# Patient Record
Sex: MALE | Race: WHITE | Employment: OTHER | URBAN - METROPOLITAN AREA
[De-identification: names, ages, dates, MRNs, and addresses within clinical notes are randomized per-mention and may not be internally consistent; named-entity substitution may affect disease eponyms.]

---

## 2021-01-01 ENCOUNTER — DOCUMENTATION ONLY (OUTPATIENT)
Dept: CARDIOLOGY CLINIC | Age: 71
End: 2021-01-01

## 2021-01-01 ENCOUNTER — TELEPHONE (OUTPATIENT)
Dept: CARDIOLOGY CLINIC | Age: 71
End: 2021-01-01

## 2021-01-01 ENCOUNTER — OFFICE VISIT (OUTPATIENT)
Dept: CARDIOLOGY CLINIC | Age: 71
End: 2021-01-01
Payer: MEDICARE

## 2021-01-01 ENCOUNTER — APPOINTMENT (OUTPATIENT)
Dept: ULTRASOUND IMAGING | Age: 71
DRG: 246 | End: 2021-01-01
Attending: INTERNAL MEDICINE
Payer: MEDICARE

## 2021-01-01 ENCOUNTER — APPOINTMENT (OUTPATIENT)
Dept: NON INVASIVE DIAGNOSTICS | Age: 71
DRG: 246 | End: 2021-01-01
Attending: STUDENT IN AN ORGANIZED HEALTH CARE EDUCATION/TRAINING PROGRAM
Payer: MEDICARE

## 2021-01-01 ENCOUNTER — APPOINTMENT (OUTPATIENT)
Dept: GENERAL RADIOLOGY | Age: 71
DRG: 246 | End: 2021-01-01
Attending: EMERGENCY MEDICINE
Payer: MEDICARE

## 2021-01-01 ENCOUNTER — HOSPITAL ENCOUNTER (OUTPATIENT)
Dept: CT IMAGING | Age: 71
Discharge: HOME | End: 2021-11-30
Payer: MEDICARE

## 2021-01-01 ENCOUNTER — HOSPITAL ENCOUNTER (OUTPATIENT)
Dept: GENERAL RADIOLOGY | Age: 71
Discharge: HOME | End: 2021-11-29
Attending: PSYCHIATRY & NEUROLOGY
Payer: MEDICARE

## 2021-01-01 ENCOUNTER — APPOINTMENT (OUTPATIENT)
Dept: GENERAL RADIOLOGY | Age: 71
DRG: 246 | End: 2021-01-01
Attending: FAMILY MEDICINE
Payer: MEDICARE

## 2021-01-01 ENCOUNTER — APPOINTMENT (OUTPATIENT)
Dept: GENERAL RADIOLOGY | Age: 71
DRG: 246 | End: 2021-01-01
Attending: STUDENT IN AN ORGANIZED HEALTH CARE EDUCATION/TRAINING PROGRAM
Payer: MEDICARE

## 2021-01-01 ENCOUNTER — TELEPHONE (OUTPATIENT)
Dept: CASE MANAGEMENT | Age: 71
End: 2021-01-01

## 2021-01-01 ENCOUNTER — TRANSCRIBE ORDER (OUTPATIENT)
Dept: CARDIAC REHAB | Age: 71
End: 2021-01-01

## 2021-01-01 ENCOUNTER — APPOINTMENT (OUTPATIENT)
Dept: CT IMAGING | Age: 71
DRG: 246 | End: 2021-01-01
Attending: EMERGENCY MEDICINE
Payer: MEDICARE

## 2021-01-01 ENCOUNTER — HOSPITAL ENCOUNTER (INPATIENT)
Age: 71
LOS: 10 days | Discharge: HOME | DRG: 246 | End: 2021-12-19
Attending: EMERGENCY MEDICINE | Admitting: STUDENT IN AN ORGANIZED HEALTH CARE EDUCATION/TRAINING PROGRAM
Payer: MEDICARE

## 2021-01-01 VITALS
HEART RATE: 76 BPM | SYSTOLIC BLOOD PRESSURE: 134 MMHG | BODY MASS INDEX: 23.32 KG/M2 | DIASTOLIC BLOOD PRESSURE: 58 MMHG | WEIGHT: 140 LBS | RESPIRATION RATE: 30 BRPM | HEIGHT: 65 IN

## 2021-01-01 VITALS
OXYGEN SATURATION: 93 % | HEIGHT: 65 IN | HEART RATE: 73 BPM | RESPIRATION RATE: 22 BRPM | TEMPERATURE: 97.4 F | DIASTOLIC BLOOD PRESSURE: 70 MMHG | SYSTOLIC BLOOD PRESSURE: 142 MMHG | WEIGHT: 137.4 LBS | BODY MASS INDEX: 22.89 KG/M2

## 2021-01-01 VITALS
BODY MASS INDEX: 22.48 KG/M2 | SYSTOLIC BLOOD PRESSURE: 141 MMHG | OXYGEN SATURATION: 99 % | HEIGHT: 65 IN | TEMPERATURE: 97.4 F | RESPIRATION RATE: 20 BRPM | HEART RATE: 80 BPM | DIASTOLIC BLOOD PRESSURE: 58 MMHG | WEIGHT: 134.92 LBS

## 2021-01-01 VITALS
BODY MASS INDEX: 23.16 KG/M2 | RESPIRATION RATE: 19 BRPM | DIASTOLIC BLOOD PRESSURE: 70 MMHG | WEIGHT: 139 LBS | OXYGEN SATURATION: 94 % | HEART RATE: 59 BPM | SYSTOLIC BLOOD PRESSURE: 121 MMHG | HEIGHT: 65 IN

## 2021-01-01 VITALS
BODY MASS INDEX: 23.39 KG/M2 | OXYGEN SATURATION: 97 % | DIASTOLIC BLOOD PRESSURE: 54 MMHG | SYSTOLIC BLOOD PRESSURE: 101 MMHG | WEIGHT: 140.4 LBS | HEIGHT: 65 IN | RESPIRATION RATE: 28 BRPM | HEART RATE: 72 BPM

## 2021-01-01 VITALS
SYSTOLIC BLOOD PRESSURE: 114 MMHG | RESPIRATION RATE: 16 BRPM | HEART RATE: 60 BPM | BODY MASS INDEX: 24.07 KG/M2 | OXYGEN SATURATION: 94 % | WEIGHT: 141 LBS | HEIGHT: 64 IN | DIASTOLIC BLOOD PRESSURE: 60 MMHG

## 2021-01-01 DIAGNOSIS — I50.42 CHRONIC COMBINED SYSTOLIC AND DIASTOLIC CONGESTIVE HEART FAILURE (HCC): Primary | ICD-10-CM

## 2021-01-01 DIAGNOSIS — Z95.5 STENTED CORONARY ARTERY: Primary | ICD-10-CM

## 2021-01-01 DIAGNOSIS — I35.1 AORTIC VALVE INSUFFICIENCY, ETIOLOGY OF CARDIAC VALVE DISEASE UNSPECIFIED: ICD-10-CM

## 2021-01-01 DIAGNOSIS — I35.1 AORTIC VALVE INSUFFICIENCY, ETIOLOGY OF CARDIAC VALVE DISEASE UNSPECIFIED: Primary | ICD-10-CM

## 2021-01-01 DIAGNOSIS — I21.A1 TYPE 2 MYOCARDIAL INFARCTION (HCC): ICD-10-CM

## 2021-01-01 DIAGNOSIS — I50.42 CHRONIC COMBINED SYSTOLIC AND DIASTOLIC CONGESTIVE HEART FAILURE (HCC): ICD-10-CM

## 2021-01-01 DIAGNOSIS — N28.9 RENAL INSUFFICIENCY: ICD-10-CM

## 2021-01-01 DIAGNOSIS — I10 ESSENTIAL HYPERTENSION: Primary | ICD-10-CM

## 2021-01-01 DIAGNOSIS — I50.9 ACUTE CONGESTIVE HEART FAILURE, UNSPECIFIED HEART FAILURE TYPE (HCC): Primary | ICD-10-CM

## 2021-01-01 DIAGNOSIS — I25.10 CORONARY ARTERIOSCLEROSIS: ICD-10-CM

## 2021-01-01 DIAGNOSIS — E11.9 TYPE 2 DIABETES MELLITUS WITHOUT COMPLICATION, WITHOUT LONG-TERM CURRENT USE OF INSULIN (HCC): ICD-10-CM

## 2021-01-01 DIAGNOSIS — J44.9 CHRONIC OBSTRUCTIVE PULMONARY DISEASE, UNSPECIFIED COPD TYPE (HCC): ICD-10-CM

## 2021-01-01 DIAGNOSIS — I10 ESSENTIAL HYPERTENSION: ICD-10-CM

## 2021-01-01 DIAGNOSIS — I25.83 CORONARY ARTERY DISEASE DUE TO LIPID RICH PLAQUE: ICD-10-CM

## 2021-01-01 DIAGNOSIS — R91.8 PULMONARY MASS: ICD-10-CM

## 2021-01-01 DIAGNOSIS — R06.02 SHORTNESS OF BREATH: ICD-10-CM

## 2021-01-01 DIAGNOSIS — Z09 HOSPITAL DISCHARGE FOLLOW-UP: ICD-10-CM

## 2021-01-01 DIAGNOSIS — I25.10 CORONARY ARTERY DISEASE DUE TO LIPID RICH PLAQUE: ICD-10-CM

## 2021-01-01 DIAGNOSIS — R06.02 SHORTNESS OF BREATH: Primary | ICD-10-CM

## 2021-01-01 LAB
A FUMIGATUS1 AB SER QL ID: NEGATIVE
A PULLULANS AB SER QL: POSITIVE
ACT BLD: 243 SECS (ref 79–138)
ACT BLD: 249 SECS (ref 79–138)
ALBUMIN SERPL-MCNC: 2.7 G/DL (ref 3.5–5)
ALBUMIN SERPL-MCNC: 2.7 G/DL (ref 3.5–5)
ALBUMIN SERPL-MCNC: 2.8 G/DL (ref 3.5–5)
ALBUMIN SERPL-MCNC: 3.1 G/DL (ref 3.5–5)
ALBUMIN SERPL-MCNC: 3.2 G/DL (ref 3.5–5)
ALBUMIN/GLOB SERPL: 0.6 {RATIO} (ref 1.1–2.2)
ALBUMIN/GLOB SERPL: 0.6 {RATIO} (ref 1.1–2.2)
ALBUMIN/GLOB SERPL: 0.7 {RATIO} (ref 1.1–2.2)
ALBUMIN/GLOB SERPL: 0.8 {RATIO} (ref 1.1–2.2)
ALBUMIN/GLOB SERPL: 0.9 {RATIO} (ref 1.1–2.2)
ALP SERPL-CCNC: 116 U/L (ref 45–117)
ALP SERPL-CCNC: 122 U/L (ref 45–117)
ALP SERPL-CCNC: 146 U/L (ref 45–117)
ALP SERPL-CCNC: 150 U/L (ref 45–117)
ALP SERPL-CCNC: 167 U/L (ref 45–117)
ALT SERPL-CCNC: 43 U/L (ref 12–78)
ALT SERPL-CCNC: 76 U/L (ref 12–78)
ALT SERPL-CCNC: 79 U/L (ref 12–78)
ALT SERPL-CCNC: 86 U/L (ref 12–78)
ALT SERPL-CCNC: 88 U/L (ref 12–78)
ANA SER QL: NEGATIVE
ANA SER QL: NEGATIVE
ANION GAP SERPL CALC-SCNC: 3 MMOL/L (ref 5–15)
ANION GAP SERPL CALC-SCNC: 4 MMOL/L (ref 5–15)
ANION GAP SERPL CALC-SCNC: 5 MMOL/L (ref 5–15)
ANION GAP SERPL CALC-SCNC: 6 MMOL/L (ref 5–15)
ANION GAP SERPL CALC-SCNC: 7 MMOL/L (ref 5–15)
ANION GAP SERPL CALC-SCNC: 7 MMOL/L (ref 5–15)
ANION GAP SERPL CALC-SCNC: 8 MMOL/L (ref 5–15)
ANION GAP SERPL CALC-SCNC: 9 MMOL/L (ref 5–15)
APPEARANCE UR: CLEAR
APTT PPP: 27.1 SEC (ref 22.1–31)
APTT PPP: 28.2 SEC (ref 22.1–31)
APTT PPP: 29.1 SEC (ref 22.1–31)
APTT PPP: 30.6 SEC (ref 22.1–31)
APTT PPP: 51.7 SEC (ref 22.1–31)
APTT PPP: 55.9 SEC (ref 22.1–31)
APTT PPP: 57.5 SEC (ref 22.1–31)
APTT PPP: 69.3 SEC (ref 22.1–31)
APTT PPP: 79.8 SEC (ref 22.1–31)
APTT PPP: >130 SEC (ref 22.1–31)
ARTERIAL PATENCY WRIST A: ABNORMAL
ARTERIAL PATENCY WRIST A: ABNORMAL
AST SERPL-CCNC: 12 U/L (ref 15–37)
AST SERPL-CCNC: 37 U/L (ref 15–37)
AST SERPL-CCNC: 38 U/L (ref 15–37)
AST SERPL-CCNC: 41 U/L (ref 15–37)
AST SERPL-CCNC: 49 U/L (ref 15–37)
ATRIAL RATE: 67 BPM
ATRIAL RATE: 72 BPM
ATRIAL RATE: 76 BPM
ATRIAL RATE: 85 BPM
ATRIAL RATE: 86 BPM
ATRIAL RATE: 97 BPM
AV R PG: 93.89 MMHG
BACTERIA SPEC CULT: ABNORMAL
BACTERIA SPEC CULT: NORMAL
BACTERIA URNS QL MICRO: ABNORMAL /HPF
BASE EXCESS BLD CALC-SCNC: 3.5 MMOL/L
BASE EXCESS BLD CALC-SCNC: 6.3 MMOL/L
BASE EXCESS BLD CALC-SCNC: 7 MMOL/L
BASOPHILS # BLD: 0 K/UL (ref 0–0.1)
BASOPHILS # BLD: 0.1 K/UL (ref 0–0.1)
BASOPHILS # BLD: 0.1 K/UL (ref 0–0.1)
BASOPHILS NFR BLD: 0 % (ref 0–1)
BASOPHILS NFR BLD: 1 % (ref 0–1)
BASOPHILS NFR BLD: 1 % (ref 0–1)
BDY SITE: ABNORMAL
BDY SITE: ABNORMAL
BILIRUB SERPL-MCNC: 0.2 MG/DL (ref 0.2–1)
BILIRUB SERPL-MCNC: 0.3 MG/DL (ref 0.2–1)
BILIRUB SERPL-MCNC: 0.4 MG/DL (ref 0.2–1)
BILIRUB UR QL: NEGATIVE
BNP SERPL-MCNC: 1300 PG/ML
BNP SERPL-MCNC: 2307 PG/ML
BNP SERPL-MCNC: 2469 PG/ML
BNP SERPL-MCNC: 2527 PG/ML
BNP SERPL-MCNC: 2630 PG/ML
BNP SERPL-MCNC: 3351 PG/ML
BNP SERPL-MCNC: 3660 PG/ML
BNP SERPL-MCNC: 4772 PG/ML
BNP SERPL-MCNC: 5365 PG/ML
BNP SERPL-MCNC: 5421 PG/ML
BUN SERPL-MCNC: 53 MG/DL (ref 6–20)
BUN SERPL-MCNC: 54 MG/DL (ref 6–20)
BUN SERPL-MCNC: 54 MG/DL (ref 6–20)
BUN SERPL-MCNC: 56 MG/DL (ref 6–20)
BUN SERPL-MCNC: 67 MG/DL (ref 6–20)
BUN SERPL-MCNC: 72 MG/DL (ref 6–20)
BUN SERPL-MCNC: 77 MG/DL (ref 6–20)
BUN SERPL-MCNC: 81 MG/DL (ref 6–20)
BUN SERPL-MCNC: 83 MG/DL (ref 6–20)
BUN SERPL-MCNC: 84 MG/DL (ref 6–20)
BUN SERPL-MCNC: 84 MG/DL (ref 6–20)
BUN SERPL-MCNC: 85 MG/DL (ref 6–20)
BUN SERPL-MCNC: 86 MG/DL (ref 6–20)
BUN SERPL-MCNC: 88 MG/DL (ref 6–20)
BUN/CREAT SERPL: 21 (ref 12–20)
BUN/CREAT SERPL: 21 (ref 12–20)
BUN/CREAT SERPL: 22 (ref 12–20)
BUN/CREAT SERPL: 23 (ref 12–20)
BUN/CREAT SERPL: 24 (ref 12–20)
BUN/CREAT SERPL: 27 (ref 12–20)
BUN/CREAT SERPL: 27 (ref 12–20)
BUN/CREAT SERPL: 28 (ref 12–20)
BUN/CREAT SERPL: 29 (ref 12–20)
BUN/CREAT SERPL: 29 (ref 12–20)
BUN/CREAT SERPL: 30 (ref 12–20)
BUN/CREAT SERPL: 30 (ref 12–20)
BUN/CREAT SERPL: 33 (ref 12–20)
BUN/CREAT SERPL: 35 (ref 12–20)
C-ANCA TITR SER IF: NORMAL TITER
C-ANCA TITR SER IF: NORMAL TITER
CALCIUM SERPL-MCNC: 8.3 MG/DL (ref 8.5–10.1)
CALCIUM SERPL-MCNC: 8.4 MG/DL (ref 8.5–10.1)
CALCIUM SERPL-MCNC: 8.6 MG/DL (ref 8.5–10.1)
CALCIUM SERPL-MCNC: 8.7 MG/DL (ref 8.5–10.1)
CALCIUM SERPL-MCNC: 8.7 MG/DL (ref 8.5–10.1)
CALCIUM SERPL-MCNC: 8.8 MG/DL (ref 8.5–10.1)
CALCIUM SERPL-MCNC: 9 MG/DL (ref 8.5–10.1)
CALCIUM SERPL-MCNC: 9 MG/DL (ref 8.5–10.1)
CALCIUM SERPL-MCNC: 9.2 MG/DL (ref 8.5–10.1)
CALCIUM SERPL-MCNC: 9.4 MG/DL (ref 8.5–10.1)
CALCIUM SERPL-MCNC: 9.4 MG/DL (ref 8.5–10.1)
CALCIUM SERPL-MCNC: 9.5 MG/DL (ref 8.5–10.1)
CALCULATED P AXIS, ECG09: 25 DEGREES
CALCULATED P AXIS, ECG09: 28 DEGREES
CALCULATED P AXIS, ECG09: 28 DEGREES
CALCULATED P AXIS, ECG09: 31 DEGREES
CALCULATED P AXIS, ECG09: 33 DEGREES
CALCULATED P AXIS, ECG09: 59 DEGREES
CALCULATED R AXIS, ECG10: -22 DEGREES
CALCULATED R AXIS, ECG10: -28 DEGREES
CALCULATED R AXIS, ECG10: -31 DEGREES
CALCULATED R AXIS, ECG10: -33 DEGREES
CALCULATED R AXIS, ECG10: -34 DEGREES
CALCULATED T AXIS, ECG11: 110 DEGREES
CALCULATED T AXIS, ECG11: 122 DEGREES
CALCULATED T AXIS, ECG11: 124 DEGREES
CALCULATED T AXIS, ECG11: 6 DEGREES
CC UR VC: ABNORMAL
CCP IGA+IGG SERPL IA-ACNC: 9 UNITS (ref 0–19)
CHLORIDE SERPL-SCNC: 100 MMOL/L (ref 97–108)
CHLORIDE SERPL-SCNC: 100 MMOL/L (ref 97–108)
CHLORIDE SERPL-SCNC: 101 MMOL/L (ref 97–108)
CHLORIDE SERPL-SCNC: 101 MMOL/L (ref 97–108)
CHLORIDE SERPL-SCNC: 102 MMOL/L (ref 97–108)
CHLORIDE SERPL-SCNC: 103 MMOL/L (ref 97–108)
CHLORIDE SERPL-SCNC: 104 MMOL/L (ref 97–108)
CHLORIDE SERPL-SCNC: 104 MMOL/L (ref 97–108)
CHLORIDE SERPL-SCNC: 95 MMOL/L (ref 97–108)
CHLORIDE SERPL-SCNC: 95 MMOL/L (ref 97–108)
CHLORIDE SERPL-SCNC: 96 MMOL/L (ref 97–108)
CHLORIDE SERPL-SCNC: 98 MMOL/L (ref 97–108)
CHLORIDE SERPL-SCNC: 98 MMOL/L (ref 97–108)
CHLORIDE SERPL-SCNC: 99 MMOL/L (ref 97–108)
CHOLEST SERPL-MCNC: 76 MG/DL
CO2 SERPL-SCNC: 27 MMOL/L (ref 21–32)
CO2 SERPL-SCNC: 28 MMOL/L (ref 21–32)
CO2 SERPL-SCNC: 28 MMOL/L (ref 21–32)
CO2 SERPL-SCNC: 29 MMOL/L (ref 21–32)
CO2 SERPL-SCNC: 30 MMOL/L (ref 21–32)
CO2 SERPL-SCNC: 30 MMOL/L (ref 21–32)
CO2 SERPL-SCNC: 31 MMOL/L (ref 21–32)
CO2 SERPL-SCNC: 31 MMOL/L (ref 21–32)
CO2 SERPL-SCNC: 32 MMOL/L (ref 21–32)
CO2 SERPL-SCNC: 32 MMOL/L (ref 21–32)
CO2 SERPL-SCNC: 33 MMOL/L (ref 21–32)
COLOR UR: ABNORMAL
COMMENT, HOLDF: NORMAL
COVID-19 RAPID TEST, COVR: NOT DETECTED
CREAT SERPL-MCNC: 2.41 MG/DL (ref 0.7–1.3)
CREAT SERPL-MCNC: 2.44 MG/DL (ref 0.7–1.3)
CREAT SERPL-MCNC: 2.46 MG/DL (ref 0.7–1.3)
CREAT SERPL-MCNC: 2.48 MG/DL (ref 0.7–1.3)
CREAT SERPL-MCNC: 2.5 MG/DL (ref 0.7–1.3)
CREAT SERPL-MCNC: 2.57 MG/DL (ref 0.7–1.3)
CREAT SERPL-MCNC: 2.62 MG/DL (ref 0.7–1.3)
CREAT SERPL-MCNC: 2.76 MG/DL (ref 0.7–1.3)
CREAT SERPL-MCNC: 2.83 MG/DL (ref 0.7–1.3)
CREAT SERPL-MCNC: 2.83 MG/DL (ref 0.7–1.3)
CREAT SERPL-MCNC: 2.85 MG/DL (ref 0.7–1.3)
CREAT SERPL-MCNC: 2.89 MG/DL (ref 0.7–1.3)
CREAT SERPL-MCNC: 3.05 MG/DL (ref 0.7–1.3)
CREAT SERPL-MCNC: 3.2 MG/DL (ref 0.7–1.3)
CREAT UR-MCNC: 26.6 MG/DL
CRP SERPL-MCNC: 3.23 MG/DL (ref 0–0.6)
DIAGNOSIS, 93000: NORMAL
DIFFERENTIAL METHOD BLD: ABNORMAL
ECHO AO ROOT DIAM: 3.61 CM
ECHO AR MAX VEL PISA: 483.93 CM/S
ECHO AV AREA PEAK VELOCITY: 1.58 CM2
ECHO AV AREA VTI: 1.83 CM2
ECHO AV AREA/BSA PEAK VELOCITY: 0.9 CM2/M2
ECHO AV AREA/BSA VTI: 1.1 CM2/M2
ECHO AV MEAN GRADIENT: 13.88 MMHG
ECHO AV PEAK GRADIENT: 26.89 MMHG
ECHO AV PEAK VELOCITY: 259.3 CM/S
ECHO AV REGURGITANT PHT: 411.89 MS
ECHO AV VTI: 43.62 CM
ECHO LA AREA 4C: 15.32 CM2
ECHO LA MAJOR AXIS: 4.5 CM
ECHO LA MINOR AXIS: 2.66 CM
ECHO LA VOL 2C: 49.5 ML (ref 18–58)
ECHO LA VOL 4C: 36.97 ML (ref 18–58)
ECHO LA VOL BP: 45.16 ML (ref 18–58)
ECHO LA VOL/BSA BIPLANE: 26.72 ML/M2 (ref 16–28)
ECHO LA VOLUME INDEX A2C: 29.29 ML/M2 (ref 16–28)
ECHO LA VOLUME INDEX A4C: 21.88 ML/M2 (ref 16–28)
ECHO LV E' LATERAL VELOCITY: 4.67 CM/S
ECHO LV E' SEPTAL VELOCITY: 5.74 CM/S
ECHO LV INTERNAL DIMENSION DIASTOLIC: 4.61 CM (ref 4.2–5.9)
ECHO LV INTERNAL DIMENSION SYSTOLIC: 3.59 CM
ECHO LV IVSD: 1.07 CM (ref 0.6–1)
ECHO LV MASS 2D: 187.7 G (ref 88–224)
ECHO LV MASS INDEX 2D: 111.1 G/M2 (ref 49–115)
ECHO LV POSTERIOR WALL DIASTOLIC: 1.18 CM (ref 0.6–1)
ECHO LVOT DIAM: 2.33 CM
ECHO LVOT PEAK GRADIENT: 3.68 MMHG
ECHO LVOT PEAK VELOCITY: 95.86 CM/S
ECHO LVOT SV: 79.9 ML
ECHO LVOT VTI: 18.71 CM
ECHO MV A VELOCITY: 81.1 CM/S
ECHO MV AREA PHT: 3.85 CM2
ECHO MV E DECELERATION TIME (DT): 197.22 MS
ECHO MV E VELOCITY: 60.56 CM/S
ECHO MV E/A RATIO: 0.75
ECHO MV E/E' LATERAL: 12.97
ECHO MV E/E' RATIO (AVERAGED): 11.76
ECHO MV E/E' SEPTAL: 10.55
ECHO MV PRESSURE HALF TIME (PHT): 57.19 MS
ECHO PV MAX VELOCITY: 77.84 CM/S
ECHO PV PEAK INSTANTANEOUS GRADIENT SYSTOLIC: 2.42 MMHG
ECHO RV INTERNAL DIMENSION: 4.73 CM
ECHO RV TAPSE: 2.37 CM (ref 1.5–2)
EOSINOPHIL # BLD: 0 K/UL (ref 0–0.4)
EOSINOPHIL # BLD: 0.1 K/UL (ref 0–0.4)
EOSINOPHIL # BLD: 0.1 K/UL (ref 0–0.4)
EOSINOPHIL # BLD: 0.2 K/UL (ref 0–0.4)
EOSINOPHIL # BLD: 0.3 K/UL (ref 0–0.4)
EOSINOPHIL # BLD: 0.4 K/UL (ref 0–0.4)
EOSINOPHIL # BLD: 0.5 K/UL (ref 0–0.4)
EOSINOPHIL # BLD: 0.7 K/UL (ref 0–0.4)
EOSINOPHIL NFR BLD: 0 % (ref 0–7)
EOSINOPHIL NFR BLD: 1 % (ref 0–7)
EOSINOPHIL NFR BLD: 3 % (ref 0–7)
EOSINOPHIL NFR BLD: 5 % (ref 0–7)
EOSINOPHIL NFR BLD: 6 % (ref 0–7)
EOSINOPHIL NFR BLD: 7 % (ref 0–7)
EPITH CASTS URNS QL MICRO: ABNORMAL /LPF
ERYTHROCYTE [DISTWIDTH] IN BLOOD BY AUTOMATED COUNT: 14.6 % (ref 11.5–14.5)
ERYTHROCYTE [DISTWIDTH] IN BLOOD BY AUTOMATED COUNT: 14.7 % (ref 11.5–14.5)
ERYTHROCYTE [DISTWIDTH] IN BLOOD BY AUTOMATED COUNT: 14.9 % (ref 11.5–14.5)
ERYTHROCYTE [DISTWIDTH] IN BLOOD BY AUTOMATED COUNT: 15 % (ref 11.5–14.5)
ERYTHROCYTE [SEDIMENTATION RATE] IN BLOOD: 63 MM/HR (ref 0–20)
ERYTHROCYTE [SEDIMENTATION RATE] IN BLOOD: 76 MM/HR (ref 0–20)
EST. AVERAGE GLUCOSE BLD GHB EST-MCNC: 220 MG/DL
FERRITIN SERPL-MCNC: 129 NG/ML (ref 26–388)
GAS FLOW.O2 O2 DELIVERY SYS: ABNORMAL L/MIN
GAS FLOW.O2 O2 DELIVERY SYS: ABNORMAL L/MIN
GLOBULIN SER CALC-MCNC: 3.4 G/DL (ref 2–4)
GLOBULIN SER CALC-MCNC: 4.1 G/DL (ref 2–4)
GLOBULIN SER CALC-MCNC: 4.1 G/DL (ref 2–4)
GLOBULIN SER CALC-MCNC: 4.7 G/DL (ref 2–4)
GLOBULIN SER CALC-MCNC: 4.8 G/DL (ref 2–4)
GLUCOSE BLD STRIP.AUTO-MCNC: 102 MG/DL (ref 65–117)
GLUCOSE BLD STRIP.AUTO-MCNC: 102 MG/DL (ref 65–117)
GLUCOSE BLD STRIP.AUTO-MCNC: 105 MG/DL (ref 65–117)
GLUCOSE BLD STRIP.AUTO-MCNC: 108 MG/DL (ref 65–117)
GLUCOSE BLD STRIP.AUTO-MCNC: 109 MG/DL (ref 65–117)
GLUCOSE BLD STRIP.AUTO-MCNC: 110 MG/DL (ref 65–117)
GLUCOSE BLD STRIP.AUTO-MCNC: 111 MG/DL (ref 65–117)
GLUCOSE BLD STRIP.AUTO-MCNC: 114 MG/DL (ref 65–117)
GLUCOSE BLD STRIP.AUTO-MCNC: 118 MG/DL (ref 65–117)
GLUCOSE BLD STRIP.AUTO-MCNC: 129 MG/DL (ref 65–117)
GLUCOSE BLD STRIP.AUTO-MCNC: 132 MG/DL (ref 65–117)
GLUCOSE BLD STRIP.AUTO-MCNC: 135 MG/DL (ref 65–117)
GLUCOSE BLD STRIP.AUTO-MCNC: 136 MG/DL (ref 65–117)
GLUCOSE BLD STRIP.AUTO-MCNC: 140 MG/DL (ref 65–117)
GLUCOSE BLD STRIP.AUTO-MCNC: 140 MG/DL (ref 65–117)
GLUCOSE BLD STRIP.AUTO-MCNC: 143 MG/DL (ref 65–117)
GLUCOSE BLD STRIP.AUTO-MCNC: 165 MG/DL (ref 65–117)
GLUCOSE BLD STRIP.AUTO-MCNC: 173 MG/DL (ref 65–117)
GLUCOSE BLD STRIP.AUTO-MCNC: 182 MG/DL (ref 65–117)
GLUCOSE BLD STRIP.AUTO-MCNC: 189 MG/DL (ref 65–117)
GLUCOSE BLD STRIP.AUTO-MCNC: 190 MG/DL (ref 65–117)
GLUCOSE BLD STRIP.AUTO-MCNC: 191 MG/DL (ref 65–117)
GLUCOSE BLD STRIP.AUTO-MCNC: 194 MG/DL (ref 65–117)
GLUCOSE BLD STRIP.AUTO-MCNC: 196 MG/DL (ref 65–117)
GLUCOSE BLD STRIP.AUTO-MCNC: 196 MG/DL (ref 65–117)
GLUCOSE BLD STRIP.AUTO-MCNC: 205 MG/DL (ref 65–117)
GLUCOSE BLD STRIP.AUTO-MCNC: 206 MG/DL (ref 65–117)
GLUCOSE BLD STRIP.AUTO-MCNC: 210 MG/DL (ref 65–117)
GLUCOSE BLD STRIP.AUTO-MCNC: 219 MG/DL (ref 65–117)
GLUCOSE BLD STRIP.AUTO-MCNC: 219 MG/DL (ref 65–117)
GLUCOSE BLD STRIP.AUTO-MCNC: 226 MG/DL (ref 65–117)
GLUCOSE BLD STRIP.AUTO-MCNC: 227 MG/DL (ref 65–117)
GLUCOSE BLD STRIP.AUTO-MCNC: 234 MG/DL (ref 65–117)
GLUCOSE BLD STRIP.AUTO-MCNC: 256 MG/DL (ref 65–117)
GLUCOSE BLD STRIP.AUTO-MCNC: 274 MG/DL (ref 65–117)
GLUCOSE BLD STRIP.AUTO-MCNC: 275 MG/DL (ref 65–117)
GLUCOSE BLD STRIP.AUTO-MCNC: 294 MG/DL (ref 65–117)
GLUCOSE BLD STRIP.AUTO-MCNC: 311 MG/DL (ref 65–117)
GLUCOSE BLD STRIP.AUTO-MCNC: 33 MG/DL (ref 65–117)
GLUCOSE BLD STRIP.AUTO-MCNC: 35 MG/DL (ref 65–117)
GLUCOSE BLD STRIP.AUTO-MCNC: 363 MG/DL (ref 65–117)
GLUCOSE BLD STRIP.AUTO-MCNC: 37 MG/DL (ref 65–117)
GLUCOSE BLD STRIP.AUTO-MCNC: 406 MG/DL (ref 65–117)
GLUCOSE BLD STRIP.AUTO-MCNC: 409 MG/DL (ref 65–117)
GLUCOSE BLD STRIP.AUTO-MCNC: 41 MG/DL (ref 65–117)
GLUCOSE BLD STRIP.AUTO-MCNC: 411 MG/DL (ref 65–117)
GLUCOSE BLD STRIP.AUTO-MCNC: 424 MG/DL (ref 65–117)
GLUCOSE BLD STRIP.AUTO-MCNC: 49 MG/DL (ref 65–117)
GLUCOSE BLD STRIP.AUTO-MCNC: 50 MG/DL (ref 65–117)
GLUCOSE BLD STRIP.AUTO-MCNC: 52 MG/DL (ref 65–117)
GLUCOSE BLD STRIP.AUTO-MCNC: 53 MG/DL (ref 65–117)
GLUCOSE BLD STRIP.AUTO-MCNC: 53 MG/DL (ref 65–117)
GLUCOSE BLD STRIP.AUTO-MCNC: 55 MG/DL (ref 65–117)
GLUCOSE BLD STRIP.AUTO-MCNC: 57 MG/DL (ref 65–117)
GLUCOSE BLD STRIP.AUTO-MCNC: 61 MG/DL (ref 65–117)
GLUCOSE BLD STRIP.AUTO-MCNC: 62 MG/DL (ref 65–117)
GLUCOSE BLD STRIP.AUTO-MCNC: 63 MG/DL (ref 65–117)
GLUCOSE BLD STRIP.AUTO-MCNC: 64 MG/DL (ref 65–117)
GLUCOSE BLD STRIP.AUTO-MCNC: 65 MG/DL (ref 65–117)
GLUCOSE BLD STRIP.AUTO-MCNC: 76 MG/DL (ref 65–117)
GLUCOSE BLD STRIP.AUTO-MCNC: 84 MG/DL (ref 65–117)
GLUCOSE BLD STRIP.AUTO-MCNC: 87 MG/DL (ref 65–117)
GLUCOSE BLD STRIP.AUTO-MCNC: 88 MG/DL (ref 65–117)
GLUCOSE BLD STRIP.AUTO-MCNC: 89 MG/DL (ref 65–117)
GLUCOSE BLD STRIP.AUTO-MCNC: 89 MG/DL (ref 65–117)
GLUCOSE BLD STRIP.AUTO-MCNC: 90 MG/DL (ref 65–117)
GLUCOSE BLD STRIP.AUTO-MCNC: 91 MG/DL (ref 65–117)
GLUCOSE BLD STRIP.AUTO-MCNC: 93 MG/DL (ref 65–117)
GLUCOSE BLD STRIP.AUTO-MCNC: 97 MG/DL (ref 65–117)
GLUCOSE SERPL-MCNC: 107 MG/DL (ref 65–100)
GLUCOSE SERPL-MCNC: 126 MG/DL (ref 65–100)
GLUCOSE SERPL-MCNC: 136 MG/DL (ref 65–100)
GLUCOSE SERPL-MCNC: 150 MG/DL (ref 65–100)
GLUCOSE SERPL-MCNC: 181 MG/DL (ref 65–100)
GLUCOSE SERPL-MCNC: 195 MG/DL (ref 65–100)
GLUCOSE SERPL-MCNC: 277 MG/DL (ref 65–100)
GLUCOSE SERPL-MCNC: 332 MG/DL (ref 65–100)
GLUCOSE SERPL-MCNC: 334 MG/DL (ref 65–100)
GLUCOSE SERPL-MCNC: 381 MG/DL (ref 65–100)
GLUCOSE SERPL-MCNC: 39 MG/DL (ref 65–100)
GLUCOSE SERPL-MCNC: 71 MG/DL (ref 65–100)
GLUCOSE SERPL-MCNC: 72 MG/DL (ref 65–100)
GLUCOSE SERPL-MCNC: 90 MG/DL (ref 65–100)
GLUCOSE UR STRIP.AUTO-MCNC: NEGATIVE MG/DL
HAV IGM SER QL: NONREACTIVE
HBA1C MFR BLD: 9.3 % (ref 4–5.6)
HBV CORE IGM SER QL: NONREACTIVE
HBV SURFACE AG SER QL: <0.1 INDEX
HBV SURFACE AG SER QL: NEGATIVE
HCO3 BLD-SCNC: 30 MMOL/L (ref 22–26)
HCO3 BLD-SCNC: 31.6 MMOL/L (ref 22–26)
HCO3 BLD-SCNC: 33.1 MMOL/L (ref 22–26)
HCT VFR BLD AUTO: 28.7 % (ref 36.6–50.3)
HCT VFR BLD AUTO: 29.1 % (ref 36.6–50.3)
HCT VFR BLD AUTO: 29.2 % (ref 36.6–50.3)
HCT VFR BLD AUTO: 29.2 % (ref 36.6–50.3)
HCT VFR BLD AUTO: 29.4 % (ref 36.6–50.3)
HCT VFR BLD AUTO: 34.6 % (ref 36.6–50.3)
HCT VFR BLD AUTO: 36 % (ref 36.6–50.3)
HCT VFR BLD AUTO: 40.2 % (ref 36.6–50.3)
HCT VFR BLD AUTO: 40.5 % (ref 36.6–50.3)
HCT VFR BLD AUTO: 41.9 % (ref 36.6–50.3)
HCV AB SERPL QL IA: NONREACTIVE
HDLC SERPL-MCNC: 28 MG/DL
HDLC SERPL: 2.7 {RATIO} (ref 0–5)
HGB BLD-MCNC: 11.1 G/DL (ref 12.1–17)
HGB BLD-MCNC: 11.4 G/DL (ref 12.1–17)
HGB BLD-MCNC: 12.6 G/DL (ref 12.1–17)
HGB BLD-MCNC: 12.8 G/DL (ref 12.1–17)
HGB BLD-MCNC: 13.2 G/DL (ref 12.1–17)
HGB BLD-MCNC: 9.2 G/DL (ref 12.1–17)
HGB BLD-MCNC: 9.3 G/DL (ref 12.1–17)
HGB UR QL STRIP: NEGATIVE
HYALINE CASTS URNS QL MICRO: ABNORMAL /LPF (ref 0–5)
IGE SERPL-ACNC: 264 IU/ML (ref 6–495)
IGE SERPL-ACNC: 268 IU/ML (ref 6–495)
IMM GRANULOCYTES # BLD AUTO: 0.1 K/UL (ref 0–0.04)
IMM GRANULOCYTES # BLD AUTO: 0.2 K/UL (ref 0–0.04)
IMM GRANULOCYTES # BLD AUTO: 0.2 K/UL (ref 0–0.04)
IMM GRANULOCYTES # BLD AUTO: 0.3 K/UL (ref 0–0.04)
IMM GRANULOCYTES # BLD AUTO: 0.3 K/UL (ref 0–0.04)
IMM GRANULOCYTES NFR BLD AUTO: 1 % (ref 0–0.5)
IMM GRANULOCYTES NFR BLD AUTO: 2 % (ref 0–0.5)
INR PPP: 1 (ref 0.9–1.1)
IRON SATN MFR SERPL: 17 % (ref 20–50)
IRON SERPL-MCNC: 45 UG/DL (ref 35–150)
KETONES UR QL STRIP.AUTO: NEGATIVE MG/DL
L PNEUMO1 AG UR QL IA: NEGATIVE
LACEYELLA SACCHARI AB SER QL: NEGATIVE
LACTATE BLD-SCNC: 0.58 MMOL/L (ref 0.4–2)
LACTATE SERPL-SCNC: 1 MMOL/L (ref 0.4–2)
LACTATE SERPL-SCNC: 1.2 MMOL/L (ref 0.4–2)
LACTATE SERPL-SCNC: 1.5 MMOL/L (ref 0.4–2)
LACTATE SERPL-SCNC: 1.7 MMOL/L (ref 0.4–2)
LDLC SERPL CALC-MCNC: 25.4 MG/DL (ref 0–100)
LEUKOCYTE ESTERASE UR QL STRIP.AUTO: ABNORMAL
LYMPHOCYTES # BLD: 0.2 K/UL (ref 0.8–3.5)
LYMPHOCYTES # BLD: 0.3 K/UL (ref 0.8–3.5)
LYMPHOCYTES # BLD: 0.4 K/UL (ref 0.8–3.5)
LYMPHOCYTES # BLD: 0.5 K/UL (ref 0.8–3.5)
LYMPHOCYTES # BLD: 0.7 K/UL (ref 0.8–3.5)
LYMPHOCYTES # BLD: 0.8 K/UL (ref 0.8–3.5)
LYMPHOCYTES # BLD: 1 K/UL (ref 0.8–3.5)
LYMPHOCYTES # BLD: 1 K/UL (ref 0.8–3.5)
LYMPHOCYTES NFR BLD: 1 % (ref 12–49)
LYMPHOCYTES NFR BLD: 10 % (ref 12–49)
LYMPHOCYTES NFR BLD: 10 % (ref 12–49)
LYMPHOCYTES NFR BLD: 2 % (ref 12–49)
LYMPHOCYTES NFR BLD: 4 % (ref 12–49)
LYMPHOCYTES NFR BLD: 5 % (ref 12–49)
LYMPHOCYTES NFR BLD: 8 % (ref 12–49)
LYMPHOCYTES NFR BLD: 8 % (ref 12–49)
M PNEUMO IGG SER IA-ACNC: 322 U/ML (ref 0–99)
M PNEUMO IGM SER IA-ACNC: <770 U/ML (ref 0–769)
MAGNESIUM SERPL-MCNC: 2.3 MG/DL (ref 1.6–2.4)
MAGNESIUM SERPL-MCNC: 2.4 MG/DL (ref 1.6–2.4)
MAGNESIUM SERPL-MCNC: 2.4 MG/DL (ref 1.6–2.4)
MAGNESIUM SERPL-MCNC: 2.5 MG/DL (ref 1.6–2.4)
MAGNESIUM SERPL-MCNC: 2.6 MG/DL (ref 1.6–2.4)
MAGNESIUM SERPL-MCNC: 2.8 MG/DL (ref 1.6–2.4)
MAGNESIUM SERPL-MCNC: 2.8 MG/DL (ref 1.6–2.4)
MAGNESIUM SERPL-MCNC: 3 MG/DL (ref 1.6–2.4)
MCH RBC QN AUTO: 29.3 PG (ref 26–34)
MCH RBC QN AUTO: 29.4 PG (ref 26–34)
MCH RBC QN AUTO: 29.5 PG (ref 26–34)
MCH RBC QN AUTO: 29.6 PG (ref 26–34)
MCH RBC QN AUTO: 29.6 PG (ref 26–34)
MCH RBC QN AUTO: 29.7 PG (ref 26–34)
MCH RBC QN AUTO: 29.8 PG (ref 26–34)
MCH RBC QN AUTO: 29.8 PG (ref 26–34)
MCH RBC QN AUTO: 30 PG (ref 26–34)
MCH RBC QN AUTO: 30 PG (ref 26–34)
MCHC RBC AUTO-ENTMCNC: 31.3 G/DL (ref 30–36.5)
MCHC RBC AUTO-ENTMCNC: 31.5 G/DL (ref 30–36.5)
MCHC RBC AUTO-ENTMCNC: 31.6 G/DL (ref 30–36.5)
MCHC RBC AUTO-ENTMCNC: 31.7 G/DL (ref 30–36.5)
MCHC RBC AUTO-ENTMCNC: 31.8 G/DL (ref 30–36.5)
MCHC RBC AUTO-ENTMCNC: 31.8 G/DL (ref 30–36.5)
MCHC RBC AUTO-ENTMCNC: 32.1 G/DL (ref 30–36.5)
MCHC RBC AUTO-ENTMCNC: 32.4 G/DL (ref 30–36.5)
MCV RBC AUTO: 92.4 FL (ref 80–99)
MCV RBC AUTO: 92.6 FL (ref 80–99)
MCV RBC AUTO: 92.7 FL (ref 80–99)
MCV RBC AUTO: 93 FL (ref 80–99)
MCV RBC AUTO: 93.3 FL (ref 80–99)
MCV RBC AUTO: 93.5 FL (ref 80–99)
MCV RBC AUTO: 94.2 FL (ref 80–99)
MCV RBC AUTO: 94.2 FL (ref 80–99)
MCV RBC AUTO: 94.4 FL (ref 80–99)
MCV RBC AUTO: 94.4 FL (ref 80–99)
MONOCYTES # BLD: 0.1 K/UL (ref 0–1)
MONOCYTES # BLD: 0.3 K/UL (ref 0–1)
MONOCYTES # BLD: 0.8 K/UL (ref 0–1)
MONOCYTES # BLD: 0.9 K/UL (ref 0–1)
MONOCYTES # BLD: 1.1 K/UL (ref 0–1)
MONOCYTES # BLD: 1.4 K/UL (ref 0–1)
MONOCYTES NFR BLD: 1 % (ref 5–13)
MONOCYTES NFR BLD: 10 % (ref 5–13)
MONOCYTES NFR BLD: 10 % (ref 5–13)
MONOCYTES NFR BLD: 11 % (ref 5–13)
MONOCYTES NFR BLD: 14 % (ref 5–13)
MONOCYTES NFR BLD: 2 % (ref 5–13)
MONOCYTES NFR BLD: 5 % (ref 5–13)
MONOCYTES NFR BLD: 9 % (ref 5–13)
MYELOPEROXIDASE AB SER IA-ACNC: <9 U/ML (ref 0–9)
MYELOPEROXIDASE AB SER IA-ACNC: <9 U/ML (ref 0–9)
NEUTS SEG # BLD: 11.6 K/UL (ref 1.8–8)
NEUTS SEG # BLD: 13.5 K/UL (ref 1.8–8)
NEUTS SEG # BLD: 6.6 K/UL (ref 1.8–8)
NEUTS SEG # BLD: 7.1 K/UL (ref 1.8–8)
NEUTS SEG # BLD: 7.2 K/UL (ref 1.8–8)
NEUTS SEG # BLD: 7.6 K/UL (ref 1.8–8)
NEUTS SEG # BLD: 7.8 K/UL (ref 1.8–8)
NEUTS SEG # BLD: 8.5 K/UL (ref 1.8–8)
NEUTS SEG NFR BLD: 72 % (ref 32–75)
NEUTS SEG NFR BLD: 73 % (ref 32–75)
NEUTS SEG NFR BLD: 74 % (ref 32–75)
NEUTS SEG NFR BLD: 77 % (ref 32–75)
NEUTS SEG NFR BLD: 79 % (ref 32–75)
NEUTS SEG NFR BLD: 91 % (ref 32–75)
NEUTS SEG NFR BLD: 93 % (ref 32–75)
NEUTS SEG NFR BLD: 93 % (ref 32–75)
NITRITE UR QL STRIP.AUTO: NEGATIVE
NRBC # BLD: 0 K/UL (ref 0–0.01)
NRBC BLD-RTO: 0 PER 100 WBC
O2/TOTAL GAS SETTING VFR VENT: 5 %
O2/TOTAL GAS SETTING VFR VENT: 5 %
P-ANCA ATYPICAL TITR SER IF: NORMAL TITER
P-ANCA ATYPICAL TITR SER IF: NORMAL TITER
P-ANCA TITR SER IF: NORMAL TITER
P-ANCA TITR SER IF: NORMAL TITER
P-R INTERVAL, ECG05: 150 MS
P-R INTERVAL, ECG05: 152 MS
P-R INTERVAL, ECG05: 162 MS
P-R INTERVAL, ECG05: 162 MS
P-R INTERVAL, ECG05: 168 MS
P-R INTERVAL, ECG05: 186 MS
PCO2 BLD: 47 MMHG (ref 35–45)
PCO2 BLD: 51.3 MMHG (ref 35–45)
PCO2 BLD: 53.9 MMHG (ref 35–45)
PH BLD: 7.38 [PH] (ref 7.35–7.45)
PH BLD: 7.4 [PH] (ref 7.35–7.45)
PH BLD: 7.44 [PH] (ref 7.35–7.45)
PH UR STRIP: 6.5 [PH] (ref 5–8)
PHOSPHATE SERPL-MCNC: 2.5 MG/DL (ref 2.6–4.7)
PHOSPHATE SERPL-MCNC: 2.8 MG/DL (ref 2.6–4.7)
PHOSPHATE SERPL-MCNC: 4.1 MG/DL (ref 2.6–4.7)
PHOSPHATE SERPL-MCNC: 4.1 MG/DL (ref 2.6–4.7)
PHOSPHATE SERPL-MCNC: 4.7 MG/DL (ref 2.6–4.7)
PIGEON SERUM AB QL ID: NEGATIVE
PLATELET # BLD AUTO: 119 K/UL (ref 150–400)
PLATELET # BLD AUTO: 131 K/UL (ref 150–400)
PLATELET # BLD AUTO: 134 K/UL (ref 150–400)
PLATELET # BLD AUTO: 137 K/UL (ref 150–400)
PLATELET # BLD AUTO: 141 K/UL (ref 150–400)
PLATELET # BLD AUTO: 176 K/UL (ref 150–400)
PLATELET # BLD AUTO: 230 K/UL (ref 150–400)
PLATELET # BLD AUTO: 243 K/UL (ref 150–400)
PLATELET # BLD AUTO: 249 K/UL (ref 150–400)
PLATELET # BLD AUTO: 250 K/UL (ref 150–400)
PLATELET COMMENTS,PCOM: ABNORMAL
PLATELET COMMENTS,PCOM: ABNORMAL
PMV BLD AUTO: 10.3 FL (ref 8.9–12.9)
PMV BLD AUTO: 10.6 FL (ref 8.9–12.9)
PMV BLD AUTO: 10.6 FL (ref 8.9–12.9)
PMV BLD AUTO: 10.8 FL (ref 8.9–12.9)
PMV BLD AUTO: 10.9 FL (ref 8.9–12.9)
PMV BLD AUTO: 11 FL (ref 8.9–12.9)
PMV BLD AUTO: 11.1 FL (ref 8.9–12.9)
PMV BLD AUTO: 11.4 FL (ref 8.9–12.9)
PMV BLD AUTO: 11.7 FL (ref 8.9–12.9)
PMV BLD AUTO: 11.8 FL (ref 8.9–12.9)
PO2 BLD: 150 MMHG (ref 80–100)
PO2 BLD: 45 MMHG (ref 80–100)
PO2 BLD: 64 MMHG (ref 80–100)
POTASSIUM SERPL-SCNC: 3.8 MMOL/L (ref 3.5–5.1)
POTASSIUM SERPL-SCNC: 3.9 MMOL/L (ref 3.5–5.1)
POTASSIUM SERPL-SCNC: 4.1 MMOL/L (ref 3.5–5.1)
POTASSIUM SERPL-SCNC: 4.2 MMOL/L (ref 3.5–5.1)
POTASSIUM SERPL-SCNC: 4.3 MMOL/L (ref 3.5–5.1)
POTASSIUM SERPL-SCNC: 4.4 MMOL/L (ref 3.5–5.1)
POTASSIUM SERPL-SCNC: 4.7 MMOL/L (ref 3.5–5.1)
POTASSIUM SERPL-SCNC: 4.7 MMOL/L (ref 3.5–5.1)
POTASSIUM SERPL-SCNC: 4.8 MMOL/L (ref 3.5–5.1)
PROCALCITONIN SERPL-MCNC: 0.06 NG/ML
PROCALCITONIN SERPL-MCNC: 20.2 NG/ML
PROCALCITONIN SERPL-MCNC: 30.26 NG/ML
PROCALCITONIN SERPL-MCNC: 6.84 NG/ML
PROT SERPL-MCNC: 6.6 G/DL (ref 6.4–8.2)
PROT SERPL-MCNC: 6.8 G/DL (ref 6.4–8.2)
PROT SERPL-MCNC: 7.2 G/DL (ref 6.4–8.2)
PROT SERPL-MCNC: 7.5 G/DL (ref 6.4–8.2)
PROT SERPL-MCNC: 7.5 G/DL (ref 6.4–8.2)
PROT UR STRIP-MCNC: 30 MG/DL
PROT UR-MCNC: 75 MG/DL (ref 0–11.9)
PROT/CREAT UR-RTO: 2.8
PROTEINASE3 AB SER IA-ACNC: <3.5 U/ML (ref 0–3.5)
PROTEINASE3 AB SER IA-ACNC: <3.5 U/ML (ref 0–3.5)
PROTHROMBIN TIME: 10.8 SEC (ref 9–11.1)
Q-T INTERVAL, ECG07: 372 MS
Q-T INTERVAL, ECG07: 380 MS
Q-T INTERVAL, ECG07: 384 MS
Q-T INTERVAL, ECG07: 402 MS
Q-T INTERVAL, ECG07: 408 MS
Q-T INTERVAL, ECG07: 426 MS
QRS DURATION, ECG06: 110 MS
QRS DURATION, ECG06: 112 MS
QRS DURATION, ECG06: 116 MS
QRS DURATION, ECG06: 120 MS
QRS DURATION, ECG06: 94 MS
QRS DURATION, ECG06: 96 MS
QTC CALCULATION (BEZET), ECG08: 416 MS
QTC CALCULATION (BEZET), ECG08: 431 MS
QTC CALCULATION (BEZET), ECG08: 459 MS
QTC CALCULATION (BEZET), ECG08: 472 MS
QTC CALCULATION (BEZET), ECG08: 478 MS
QTC CALCULATION (BEZET), ECG08: 479 MS
RBC # BLD AUTO: 3.09 M/UL (ref 4.1–5.7)
RBC # BLD AUTO: 3.1 M/UL (ref 4.1–5.7)
RBC # BLD AUTO: 3.14 M/UL (ref 4.1–5.7)
RBC # BLD AUTO: 3.16 M/UL (ref 4.1–5.7)
RBC # BLD AUTO: 3.17 M/UL (ref 4.1–5.7)
RBC # BLD AUTO: 3.7 M/UL (ref 4.1–5.7)
RBC # BLD AUTO: 3.86 M/UL (ref 4.1–5.7)
RBC # BLD AUTO: 4.26 M/UL (ref 4.1–5.7)
RBC # BLD AUTO: 4.3 M/UL (ref 4.1–5.7)
RBC # BLD AUTO: 4.44 M/UL (ref 4.1–5.7)
RBC #/AREA URNS HPF: ABNORMAL /HPF (ref 0–5)
RBC MORPH BLD: ABNORMAL
RHEUMATOID FACT SERPL-ACNC: 19 IU/ML
S RECTIVIRGULA AB SER QL ID: NEGATIVE
SAMPLES BEING HELD,HOLD: NORMAL
SAO2 % BLD: 78.5 % (ref 92–97)
SAO2 % BLD: 92.4 % (ref 92–97)
SAO2 % BLD: 99.2 % (ref 92–97)
SARS-COV-2, COV2: NORMAL
SARS-COV-2, XPLCVT: NOT DETECTED
SERVICE CMNT-IMP: ABNORMAL
SERVICE CMNT-IMP: NORMAL
SODIUM SERPL-SCNC: 130 MMOL/L (ref 136–145)
SODIUM SERPL-SCNC: 132 MMOL/L (ref 136–145)
SODIUM SERPL-SCNC: 133 MMOL/L (ref 136–145)
SODIUM SERPL-SCNC: 135 MMOL/L (ref 136–145)
SODIUM SERPL-SCNC: 136 MMOL/L (ref 136–145)
SODIUM SERPL-SCNC: 137 MMOL/L (ref 136–145)
SODIUM SERPL-SCNC: 137 MMOL/L (ref 136–145)
SODIUM SERPL-SCNC: 139 MMOL/L (ref 136–145)
SODIUM SERPL-SCNC: 140 MMOL/L (ref 136–145)
SOURCE, COVRS: NORMAL
SOURCE, COVRS: NORMAL
SP GR UR REFRACTOMETRY: 1.01 (ref 1–1.03)
SP1: NORMAL
SP2: NORMAL
SP3: NORMAL
SPECIMEN SOURCE: NORMAL
SPECIMEN TYPE: ABNORMAL
T VULGARIS AB SER QL ID: NEGATIVE
THERAPEUTIC RANGE,PTTT: ABNORMAL SECS (ref 58–77)
THERAPEUTIC RANGE,PTTT: NORMAL SECS (ref 58–77)
TIBC SERPL-MCNC: 272 UG/DL (ref 250–450)
TRIGL SERPL-MCNC: 113 MG/DL (ref ?–150)
TROPONIN-HIGH SENSITIVITY: 15 NG/L (ref 0–76)
TROPONIN-HIGH SENSITIVITY: 16 NG/L (ref 0–76)
TROPONIN-HIGH SENSITIVITY: 16 NG/L (ref 0–76)
TROPONIN-HIGH SENSITIVITY: 17 NG/L (ref 0–76)
TROPONIN-HIGH SENSITIVITY: 250 NG/L (ref 0–76)
TROPONIN-HIGH SENSITIVITY: 490 NG/L (ref 0–76)
TROPONIN-HIGH SENSITIVITY: 527 NG/L (ref 0–76)
TROPONIN-HIGH SENSITIVITY: 88 NG/L (ref 0–76)
TROPONIN-HIGH SENSITIVITY: 90 NG/L (ref 0–76)
TSH SERPL DL<=0.05 MIU/L-ACNC: 0.41 UIU/ML (ref 0.36–3.74)
UA: UC IF INDICATED,UAUC: ABNORMAL
URATE SERPL-MCNC: 9.4 MG/DL (ref 3.5–7.2)
UROBILINOGEN UR QL STRIP.AUTO: 0.2 EU/DL (ref 0.2–1)
VENTRICULAR RATE, ECG03: 67 BPM
VENTRICULAR RATE, ECG03: 72 BPM
VENTRICULAR RATE, ECG03: 76 BPM
VENTRICULAR RATE, ECG03: 85 BPM
VENTRICULAR RATE, ECG03: 86 BPM
VENTRICULAR RATE, ECG03: 97 BPM
VLDLC SERPL CALC-MCNC: 22.6 MG/DL
WBC # BLD AUTO: 10.6 K/UL (ref 4.1–11.1)
WBC # BLD AUTO: 10.6 K/UL (ref 4.1–11.1)
WBC # BLD AUTO: 11.4 K/UL (ref 4.1–11.1)
WBC # BLD AUTO: 12.6 K/UL (ref 4.1–11.1)
WBC # BLD AUTO: 15 K/UL (ref 4.1–11.1)
WBC # BLD AUTO: 8.4 K/UL (ref 4.1–11.1)
WBC # BLD AUTO: 9.2 K/UL (ref 4.1–11.1)
WBC # BLD AUTO: 9.6 K/UL (ref 4.1–11.1)
WBC # BLD AUTO: 9.8 K/UL (ref 4.1–11.1)
WBC # BLD AUTO: 9.9 K/UL (ref 4.1–11.1)
WBC URNS QL MICRO: ABNORMAL /HPF (ref 0–4)

## 2021-01-01 PROCEDURE — 74011250636 HC RX REV CODE- 250/636: Performed by: STUDENT IN AN ORGANIZED HEALTH CARE EDUCATION/TRAINING PROGRAM

## 2021-01-01 PROCEDURE — 74011000250 HC RX REV CODE- 250: Performed by: STUDENT IN AN ORGANIZED HEALTH CARE EDUCATION/TRAINING PROGRAM

## 2021-01-01 PROCEDURE — 65660000001 HC RM ICU INTERMED STEPDOWN

## 2021-01-01 PROCEDURE — 74011250637 HC RX REV CODE- 250/637: Performed by: SPECIALIST

## 2021-01-01 PROCEDURE — 94640 AIRWAY INHALATION TREATMENT: CPT

## 2021-01-01 PROCEDURE — 2022F DILAT RTA XM EVC RTNOPTHY: CPT | Performed by: NURSE PRACTITIONER

## 2021-01-01 PROCEDURE — 97535 SELF CARE MNGMENT TRAINING: CPT

## 2021-01-01 PROCEDURE — 77030013744: Performed by: INTERNAL MEDICINE

## 2021-01-01 PROCEDURE — 85730 THROMBOPLASTIN TIME PARTIAL: CPT

## 2021-01-01 PROCEDURE — C1887 CATHETER, GUIDING: HCPCS | Performed by: INTERNAL MEDICINE

## 2021-01-01 PROCEDURE — 74011000250 HC RX REV CODE- 250: Performed by: FAMILY MEDICINE

## 2021-01-01 PROCEDURE — G8420 CALC BMI NORM PARAMETERS: HCPCS | Performed by: NURSE PRACTITIONER

## 2021-01-01 PROCEDURE — 83735 ASSAY OF MAGNESIUM: CPT

## 2021-01-01 PROCEDURE — 74011250637 HC RX REV CODE- 250/637: Performed by: STUDENT IN AN ORGANIZED HEALTH CARE EDUCATION/TRAINING PROGRAM

## 2021-01-01 PROCEDURE — 027034Z DILATION OF CORONARY ARTERY, ONE ARTERY WITH DRUG-ELUTING INTRALUMINAL DEVICE, PERCUTANEOUS APPROACH: ICD-10-PCS | Performed by: INTERNAL MEDICINE

## 2021-01-01 PROCEDURE — 85025 COMPLETE CBC W/AUTO DIFF WBC: CPT

## 2021-01-01 PROCEDURE — 83880 ASSAY OF NATRIURETIC PEPTIDE: CPT

## 2021-01-01 PROCEDURE — 92928 PRQ TCAT PLMT NTRAC ST 1 LES: CPT | Performed by: INTERNAL MEDICINE

## 2021-01-01 PROCEDURE — 74011250637 HC RX REV CODE- 250/637: Performed by: FAMILY MEDICINE

## 2021-01-01 PROCEDURE — 86140 C-REACTIVE PROTEIN: CPT

## 2021-01-01 PROCEDURE — 74011000250 HC RX REV CODE- 250: Performed by: INTERNAL MEDICINE

## 2021-01-01 PROCEDURE — 82962 GLUCOSE BLOOD TEST: CPT

## 2021-01-01 PROCEDURE — G8754 DIAS BP LESS 90: HCPCS | Performed by: NURSE PRACTITIONER

## 2021-01-01 PROCEDURE — 77030040934 HC CATH DIAG DXTERITY MEDT -A: Performed by: INTERNAL MEDICINE

## 2021-01-01 PROCEDURE — 93460 R&L HRT ART/VENTRICLE ANGIO: CPT | Performed by: INTERNAL MEDICINE

## 2021-01-01 PROCEDURE — 74011250636 HC RX REV CODE- 250/636: Performed by: INTERNAL MEDICINE

## 2021-01-01 PROCEDURE — 3017F COLORECTAL CA SCREEN DOC REV: CPT | Performed by: NURSE PRACTITIONER

## 2021-01-01 PROCEDURE — 77010033678 HC OXYGEN DAILY

## 2021-01-01 PROCEDURE — 74011636637 HC RX REV CODE- 636/637: Performed by: FAMILY MEDICINE

## 2021-01-01 PROCEDURE — 82728 ASSAY OF FERRITIN: CPT

## 2021-01-01 PROCEDURE — 83520 IMMUNOASSAY QUANT NOS NONAB: CPT

## 2021-01-01 PROCEDURE — 74011250636 HC RX REV CODE- 250/636: Performed by: FAMILY MEDICINE

## 2021-01-01 PROCEDURE — 99214 OFFICE O/P EST MOD 30 MIN: CPT | Performed by: NURSE PRACTITIONER

## 2021-01-01 PROCEDURE — G8432 DEP SCR NOT DOC, RNG: HCPCS | Performed by: NURSE PRACTITIONER

## 2021-01-01 PROCEDURE — 84443 ASSAY THYROID STIM HORMONE: CPT

## 2021-01-01 PROCEDURE — 74011250636 HC RX REV CODE- 250/636: Performed by: PHYSICIAN ASSISTANT

## 2021-01-01 PROCEDURE — 87040 BLOOD CULTURE FOR BACTERIA: CPT

## 2021-01-01 PROCEDURE — 74011250637 HC RX REV CODE- 250/637: Performed by: INTERNAL MEDICINE

## 2021-01-01 PROCEDURE — 74011000258 HC RX REV CODE- 258: Performed by: PHYSICIAN ASSISTANT

## 2021-01-01 PROCEDURE — 77030013406 HC CATH CTRL EDWD -B: Performed by: INTERNAL MEDICINE

## 2021-01-01 PROCEDURE — 74011000636 HC RX REV CODE- 636: Performed by: INTERNAL MEDICINE

## 2021-01-01 PROCEDURE — G8752 SYS BP LESS 140: HCPCS | Performed by: NURSE PRACTITIONER

## 2021-01-01 PROCEDURE — 3046F HEMOGLOBIN A1C LEVEL >9.0%: CPT | Performed by: NURSE PRACTITIONER

## 2021-01-01 PROCEDURE — 99152 MOD SED SAME PHYS/QHP 5/>YRS: CPT | Performed by: INTERNAL MEDICINE

## 2021-01-01 PROCEDURE — 84156 ASSAY OF PROTEIN URINE: CPT

## 2021-01-01 PROCEDURE — 84484 ASSAY OF TROPONIN QUANT: CPT

## 2021-01-01 PROCEDURE — C1769 GUIDE WIRE: HCPCS | Performed by: INTERNAL MEDICINE

## 2021-01-01 PROCEDURE — 94664 DEMO&/EVAL PT USE INHALER: CPT

## 2021-01-01 PROCEDURE — 84145 PROCALCITONIN (PCT): CPT

## 2021-01-01 PROCEDURE — C1725 CATH, TRANSLUMIN NON-LASER: HCPCS | Performed by: INTERNAL MEDICINE

## 2021-01-01 PROCEDURE — 93005 ELECTROCARDIOGRAM TRACING: CPT

## 2021-01-01 PROCEDURE — 99232 SBSQ HOSP IP/OBS MODERATE 35: CPT | Performed by: SPECIALIST

## 2021-01-01 PROCEDURE — 92978 ENDOLUMINL IVUS OCT C 1ST: CPT | Performed by: INTERNAL MEDICINE

## 2021-01-01 PROCEDURE — 97116 GAIT TRAINING THERAPY: CPT

## 2021-01-01 PROCEDURE — 99153 MOD SED SAME PHYS/QHP EA: CPT | Performed by: INTERNAL MEDICINE

## 2021-01-01 PROCEDURE — 99285 EMERGENCY DEPT VISIT HI MDM: CPT

## 2021-01-01 PROCEDURE — 86602 ANTINOMYCES ANTIBODY: CPT

## 2021-01-01 PROCEDURE — 77030013797 HC KT TRNSDUC PRSSR EDWD -A: Performed by: INTERNAL MEDICINE

## 2021-01-01 PROCEDURE — G8427 DOCREV CUR MEDS BY ELIG CLIN: HCPCS | Performed by: NURSE PRACTITIONER

## 2021-01-01 PROCEDURE — 36415 COLL VENOUS BLD VENIPUNCTURE: CPT

## 2021-01-01 PROCEDURE — 80048 BASIC METABOLIC PNL TOTAL CA: CPT

## 2021-01-01 PROCEDURE — 74011250636 HC RX REV CODE- 250/636: Performed by: NURSE PRACTITIONER

## 2021-01-01 PROCEDURE — U0005 INFEC AGEN DETEC AMPLI PROBE: HCPCS

## 2021-01-01 PROCEDURE — 77030013715 HC INFL SYS MRTM -B: Performed by: INTERNAL MEDICINE

## 2021-01-01 PROCEDURE — 71046 X-RAY EXAM CHEST 2 VIEWS: CPT

## 2021-01-01 PROCEDURE — P9047 ALBUMIN (HUMAN), 25%, 50ML: HCPCS | Performed by: NURSE PRACTITIONER

## 2021-01-01 PROCEDURE — 97530 THERAPEUTIC ACTIVITIES: CPT

## 2021-01-01 PROCEDURE — 74011250637 HC RX REV CODE- 250/637: Performed by: NURSE PRACTITIONER

## 2021-01-01 PROCEDURE — 80074 ACUTE HEPATITIS PANEL: CPT

## 2021-01-01 PROCEDURE — 36600 WITHDRAWAL OF ARTERIAL BLOOD: CPT

## 2021-01-01 PROCEDURE — 99233 SBSQ HOSP IP/OBS HIGH 50: CPT | Performed by: SPECIALIST

## 2021-01-01 PROCEDURE — 84132 ASSAY OF SERUM POTASSIUM: CPT

## 2021-01-01 PROCEDURE — 99203 OFFICE O/P NEW LOW 30 MIN: CPT | Performed by: INTERNAL MEDICINE

## 2021-01-01 PROCEDURE — 71045 X-RAY EXAM CHEST 1 VIEW: CPT

## 2021-01-01 PROCEDURE — C1874 STENT, COATED/COV W/DEL SYS: HCPCS | Performed by: INTERNAL MEDICINE

## 2021-01-01 PROCEDURE — 71250 CT THORAX DX C-: CPT

## 2021-01-01 PROCEDURE — 74011636637 HC RX REV CODE- 636/637: Performed by: INTERNAL MEDICINE

## 2021-01-01 PROCEDURE — 84100 ASSAY OF PHOSPHORUS: CPT

## 2021-01-01 PROCEDURE — 74011250636 HC RX REV CODE- 250/636: Performed by: SPECIALIST

## 2021-01-01 PROCEDURE — 86200 CCP ANTIBODY: CPT

## 2021-01-01 PROCEDURE — 80053 COMPREHEN METABOLIC PANEL: CPT

## 2021-01-01 PROCEDURE — 76937 US GUIDE VASCULAR ACCESS: CPT | Performed by: INTERNAL MEDICINE

## 2021-01-01 PROCEDURE — APPSS45 APP SPLIT SHARED TIME 31-45 MINUTES: Performed by: NURSE PRACTITIONER

## 2021-01-01 PROCEDURE — 86431 RHEUMATOID FACTOR QUANT: CPT

## 2021-01-01 PROCEDURE — 99223 1ST HOSP IP/OBS HIGH 75: CPT | Performed by: SPECIALIST

## 2021-01-01 PROCEDURE — 74011000250 HC RX REV CODE- 250: Performed by: NURSE PRACTITIONER

## 2021-01-01 PROCEDURE — 82164 ANGIOTENSIN I ENZYME TEST: CPT

## 2021-01-01 PROCEDURE — 65660000000 HC RM CCU STEPDOWN

## 2021-01-01 PROCEDURE — 74011000250 HC RX REV CODE- 250: Performed by: SPECIALIST

## 2021-01-01 PROCEDURE — C1894 INTRO/SHEATH, NON-LASER: HCPCS | Performed by: INTERNAL MEDICINE

## 2021-01-01 PROCEDURE — 85027 COMPLETE CBC AUTOMATED: CPT

## 2021-01-01 PROCEDURE — G8536 NO DOC ELDER MAL SCRN: HCPCS | Performed by: NURSE PRACTITIONER

## 2021-01-01 PROCEDURE — 1101F PT FALLS ASSESS-DOCD LE1/YR: CPT | Performed by: NURSE PRACTITIONER

## 2021-01-01 PROCEDURE — 83605 ASSAY OF LACTIC ACID: CPT

## 2021-01-01 PROCEDURE — 83036 HEMOGLOBIN GLYCOSYLATED A1C: CPT

## 2021-01-01 PROCEDURE — 84550 ASSAY OF BLOOD/URIC ACID: CPT

## 2021-01-01 PROCEDURE — 87086 URINE CULTURE/COLONY COUNT: CPT

## 2021-01-01 PROCEDURE — 93306 TTE W/DOPPLER COMPLETE: CPT | Performed by: SPECIALIST

## 2021-01-01 PROCEDURE — 74011636637 HC RX REV CODE- 636/637: Performed by: STUDENT IN AN ORGANIZED HEALTH CARE EDUCATION/TRAINING PROGRAM

## 2021-01-01 PROCEDURE — APPSS30 APP SPLIT SHARED TIME 16-30 MINUTES: Performed by: NURSE PRACTITIONER

## 2021-01-01 PROCEDURE — 77030010221 HC SPLNT WR POS TELE -B: Performed by: INTERNAL MEDICINE

## 2021-01-01 PROCEDURE — 97161 PT EVAL LOW COMPLEX 20 MIN: CPT

## 2021-01-01 PROCEDURE — 82785 ASSAY OF IGE: CPT

## 2021-01-01 PROCEDURE — 99232 SBSQ HOSP IP/OBS MODERATE 35: CPT | Performed by: INTERNAL MEDICINE

## 2021-01-01 PROCEDURE — 82803 BLOOD GASES ANY COMBINATION: CPT

## 2021-01-01 PROCEDURE — 93306 TTE W/DOPPLER COMPLETE: CPT

## 2021-01-01 PROCEDURE — 93000 ELECTROCARDIOGRAM COMPLETE: CPT | Performed by: NURSE PRACTITIONER

## 2021-01-01 PROCEDURE — 86738 MYCOPLASMA ANTIBODY: CPT

## 2021-01-01 PROCEDURE — 86038 ANTINUCLEAR ANTIBODIES: CPT

## 2021-01-01 PROCEDURE — 81001 URINALYSIS AUTO W/SCOPE: CPT

## 2021-01-01 PROCEDURE — P9047 ALBUMIN (HUMAN), 25%, 50ML: HCPCS | Performed by: INTERNAL MEDICINE

## 2021-01-01 PROCEDURE — 74011000258 HC RX REV CODE- 258: Performed by: NURSE PRACTITIONER

## 2021-01-01 PROCEDURE — 85652 RBC SED RATE AUTOMATED: CPT

## 2021-01-01 PROCEDURE — 87449 NOS EACH ORGANISM AG IA: CPT

## 2021-01-01 PROCEDURE — 74011636637 HC RX REV CODE- 636/637: Performed by: NURSE PRACTITIONER

## 2021-01-01 PROCEDURE — 87077 CULTURE AEROBIC IDENTIFY: CPT

## 2021-01-01 PROCEDURE — B2111ZZ FLUOROSCOPY OF MULTIPLE CORONARY ARTERIES USING LOW OSMOLAR CONTRAST: ICD-10-PCS | Performed by: INTERNAL MEDICINE

## 2021-01-01 PROCEDURE — 87635 SARS-COV-2 COVID-19 AMP PRB: CPT

## 2021-01-01 PROCEDURE — 76770 US EXAM ABDO BACK WALL COMP: CPT

## 2021-01-01 PROCEDURE — 82784 ASSAY IGA/IGD/IGG/IGM EACH: CPT

## 2021-01-01 PROCEDURE — 87186 SC STD MICRODIL/AGAR DIL: CPT

## 2021-01-01 PROCEDURE — 83540 ASSAY OF IRON: CPT

## 2021-01-01 PROCEDURE — 2709999900 HC NON-CHARGEABLE SUPPLY: Performed by: INTERNAL MEDICINE

## 2021-01-01 PROCEDURE — 1111F DSCHRG MED/CURRENT MED MERGE: CPT | Performed by: NURSE PRACTITIONER

## 2021-01-01 PROCEDURE — C1753 CATH, INTRAVAS ULTRASOUND: HCPCS | Performed by: INTERNAL MEDICINE

## 2021-01-01 PROCEDURE — 85610 PROTHROMBIN TIME: CPT

## 2021-01-01 PROCEDURE — 96374 THER/PROPH/DIAG INJ IV PUSH: CPT

## 2021-01-01 PROCEDURE — 74011000250 HC RX REV CODE- 250: Performed by: EMERGENCY MEDICINE

## 2021-01-01 PROCEDURE — C1751 CATH, INF, PER/CENT/MIDLINE: HCPCS | Performed by: INTERNAL MEDICINE

## 2021-01-01 PROCEDURE — 97165 OT EVAL LOW COMPLEX 30 MIN: CPT

## 2021-01-01 PROCEDURE — 80061 LIPID PANEL: CPT

## 2021-01-01 PROCEDURE — 4A023N8 MEASUREMENT OF CARDIAC SAMPLING AND PRESSURE, BILATERAL, PERCUTANEOUS APPROACH: ICD-10-PCS | Performed by: INTERNAL MEDICINE

## 2021-01-01 PROCEDURE — 85347 COAGULATION TIME ACTIVATED: CPT

## 2021-01-01 PROCEDURE — 77030019569 HC BND COMPR RAD TERU -B: Performed by: INTERNAL MEDICINE

## 2021-01-01 DEVICE — XIENCE SIERRA™ EVEROLIMUS ELUTING CORONARY STENT SYSTEM 3.25 MM X 33 MM / RAPID-EXCHANGE
Type: IMPLANTABLE DEVICE | Status: FUNCTIONAL
Brand: XIENCE SIERRA™

## 2021-01-01 RX ORDER — INSULIN GLARGINE 100 [IU]/ML
15 INJECTION, SOLUTION SUBCUTANEOUS
Status: DISCONTINUED | OUTPATIENT
Start: 2021-01-01 | End: 2021-01-01

## 2021-01-01 RX ORDER — GUAIFENESIN 600 MG/1
600 TABLET, EXTENDED RELEASE ORAL EVERY 12 HOURS
Status: DISCONTINUED | OUTPATIENT
Start: 2021-01-01 | End: 2021-01-01 | Stop reason: HOSPADM

## 2021-01-01 RX ORDER — MIDAZOLAM HYDROCHLORIDE 1 MG/ML
INJECTION, SOLUTION INTRAMUSCULAR; INTRAVENOUS AS NEEDED
Status: DISCONTINUED | OUTPATIENT
Start: 2021-01-01 | End: 2021-01-01 | Stop reason: HOSPADM

## 2021-01-01 RX ORDER — HYDRALAZINE HYDROCHLORIDE 50 MG/1
25 TABLET, FILM COATED ORAL EVERY 8 HOURS
Status: DISCONTINUED | OUTPATIENT
Start: 2021-01-01 | End: 2021-01-01

## 2021-01-01 RX ORDER — DEXTROSE 50 % IN WATER (D50W) INTRAVENOUS SYRINGE
12.5-25 AS NEEDED
Status: DISCONTINUED | OUTPATIENT
Start: 2021-01-01 | End: 2021-01-01 | Stop reason: HOSPADM

## 2021-01-01 RX ORDER — FUROSEMIDE 40 MG/1
40 TABLET ORAL 2 TIMES DAILY
Status: DISCONTINUED | OUTPATIENT
Start: 2021-01-01 | End: 2021-01-01

## 2021-01-01 RX ORDER — BUDESONIDE AND FORMOTEROL FUMARATE DIHYDRATE 80; 4.5 UG/1; UG/1
AEROSOL RESPIRATORY (INHALATION)
Status: ON HOLD | COMMUNITY
Start: 2021-01-01 | End: 2022-01-01

## 2021-01-01 RX ORDER — ASPIRIN 325 MG
325 TABLET ORAL ONCE
Status: COMPLETED | OUTPATIENT
Start: 2021-01-01 | End: 2021-01-01

## 2021-01-01 RX ORDER — CLOPIDOGREL BISULFATE 75 MG/1
TABLET ORAL
Qty: 90 TABLET | Refills: 3 | Status: ON HOLD | OUTPATIENT
Start: 2021-01-01 | End: 2022-01-01

## 2021-01-01 RX ORDER — HYDRALAZINE HYDROCHLORIDE 50 MG/1
50 TABLET, FILM COATED ORAL EVERY 8 HOURS
Status: DISCONTINUED | OUTPATIENT
Start: 2021-01-01 | End: 2021-01-01

## 2021-01-01 RX ORDER — IPRATROPIUM BROMIDE AND ALBUTEROL SULFATE 2.5; .5 MG/3ML; MG/3ML
3 SOLUTION RESPIRATORY (INHALATION)
Status: COMPLETED | OUTPATIENT
Start: 2021-01-01 | End: 2021-01-01

## 2021-01-01 RX ORDER — HYDRALAZINE HYDROCHLORIDE 50 MG/1
50 TABLET, FILM COATED ORAL EVERY 8 HOURS
Qty: 270 TABLET | Refills: 1 | Status: SHIPPED | OUTPATIENT
Start: 2021-01-01 | End: 2022-01-01

## 2021-01-01 RX ORDER — LIDOCAINE HYDROCHLORIDE 10 MG/ML
INJECTION INFILTRATION; PERINEURAL AS NEEDED
Status: DISCONTINUED | OUTPATIENT
Start: 2021-01-01 | End: 2021-01-01 | Stop reason: HOSPADM

## 2021-01-01 RX ORDER — CARVEDILOL 12.5 MG/1
12.5 TABLET ORAL EVERY 12 HOURS
Status: DISCONTINUED | OUTPATIENT
Start: 2021-01-01 | End: 2021-01-01

## 2021-01-01 RX ORDER — MORPHINE SULFATE 10 MG/ML
2 INJECTION, SOLUTION INTRAMUSCULAR; INTRAVENOUS ONCE
Status: COMPLETED | OUTPATIENT
Start: 2021-01-01 | End: 2021-01-01

## 2021-01-01 RX ORDER — FUROSEMIDE 40 MG/1
1 TABLET ORAL 2 TIMES DAILY
COMMUNITY
Start: 2021-01-01 | End: 2021-01-01

## 2021-01-01 RX ORDER — SODIUM CHLORIDE 0.9 % (FLUSH) 0.9 %
5-10 SYRINGE (ML) INJECTION AS NEEDED
Status: DISCONTINUED | OUTPATIENT
Start: 2021-01-01 | End: 2021-01-01 | Stop reason: HOSPADM

## 2021-01-01 RX ORDER — CLONIDINE HYDROCHLORIDE 0.1 MG/1
TABLET ORAL
Qty: 270 TABLET | Refills: 1 | Status: SHIPPED | OUTPATIENT
Start: 2021-01-01 | End: 2021-01-01 | Stop reason: ALTCHOICE

## 2021-01-01 RX ORDER — MAG HYDROX/ALUMINUM HYD/SIMETH 200-200-20
30 SUSPENSION, ORAL (FINAL DOSE FORM) ORAL
Status: DISCONTINUED | OUTPATIENT
Start: 2021-01-01 | End: 2021-01-01 | Stop reason: HOSPADM

## 2021-01-01 RX ORDER — INSULIN GLARGINE 100 [IU]/ML
15 INJECTION, SOLUTION SUBCUTANEOUS
Status: DISCONTINUED | OUTPATIENT
Start: 2021-01-01 | End: 2021-01-01 | Stop reason: HOSPADM

## 2021-01-01 RX ORDER — ALBUMIN HUMAN 250 G/1000ML
25 SOLUTION INTRAVENOUS ONCE
Status: COMPLETED | OUTPATIENT
Start: 2021-01-01 | End: 2021-01-01

## 2021-01-01 RX ORDER — CARVEDILOL 6.25 MG/1
6.25 TABLET ORAL EVERY 12 HOURS
Qty: 180 TABLET | Refills: 1 | Status: SHIPPED | OUTPATIENT
Start: 2021-01-01

## 2021-01-01 RX ORDER — DEXAMETHASONE 4 MG/1
6 TABLET ORAL DAILY
Status: DISCONTINUED | OUTPATIENT
Start: 2021-01-01 | End: 2021-01-01

## 2021-01-01 RX ORDER — ROSUVASTATIN CALCIUM 20 MG/1
TABLET, COATED ORAL
Qty: 90 TABLET | Refills: 3 | Status: SHIPPED | OUTPATIENT
Start: 2021-01-01 | End: 2022-01-01

## 2021-01-01 RX ORDER — BUMETANIDE 1 MG/1
1 TABLET ORAL 2 TIMES DAILY
Qty: 60 TABLET | Refills: 1 | Status: SHIPPED | OUTPATIENT
Start: 2021-01-01 | End: 2021-01-01

## 2021-01-01 RX ORDER — HEPARIN SODIUM 1000 [USP'U]/ML
INJECTION, SOLUTION INTRAVENOUS; SUBCUTANEOUS AS NEEDED
Status: DISCONTINUED | OUTPATIENT
Start: 2021-01-01 | End: 2021-01-01 | Stop reason: HOSPADM

## 2021-01-01 RX ORDER — CEPHALEXIN 250 MG/1
250 CAPSULE ORAL EVERY 8 HOURS
Status: DISCONTINUED | OUTPATIENT
Start: 2021-01-01 | End: 2021-01-01 | Stop reason: HOSPADM

## 2021-01-01 RX ORDER — CEPHALEXIN 500 MG/1
500 CAPSULE ORAL EVERY 8 HOURS
Status: DISCONTINUED | OUTPATIENT
Start: 2021-01-01 | End: 2021-01-01 | Stop reason: DRUGHIGH

## 2021-01-01 RX ORDER — ACETAMINOPHEN 325 MG/1
650 TABLET ORAL EVERY 6 HOURS
Status: DISCONTINUED | OUTPATIENT
Start: 2021-01-01 | End: 2021-01-01 | Stop reason: HOSPADM

## 2021-01-01 RX ORDER — HYDRALAZINE HYDROCHLORIDE 25 MG/1
1 TABLET, FILM COATED ORAL EVERY 8 HOURS
COMMUNITY
Start: 2021-01-01 | End: 2021-01-01 | Stop reason: SDUPTHER

## 2021-01-01 RX ORDER — CARVEDILOL 6.25 MG/1
6.25 TABLET ORAL EVERY 12 HOURS
Status: DISCONTINUED | OUTPATIENT
Start: 2021-01-01 | End: 2021-01-01 | Stop reason: HOSPADM

## 2021-01-01 RX ORDER — PEN NEEDLE, DIABETIC 32GX 5/32"
NEEDLE, DISPOSABLE MISCELLANEOUS
Status: ON HOLD | COMMUNITY
Start: 2021-01-01 | End: 2022-01-01

## 2021-01-01 RX ORDER — ALBUTEROL SULFATE 90 UG/1
2 AEROSOL, METERED RESPIRATORY (INHALATION)
COMMUNITY
Start: 2021-01-22

## 2021-01-01 RX ORDER — AMLODIPINE BESYLATE 5 MG/1
2.5 TABLET ORAL DAILY
Status: DISCONTINUED | OUTPATIENT
Start: 2021-01-01 | End: 2021-01-01

## 2021-01-01 RX ORDER — IPRATROPIUM BROMIDE AND ALBUTEROL SULFATE 2.5; .5 MG/3ML; MG/3ML
3 SOLUTION RESPIRATORY (INHALATION)
Status: DISCONTINUED | OUTPATIENT
Start: 2021-01-01 | End: 2021-01-01

## 2021-01-01 RX ORDER — ONDANSETRON 2 MG/ML
4 INJECTION INTRAMUSCULAR; INTRAVENOUS
Status: DISCONTINUED | OUTPATIENT
Start: 2021-01-01 | End: 2021-01-01 | Stop reason: HOSPADM

## 2021-01-01 RX ORDER — ISOSORBIDE MONONITRATE 60 MG/1
60 TABLET, EXTENDED RELEASE ORAL DAILY
Qty: 30 TABLET | Refills: 0 | Status: SHIPPED | OUTPATIENT
Start: 2021-01-01 | End: 2022-01-01

## 2021-01-01 RX ORDER — CLONIDINE HYDROCHLORIDE 0.1 MG/1
TABLET ORAL
Qty: 270 TABLET | Refills: 1 | Status: SHIPPED | OUTPATIENT
Start: 2021-01-01 | End: 2021-01-01

## 2021-01-01 RX ORDER — ISOSORBIDE MONONITRATE 30 MG/1
1 TABLET, EXTENDED RELEASE ORAL DAILY
COMMUNITY
Start: 2021-01-01 | End: 2021-01-01

## 2021-01-01 RX ORDER — BENZONATATE 100 MG/1
100 CAPSULE ORAL
Status: DISCONTINUED | OUTPATIENT
Start: 2021-01-01 | End: 2021-01-01 | Stop reason: HOSPADM

## 2021-01-01 RX ORDER — NITROGLYCERIN 0.4 MG/1
0.4 TABLET SUBLINGUAL
Status: DISCONTINUED | OUTPATIENT
Start: 2021-01-01 | End: 2021-01-01 | Stop reason: HOSPADM

## 2021-01-01 RX ORDER — ISOSORBIDE MONONITRATE 60 MG/1
60 TABLET, EXTENDED RELEASE ORAL DAILY
Status: DISCONTINUED | OUTPATIENT
Start: 2021-01-01 | End: 2021-01-01

## 2021-01-01 RX ORDER — ISOSORBIDE MONONITRATE 30 MG/1
30 TABLET, EXTENDED RELEASE ORAL DAILY
Qty: 90 TABLET | Refills: 1 | Status: ON HOLD | OUTPATIENT
Start: 2021-01-01 | End: 2021-01-01 | Stop reason: SDUPTHER

## 2021-01-01 RX ORDER — HEPARIN SODIUM 5000 [USP'U]/ML
5000 INJECTION, SOLUTION INTRAVENOUS; SUBCUTANEOUS EVERY 12 HOURS
Status: DISCONTINUED | OUTPATIENT
Start: 2021-01-01 | End: 2021-01-01 | Stop reason: SDUPTHER

## 2021-01-01 RX ORDER — AMLODIPINE BESYLATE 2.5 MG/1
2.5 TABLET ORAL DAILY
Qty: 90 TABLET | Refills: 3 | Status: SHIPPED | OUTPATIENT
Start: 2021-01-01 | End: 2021-01-01 | Stop reason: ALTCHOICE

## 2021-01-01 RX ORDER — FUROSEMIDE 10 MG/ML
40 INJECTION INTRAMUSCULAR; INTRAVENOUS ONCE
Status: DISCONTINUED | OUTPATIENT
Start: 2021-01-01 | End: 2021-01-01

## 2021-01-01 RX ORDER — LEVOTHYROXINE SODIUM 100 UG/1
100 TABLET ORAL
Status: DISCONTINUED | OUTPATIENT
Start: 2021-01-01 | End: 2021-01-01 | Stop reason: HOSPADM

## 2021-01-01 RX ORDER — FENTANYL CITRATE 50 UG/ML
INJECTION, SOLUTION INTRAMUSCULAR; INTRAVENOUS AS NEEDED
Status: DISCONTINUED | OUTPATIENT
Start: 2021-01-01 | End: 2021-01-01 | Stop reason: HOSPADM

## 2021-01-01 RX ORDER — BUMETANIDE 1 MG/1
TABLET ORAL
Qty: 180 TABLET | Refills: 0 | Status: ON HOLD | OUTPATIENT
Start: 2021-01-01 | End: 2022-01-01

## 2021-01-01 RX ORDER — LANOLIN ALCOHOL/MO/W.PET/CERES
200 CREAM (GRAM) TOPICAL EVERY OTHER DAY
Status: DISCONTINUED | OUTPATIENT
Start: 2021-01-01 | End: 2021-01-01 | Stop reason: HOSPADM

## 2021-01-01 RX ORDER — DIPHENHYDRAMINE HYDROCHLORIDE 50 MG/ML
25 INJECTION, SOLUTION INTRAMUSCULAR; INTRAVENOUS ONCE
Status: COMPLETED | OUTPATIENT
Start: 2021-01-01 | End: 2021-01-01

## 2021-01-01 RX ORDER — INSULIN GLARGINE 100 [IU]/ML
25 INJECTION, SOLUTION SUBCUTANEOUS
Status: DISCONTINUED | OUTPATIENT
Start: 2021-01-01 | End: 2021-01-01

## 2021-01-01 RX ORDER — ASPIRIN 81 MG/1
81 TABLET ORAL
Status: DISCONTINUED | OUTPATIENT
Start: 2021-01-01 | End: 2021-01-01

## 2021-01-01 RX ORDER — LISINOPRIL 20 MG/1
TABLET ORAL
Qty: 180 TABLET | Refills: 3 | Status: SHIPPED | OUTPATIENT
Start: 2021-01-01 | End: 2021-01-01 | Stop reason: ALTCHOICE

## 2021-01-01 RX ORDER — CARVEDILOL 6.25 MG/1
1 TABLET ORAL EVERY 12 HOURS
COMMUNITY
Start: 2021-01-01 | End: 2021-01-01 | Stop reason: SDUPTHER

## 2021-01-01 RX ORDER — SODIUM CHLORIDE 9 MG/ML
INJECTION, SOLUTION INTRAVENOUS
Status: COMPLETED | OUTPATIENT
Start: 2021-01-01 | End: 2021-01-01

## 2021-01-01 RX ORDER — ISOSORBIDE MONONITRATE 30 MG/1
30 TABLET, EXTENDED RELEASE ORAL DAILY
COMMUNITY
Start: 2021-01-01 | End: 2021-01-01 | Stop reason: SDUPTHER

## 2021-01-01 RX ORDER — DEXTROSE MONOHYDRATE 100 MG/ML
25 INJECTION, SOLUTION INTRAVENOUS CONTINUOUS
Status: DISCONTINUED | OUTPATIENT
Start: 2021-01-01 | End: 2021-01-01

## 2021-01-01 RX ORDER — ANASTROZOLE 1 MG/1
0.5 TABLET ORAL
COMMUNITY

## 2021-01-01 RX ORDER — HYDROCORTISONE SODIUM SUCCINATE 100 MG/2ML
100 INJECTION, POWDER, FOR SOLUTION INTRAMUSCULAR; INTRAVENOUS ONCE
Status: COMPLETED | OUTPATIENT
Start: 2021-01-01 | End: 2021-01-01

## 2021-01-01 RX ORDER — AMLODIPINE BESYLATE 2.5 MG/1
2.5 TABLET ORAL DAILY
Qty: 90 TABLET | Refills: 1 | Status: SHIPPED | OUTPATIENT
Start: 2021-01-01 | End: 2021-01-01

## 2021-01-01 RX ORDER — BUMETANIDE 0.25 MG/ML
1.5 INJECTION INTRAMUSCULAR; INTRAVENOUS ONCE
Status: COMPLETED | OUTPATIENT
Start: 2021-01-01 | End: 2021-01-01

## 2021-01-01 RX ORDER — ONDANSETRON 2 MG/ML
4 INJECTION INTRAMUSCULAR; INTRAVENOUS
Status: DISCONTINUED | OUTPATIENT
Start: 2021-01-01 | End: 2021-01-01

## 2021-01-01 RX ORDER — ACETAMINOPHEN 325 MG/1
650 TABLET ORAL
Status: DISCONTINUED | OUTPATIENT
Start: 2021-01-01 | End: 2021-01-01

## 2021-01-01 RX ORDER — CLOPIDOGREL 300 MG/1
300 TABLET, FILM COATED ORAL ONCE
Status: COMPLETED | OUTPATIENT
Start: 2021-01-01 | End: 2021-01-01

## 2021-01-01 RX ORDER — ACETAMINOPHEN 325 MG/1
650 TABLET ORAL
Status: DISCONTINUED | OUTPATIENT
Start: 2021-01-01 | End: 2021-01-01 | Stop reason: HOSPADM

## 2021-01-01 RX ORDER — INSULIN LISPRO 100 [IU]/ML
INJECTION, SOLUTION INTRAVENOUS; SUBCUTANEOUS
Status: DISCONTINUED | OUTPATIENT
Start: 2021-01-01 | End: 2021-01-01 | Stop reason: HOSPADM

## 2021-01-01 RX ORDER — DEXTROSE 20 G/100ML
INJECTION, SOLUTION INTRAVENOUS CONTINUOUS
Status: DISCONTINUED | OUTPATIENT
Start: 2021-01-01 | End: 2021-01-01

## 2021-01-01 RX ORDER — NITROGLYCERIN 20 MG/100ML
0-200 INJECTION INTRAVENOUS
Status: DISCONTINUED | OUTPATIENT
Start: 2021-01-01 | End: 2021-01-01

## 2021-01-01 RX ORDER — LISINOPRIL 5 MG/1
2.5 TABLET ORAL DAILY
Status: DISCONTINUED | OUTPATIENT
Start: 2021-01-01 | End: 2021-01-01

## 2021-01-01 RX ORDER — BUMETANIDE 1 MG/1
1 TABLET ORAL 2 TIMES DAILY
Status: DISCONTINUED | OUTPATIENT
Start: 2021-01-01 | End: 2021-01-01

## 2021-01-01 RX ORDER — HYDRALAZINE HYDROCHLORIDE 50 MG/1
50 TABLET, FILM COATED ORAL 3 TIMES DAILY
Status: DISCONTINUED | OUTPATIENT
Start: 2021-01-01 | End: 2021-01-01 | Stop reason: HOSPADM

## 2021-01-01 RX ORDER — ATENOLOL 50 MG/1
TABLET ORAL
Qty: 180 TABLET | Refills: 3 | Status: SHIPPED | OUTPATIENT
Start: 2021-01-01 | End: 2021-01-01 | Stop reason: ALTCHOICE

## 2021-01-01 RX ORDER — CLOPIDOGREL BISULFATE 75 MG/1
75 TABLET ORAL DAILY
Status: DISCONTINUED | OUTPATIENT
Start: 2021-01-01 | End: 2021-01-01 | Stop reason: HOSPADM

## 2021-01-01 RX ORDER — MAGNESIUM SULFATE 100 %
4 CRYSTALS MISCELLANEOUS AS NEEDED
Status: DISCONTINUED | OUTPATIENT
Start: 2021-01-01 | End: 2021-01-01 | Stop reason: HOSPADM

## 2021-01-01 RX ORDER — BUMETANIDE 0.25 MG/ML
1 INJECTION INTRAMUSCULAR; INTRAVENOUS
Status: COMPLETED | OUTPATIENT
Start: 2021-01-01 | End: 2021-01-01

## 2021-01-01 RX ORDER — BUMETANIDE 0.25 MG/ML
1 INJECTION INTRAMUSCULAR; INTRAVENOUS 2 TIMES DAILY
Status: DISCONTINUED | OUTPATIENT
Start: 2021-01-01 | End: 2021-01-01

## 2021-01-01 RX ORDER — INSULIN LISPRO 100 [IU]/ML
5 INJECTION, SOLUTION INTRAVENOUS; SUBCUTANEOUS ONCE
Status: COMPLETED | OUTPATIENT
Start: 2021-01-01 | End: 2021-01-01

## 2021-01-01 RX ORDER — ALBUMIN HUMAN 250 G/1000ML
12.5 SOLUTION INTRAVENOUS 2 TIMES DAILY
Status: DISCONTINUED | OUTPATIENT
Start: 2021-01-01 | End: 2021-01-01

## 2021-01-01 RX ORDER — FUROSEMIDE 10 MG/ML
60 INJECTION INTRAMUSCULAR; INTRAVENOUS ONCE
Status: COMPLETED | OUTPATIENT
Start: 2021-01-01 | End: 2021-01-01

## 2021-01-01 RX ORDER — NITROGLYCERIN 0.4 MG/1
0.4 TABLET SUBLINGUAL
Qty: 25 TABLET | Refills: 1 | Status: SHIPPED | OUTPATIENT
Start: 2021-01-01

## 2021-01-01 RX ORDER — ASPIRIN 81 MG/1
81 TABLET ORAL
Status: DISCONTINUED | OUTPATIENT
Start: 2021-01-01 | End: 2021-01-01 | Stop reason: HOSPADM

## 2021-01-01 RX ORDER — ISOSORBIDE MONONITRATE 60 MG/1
60 TABLET, EXTENDED RELEASE ORAL 2 TIMES DAILY
Status: DISCONTINUED | OUTPATIENT
Start: 2021-01-01 | End: 2021-01-01 | Stop reason: HOSPADM

## 2021-01-01 RX ORDER — ROSUVASTATIN CALCIUM 10 MG/1
20 TABLET, COATED ORAL
Status: DISCONTINUED | OUTPATIENT
Start: 2021-01-01 | End: 2021-01-01 | Stop reason: HOSPADM

## 2021-01-01 RX ORDER — ISOSORBIDE MONONITRATE 30 MG/1
30 TABLET, EXTENDED RELEASE ORAL 2 TIMES DAILY
Status: DISCONTINUED | OUTPATIENT
Start: 2021-01-01 | End: 2021-01-01

## 2021-01-01 RX ORDER — TALC
250 POWDER (GRAM) TOPICAL
COMMUNITY
Start: 2021-01-01

## 2021-01-01 RX ORDER — CARVEDILOL 6.25 MG/1
6.25 TABLET ORAL EVERY 12 HOURS
Status: DISCONTINUED | OUTPATIENT
Start: 2021-01-01 | End: 2021-01-01

## 2021-01-01 RX ORDER — SODIUM CHLORIDE 9 MG/ML
75 INJECTION, SOLUTION INTRAVENOUS CONTINUOUS
Status: DISCONTINUED | OUTPATIENT
Start: 2021-01-01 | End: 2021-01-01

## 2021-01-01 RX ORDER — LINEZOLID 2 MG/ML
600 INJECTION, SOLUTION INTRAVENOUS EVERY 12 HOURS
Status: DISCONTINUED | OUTPATIENT
Start: 2021-01-01 | End: 2021-01-01

## 2021-01-01 RX ORDER — ASPIRIN 325 MG
325 TABLET ORAL DAILY
Status: DISCONTINUED | OUTPATIENT
Start: 2021-01-01 | End: 2021-01-01

## 2021-01-01 RX ORDER — ISOSORBIDE MONONITRATE 30 MG/1
30 TABLET, EXTENDED RELEASE ORAL DAILY
Status: DISCONTINUED | OUTPATIENT
Start: 2021-01-01 | End: 2021-01-01

## 2021-01-01 RX ORDER — HEPARIN SODIUM 200 [USP'U]/100ML
INJECTION, SOLUTION INTRAVENOUS
Status: COMPLETED | OUTPATIENT
Start: 2021-01-01 | End: 2021-01-01

## 2021-01-01 RX ORDER — ALBUMIN HUMAN 250 G/1000ML
12.5 SOLUTION INTRAVENOUS 2 TIMES DAILY
Status: COMPLETED | OUTPATIENT
Start: 2021-01-01 | End: 2021-01-01

## 2021-01-01 RX ORDER — IPRATROPIUM BROMIDE AND ALBUTEROL SULFATE 2.5; .5 MG/3ML; MG/3ML
3 SOLUTION RESPIRATORY (INHALATION)
Status: ACTIVE | OUTPATIENT
Start: 2021-01-01 | End: 2021-01-01

## 2021-01-01 RX ORDER — ALBUMIN HUMAN 250 G/1000ML
12.5 SOLUTION INTRAVENOUS ONCE
Status: DISCONTINUED | OUTPATIENT
Start: 2021-01-01 | End: 2021-01-01

## 2021-01-01 RX ORDER — CEFDINIR 300 MG/1
300 CAPSULE ORAL DAILY
Status: DISCONTINUED | OUTPATIENT
Start: 2021-01-01 | End: 2021-01-01 | Stop reason: CLARIF

## 2021-01-01 RX ORDER — DEXTROSE MONOHYDRATE 50 MG/ML
75 INJECTION, SOLUTION INTRAVENOUS CONTINUOUS
Status: DISCONTINUED | OUTPATIENT
Start: 2021-01-01 | End: 2021-01-01

## 2021-01-01 RX ORDER — HEPARIN SODIUM 10000 [USP'U]/100ML
12-25 INJECTION, SOLUTION INTRAVENOUS
Status: DISCONTINUED | OUTPATIENT
Start: 2021-01-01 | End: 2021-01-01

## 2021-01-01 RX ORDER — HYDRALAZINE HYDROCHLORIDE 25 MG/1
25 TABLET, FILM COATED ORAL EVERY 8 HOURS
Qty: 270 TABLET | Refills: 1 | Status: ON HOLD | OUTPATIENT
Start: 2021-01-01 | End: 2021-01-01 | Stop reason: SDUPTHER

## 2021-01-01 RX ADMIN — ACETAMINOPHEN 650 MG: 325 TABLET ORAL at 06:12

## 2021-01-01 RX ADMIN — Medication 3 UNITS: at 19:02

## 2021-01-01 RX ADMIN — CLOPIDOGREL BISULFATE 75 MG: 75 TABLET ORAL at 09:34

## 2021-01-01 RX ADMIN — Medication 3 UNITS: at 12:51

## 2021-01-01 RX ADMIN — ASPIRIN 81 MG: 81 TABLET, COATED ORAL at 01:01

## 2021-01-01 RX ADMIN — INSULIN GLARGINE 15 UNITS: 100 INJECTION, SOLUTION SUBCUTANEOUS at 18:11

## 2021-01-01 RX ADMIN — NITROGLYCERIN 0.4 MG: 0.4 TABLET, ORALLY DISINTEGRATING SUBLINGUAL at 07:45

## 2021-01-01 RX ADMIN — CLOPIDOGREL BISULFATE 75 MG: 75 TABLET ORAL at 09:09

## 2021-01-01 RX ADMIN — ARFORMOTEROL TARTRATE: 15 SOLUTION RESPIRATORY (INHALATION) at 07:38

## 2021-01-01 RX ADMIN — GUAIFENESIN 600 MG: 600 TABLET, EXTENDED RELEASE ORAL at 08:12

## 2021-01-01 RX ADMIN — ISOSORBIDE MONONITRATE 60 MG: 60 TABLET, EXTENDED RELEASE ORAL at 14:16

## 2021-01-01 RX ADMIN — INSULIN GLARGINE 15 UNITS: 100 INJECTION, SOLUTION SUBCUTANEOUS at 17:48

## 2021-01-01 RX ADMIN — IRON SUCROSE 200 MG: 20 INJECTION, SOLUTION INTRAVENOUS at 13:31

## 2021-01-01 RX ADMIN — BUMETANIDE 1 MG: 1 TABLET ORAL at 10:26

## 2021-01-01 RX ADMIN — HYDRALAZINE HYDROCHLORIDE 75 MG: 50 TABLET, FILM COATED ORAL at 17:53

## 2021-01-01 RX ADMIN — ACETAMINOPHEN 650 MG: 325 TABLET ORAL at 00:49

## 2021-01-01 RX ADMIN — DOXYCYCLINE 100 MG: 100 INJECTION, POWDER, LYOPHILIZED, FOR SOLUTION INTRAVENOUS at 18:58

## 2021-01-01 RX ADMIN — NITROGLYCERIN 40 MCG/MIN: 20 INJECTION INTRAVENOUS at 10:49

## 2021-01-01 RX ADMIN — CLOPIDOGREL BISULFATE 75 MG: 75 TABLET ORAL at 08:58

## 2021-01-01 RX ADMIN — ISOSORBIDE MONONITRATE 60 MG: 60 TABLET, EXTENDED RELEASE ORAL at 08:12

## 2021-01-01 RX ADMIN — CEFEPIME 2 G: 2 INJECTION, POWDER, FOR SOLUTION INTRAVENOUS at 06:33

## 2021-01-01 RX ADMIN — HEPARIN SODIUM 5000 UNITS: 5000 INJECTION INTRAVENOUS; SUBCUTANEOUS at 19:13

## 2021-01-01 RX ADMIN — ACETAMINOPHEN 650 MG: 325 TABLET ORAL at 12:34

## 2021-01-01 RX ADMIN — ROSUVASTATIN 20 MG: 10 TABLET, FILM COATED ORAL at 21:15

## 2021-01-01 RX ADMIN — CARVEDILOL 12.5 MG: 12.5 TABLET, FILM COATED ORAL at 08:40

## 2021-01-01 RX ADMIN — ARFORMOTEROL TARTRATE: 15 SOLUTION RESPIRATORY (INHALATION) at 07:16

## 2021-01-01 RX ADMIN — NITROGLYCERIN 0.4 MG: 0.4 TABLET, ORALLY DISINTEGRATING SUBLINGUAL at 18:45

## 2021-01-01 RX ADMIN — ONDANSETRON 4 MG: 2 INJECTION INTRAMUSCULAR; INTRAVENOUS at 03:07

## 2021-01-01 RX ADMIN — CARVEDILOL 12.5 MG: 12.5 TABLET, FILM COATED ORAL at 08:15

## 2021-01-01 RX ADMIN — Medication 10 ML: at 21:22

## 2021-01-01 RX ADMIN — Medication 3 UNITS: at 07:07

## 2021-01-01 RX ADMIN — GUAIFENESIN 600 MG: 600 TABLET, EXTENDED RELEASE ORAL at 21:21

## 2021-01-01 RX ADMIN — ACETAMINOPHEN 650 MG: 325 TABLET ORAL at 21:11

## 2021-01-01 RX ADMIN — Medication 10 ML: at 21:20

## 2021-01-01 RX ADMIN — HYDRALAZINE HYDROCHLORIDE 50 MG: 50 TABLET, FILM COATED ORAL at 15:13

## 2021-01-01 RX ADMIN — NITROGLYCERIN 0.4 MG: 0.4 TABLET, ORALLY DISINTEGRATING SUBLINGUAL at 06:18

## 2021-01-01 RX ADMIN — DOXYCYCLINE 100 MG: 100 INJECTION, POWDER, LYOPHILIZED, FOR SOLUTION INTRAVENOUS at 19:48

## 2021-01-01 RX ADMIN — CEFDINIR 300 MG: 300 CAPSULE ORAL at 09:09

## 2021-01-01 RX ADMIN — LEVOTHYROXINE SODIUM 100 MCG: 0.1 TABLET ORAL at 08:42

## 2021-01-01 RX ADMIN — BUMETANIDE 1 MG: 1 TABLET ORAL at 17:53

## 2021-01-01 RX ADMIN — IPRATROPIUM BROMIDE AND ALBUTEROL SULFATE 3 ML: .5; 3 SOLUTION RESPIRATORY (INHALATION) at 07:20

## 2021-01-01 RX ADMIN — BUMETANIDE 1 MG: 1 TABLET ORAL at 09:34

## 2021-01-01 RX ADMIN — LINEZOLID 600 MG: 600 INJECTION, SOLUTION INTRAVENOUS at 06:33

## 2021-01-01 RX ADMIN — Medication 200 MG: at 09:10

## 2021-01-01 RX ADMIN — BUMETANIDE 1 MG: 0.25 INJECTION INTRAMUSCULAR; INTRAVENOUS at 23:35

## 2021-01-01 RX ADMIN — ACETAMINOPHEN 650 MG: 325 TABLET ORAL at 05:50

## 2021-01-01 RX ADMIN — Medication 3 UNITS: at 13:56

## 2021-01-01 RX ADMIN — CARVEDILOL 6.25 MG: 3.12 TABLET, FILM COATED ORAL at 21:11

## 2021-01-01 RX ADMIN — INSULIN GLARGINE 25 UNITS: 100 INJECTION, SOLUTION SUBCUTANEOUS at 21:21

## 2021-01-01 RX ADMIN — Medication 9 UNITS: at 07:31

## 2021-01-01 RX ADMIN — HYDRALAZINE HYDROCHLORIDE 75 MG: 50 TABLET, FILM COATED ORAL at 21:15

## 2021-01-01 RX ADMIN — IRON SUCROSE 200 MG: 20 INJECTION, SOLUTION INTRAVENOUS at 14:10

## 2021-01-01 RX ADMIN — CARVEDILOL 12.5 MG: 12.5 TABLET, FILM COATED ORAL at 21:21

## 2021-01-01 RX ADMIN — Medication 2 UNITS: at 16:30

## 2021-01-01 RX ADMIN — CLOPIDOGREL BISULFATE 75 MG: 75 TABLET ORAL at 08:12

## 2021-01-01 RX ADMIN — GUAIFENESIN 600 MG: 600 TABLET, EXTENDED RELEASE ORAL at 21:34

## 2021-01-01 RX ADMIN — ALBUMIN (HUMAN) 12.5 G: 0.25 INJECTION, SOLUTION INTRAVENOUS at 09:06

## 2021-01-01 RX ADMIN — ASPIRIN 81 MG: 81 TABLET, COATED ORAL at 21:15

## 2021-01-01 RX ADMIN — LEVOTHYROXINE SODIUM 100 MCG: 0.1 TABLET ORAL at 06:12

## 2021-01-01 RX ADMIN — DOXYCYCLINE 100 MG: 100 INJECTION, POWDER, LYOPHILIZED, FOR SOLUTION INTRAVENOUS at 07:07

## 2021-01-01 RX ADMIN — Medication 200 MG: at 09:48

## 2021-01-01 RX ADMIN — ONDANSETRON 4 MG: 2 INJECTION INTRAMUSCULAR; INTRAVENOUS at 18:23

## 2021-01-01 RX ADMIN — HYDRALAZINE HYDROCHLORIDE 75 MG: 50 TABLET, FILM COATED ORAL at 22:08

## 2021-01-01 RX ADMIN — ASPIRIN 81 MG: 81 TABLET, COATED ORAL at 22:31

## 2021-01-01 RX ADMIN — NITROGLYCERIN 0.4 MG: 0.4 TABLET, ORALLY DISINTEGRATING SUBLINGUAL at 02:00

## 2021-01-01 RX ADMIN — ISOSORBIDE MONONITRATE 60 MG: 60 TABLET, EXTENDED RELEASE ORAL at 08:40

## 2021-01-01 RX ADMIN — MORPHINE SULFATE 2 MG: 10 INJECTION INTRAVENOUS at 11:55

## 2021-01-01 RX ADMIN — ISOSORBIDE MONONITRATE 60 MG: 60 TABLET, EXTENDED RELEASE ORAL at 09:09

## 2021-01-01 RX ADMIN — ACETAMINOPHEN 650 MG: 325 TABLET ORAL at 06:32

## 2021-01-01 RX ADMIN — GUAIFENESIN 600 MG: 600 TABLET, EXTENDED RELEASE ORAL at 10:25

## 2021-01-01 RX ADMIN — GUAIFENESIN 600 MG: 600 TABLET, EXTENDED RELEASE ORAL at 09:46

## 2021-01-01 RX ADMIN — Medication 10 ML: at 12:38

## 2021-01-01 RX ADMIN — CARVEDILOL 12.5 MG: 12.5 TABLET, FILM COATED ORAL at 10:26

## 2021-01-01 RX ADMIN — ARFORMOTEROL TARTRATE: 15 SOLUTION RESPIRATORY (INHALATION) at 19:47

## 2021-01-01 RX ADMIN — Medication 3 UNITS: at 22:32

## 2021-01-01 RX ADMIN — IPRATROPIUM BROMIDE AND ALBUTEROL SULFATE 3 ML: .5; 3 SOLUTION RESPIRATORY (INHALATION) at 19:47

## 2021-01-01 RX ADMIN — DEXTROSE MONOHYDRATE 25 G: 25 INJECTION, SOLUTION INTRAVENOUS at 03:49

## 2021-01-01 RX ADMIN — DOXYCYCLINE 100 MG: 100 INJECTION, POWDER, LYOPHILIZED, FOR SOLUTION INTRAVENOUS at 19:00

## 2021-01-01 RX ADMIN — CARVEDILOL 6.25 MG: 3.12 TABLET, FILM COATED ORAL at 22:08

## 2021-01-01 RX ADMIN — LEVOTHYROXINE SODIUM 100 MCG: 0.1 TABLET ORAL at 07:07

## 2021-01-01 RX ADMIN — Medication 2 UNITS: at 13:14

## 2021-01-01 RX ADMIN — CLOPIDOGREL BISULFATE 300 MG: 300 TABLET, FILM COATED ORAL at 13:11

## 2021-01-01 RX ADMIN — HYDRALAZINE HYDROCHLORIDE 75 MG: 50 TABLET, FILM COATED ORAL at 10:25

## 2021-01-01 RX ADMIN — NITROGLYCERIN 0.4 MG: 0.4 TABLET, ORALLY DISINTEGRATING SUBLINGUAL at 16:44

## 2021-01-01 RX ADMIN — ACETAMINOPHEN 650 MG: 325 TABLET ORAL at 18:11

## 2021-01-01 RX ADMIN — ONDANSETRON 4 MG: 2 INJECTION INTRAMUSCULAR; INTRAVENOUS at 15:14

## 2021-01-01 RX ADMIN — NITROGLYCERIN 0.4 MG: 0.4 TABLET, ORALLY DISINTEGRATING SUBLINGUAL at 10:26

## 2021-01-01 RX ADMIN — NITROGLYCERIN 0.4 MG: 0.4 TABLET, ORALLY DISINTEGRATING SUBLINGUAL at 18:53

## 2021-01-01 RX ADMIN — CEPHALEXIN 250 MG: 250 CAPSULE ORAL at 15:02

## 2021-01-01 RX ADMIN — ARFORMOTEROL TARTRATE: 15 SOLUTION RESPIRATORY (INHALATION) at 07:44

## 2021-01-01 RX ADMIN — LINEZOLID 600 MG: 600 INJECTION, SOLUTION INTRAVENOUS at 06:58

## 2021-01-01 RX ADMIN — IPRATROPIUM BROMIDE AND ALBUTEROL SULFATE 3 ML: .5; 3 SOLUTION RESPIRATORY (INHALATION) at 01:43

## 2021-01-01 RX ADMIN — ACETAMINOPHEN 650 MG: 325 TABLET ORAL at 00:35

## 2021-01-01 RX ADMIN — ONDANSETRON 4 MG: 2 INJECTION INTRAMUSCULAR; INTRAVENOUS at 21:32

## 2021-01-01 RX ADMIN — NITROGLYCERIN 0.4 MG: 0.4 TABLET, ORALLY DISINTEGRATING SUBLINGUAL at 19:12

## 2021-01-01 RX ADMIN — NITROGLYCERIN 0.4 MG: 0.4 TABLET, ORALLY DISINTEGRATING SUBLINGUAL at 20:01

## 2021-01-01 RX ADMIN — CLOPIDOGREL BISULFATE 75 MG: 75 TABLET ORAL at 10:26

## 2021-01-01 RX ADMIN — ROSUVASTATIN 20 MG: 10 TABLET, FILM COATED ORAL at 21:33

## 2021-01-01 RX ADMIN — ASPIRIN 81 MG: 81 TABLET, COATED ORAL at 21:21

## 2021-01-01 RX ADMIN — GUAIFENESIN 600 MG: 600 TABLET, EXTENDED RELEASE ORAL at 09:04

## 2021-01-01 RX ADMIN — HYDRALAZINE HYDROCHLORIDE 75 MG: 50 TABLET, FILM COATED ORAL at 13:00

## 2021-01-01 RX ADMIN — ASPIRIN 81 MG: 81 TABLET, COATED ORAL at 21:14

## 2021-01-01 RX ADMIN — ASPIRIN 81 MG: 81 TABLET, COATED ORAL at 21:32

## 2021-01-01 RX ADMIN — HYDRALAZINE HYDROCHLORIDE 75 MG: 50 TABLET, FILM COATED ORAL at 17:48

## 2021-01-01 RX ADMIN — CARVEDILOL 12.5 MG: 12.5 TABLET, FILM COATED ORAL at 22:31

## 2021-01-01 RX ADMIN — BUMETANIDE 1 MG: 1 TABLET ORAL at 17:48

## 2021-01-01 RX ADMIN — IPRATROPIUM BROMIDE AND ALBUTEROL SULFATE 3 ML: .5; 3 SOLUTION RESPIRATORY (INHALATION) at 21:11

## 2021-01-01 RX ADMIN — GUAIFENESIN 600 MG: 600 TABLET, EXTENDED RELEASE ORAL at 21:14

## 2021-01-01 RX ADMIN — LINEZOLID 600 MG: 600 INJECTION, SOLUTION INTRAVENOUS at 18:54

## 2021-01-01 RX ADMIN — HYDRALAZINE HYDROCHLORIDE 75 MG: 50 TABLET, FILM COATED ORAL at 09:05

## 2021-01-01 RX ADMIN — CLOPIDOGREL BISULFATE 300 MG: 300 TABLET, FILM COATED ORAL at 20:45

## 2021-01-01 RX ADMIN — DOXYCYCLINE 100 MG: 100 INJECTION, POWDER, LYOPHILIZED, FOR SOLUTION INTRAVENOUS at 07:11

## 2021-01-01 RX ADMIN — DEXTROSE MONOHYDRATE 12.5 G: 25 INJECTION, SOLUTION INTRAVENOUS at 19:17

## 2021-01-01 RX ADMIN — Medication 10 ML: at 10:20

## 2021-01-01 RX ADMIN — GLUCAGON HYDROCHLORIDE 2 MG: 1 INJECTION, POWDER, FOR SOLUTION INTRAMUSCULAR; INTRAVENOUS; SUBCUTANEOUS at 05:23

## 2021-01-01 RX ADMIN — CARVEDILOL 12.5 MG: 12.5 TABLET, FILM COATED ORAL at 09:45

## 2021-01-01 RX ADMIN — GUAIFENESIN 600 MG: 600 TABLET, EXTENDED RELEASE ORAL at 21:15

## 2021-01-01 RX ADMIN — ROSUVASTATIN 20 MG: 10 TABLET, FILM COATED ORAL at 21:21

## 2021-01-01 RX ADMIN — GUAIFENESIN 600 MG: 600 TABLET, EXTENDED RELEASE ORAL at 22:32

## 2021-01-01 RX ADMIN — CARVEDILOL 12.5 MG: 12.5 TABLET, FILM COATED ORAL at 21:15

## 2021-01-01 RX ADMIN — GUAIFENESIN 600 MG: 600 TABLET, EXTENDED RELEASE ORAL at 21:11

## 2021-01-01 RX ADMIN — ACETAMINOPHEN 650 MG: 325 TABLET ORAL at 06:18

## 2021-01-01 RX ADMIN — NITROGLYCERIN 0.4 MG: 0.4 TABLET, ORALLY DISINTEGRATING SUBLINGUAL at 10:18

## 2021-01-01 RX ADMIN — CEPHALEXIN 250 MG: 250 CAPSULE ORAL at 06:30

## 2021-01-01 RX ADMIN — HYDRALAZINE HYDROCHLORIDE 75 MG: 50 TABLET, FILM COATED ORAL at 22:32

## 2021-01-01 RX ADMIN — Medication 2 UNITS: at 18:09

## 2021-01-01 RX ADMIN — Medication 2 UNITS: at 23:06

## 2021-01-01 RX ADMIN — ONDANSETRON 4 MG: 2 INJECTION INTRAMUSCULAR; INTRAVENOUS at 19:11

## 2021-01-01 RX ADMIN — CLOPIDOGREL BISULFATE 75 MG: 75 TABLET ORAL at 09:04

## 2021-01-01 RX ADMIN — NITROGLYCERIN 0.4 MG: 0.4 TABLET, ORALLY DISINTEGRATING SUBLINGUAL at 02:25

## 2021-01-01 RX ADMIN — NITROGLYCERIN 0.4 MG: 0.4 TABLET, ORALLY DISINTEGRATING SUBLINGUAL at 11:19

## 2021-01-01 RX ADMIN — HYDRALAZINE HYDROCHLORIDE 25 MG: 50 TABLET, FILM COATED ORAL at 07:00

## 2021-01-01 RX ADMIN — ISOSORBIDE MONONITRATE 60 MG: 60 TABLET, EXTENDED RELEASE ORAL at 17:48

## 2021-01-01 RX ADMIN — HYDRALAZINE HYDROCHLORIDE 75 MG: 50 TABLET, FILM COATED ORAL at 12:36

## 2021-01-01 RX ADMIN — ARFORMOTEROL TARTRATE: 15 SOLUTION RESPIRATORY (INHALATION) at 08:46

## 2021-01-01 RX ADMIN — CEPHALEXIN 250 MG: 250 CAPSULE ORAL at 18:03

## 2021-01-01 RX ADMIN — HYDRALAZINE HYDROCHLORIDE 75 MG: 50 TABLET, FILM COATED ORAL at 08:12

## 2021-01-01 RX ADMIN — IPRATROPIUM BROMIDE AND ALBUTEROL SULFATE 3 ML: .5; 3 SOLUTION RESPIRATORY (INHALATION) at 19:22

## 2021-01-01 RX ADMIN — Medication 10 ML: at 13:15

## 2021-01-01 RX ADMIN — BUMETANIDE 1 MG: 0.25 INJECTION INTRAMUSCULAR; INTRAVENOUS at 09:04

## 2021-01-01 RX ADMIN — ISOSORBIDE MONONITRATE 60 MG: 60 TABLET, EXTENDED RELEASE ORAL at 10:25

## 2021-01-01 RX ADMIN — ISOSORBIDE MONONITRATE 60 MG: 60 TABLET, EXTENDED RELEASE ORAL at 18:42

## 2021-01-01 RX ADMIN — HYDRALAZINE HYDROCHLORIDE 50 MG: 50 TABLET, FILM COATED ORAL at 07:16

## 2021-01-01 RX ADMIN — GUAIFENESIN 600 MG: 600 TABLET, EXTENDED RELEASE ORAL at 08:44

## 2021-01-01 RX ADMIN — CEFEPIME 2 G: 2 INJECTION, POWDER, FOR SOLUTION INTRAVENOUS at 07:01

## 2021-01-01 RX ADMIN — CLOPIDOGREL BISULFATE 75 MG: 75 TABLET ORAL at 09:05

## 2021-01-01 RX ADMIN — ROSUVASTATIN 20 MG: 10 TABLET, FILM COATED ORAL at 22:08

## 2021-01-01 RX ADMIN — HEPARIN SODIUM 9 UNITS/KG/HR: 10000 INJECTION, SOLUTION INTRAVENOUS at 05:46

## 2021-01-01 RX ADMIN — ACETAMINOPHEN 650 MG: 325 TABLET ORAL at 19:30

## 2021-01-01 RX ADMIN — DOXYCYCLINE 100 MG: 100 INJECTION, POWDER, LYOPHILIZED, FOR SOLUTION INTRAVENOUS at 19:47

## 2021-01-01 RX ADMIN — DEXTROSE MONOHYDRATE 75 ML/HR: 50 INJECTION, SOLUTION INTRAVENOUS at 03:58

## 2021-01-01 RX ADMIN — Medication 10 ML: at 22:32

## 2021-01-01 RX ADMIN — HYDRALAZINE HYDROCHLORIDE 50 MG: 50 TABLET, FILM COATED ORAL at 07:43

## 2021-01-01 RX ADMIN — Medication 7 UNITS: at 18:11

## 2021-01-01 RX ADMIN — ALBUMIN (HUMAN) 25 G: 0.25 INJECTION, SOLUTION INTRAVENOUS at 00:40

## 2021-01-01 RX ADMIN — NITROGLYCERIN 0.4 MG: 0.4 TABLET, ORALLY DISINTEGRATING SUBLINGUAL at 04:35

## 2021-01-01 RX ADMIN — Medication 5 UNITS: at 22:08

## 2021-01-01 RX ADMIN — ASPIRIN 325 MG ORAL TABLET 325 MG: 325 PILL ORAL at 20:45

## 2021-01-01 RX ADMIN — METHYLPREDNISOLONE SODIUM SUCCINATE 125 MG: 125 INJECTION, POWDER, FOR SOLUTION INTRAMUSCULAR; INTRAVENOUS at 07:00

## 2021-01-01 RX ADMIN — ISOSORBIDE MONONITRATE 60 MG: 60 TABLET, EXTENDED RELEASE ORAL at 08:58

## 2021-01-01 RX ADMIN — ROSUVASTATIN 20 MG: 10 TABLET, FILM COATED ORAL at 23:36

## 2021-01-01 RX ADMIN — INSULIN GLARGINE 15 UNITS: 100 INJECTION, SOLUTION SUBCUTANEOUS at 17:54

## 2021-01-01 RX ADMIN — CARVEDILOL 12.5 MG: 12.5 TABLET, FILM COATED ORAL at 21:33

## 2021-01-01 RX ADMIN — HYDRALAZINE HYDROCHLORIDE 50 MG: 50 TABLET, FILM COATED ORAL at 21:21

## 2021-01-01 RX ADMIN — ARFORMOTEROL TARTRATE: 15 SOLUTION RESPIRATORY (INHALATION) at 19:20

## 2021-01-01 RX ADMIN — CARVEDILOL 12.5 MG: 12.5 TABLET, FILM COATED ORAL at 09:09

## 2021-01-01 RX ADMIN — NITROGLYCERIN 0.4 MG: 0.4 TABLET, ORALLY DISINTEGRATING SUBLINGUAL at 07:30

## 2021-01-01 RX ADMIN — IPRATROPIUM BROMIDE AND ALBUTEROL SULFATE 3 ML: .5; 3 SOLUTION RESPIRATORY (INHALATION) at 07:12

## 2021-01-01 RX ADMIN — CEFDINIR 300 MG: 300 CAPSULE ORAL at 13:00

## 2021-01-01 RX ADMIN — LEVOTHYROXINE SODIUM 100 MCG: 0.1 TABLET ORAL at 07:11

## 2021-01-01 RX ADMIN — HYDRALAZINE HYDROCHLORIDE 50 MG: 50 TABLET, FILM COATED ORAL at 17:49

## 2021-01-01 RX ADMIN — CEFDINIR 300 MG: 300 CAPSULE ORAL at 08:15

## 2021-01-01 RX ADMIN — CLOPIDOGREL BISULFATE 75 MG: 75 TABLET ORAL at 08:40

## 2021-01-01 RX ADMIN — BUMETANIDE 1.5 MG: 0.25 INJECTION INTRAMUSCULAR; INTRAVENOUS at 18:57

## 2021-01-01 RX ADMIN — HYDRALAZINE HYDROCHLORIDE 75 MG: 50 TABLET, FILM COATED ORAL at 09:09

## 2021-01-01 RX ADMIN — LEVOTHYROXINE SODIUM 100 MCG: 0.1 TABLET ORAL at 06:32

## 2021-01-01 RX ADMIN — DOXYCYCLINE 100 MG: 100 INJECTION, POWDER, LYOPHILIZED, FOR SOLUTION INTRAVENOUS at 19:12

## 2021-01-01 RX ADMIN — LEVOTHYROXINE SODIUM 100 MCG: 0.1 TABLET ORAL at 06:30

## 2021-01-01 RX ADMIN — HYDROCORTISONE SODIUM SUCCINATE 100 MG: 100 INJECTION, POWDER, FOR SOLUTION INTRAMUSCULAR; INTRAVENOUS at 10:07

## 2021-01-01 RX ADMIN — CEPHALEXIN 250 MG: 250 CAPSULE ORAL at 21:21

## 2021-01-01 RX ADMIN — Medication 200 MG: at 09:34

## 2021-01-01 RX ADMIN — IPRATROPIUM BROMIDE AND ALBUTEROL SULFATE 3 ML: .5; 3 SOLUTION RESPIRATORY (INHALATION) at 07:44

## 2021-01-01 RX ADMIN — LEVOTHYROXINE SODIUM 100 MCG: 0.1 TABLET ORAL at 07:01

## 2021-01-01 RX ADMIN — HEPARIN SODIUM 12 UNITS/KG/HR: 10000 INJECTION, SOLUTION INTRAVENOUS at 10:02

## 2021-01-01 RX ADMIN — IPRATROPIUM BROMIDE AND ALBUTEROL SULFATE 3 ML: .5; 3 SOLUTION RESPIRATORY (INHALATION) at 07:38

## 2021-01-01 RX ADMIN — ACETAMINOPHEN 650 MG: 325 TABLET ORAL at 07:19

## 2021-01-01 RX ADMIN — Medication 2 UNITS: at 12:38

## 2021-01-01 RX ADMIN — GUAIFENESIN 600 MG: 600 TABLET, EXTENDED RELEASE ORAL at 22:08

## 2021-01-01 RX ADMIN — DEXTROSE: 20 INJECTION, SOLUTION INTRAVENOUS at 22:00

## 2021-01-01 RX ADMIN — ROSUVASTATIN 20 MG: 10 TABLET, FILM COATED ORAL at 21:14

## 2021-01-01 RX ADMIN — HEPARIN SODIUM 5000 UNITS: 5000 INJECTION INTRAVENOUS; SUBCUTANEOUS at 05:27

## 2021-01-01 RX ADMIN — DEXTROSE MONOHYDRATE 25 ML/HR: 10 INJECTION, SOLUTION INTRAVENOUS at 09:02

## 2021-01-01 RX ADMIN — CARVEDILOL 12.5 MG: 12.5 TABLET, FILM COATED ORAL at 08:58

## 2021-01-01 RX ADMIN — CARVEDILOL 6.25 MG: 3.12 TABLET, FILM COATED ORAL at 09:05

## 2021-01-01 RX ADMIN — HYDRALAZINE HYDROCHLORIDE 50 MG: 50 TABLET, FILM COATED ORAL at 15:20

## 2021-01-01 RX ADMIN — ROSUVASTATIN 20 MG: 10 TABLET, FILM COATED ORAL at 22:32

## 2021-01-01 RX ADMIN — Medication 5 UNITS: at 07:02

## 2021-01-01 RX ADMIN — ACETAMINOPHEN 650 MG: 325 TABLET ORAL at 07:11

## 2021-01-01 RX ADMIN — Medication 2 UNITS: at 17:49

## 2021-01-01 RX ADMIN — Medication 5 UNITS: at 13:45

## 2021-01-01 RX ADMIN — HYDRALAZINE HYDROCHLORIDE 75 MG: 50 TABLET, FILM COATED ORAL at 12:38

## 2021-01-01 RX ADMIN — LINEZOLID 600 MG: 600 INJECTION, SOLUTION INTRAVENOUS at 18:22

## 2021-01-01 RX ADMIN — ISOSORBIDE MONONITRATE 60 MG: 60 TABLET, EXTENDED RELEASE ORAL at 09:45

## 2021-01-01 RX ADMIN — ALUMINUM HYDROXIDE, MAGNESIUM HYDROXIDE, AND SIMETHICONE 30 ML: 200; 200; 20 SUSPENSION ORAL at 15:45

## 2021-01-01 RX ADMIN — ISOSORBIDE MONONITRATE 60 MG: 60 TABLET, EXTENDED RELEASE ORAL at 08:15

## 2021-01-01 RX ADMIN — BUMETANIDE 1 MG: 1 TABLET ORAL at 08:12

## 2021-01-01 RX ADMIN — HYDRALAZINE HYDROCHLORIDE 50 MG: 50 TABLET, FILM COATED ORAL at 18:02

## 2021-01-01 RX ADMIN — NITROGLYCERIN 0.4 MG: 0.4 TABLET, ORALLY DISINTEGRATING SUBLINGUAL at 00:55

## 2021-01-01 RX ADMIN — NITROGLYCERIN 0.4 MG: 0.4 TABLET, ORALLY DISINTEGRATING SUBLINGUAL at 08:41

## 2021-01-01 RX ADMIN — Medication 10 ML: at 06:58

## 2021-01-01 RX ADMIN — LINEZOLID 600 MG: 600 INJECTION, SOLUTION INTRAVENOUS at 07:27

## 2021-01-01 RX ADMIN — ALUMINUM HYDROXIDE, MAGNESIUM HYDROXIDE, AND SIMETHICONE 40 ML: 200; 200; 20 SUSPENSION ORAL at 11:38

## 2021-01-01 RX ADMIN — ISOSORBIDE MONONITRATE 60 MG: 60 TABLET, EXTENDED RELEASE ORAL at 18:02

## 2021-01-01 RX ADMIN — CARVEDILOL 12.5 MG: 12.5 TABLET, FILM COATED ORAL at 13:33

## 2021-01-01 RX ADMIN — BUMETANIDE 1 MG: 1 TABLET ORAL at 09:09

## 2021-01-01 RX ADMIN — Medication 200 MG: at 09:05

## 2021-01-01 RX ADMIN — ISOSORBIDE MONONITRATE 30 MG: 30 TABLET, EXTENDED RELEASE ORAL at 09:34

## 2021-01-01 RX ADMIN — DIPHENHYDRAMINE HYDROCHLORIDE 25 MG: 50 INJECTION, SOLUTION INTRAMUSCULAR; INTRAVENOUS at 10:07

## 2021-01-01 RX ADMIN — BUMETANIDE 1 MG: 1 TABLET ORAL at 18:41

## 2021-01-01 RX ADMIN — CEPHALEXIN 250 MG: 250 CAPSULE ORAL at 13:55

## 2021-01-01 RX ADMIN — NITROGLYCERIN 0.4 MG: 0.4 TABLET, ORALLY DISINTEGRATING SUBLINGUAL at 02:10

## 2021-01-01 RX ADMIN — HEPARIN SODIUM 5000 UNITS: 5000 INJECTION INTRAVENOUS; SUBCUTANEOUS at 19:48

## 2021-01-01 RX ADMIN — ROSUVASTATIN 20 MG: 10 TABLET, FILM COATED ORAL at 22:31

## 2021-01-01 RX ADMIN — CARVEDILOL 12.5 MG: 12.5 TABLET, FILM COATED ORAL at 22:32

## 2021-01-01 RX ADMIN — CARVEDILOL 6.25 MG: 3.12 TABLET, FILM COATED ORAL at 09:43

## 2021-01-01 RX ADMIN — HYDRALAZINE HYDROCHLORIDE 75 MG: 50 TABLET, FILM COATED ORAL at 09:04

## 2021-01-01 RX ADMIN — FUROSEMIDE 60 MG: 40 INJECTION, SOLUTION INTRAMUSCULAR; INTRAVENOUS at 01:11

## 2021-01-01 RX ADMIN — ARFORMOTEROL TARTRATE: 15 SOLUTION RESPIRATORY (INHALATION) at 07:20

## 2021-01-01 RX ADMIN — Medication 16 G: at 23:28

## 2021-01-01 RX ADMIN — LEVOTHYROXINE SODIUM 100 MCG: 0.1 TABLET ORAL at 06:48

## 2021-01-01 RX ADMIN — DOXYCYCLINE 100 MG: 100 INJECTION, POWDER, LYOPHILIZED, FOR SOLUTION INTRAVENOUS at 06:18

## 2021-01-01 RX ADMIN — ISOSORBIDE MONONITRATE 60 MG: 60 TABLET, EXTENDED RELEASE ORAL at 19:08

## 2021-01-01 RX ADMIN — CLOPIDOGREL BISULFATE 75 MG: 75 TABLET ORAL at 08:15

## 2021-01-01 RX ADMIN — GUAIFENESIN 600 MG: 600 TABLET, EXTENDED RELEASE ORAL at 08:58

## 2021-01-01 RX ADMIN — GUAIFENESIN 600 MG: 600 TABLET, EXTENDED RELEASE ORAL at 09:34

## 2021-01-01 RX ADMIN — ONDANSETRON 4 MG: 2 INJECTION INTRAMUSCULAR; INTRAVENOUS at 13:20

## 2021-01-01 RX ADMIN — CARVEDILOL 6.25 MG: 3.12 TABLET, FILM COATED ORAL at 09:04

## 2021-01-01 RX ADMIN — LINEZOLID 600 MG: 600 INJECTION, SOLUTION INTRAVENOUS at 07:19

## 2021-01-01 RX ADMIN — ASPIRIN 81 MG: 81 TABLET, COATED ORAL at 21:11

## 2021-01-01 RX ADMIN — HYDRALAZINE HYDROCHLORIDE 75 MG: 50 TABLET, FILM COATED ORAL at 12:34

## 2021-01-01 RX ADMIN — ACETAMINOPHEN 650 MG: 325 TABLET ORAL at 23:00

## 2021-01-01 RX ADMIN — LEVOTHYROXINE SODIUM 100 MCG: 0.1 TABLET ORAL at 07:19

## 2021-01-01 RX ADMIN — Medication 10 ML: at 06:32

## 2021-01-01 RX ADMIN — CARVEDILOL 12.5 MG: 12.5 TABLET, FILM COATED ORAL at 08:12

## 2021-01-01 RX ADMIN — DEXTROSE MONOHYDRATE 12.5 G: 25 INJECTION, SOLUTION INTRAVENOUS at 21:15

## 2021-01-01 RX ADMIN — GUAIFENESIN 600 MG: 600 TABLET, EXTENDED RELEASE ORAL at 09:09

## 2021-01-01 RX ADMIN — LEVOTHYROXINE SODIUM 100 MCG: 0.1 TABLET ORAL at 07:00

## 2021-01-01 RX ADMIN — CEFEPIME 2 G: 2 INJECTION, POWDER, FOR SOLUTION INTRAVENOUS at 06:59

## 2021-01-01 RX ADMIN — SODIUM CHLORIDE 75 ML/HR: 9 INJECTION, SOLUTION INTRAVENOUS at 12:36

## 2021-01-01 RX ADMIN — DEXTROSE MONOHYDRATE 25 G: 25 INJECTION, SOLUTION INTRAVENOUS at 15:57

## 2021-01-01 RX ADMIN — ISOSORBIDE MONONITRATE 60 MG: 60 TABLET, EXTENDED RELEASE ORAL at 17:49

## 2021-01-01 RX ADMIN — ISOSORBIDE MONONITRATE 60 MG: 60 TABLET, EXTENDED RELEASE ORAL at 18:16

## 2021-01-01 RX ADMIN — NITROGLYCERIN 0.4 MG: 0.4 TABLET, ORALLY DISINTEGRATING SUBLINGUAL at 13:38

## 2021-01-01 RX ADMIN — LINEZOLID 600 MG: 600 INJECTION, SOLUTION INTRAVENOUS at 22:14

## 2021-01-01 RX ADMIN — NITROGLYCERIN 0.4 MG: 0.4 TABLET, ORALLY DISINTEGRATING SUBLINGUAL at 21:29

## 2021-01-01 RX ADMIN — LEVOTHYROXINE SODIUM 100 MCG: 0.1 TABLET ORAL at 06:59

## 2021-01-01 RX ADMIN — CLOPIDOGREL BISULFATE 75 MG: 75 TABLET ORAL at 08:44

## 2021-01-01 RX ADMIN — HYDRALAZINE HYDROCHLORIDE 75 MG: 50 TABLET, FILM COATED ORAL at 18:11

## 2021-01-01 RX ADMIN — IPRATROPIUM BROMIDE AND ALBUTEROL SULFATE 3 ML: .5; 3 SOLUTION RESPIRATORY (INHALATION) at 08:46

## 2021-01-01 RX ADMIN — BUMETANIDE 1 MG: 0.25 INJECTION INTRAMUSCULAR; INTRAVENOUS at 09:05

## 2021-01-01 RX ADMIN — BUMETANIDE 1 MG: 0.25 INJECTION INTRAMUSCULAR; INTRAVENOUS at 18:12

## 2021-01-01 RX ADMIN — NITROGLYCERIN 10 MCG/MIN: 20 INJECTION INTRAVENOUS at 13:27

## 2021-01-01 RX ADMIN — BUMETANIDE 1 MG: 0.25 INJECTION INTRAMUSCULAR; INTRAVENOUS at 19:12

## 2021-01-01 RX ADMIN — HEPARIN SODIUM 5000 UNITS: 5000 INJECTION INTRAVENOUS; SUBCUTANEOUS at 06:19

## 2021-01-01 RX ADMIN — ARFORMOTEROL TARTRATE: 15 SOLUTION RESPIRATORY (INHALATION) at 21:11

## 2021-01-01 RX ADMIN — SODIUM CHLORIDE 2 G: 9 INJECTION INTRAMUSCULAR; INTRAVENOUS; SUBCUTANEOUS at 07:19

## 2021-01-01 RX ADMIN — HYDRALAZINE HYDROCHLORIDE 50 MG: 50 TABLET, FILM COATED ORAL at 06:39

## 2021-01-01 RX ADMIN — ACETAMINOPHEN 650 MG: 325 TABLET ORAL at 00:46

## 2021-01-01 RX ADMIN — BUMETANIDE 1 MG: 1 TABLET ORAL at 09:46

## 2021-01-01 RX ADMIN — HYDRALAZINE HYDROCHLORIDE 50 MG: 50 TABLET, FILM COATED ORAL at 08:40

## 2021-01-01 RX ADMIN — INSULIN GLARGINE 15 UNITS: 100 INJECTION, SOLUTION SUBCUTANEOUS at 19:12

## 2021-01-01 RX ADMIN — DOXYCYCLINE 100 MG: 100 INJECTION, POWDER, LYOPHILIZED, FOR SOLUTION INTRAVENOUS at 06:19

## 2021-01-01 RX ADMIN — CLOPIDOGREL BISULFATE 75 MG: 75 TABLET ORAL at 09:46

## 2021-01-01 RX ADMIN — GUAIFENESIN 600 MG: 600 TABLET, EXTENDED RELEASE ORAL at 09:06

## 2021-01-01 RX ADMIN — ALBUMIN (HUMAN) 12.5 G: 0.25 INJECTION, SOLUTION INTRAVENOUS at 18:10

## 2021-01-01 RX ADMIN — NITROGLYCERIN 0.4 MG: 0.4 TABLET, ORALLY DISINTEGRATING SUBLINGUAL at 04:40

## 2021-01-28 ENCOUNTER — OFFICE VISIT (OUTPATIENT)
Dept: CARDIOLOGY CLINIC | Age: 71
End: 2021-01-28
Payer: MEDICARE

## 2021-01-28 VITALS
OXYGEN SATURATION: 95 % | WEIGHT: 146 LBS | BODY MASS INDEX: 24.32 KG/M2 | SYSTOLIC BLOOD PRESSURE: 128 MMHG | RESPIRATION RATE: 18 BRPM | HEART RATE: 56 BPM | DIASTOLIC BLOOD PRESSURE: 60 MMHG | HEIGHT: 65 IN | TEMPERATURE: 96 F

## 2021-01-28 DIAGNOSIS — N28.9 RENAL INSUFFICIENCY: ICD-10-CM

## 2021-01-28 DIAGNOSIS — R06.09 DOE (DYSPNEA ON EXERTION): Primary | ICD-10-CM

## 2021-01-28 DIAGNOSIS — I25.10 CORONARY ARTERIOSCLEROSIS: ICD-10-CM

## 2021-01-28 PROBLEM — N18.4 STAGE 4 CHRONIC KIDNEY DISEASE (HCC): Status: RESOLVED | Noted: 2019-01-15 | Resolved: 2021-01-28

## 2021-01-28 PROBLEM — N18.4 STAGE 4 CHRONIC KIDNEY DISEASE (HCC): Status: ACTIVE | Noted: 2019-01-15

## 2021-01-28 PROCEDURE — 99215 OFFICE O/P EST HI 40 MIN: CPT | Performed by: NURSE PRACTITIONER

## 2021-01-28 PROCEDURE — G8420 CALC BMI NORM PARAMETERS: HCPCS | Performed by: NURSE PRACTITIONER

## 2021-01-28 PROCEDURE — G8432 DEP SCR NOT DOC, RNG: HCPCS | Performed by: NURSE PRACTITIONER

## 2021-01-28 PROCEDURE — G8754 DIAS BP LESS 90: HCPCS | Performed by: NURSE PRACTITIONER

## 2021-01-28 PROCEDURE — G8536 NO DOC ELDER MAL SCRN: HCPCS | Performed by: NURSE PRACTITIONER

## 2021-01-28 PROCEDURE — 1101F PT FALLS ASSESS-DOCD LE1/YR: CPT | Performed by: NURSE PRACTITIONER

## 2021-01-28 PROCEDURE — G8752 SYS BP LESS 140: HCPCS | Performed by: NURSE PRACTITIONER

## 2021-01-28 PROCEDURE — G8427 DOCREV CUR MEDS BY ELIG CLIN: HCPCS | Performed by: NURSE PRACTITIONER

## 2021-01-28 PROCEDURE — 3017F COLORECTAL CA SCREEN DOC REV: CPT | Performed by: NURSE PRACTITIONER

## 2021-01-28 RX ORDER — GLIMEPIRIDE 4 MG/1
4 TABLET ORAL
COMMUNITY
End: 2021-01-01

## 2021-01-28 RX ORDER — INSULIN GLARGINE 100 [IU]/ML
INJECTION, SOLUTION SUBCUTANEOUS
COMMUNITY
End: 2021-01-01

## 2021-01-28 RX ORDER — HYDRALAZINE HYDROCHLORIDE 25 MG/1
TABLET, FILM COATED ORAL
COMMUNITY
Start: 2020-07-20 | End: 2021-01-01 | Stop reason: SINTOL

## 2021-01-28 RX ORDER — ASPIRIN 81 MG/1
81 TABLET ORAL DAILY
COMMUNITY

## 2021-01-28 RX ORDER — CLONIDINE HYDROCHLORIDE 0.1 MG/1
TABLET ORAL 3 TIMES DAILY
COMMUNITY
Start: 2020-07-20 | End: 2021-01-01

## 2021-01-28 RX ORDER — CLOPIDOGREL BISULFATE 75 MG/1
75 TABLET ORAL DAILY
Qty: 90 TAB | Refills: 3 | Status: SHIPPED | OUTPATIENT
Start: 2021-01-28 | End: 2021-01-01

## 2021-01-28 RX ORDER — TESTOSTERONE 20.25 MG/1.25G
20.25 GEL TOPICAL DAILY
COMMUNITY

## 2021-01-28 RX ORDER — LEVOTHYROXINE SODIUM 100 UG/1
100 TABLET ORAL DAILY
COMMUNITY

## 2021-01-28 RX ORDER — ANASTROZOLE 1 MG/1
1 TABLET ORAL 3 TIMES DAILY
COMMUNITY
End: 2021-01-01

## 2021-01-28 RX ORDER — FUROSEMIDE 40 MG/1
40 TABLET ORAL 2 TIMES DAILY
COMMUNITY
End: 2021-01-01 | Stop reason: ALTCHOICE

## 2021-01-28 RX ORDER — NITROGLYCERIN 0.4 MG/1
TABLET SUBLINGUAL
COMMUNITY
End: 2021-01-01 | Stop reason: SDUPTHER

## 2021-01-28 RX ORDER — PRAVASTATIN SODIUM 40 MG/1
TABLET ORAL
COMMUNITY
End: 2021-02-01 | Stop reason: ALTCHOICE

## 2021-01-28 RX ORDER — LISINOPRIL 20 MG/1
TABLET ORAL
COMMUNITY
Start: 2020-07-20 | End: 2021-01-01 | Stop reason: SDUPTHER

## 2021-01-28 RX ORDER — CLOPIDOGREL BISULFATE 75 MG/1
TABLET ORAL
COMMUNITY
Start: 2020-07-20 | End: 2021-01-28 | Stop reason: SDUPTHER

## 2021-01-28 RX ORDER — ATENOLOL 50 MG/1
TABLET ORAL
COMMUNITY
Start: 2020-07-20 | End: 2021-01-01 | Stop reason: SDUPTHER

## 2021-01-28 NOTE — LETTER
1/28/2021    Patient: Jarad Mcfarland   YOB: 1950   Date of Visit: 1/28/2021     Neela Rodgers NP  [address] [facility] [address]  Via Fax: [phone]    Dear Neela Rodgers NP,      Thank you for referring Mr. Jarad Mcfarland to [facility] for evaluation. My notes for this consultation are attached.    If you have questions, please do not hesitate to call me. I look forward to following your patient along with you.      Sincerely,    Aime Manrique NP

## 2021-01-28 NOTE — PROGRESS NOTES
[facility] @ [facility]    Corrine Meehan DNP, ANP-BC  Subjective/HPI: New patient consultation    Carin Duncan is a 79 y.o. male who has previously been seen in our practice in 2005 by Dr. Alondra Crane and myself for a history of atherosclerotic heart disease PTCA stenting December 2005. He had moved to [place] where he had had numerous cardiac catheterizations and stents placed. In reviewing his stent cards he has a Cypher stent in 2006 and a proximal circumflex, 2018 2 Sudarshan stents in the LAD, 2019 and Woronoco stent in the LAD and circumflex he recently had a cardiac catheterization January 2020 received an Woronoco stent but unknown vessel. He states he has had at least 2 myocardial infarctions in [place]. He was followed by nephrology for CKD stage III-4 predominantly in CKD stage III. He has moved back to [place]. Was recently seen by primary care lab work was performed and he is awaiting results. Reviewed some medical records he had from [place], he has intolerance to Brilinta and Effient stating it was causing him lightheadedness dizziness and syncope. He was intolerant to Ranexa, isosorbide mononitrate, carvedilol, amlodipine due to fatigue lightheadedness dizziness. At present time he denies any chest discomfort, has some intermittent dyspnea on exertion he has quit smoking in 2000. He also has aortic regurgitation unknown degree, patient reports last echocardiogram at least 2 years ago.     PCP Provider  Bernarda Vega NP  Past Medical History:   Diagnosis Date    Aortic regurgitation     CAD (coronary artery disease)     Taxus stent 12/2005, 12/06 Cypher Prox circ, 6/2018 Woronoco LADx2,  9/19 Woronoco LAD & LCX, 1/20 Woronoco     Chronic obstructive pulmonary disease ([address]     Congestive heart failure ([address]     Diabetes ([address]     Essential hypertension     Hyperlipidemia     MI (myocardial infarction) ([address]     Murmur     Renal insufficiency     Thyroid disease Past Surgical History:   Procedure Laterality Date    HX CORONARY STENT PLACEMENT      HX CYST REMOVAL      IR ASP BLADDER SUPRA CATH       Allergies   Allergen Reactions    Bee Sting [Sting, Bee] Anaphylaxis    Iodinated Contrast Media Rash    Brilinta [Ticagrelor] Other (comments)    Effient [Prasugrel] Other (comments)    Imdur [Isosorbide Mononitrate] Other (comments)    Penicillins Rash and Nausea Only    Ranexa [Ranolazine] Other (comments)    Sulfa (Sulfonamide Antibiotics) Rash      No family history on file. Current Outpatient Medications   Medication Sig    anastrozole (ARIMIDEX) 1 mg tablet anastrozole 1 mg tablet   Take 0.5 tablets every other day by oral route.  aspirin delayed-release 81 mg tablet Aspir-81 mg tablet,delayed release   Take 1 tablet every day by oral route.  atenoloL (TENORMIN) 50 mg tablet atenolol 50 mg tablet   Take 1 tablet twice a day by oral route.  cloNIDine HCL (CATAPRES) 0.1 mg tablet clonidine HCl 0.1 mg tablet   Take 1 tablet twice a day by oral route.  furosemide (LASIX) 20 mg tablet furosemide 20 mg tablet   Take 2 tablets twice a day by oral route.  glimepiride (AMARYL) 4 mg tablet glimepiride 4 mg tablet   Take 1 tablet twice a day by oral route.  hydrALAZINE (APRESOLINE) 25 mg tablet hydralazine 25 mg tablet   Take 1 tablet 3 times a day by oral route.  insulin glargine (Lantus U-100 Insulin) 100 unit/mL injection Lantus U-100 Insulin 100 unit/mL subcutaneous solution   Inject by subcutaneous route as directed.  levothyroxine (SYNTHROID) 100 mcg tablet levothyroxine 100 mcg tablet   Take 1 tablet every day by oral route.  lisinopriL (PRINIVIL, ZESTRIL) 20 mg tablet lisinopril 20 mg tablet   Take 1 tablet twice a day by oral route.  nitroglycerin (Nitrostat) 0.4 mg SL tablet Nitrostat 0.4 mg sublingual tablet   Place 1 tablet by sublingual route as needed.     pravastatin (PRAVACHOL) 40 mg tablet pravastatin 40 mg tablet   Take 1 tablet every day by oral route in the evening.  testosterone (ANDROGEL) 1.62 % (20.25 mg/1.25 gram) glpk testosterone 1.62 % (20.25 mg/1.25 gram) transdermal gel packet   Apply 1 packet every day by transdermal route.  clopidogreL (PLAVIX) 75 mg tab Take 1 Tab by mouth daily. Long term 1st generation stents     No current facility-administered medications for this visit.        Vitals:    21 1336   BP: 128/60   Pulse: (!) 56   Resp: 18   Temp: (!) 96 °F (35.6 °C)   TempSrc: Temporal   SpO2: 95%   Weight: 146 lb (66.2 kg)   Height: 5' 4.5\" (1.638 m)     Social History     Socioeconomic History    Marital status:      Spouse name: Not on file    Number of children: Not on file    Years of education: Not on file    Highest education level: Not on file   Occupational History    Not on file   Social Needs    Financial resource strain: Not on file    Food insecurity     Worry: Not on file     Inability: Not on file    Transportation needs     Medical: Not on file     Non-medical: Not on file   Tobacco Use    Smoking status: Former Smoker     Packs/day: 2.00     Years: 30.00     Pack years: 60.00     Quit date: 1991     Years since quittin.0    Smokeless tobacco: Never Used   Substance and Sexual Activity    Alcohol use: Yes     Comment: rarely    Drug use: Never    Sexual activity: Yes     Partners: Female   Lifestyle    Physical activity     Days per week: Not on file     Minutes per session: Not on file    Stress: Not on file   Relationships    Social connections     Talks on phone: Not on file     Gets together: Not on file     Attends Latter day service: Not on file     Active member of club or organization: Not on file     Attends meetings of clubs or organizations: Not on file     Relationship status: Not on file    Intimate partner violence     Fear of current or ex partner: Not on file     Emotionally abused: Not on file     Physically abused: Not on file     Forced sexual activity: Not on file   Other Topics Concern    Not on file   Social History Narrative    Not on file       I have reviewed the nurses notes, vitals, problem list, allergy list, medical history, family, social history and medications. Review of Symptoms:  11 systems reviewed, negative other than as stated in the HPI      Physical Exam:      General: Well developed, in no acute distress, cooperative and alert  HEENT: No carotid bruits, no JVD, trach is midline. Neck Supple, PERRL, EOM intact. Heart:  Normal S1/S2 negative S3 or S4. Regular, 2/6 diastolic murmur, gallop or rub. Respiratory: Clear bilaterally x 4, no wheezing or rales  Abdomen:   Soft, non-tender, no masses, bowel sounds are active. Extremities:  No edema, normal cap refill, no cyanosis, atraumatic. Neuro: A&Ox3, speech clear, gait stable. Skin: Skin color is normal. No rashes or lesions. Non diaphoretic  Vascular: 2+ pulses symmetric in all extremities    Cardiographics    ECG: Manual EKG to be scanned into chart on today's date of service. Sinus bradycardia rate 49 bpm, poor anteroseptal R wave progression. Cardiology Labs:  No results found for: CHOL, CHOLX, CHLST, CHOLV, 941246, HDL, HDLP, LDL, LDLC, DLDLP, TGLX, TRIGL, TRIGP, CHHD, CHHDX    No results found for: NA, K, CL, CO2, AGAP, GLU, BUN, CREA, BUCR, GFRAA, GFRNA, CA, TBIL, TBILI, AP, TP, ALB, GLOB, AGRAT, ALT        Assessment:     Assessment:     Diagnoses and all orders for this visit:    1. WISEMAN (dyspnea on exertion)  -     AMB POC EKG ROUTINE W/ 12 LEADS, INTER & REP  -     ECHO ADULT COMPLETE; Future    2. Coronary arteriosclerosis    3. Renal insufficiency    Other orders  -     clopidogreL (PLAVIX) 75 mg tab; Take 1 Tab by mouth daily. Long term 1st generation stents        ICD-10-CM ICD-9-CM    1. WISEMAN (dyspnea on exertion)  R06.00 786.09 AMB POC EKG ROUTINE W/ 12 LEADS, INTER & REP      ECHO ADULT COMPLETE   2.  Coronary arteriosclerosis  I25.10 414.00 3. Renal insufficiency  N28.9 593.9      Orders Placed This Encounter    AMB POC EKG ROUTINE W/ 12 LEADS, INTER & REP     Order Specific Question:   Reason for Exam:     Answer:   CAD    anastrozole (ARIMIDEX) 1 mg tablet     Sig: anastrozole 1 mg tablet   Take 0.5 tablets every other day by oral route.  aspirin delayed-release 81 mg tablet     Sig: Aspir-81 mg tablet,delayed release   Take 1 tablet every day by oral route.  atenoloL (TENORMIN) 50 mg tablet     Sig: atenolol 50 mg tablet   Take 1 tablet twice a day by oral route.  cloNIDine HCL (CATAPRES) 0.1 mg tablet     Sig: clonidine HCl 0.1 mg tablet   Take 1 tablet twice a day by oral route.  DISCONTD: clopidogreL (PLAVIX) 75 mg tab     Sig: clopidogrel 75 mg tablet   Take 1 tablet every day by oral route.  furosemide (LASIX) 20 mg tablet     Sig: furosemide 20 mg tablet   Take 2 tablets twice a day by oral route.  glimepiride (AMARYL) 4 mg tablet     Sig: glimepiride 4 mg tablet   Take 1 tablet twice a day by oral route.  hydrALAZINE (APRESOLINE) 25 mg tablet     Sig: hydralazine 25 mg tablet   Take 1 tablet 3 times a day by oral route.  insulin glargine (Lantus U-100 Insulin) 100 unit/mL injection     Sig: Lantus U-100 Insulin 100 unit/mL subcutaneous solution   Inject by subcutaneous route as directed.  levothyroxine (SYNTHROID) 100 mcg tablet     Sig: levothyroxine 100 mcg tablet   Take 1 tablet every day by oral route.  lisinopriL (PRINIVIL, ZESTRIL) 20 mg tablet     Sig: lisinopril 20 mg tablet   Take 1 tablet twice a day by oral route.  nitroglycerin (Nitrostat) 0.4 mg SL tablet     Sig: Nitrostat 0.4 mg sublingual tablet   Place 1 tablet by sublingual route as needed.  pravastatin (PRAVACHOL) 40 mg tablet     Sig: pravastatin 40 mg tablet   Take 1 tablet every day by oral route in the evening.     testosterone (ANDROGEL) 1.62 % (20.25 mg/1.25 gram) glpk     Sig: testosterone 1.62 % (20.25 mg/1.25 gram) transdermal gel packet   Apply 1 packet every day by transdermal route.  clopidogreL (PLAVIX) 75 mg tab     Sig: Take 1 Tab by mouth daily. Long term 1st generation stents     Dispense:  90 Tab     Refill:  3        Plan:     1. Atherosclerotic heart disease: History of multiple PTCA stents in LAD circumflex most recent in 2020. At least a total of 7 predominantly in the LAD. Requesting records from [place]. Has maintained dual antiplatelet therapy he has first generation Taxus and Cypher stents recommend long-term Plavix therapy provided no future contraindications. Continue atenolol and statin therapy  2. Hypertension: Controlled 128/60 continue lisinopril, hydralazine, atenolol and clonidine. 3.  CKD stage III: We will need to establish with nephrology recommending Dr. Manuel [place] will provide consult information  4. Hyperlipidemia: On pravastatin 40 mg will request records from Dr. Emir Borden goal LDL less than 70.  5.  Aortic regurgitation: 2/6 diastolic murmur on exam echocardiogram ordered, essentially asymptomatic. Mild dyspnea on exertion multifactorial given history of COPD. 6.  Type 2 diabetes: On Lantus intermittently checking Accu-Cheks, lab work pending from primary care. 51-year-old male with extensive history of multivessel PTCA stenting CAD with myocardial infarction, hypertension hyperlipidemia type 2 diabetes aortic regurgitation. Presenting clinically stable will evaluate with 2D echocardiogram, refer to nephrology given history of CKD previously followed by nephrology in [place], follow-up in 6 months unless markedly abnormal echo. Judit Finley NP      Please note that this dictation was completed with [employer], the [employer] voice recognition software. Quite often unanticipated grammatical, syntax, homophones, and other interpretive errors are inadvertently transcribed by the computer software. Please disregard these errors.   Please excuse any errors that have escaped final proofreading. Thank you.

## 2021-02-01 ENCOUNTER — ANCILLARY PROCEDURE (OUTPATIENT)
Dept: CARDIOLOGY CLINIC | Age: 71
End: 2021-02-01
Payer: MEDICARE

## 2021-02-01 VITALS
BODY MASS INDEX: 24.92 KG/M2 | DIASTOLIC BLOOD PRESSURE: 60 MMHG | WEIGHT: 146 LBS | HEIGHT: 64 IN | SYSTOLIC BLOOD PRESSURE: 128 MMHG

## 2021-02-01 DIAGNOSIS — R06.09 DOE (DYSPNEA ON EXERTION): ICD-10-CM

## 2021-02-01 LAB
AV R PG: 78.12 MMHG
ECHO AO ASC DIAM: 3.06 CM
ECHO AO ROOT DIAM: 3.05 CM
ECHO AR MAX VEL PISA: 441.94 CM/S
ECHO AV AREA PEAK VELOCITY: 1.42 CM2
ECHO AV AREA VTI: 1.29 CM2
ECHO AV AREA/BSA PEAK VELOCITY: 0.8 CM2/M2
ECHO AV AREA/BSA VTI: 0.8 CM2/M2
ECHO AV MEAN GRADIENT: 12.76 MMHG
ECHO AV PEAK GRADIENT: 27.73 MMHG
ECHO AV PEAK VELOCITY: 263.29 CM/S
ECHO AV REGURGITANT PHT: 689.27 MS
ECHO AV VTI: 64.07 CM
ECHO LA AREA 4C: 15.91 CM2
ECHO LA MAJOR AXIS: 4.46 CM
ECHO LA MINOR AXIS: 2.61 CM
ECHO LA VOL 2C: 68.67 ML (ref 18–58)
ECHO LA VOL 4C: 43.98 ML (ref 18–58)
ECHO LA VOL BP: 64.59 ML (ref 18–58)
ECHO LA VOL/BSA BIPLANE: 37.75 ML/M2 (ref 16–28)
ECHO LA VOLUME INDEX A2C: 40.13 ML/M2 (ref 16–28)
ECHO LA VOLUME INDEX A4C: 25.7 ML/M2 (ref 16–28)
ECHO LV E' LATERAL VELOCITY: 6.31 CM/S
ECHO LV E' SEPTAL VELOCITY: 4.06 CM/S
ECHO LV INTERNAL DIMENSION DIASTOLIC: 5.38 CM (ref 4.2–5.9)
ECHO LV INTERNAL DIMENSION SYSTOLIC: 4.29 CM
ECHO LV IVSD: 1.07 CM (ref 0.6–1)
ECHO LV MASS 2D: 227.7 G (ref 88–224)
ECHO LV MASS INDEX 2D: 133.1 G/M2 (ref 49–115)
ECHO LV POSTERIOR WALL DIASTOLIC: 1.09 CM (ref 0.6–1)
ECHO LVOT DIAM: 1.97 CM
ECHO LVOT PEAK GRADIENT: 5.97 MMHG
ECHO LVOT PEAK VELOCITY: 122.13 CM/S
ECHO LVOT SV: 82.8 ML
ECHO LVOT VTI: 27.24 CM
ECHO MV A VELOCITY: 70.87 CM/S
ECHO MV E DECELERATION TIME (DT): 390.02 MS
ECHO MV E VELOCITY: 49.57 CM/S
ECHO MV E/A RATIO: 0.7
ECHO MV E/E' LATERAL: 7.86
ECHO MV E/E' RATIO (AVERAGED): 10.03
ECHO MV E/E' SEPTAL: 12.21
ECHO MV EROA PISA: 0.11 CM2
ECHO MV REGURGITANT RADIUS PISA: 0.57 CM
ECHO MV REGURGITANT VOLUME: 30.62 ML
ECHO MV REGURGITANT VTIA: 278.73 CM
ECHO RA AREA 4C: 11.06 CM2
ECHO RV TAPSE: 3.06 CM (ref 1.5–2)
LA VOL DISK BP: 60.35 ML (ref 18–58)
LVOT MG: 2.67 MMHG
MR PISA PV: 692.25 CM/S

## 2021-02-01 PROCEDURE — 93306 TTE W/DOPPLER COMPLETE: CPT | Performed by: INTERNAL MEDICINE

## 2021-02-01 RX ORDER — ROSUVASTATIN CALCIUM 20 MG/1
20 TABLET, COATED ORAL
Qty: 90 TAB | Refills: 3 | Status: SHIPPED | OUTPATIENT
Start: 2021-02-01 | End: 2021-01-01

## 2021-02-01 NOTE — PROGRESS NOTES
Placed call to pt. Two pt identifiers confirmed. Pt informed per NP Collene Laughter of his heart in the normal range but on the low side of normal as expected with having previous heart attacks. His aortic valve has only mild stenosis and leakage. Otherwise stable looking ultrasound of the heart follow-up as planned in 6 months. \" Pt verbalized understanding of information discussed w/ no further questions at this time.

## 2021-02-01 NOTE — PROGRESS NOTES
Q: Please call patient strength of his heart in the normal range but on the low side of normal as expected with having previous heart attacks. His aortic valve has only mild stenosis and leakage.   Otherwise stable looking ultrasound of the heart follow-up as planned in 6 months

## 2021-05-04 NOTE — PROGRESS NOTES
Patient: Berenice Schumacher date of birth 1950  Provider Marie Torres, [address], ANP-[facility]  Date of note 5/4/2021    Received lab work from primary care office Dr. Heather Tariq. Patient has hyponatremia sodium level 120, magnesium level 2.4, creatinine 2.9 GFR 21, potassium 3.7 glucose 122. Patient had been advised to hold furosemide but was awaiting input from cardiology. I spoke with Mr. Anitra Leon this afternoon at 1620 hrs. advising him to hold furosemide 40 mg twice a day for 5 days. He will need repeat basic metabolic panel in 5 days and reassess sodium level and diuretic usage as he is known to have heart failure with preserved ejection fraction. He is pending evaluation with nephrology and endocrinology. In discussing symptoms of hyponatremia with patient he denies blurred vision unless his sugar is low, denies headache nausea or vomiting. He denies muscle cramps. He is alert and orientated x3 so no mental confusion. Discussed with patient if any symptoms of hyponatremia develop he will need to proceed to the emergency room for repeat labs and possible repletion of sodium. I have advised him to increase some sodium intake in his diet but not in excess. He plans to call primary care for appointment to repeat lab work.

## 2021-05-12 NOTE — PROGRESS NOTES
[facility]  [facility]  5/12/2021    Patient had been on a furosemide hold initially for 5 days due to profound hyponatremia. Within 48 hours of the hold he had started developing dyspnea and lower extremity edema. Previously on 40 mg twice a day with a sodium level dropping to 120. He had resumed taking furosemide 40 mg daily with improvement of his edema and respiratory state. Had chest x-ray performed by primary care 5/10/2021 reporting cardiomegaly COPD possible CHF but no pleural effusion. He had temporarily been put back on 40 mg twice a day. His weight was 146 pounds at my last visit, his weight at primary care 2 days ago 144 pounds. Over the phone clinically sounds compensated. Of concern is a rebound hyponatremia event with the higher dose of furosemide. Will have patient maintain 20 mg furosemide twice a day, take his weight daily. If his weight exceeds 145 pounds he is to take an additional 20 mg of furosemide for that day until his weight is below 145 pounds. He will need repeat blood work in 2 weeks to reassess his BMP. He has a nephrology consult with Dr. Socorro Garcia pending in mid June.     Hilda Harden DNP, ANP-BC     GARCIA FP: Please schedule patient for BMP the last week of May and fax results to [phone]  Thanks,

## 2021-06-25 NOTE — TELEPHONE ENCOUNTER
Received refill request from pharmacy for atenolol.     PCP: Yuni Villanueva NP    Last appt: Visit date not found  Future Appointments   Date Time Provider Johnny Roman   7/29/2021  1:20 PM Elodia Mosqueda, [facility]       Requested Prescriptions      No prescriptions requested or ordered in this encounter       Prior labs and Blood pressures:  BP Readings from Last 3 Encounters:   02/01/21 128/60   01/28/21 128/60     No results found for: NA, K, CL, CO2, AGAP, GLU, BUN, CREA, BUCR, GFRAA, GFRNA, CA, GFRAA  No results found for: HBA1C, KVK8MDSU, DOS4NGPK  No results found for: CHOL, CHOLPOCT, CHOLX, CHLST, CHOLV, HDL, HDLPOC, HDLP, LDL, LDLCPOC, LDLC, DLDLP, VLDLC, VLDL, TGLX, TRIGL, TRIGP, TGLPOCT, CHHD, CHHDX  No results found for: VITD3, XQVID2, XQVID3, XQVID, VD3RIA    No results found for: TSH, TSH2, TSH3, TSHP, TSHEXT

## 2021-07-27 NOTE — PROGRESS NOTES
[facility] @ [facility]    Abhijeet Doherty DNP, ANP-BC  Subjective/HPI:     Chevy Carlson is a 79 y.o. male the history of multivessel PTCA stenting predominantly in the LAD most recent stent 2020, hypertension, hyperlipidemia, CKD stage III, mild aortic stenosis, diastolic dysfunction presents for routine follow-up. He reports feeling in his usual state of health however we had discussed in detail that since he had been placed on hydralazine from his cardiologist in [place] last year he felt for approximately 10 minutes each time in the morning and evening after taking hydralazine he was developing an overall chest fullness and pressure radiating the entire anterior chest wall. He denies orthopnea or paroxysmal nocturnal dyspnea. Remains clinically euvolemic on 40 mg of furosemide twice a day, he did not tolerate reduction to 60 mg daily as he had developed significant lower extremity edema and dyspnea. He has since established with nephrologist Dr. Tonya Marinelli. Lab work from primary care dated 5/10/2021. .5, creatinine 2.5, BUN 47. LDL 55, HDL 28 hemoglobin A1c 10%  ECHO 2/2021  Interpretation Summary    · LV: Estimated LVEF is 50 - 55%. Visually measured ejection fraction. Mildly dilated left ventricle. Upper normal wall thickness. Globally reduced systolic function. Low normal systolic function. Mild (grade 1) left ventricular diastolic dysfunction. · LA: Mildly dilated left atrium. Left Atrium volume index is 37.77 mL/m2. · AV: Aortic valve leaflet calcification present. Mild aortic valve stenosis is present. Mild aortic valve regurgitation is present. · MV: Mild mitral valve regurgitation is present. Echo Findings    Left Ventricle Mildly dilated left ventricle. Upper normal wall thickness. The estimated EF is 50 - 55%. Visually measured ejection fraction. Globally reduced systolic function. Low normal systolic function.  There is mild (grade 1) left ventricular diastolic dysfunction. Left Atrium Mildly dilated left atrium. Left Atrium volume index is 37.77 mL/m2. Right Ventricle Normal cavity size, wall thickness and global systolic function. Right Atrium Normal cavity size. Aortic Valve Trileaflet valve structure. Mild aortic valve sclerosis. There is leaflet calcification. There is mild aortic stenosis. Mild aortic valve regurgitation. Mitral Valve Normal valve structure and no stenosis. Mild regurgitation. Tricuspid Valve Normal valve structure and no stenosis. Trace regurgitation. Pulmonic Valve Normal valve structure, no stenosis and no regurgitation. Aorta Normal aortic root, ascending aortic, and aortic arch. Pulmonary Artery Pulmonary hypertension not suggested by Doppler findings. IVC/Hepatic Veins Normal structure. Normal central venous pressure (3 mmHg); IVC diameter is less than 21 mm and collapses more than 50% with respiration. Pericardium Normal pericardium and no evidence of pericardial effusion. 1/28/21 Visit  1. Atherosclerotic heart disease: History of multiple PTCA stents in LAD circumflex most recent in 2020. At least a total of 7 predominantly in the LAD. Requesting records from [place]. Has maintained dual antiplatelet therapy he has first generation Taxus and Cypher stents recommend long-term Plavix therapy provided no future contraindications. Continue atenolol and statin therapy  2. Hypertension: Controlled 128/60 continue lisinopril, hydralazine, atenolol and clonidine. 3.  CKD stage III: We will need to establish with nephrology recommending Dr. Atilio Garcia will provide consult information  4. Hyperlipidemia: On pravastatin 40 mg will request records from Dr. Maame Robin goal LDL less than 70.  5.  Aortic regurgitation: 2/6 diastolic murmur on exam echocardiogram ordered, essentially asymptomatic. Mild dyspnea on exertion multifactorial given history of COPD.   6.  Type 2 diabetes: On Lantus intermittently checking Accu-Cheks, lab work pending from primary care.     70-year-old male with extensive history of multivessel PTCA stenting CAD with myocardial infarction, hypertension hyperlipidemia type 2 diabetes aortic regurgitation. Presenting clinically stable will evaluate with 2D echocardiogram, refer to nephrology given history of CKD previously followed by nephrology in [place], follow-up in 6 months unless markedly abnormal echo  PCP Provider  Matt Olivo NP  Past Medical History:   Diagnosis Date    Aortic regurgitation     CAD (coronary artery disease)     Taxus stent 12/2005, 12/06 Cypher Prox circ, 6/2018 [facility] LADx2,  9/19 [facility] LAD & LCX, 1/20 [facility]     Chronic obstructive pulmonary disease [address]     Congestive heart failure ([address]     Diabetes ([address]     Essential hypertension     Hyperlipidemia     MI (myocardial infarction) ([address]     Murmur     Renal insufficiency     Thyroid disease       Past Surgical History:   Procedure Laterality Date    HX CORONARY STENT PLACEMENT      HX CYST REMOVAL      IR ASP BLADDER SUPRA CATH       Allergies   Allergen Reactions    Bee Sting [Sting, Bee] Anaphylaxis    Iodinated Contrast Media Rash    Brilinta [Ticagrelor] Other (comments)    Effient [Prasugrel] Other (comments)    Imdur [Isosorbide Mononitrate] Other (comments)    Penicillins Rash and Nausea Only    Ranexa [Ranolazine] Other (comments)    Sulfa (Sulfonamide Antibiotics) Rash      No family history on file. Current Outpatient Medications   Medication Sig    atenoloL (TENORMIN) 50 mg tablet Take 1 tablet twice a day by oral route.  lisinopriL (PRINIVIL, ZESTRIL) 20 mg tablet Take 1 tablet twice a day by oral route.  rosuvastatin (CRESTOR) 20 mg tablet Take 1 Tab by mouth nightly.  anastrozole (ARIMIDEX) 1 mg tablet anastrozole 1 mg tablet   Take 0.5 tablets every other day by oral route.     aspirin delayed-release 81 mg tablet Aspir-81 mg tablet,delayed release   Take 1 tablet every day by oral route.  cloNIDine HCL (CATAPRES) 0.1 mg tablet clonidine HCl 0.1 mg tablet   Take 1 tablet twice a day by oral route.  furosemide (LASIX) 20 mg tablet furosemide 20 mg tablet   Take 2 tablets twice a day by oral route.  glimepiride (AMARYL) 4 mg tablet glimepiride 4 mg tablet   Take 1 tablet twice a day by oral route.  hydrALAZINE (APRESOLINE) 25 mg tablet hydralazine 25 mg tablet   Take 1 tablet 3 times a day by oral route.  insulin glargine (Lantus U-100 Insulin) 100 unit/mL injection Lantus U-100 Insulin 100 unit/mL subcutaneous solution   Inject by subcutaneous route as directed.  levothyroxine (SYNTHROID) 100 mcg tablet levothyroxine 100 mcg tablet   Take 1 tablet every day by oral route.  nitroglycerin (Nitrostat) 0.4 mg SL tablet Nitrostat 0.4 mg sublingual tablet   Place 1 tablet by sublingual route as needed.  testosterone (ANDROGEL) 1.62 % (20.25 mg/1.25 gram) glpk testosterone 1.62 % (20.25 mg/1.25 gram) transdermal gel packet   Apply 1 packet every day by transdermal route.  clopidogreL (PLAVIX) 75 mg tab Take 1 Tab by mouth daily. Long term 1st generation stents     No current facility-administered medications for this visit. There were no vitals filed for this visit.   Social History     Socioeconomic History    Marital status:      Spouse name: Not on file    Number of children: Not on file    Years of education: Not on file    Highest education level: Not on file   Occupational History    Not on file   Tobacco Use    Smoking status: Former Smoker     Packs/day: 2.00     Years: 30.00     Pack years: 60.00     Quit date: 1991     Years since quittin.5    Smokeless tobacco: Never Used   Vaping Use    Vaping Use: Never used   Substance and Sexual Activity    Alcohol use: Yes     Comment: rarely    Drug use: Never    Sexual activity: Yes     Partners: Female   Other Topics Concern    Not on file   Social History Narrative    Not on file     Social Determinants of Health     Financial Resource Strain:     Difficulty of Paying Living Expenses:    Food Insecurity:     Worried About Running Out of Food in the Last Year:     [address] in the Last Year:    Transportation Needs:     Lack of Transportation (Medical):  Lack of Transportation (Non-Medical):    Physical Activity:     Days of Exercise per Week:     Minutes of Exercise per Session:    Stress:     Feeling of Stress :    Social Connections:     Frequency of Communication with Friends and Family:     Frequency of Social Gatherings with Friends and Family:     Attends [employer] Services:     Active Member of Clubs or Organizations:     Attends Club or Organization Meetings:     Marital Status:    Intimate Partner Violence:     Fear of Current or Ex-Partner:     Emotionally Abused:     Physically Abused:     Sexually Abused:        I have reviewed the nurses notes, vitals, problem list, allergy list, medical history, family, social history and medications. Review of Symptoms:  11 systems reviewed, negative other than as stated in the HPI      Physical Exam:      General: Well developed, in no acute distress, cooperative and alert  HEENT: No carotid bruits, no JVD, trach is midline. Neck Supple, PERRL, EOM intact. Heart:  Normal S1/S2 negative S3 or S4. Regular, no murmur, gallop or rub. Respiratory: Clear bilaterally x 4, no wheezing or rales  Abdomen:   Soft, non-tender, no masses, bowel sounds are active. Extremities: Trace bilateral ankle edema, normal cap refill, no cyanosis, atraumatic. Neuro: A&Ox3, speech clear, gait stable. Skin: Skin color is normal. No rashes or lesions. Non diaphoretic  Vascular: 2+ pulses symmetric in all extremities    Cardiographics    ECG: Sinus rhythm LVH.         Cardiology Labs:  No results found for: CHOL, CHOLX, CHLST, [address], [address], HDL, HDLP, LDL, LDLC, DLDLP, TGLX, TRIGL, TRIGP, CHHD, CHHDX    No results found for: NA, K, CL, CO2, AGAP, GLU, BUN, CREA, BUCR, GFRAA, GFRNA, CA, TBIL, TBILI, AP, TP, ALB, GLOB, AGRAT, ALT        Assessment:     Assessment:     Diagnoses and all orders for this visit:    1. Aortic valve insufficiency, etiology of cardiac valve disease unspecified    2. Chronic obstructive pulmonary disease, unspecified COPD type ([facility]    3. Type 2 diabetes mellitus without complication, without long-term current use of insulin ([facility]    4. Coronary arteriosclerosis    5. Essential hypertension        ICD-10-CM ICD-9-CM    1. Aortic valve insufficiency, etiology of cardiac valve disease unspecified  I35.1 424.1    2. Chronic obstructive pulmonary disease, unspecified COPD type ([facility]  J44.9 496    3. Type 2 diabetes mellitus without complication, without long-term current use of insulin ([facility])  E11.9 250.00    4. Coronary arteriosclerosis  I25.10 414.00    5. Essential hypertension  I10 401.9      No orders of the defined types were placed in this encounter. Plan:     1. Atherosclerotic heart disease: History of multiple PTCA stents in LAD circumflex most recent in 2020. At least a total of 7 predominantly in the LAD. Has maintained dual antiplatelet therapy he has first generation Taxus and Cypher stents recommend long-term Plavix (intolerant to Brilinta and Effient) therapy provided no future contraindications. Continue atenolol and statin therapy  2. Daily chest discomfort post medications, specific hydralazine: Unable to take long-acting nitrates due to side effects, will discontinue hydralazine as potential for anginal side effects in the absence of using long-acting nitrates currently. Will place patient on amlodipine 2.5 mg uptitrate accordingly for hypertension management. 2.  Hypertension: Well-controlled continue current medications with the exception of hydralazine change as stated above  3. CKD stage III: Has established with Dr. Jorge Forrester  4.   Hyperlipidemia: On pravastatin 40 mg, as per primary care goal LDL 70 or less  5. Aortic stenosis / regurgitation: Mild on ECHO 2/2021   6. Type 2 diabetes: On Lantus intermittently checking Accu-Cheks, labs followed by primary care  7. Diastolic dysfunction: Requires furosemide 40 mg twice a day to remain euvolemic previous reduction triggered significant lower extremity edema and dyspnea. (Of note BNP can be falsely elevated in the setting of CKD, will manage patient's diastolic dysfunction merrily on symptom and weight). 80-year-old male history of atherosclerotic heart disease, hypertension, CKD stage III mild AS and type II diabetic. On discussion today reporting daily chest discomfort after taking medications in particular hydralazine since 2020 prescribed from previous cardiologist in [place]. Concern with inability to use long-acting nitrates along with hydralazine in the setting of CAD suspecting anginal side effect of hydralazine will discontinue and use amlodipine 2.5 mg daily for hypertension management can uptitrate amlodipine and/or clonidine for hypertension management. Follow-up in 1 month for symptom reassessment, discuss with patient/wife to call if not tolerating medication changes.     Heaven Marcial NP      Please note that this dictation was completed with [employer], the computer voice recognition software. Quite often unanticipated grammatical, syntax, homophones, and other interpretive errors are inadvertently transcribed by the computer software. Please disregard these errors. Please excuse any errors that have escaped final proofreading. Thank you.

## 2021-07-29 NOTE — PROGRESS NOTES
1. Have you been to the ER, urgent care clinic since your last visit? Hospitalized since your last visit? No    2. Have you seen or consulted any other health care providers outside of the [address] [place] [address] since your last visit? Include any pap smears or colon screening.  No    Chief Complaint   Patient presents with    Heart Problem     6mo f/u; Pt c/o diff breathing w/COPD and humidity, when taking BP meds notices at occ CP across chest to arms, Bilat feet/ankle swelling

## 2021-08-31 NOTE — PROGRESS NOTES
[facility] @ [address] [place] , [place]  Subjective/HPI:     Nery Saunders is a 70 y.o. male history of multivessel PTCA stenting predominantly in the LAD most recent stent 2020, hypertension, hyperlipidemia, CKD stage III, mild aortic stenosis, diastolic dysfunction  is here for routine f/u. At the last visit he had reported daily chest discomfort after taking his medications daily, I had discontinued hydralazine since he is unable to take long-acting nitrates as suspecting hydralazine was triggering coronary artery spasms. He reports his chest discomfort has resolved. It is on rare occasion that after taking his p.m. medications any lays down he feels some tightness across his chest but resolves when he sits up. There is no associated nausea vomiting or diaphoresis. Lab work 8/18/2021 from primary care: , , LDL 56, HDL 27, triglycerides 117, TSH 0.18, normal hepatic panel creatinine 3.01 GFR 21 potassium 4.2 magnesium level 2.4 normal H&H normal platelet. 7/29/2021 Visit  1.  Atherosclerotic heart disease: History of multiple PTCA stents in LAD circumflex most recent in 2020.  At least a total of 7 predominantly in the LAD.    Has maintained dual antiplatelet therapy he has first generation Taxus and Cypher stents recommend long-term Plavix (intolerant to Brilinta and Effient) therapy provided no future contraindications.  Continue atenolol and statin therapy  2. Daily chest discomfort post medications, specific hydralazine: Unable to take long-acting nitrates due to side effects, will discontinue hydralazine as potential for anginal side effects in the absence of using long-acting nitrates currently. Will place patient on amlodipine 2.5 mg uptitrate accordingly for hypertension management.     2.  Hypertension: Well-controlled continue current medications with the exception of hydralazine change as stated above  3.  CKD stage III: Has established with Dr. Zachariah Hernández: On pravastatin 40 mg, as per primary care goal LDL 70 or less  5.  Aortic stenosis / regurgitation: Mild on ECHO 2/2021   6.  Type 2 diabetes: On Lantus intermittently checking Accu-Cheks, labs followed by primary care  7. Diastolic dysfunction: Requires furosemide 40 mg twice a day to remain euvolemic previous reduction triggered significant lower extremity edema and dyspnea. (Of note BNP can be falsely elevated in the setting of CKD, will manage patient's diastolic dysfunction merrily on symptom and weight).     79-year-old male history of atherosclerotic heart disease, hypertension, CKD stage III mild AS and type II diabetic. On discussion today reporting daily chest discomfort after taking medications in particular hydralazine since 2020 prescribed from previous cardiologist in [place]. Concern with inability to use long-acting nitrates along with hydralazine in the setting of CAD suspecting anginal side effect of hydralazine will discontinue and use amlodipine 2.5 mg daily for hypertension management can uptitrate amlodipine and/or clonidine for hypertension management.     Follow-up in 1 month for symptom reassessment, discuss with patient/wife to call if not tolerating medication changes. ECHO 2/2021  Interpretation Summary     · LV: Estimated LVEF is 50 - 55%. Visually measured ejection fraction. Mildly dilated left ventricle. Upper normal wall thickness. Globally reduced systolic function. Low normal systolic function. Mild (grade 1) left ventricular diastolic dysfunction. · LA: Mildly dilated left atrium. Left Atrium volume index is 37.77 mL/m2. · AV: Aortic valve leaflet calcification present. Mild aortic valve stenosis is present. Mild aortic valve regurgitation is present. · MV: Mild mitral valve regurgitation is present.      Echo Findings     Left Ventricle Mildly dilated left ventricle. Upper normal wall thickness.  The estimated EF is 50 - 55%. Visually measured ejection fraction. Globally reduced systolic function. Low normal systolic function. There is mild (grade 1) left ventricular diastolic dysfunction. Left Atrium Mildly dilated left atrium. Left Atrium volume index is 37.77 mL/m2. Right Ventricle Normal cavity size, wall thickness and global systolic function. Right Atrium Normal cavity size. Aortic Valve Trileaflet valve structure. Mild aortic valve sclerosis. There is leaflet calcification. There is mild aortic stenosis. Mild aortic valve regurgitation. Mitral Valve Normal valve structure and no stenosis. Mild regurgitation. Tricuspid Valve Normal valve structure and no stenosis. Trace regurgitation. Pulmonic Valve Normal valve structure, no stenosis and no regurgitation. Aorta Normal aortic root, ascending aortic, and aortic arch. Pulmonary Artery Pulmonary hypertension not suggested by Doppler findings. IVC/Hepatic Veins Normal structure. Normal central venous pressure (3 mmHg); IVC diameter is less than 21 mm and collapses more than 50% with respiration.    Pericardium Normal pericardium and no evidence of pericardial effusion.        PCP Provider  Yasir Boyd NP  Past Medical History:   Diagnosis Date    Aortic regurgitation     CAD (coronary artery disease)     Taxus stent 12/2005, 12/06 Cypher Prox circ, 6/2018 Sudarshan LADx2,  9/19 Duncan LAD & LCX, 1/20 Duncan     Chronic obstructive pulmonary disease (HCC)     Congestive heart failure ([address]     Diabetes ([address]     Essential hypertension     Hyperlipidemia     MI (myocardial infarction) ([address]     Murmur     Renal insufficiency     Thyroid disease       Past Surgical History:   Procedure Laterality Date    HX CORONARY STENT PLACEMENT      HX CYST REMOVAL      IR ASP BLADDER SUPRA CATH       Allergies   Allergen Reactions    Bee Sting [Sting, Bee] Anaphylaxis    Iodinated Contrast Media Rash    Brilinta [Ticagrelor] Other (comments)    Effient [Prasugrel] Other (comments)    Imdur [Isosorbide Mononitrate] Other (comments)    Penicillins Rash and Nausea Only    Ranexa [Ranolazine] Other (comments)    Sulfa (Sulfonamide Antibiotics) Rash      No family history on file. Current Outpatient Medications   Medication Sig    cloNIDine HCL (CATAPRES) 0.1 mg tablet TAKE 1 TABLET BY MOUTH THREE TIMES DAILY    albuterol (ProAir HFA) 90 mcg/actuation inhaler inhale 2 puff by inhalation route  every 4 hours as needed    BD Lali 2nd Gen Pen Needle 32 gauge x 5/32\" ndle USE WITH LANTUS    amLODIPine (NORVASC) 2.5 mg tablet Take 1 Tablet by mouth daily. D/C hydralazine    atenoloL (TENORMIN) 50 mg tablet Take 1 tablet twice a day by oral route.  lisinopriL (PRINIVIL, ZESTRIL) 20 mg tablet Take 1 tablet twice a day by oral route.  rosuvastatin (CRESTOR) 20 mg tablet Take 1 Tab by mouth nightly.  anastrozole (ARIMIDEX) 1 mg tablet anastrozole 1 mg tablet   Take 0.5 tablets every other day by oral route.  aspirin delayed-release 81 mg tablet Aspir-81 mg tablet,delayed release   Take 1 tablet every day by oral route.  furosemide (LASIX) 20 mg tablet furosemide 20 mg tablet   Take 2 tablets twice a day by oral route.  glimepiride (AMARYL) 4 mg tablet glimepiride 4 mg tablet   Take 1 tablet twice a day by oral route.  insulin glargine (Lantus U-100 Insulin) 100 unit/mL injection 18U nightly    levothyroxine (SYNTHROID) 100 mcg tablet levothyroxine 100 mcg tablet   Take 1 tablet every day by oral route.  nitroglycerin (Nitrostat) 0.4 mg SL tablet Nitrostat 0.4 mg sublingual tablet   Place 1 tablet by sublingual route as needed.  testosterone (ANDROGEL) 1.62 % (20.25 mg/1.25 gram) glpk testosterone 1.62 % (20.25 mg/1.25 gram) transdermal gel packet   Apply 1 packet every day by transdermal route.  clopidogreL (PLAVIX) 75 mg tab Take 1 Tab by mouth daily.  Long term 1st generation stents     No current facility-administered medications for this visit. There were no vitals filed for this visit. Social History     Socioeconomic History    Marital status:      Spouse name: Not on file    Number of children: Not on file    Years of education: Not on file    Highest education level: Not on file   Occupational History    Not on file   Tobacco Use    Smoking status: Former Smoker     Packs/day: 2.00     Years: 30.00     Pack years: 60.00     Quit date: 1991     Years since quittin.6    Smokeless tobacco: Never Used   Vaping Use    Vaping Use: Never used   Substance and Sexual Activity    Alcohol use: Yes     Comment: rarely    Drug use: Never    Sexual activity: Yes     Partners: Female   Other Topics Concern    Not on file   Social History Narrative    Not on file     Social Determinants of Health     Financial Resource Strain:     Difficulty of Paying Living Expenses:    Food Insecurity:     Worried About Running Out of Food in the Last Year:     [address] in the Last Year:    Transportation Needs:     Lack of Transportation (Medical):  Lack of Transportation (Non-Medical):    Physical Activity:     Days of Exercise per Week:     Minutes of Exercise per Session:    Stress:     Feeling of Stress :    Social Connections:     Frequency of Communication with Friends and Family:     Frequency of Social Gatherings with Friends and Family:     Attends [employer] Services:     Active Member of Clubs or Organizations:     Attends Club or Organization Meetings:     Marital Status:    Intimate Partner Violence:     Fear of Current or Ex-Partner:     Emotionally Abused:     Physically Abused:     Sexually Abused:        I have reviewed the nurses notes, vitals, problem list, allergy list, medical history, family, social history and medications.     Review of Symptoms  11 systems reviewed, negative other than as stated in the HPI      Physical Exam:      General: Well developed, in no acute distress, cooperative and alert  HEENT: No carotid bruits, no JVD, trach is midline. Neck Supple, PERRL, EOM intact. Heart:  Normal S1/S2 negative S3 or S4. Regular, no murmur, gallop or rub. Respiratory: Clear bilaterally x 4, no wheezing or rales  Abdomen:   Soft, non-tender, no masses, bowel sounds are active. Extremities: Trace ankle edema, bilateral varicosities, normal cap refill, no cyanosis, atraumatic. Neuro: A&Ox3, speech clear, gait stable. Skin: Skin color is normal. No rashes or lesions. Non diaphoretic  Vascular: 2+ pulses symmetric in all extremities    Cardiographics    ECG:         Cardiology Labs:  No results found for: CHOL, CHOLX, CHLST, CHOLV, 492991, HDL, HDLP, LDL, LDLC, DLDLP, TGLX, TRIGL, TRIGP, CHHD, CHHDX    No results found for: NA, K, CL, CO2, AGAP, GLU, BUN, CREA, BUCR, GFRAA, GFRNA, CA, TBIL, TBILI, AP, TP, ALB, GLOB, AGRAT, ALT        Assessment:     Assessment:     Diagnoses and all orders for this visit:    1. Essential hypertension    2. Type 2 diabetes mellitus without complication, without long-term current use of insulin ([facility]    3. Aortic valve insufficiency, etiology of cardiac valve disease unspecified    4. Coronary arteriosclerosis    5. Chronic obstructive pulmonary disease, unspecified COPD type ([facility]        ICD-10-CM ICD-9-CM    1. Essential hypertension  I10 401.9    2. Type 2 diabetes mellitus without complication, without long-term current use of insulin (HCC)  E11.9 250.00    3. Aortic valve insufficiency, etiology of cardiac valve disease unspecified  I35.1 424.1    4. Coronary arteriosclerosis  I25.10 414.00    5. Chronic obstructive pulmonary disease, unspecified COPD type ([facility]  J44.9 496      No orders of the defined types were placed in this encounter.        Plan:       1.  Atherosclerotic heart disease: History of multiple PTCA stents in LAD circumflex most recent in 2020.  At least a total of 7 predominantly in the LAD.    Has maintained dual antiplatelet therapy he has first generation Taxus and Cypher stents recommend long-term Plavix (intolerant to Brilinta and Effient) therapy provided no future contraindications.  Continue atenolol and statin therapy  2. Daily chest discomfort post medications, specific hydralazine: His chest discomfort has resolved with discontinuation of hydralazine and is tolerating amlodipine without side effects. Suspect coronary artery spasms were triggered by hydralazine in the absence and the ability to use nitrates. 2.  Hypertension:  Normotensive 121/70  3.  CKD stage III: Has established with Dr. Isaac Méndez: On pravastatin 40 mg,  DL 56 at target  5.  Aortic stenosis / regurgitation: Mild on ECHO 2/2021   6.  Type 2 diabetes: On Lantus intermittently checking Accu-Cheks, labs followed by primary care  7. Diastolic dysfunction: Requires furosemide 40 mg twice a day to remain euvolemic previous reduction triggered significant lower extremity edema and dyspnea. (Of note BNP can be falsely elevated in the setting of CKD, will manage patient's diastolic dysfunction merrily on symptom and weight). Presents clinically stable and euvolemic on exam today. Advised for trivial amount of pedal edema to use compression stockings more likely venous insufficiency/varicosities is the culprit. Follow-up in 6 months  Cas Perez NP      Please note that this dictation was completed with [employer], the computer voice recognition software. Quite often unanticipated grammatical, syntax, homophones, and other interpretive errors are inadvertently transcribed by the computer software. Please disregard these errors. Please excuse any errors that have escaped final proofreading. Thank you.

## 2021-09-01 NOTE — PROGRESS NOTES
1. Have you been to the ER, urgent care clinic since your last visit? Hospitalized since your last visit? No    2. Have you seen or consulted any other health care providers outside of the [address] [place] [address] since your last visit? Include any pap smears or colon screening. Family Practice Dr. Timothy Tovar [facility], Dr. Adrian Salcedo. Chief Complaint   Patient presents with    Follow-up     1 month appt. Pt denies cardiac symptoms.  Shortness of Breath     pt states he has COPD.  Leg Swelling     bilateral, leg swelling.

## 2021-11-14 PROBLEM — I50.42 CHRONIC COMBINED SYSTOLIC AND DIASTOLIC CONGESTIVE HEART FAILURE (HCC): Status: ACTIVE | Noted: 2021-01-01

## 2021-11-15 NOTE — LETTER
11/15/2021    Patient: Dasia Martino   YOB: 1950   Date of Visit: 11/15/2021     Venice Hackett NP  [place] [address]  Via Fax: [phone]    Dear [place] Hackett NP,      Thank you for referring Mr. Dasia Martino to [address] [place] [address] for evaluation. My notes for this consultation are attached. If you have questions, please do not hesitate to call me. I look forward to following your patient along with you.       Sincerely,    Chema Conner NP

## 2021-11-15 NOTE — PROGRESS NOTES
Chief Complaint   Patient presents with   [facility] ED/ Admission on 11/04/21 d/t SOB, discharged on 11/10/21    Shortness of Breath     portable O2 at 2LPM via [place].     Ankle swelling    Chest Pain

## 2021-11-23 NOTE — TELEPHONE ENCOUNTER
Maria L with [facility] called to check the status of new patient referral. [facility] was informed that records are still with nursing and will call her back

## 2021-11-26 NOTE — PROGRESS NOTES
[facility] @ [place] [place] , [place]  Subjective/HPI:     Johnathan Nevarez is a 70 y.o. male history of multivessel PTCA stenting x 9  predominantly in the LAD most recent stent 2020, hypertension, hyperlipidemia, CKD stage III, mild aortic stenosis, diastolic dysfunction, HFmrEF, is here for 2-week follow-up. Despite increasing diuretics, he continues to have dyspnea, weight has been about the same, home scale is a little bit lower than our scale here. Has lower extremity edema. Has required some increased oxygen use, desaturates with simple activities. Since last visit he did see pulmonary Associates Dr. Zaida Lei, awaiting PFTs. 11/15/21  1.  Atherosclerotic heart disease: History of multiple PTCA stents in LAD circumflex most recent in 2020. Type II non-STEMI [facility] 11/2021 in setting of decompensated combined systolic and diastolic heart failure EF 45-50%. On previous visits patient reporting angina after taking hydralazine in the absence of nitrates which triggered significant headaches. At [facility] restarted on hydralazine with Imdur and patient reports he is tolerating without headaches and denies chest pain at this time. Presently on triple antianginal therapy beta-blocker/nitrates/calcium channel blocker. 2.  Heart failure moderately reduced ejection fraction: EF 45% on echo [facility] 11/2021 (55% 22021): Switched to carvedilol during admission, resumed on diuretics as initially had BIPIN with aggressive diuresis during inpatient, remainder of nephrotoxic medications held. Avoid ACE/ARB/Arni at this time. He is up 5 pounds and developing lower extremity edema and has crackles in the left lower lobe, will change from Lasix 40 mg twice a day to Bumex 1 mg twice a day. Repeat CMP in 1 week. 2.  Hypertension:   Reducing amlodipine back to 2.5 mg ([facility] had increased dose to 5 mg), he is off clonidine.   Blood pressure is soft 101/54  3.  CKD stage III/: Has established with Dr. Brayden Nair, holding nephrotoxic medications for heart failure with the exception of diuretics, has upcoming appointment in January. 4.  Hyperlipidemia: Previously on pravastatin, was switched to Crestor at time [place] [facility] will continue  5.  Aortic stenosis / regurgitation: Mild/Moderate 11/2021 [place] [facility]   6.  Type 2 diabetes: On Lantus intermittently checking Accu-Cheks, labs followed by primary care  7.  Diastolic dysfunction: Recent admission 11/2021 for decompensated CHF, 5 pound weight gain with increasing edema as reported above changing Lasix to Bumex. 8.  COPD: Followed by Dr Zully Freeman.     68-year-old male with history of atherosclerotic heart disease, hypertension, CKD, aortic stenosis, diabetes, diastolic dysfunction recent [place] [facility] [place] onset heart failure moderately reduced ejection fraction 10%, diastolic dysfunction, fluid overload. Multiple med changes as reported above presently chest pain-free, changing diuretics. Has appointment with pulmonary tomorrow. Check CMP, BNP, CBC and magnesium in 1 week. Referring patient to advanced heart failure. Will need right heart cath, will consider left heart cath dependent on renal function and if any recurrence of chest pain. Follow-up in 2 weeks.   PCP Provider  Sarah Dewitt NP  Past Medical History:   Diagnosis Date    Aortic regurgitation     CAD (coronary artery disease)     Taxus stent 12/2005, 12/06 [place] Prox circ, 6/2018 [place] LADx2,  9/19 [place] L[place] & LCX, 1/20 [place]     Chronic combined systolic and diastolic congestive heart failure ([address] 11/14/2021    Chronic obstructive pulmonary disease ([address]     Congestive heart failure ([address]     Diabetes ([address]     Essential hypertension     Hyperlipidemia     MI (myocardial infarction) ([address]     Murmur     Renal insufficiency     Thyroid disease       Past Surgical History:   Procedure Laterality Date    HX CORONARY STENT PLACEMENT      HX CYST REMOVAL      IR ASP BLADDER SUPRA CATH       Allergies   Allergen Reactions    Bee Sting [Sting, Bee] Anaphylaxis    Iodinated Contrast Media Rash    Brilinta [Ticagrelor] Other (comments)    Effient [Prasugrel] Other (comments)    Penicillins Rash and Nausea Only    Ranexa [Ranolazine] Other (comments)    Sulfa (Sulfonamide Antibiotics) Rash      No family history on file. Current Outpatient Medications   Medication Sig    bumetanide (BUMEX) 1 mg tablet TAKE 1 TABLET BY MOUTH TWICE DAILY. REPLACES. LASIX    isosorbide mononitrate ER (IMDUR) 30 mg tablet Take 1 Tablet by mouth daily.  magnesium oxide 250 mg magnesium tablet Take 250 mg by mouth. Every other day    prasterone, dhea, 50 mg tab 50 mg = 1 tab each dose, PO, daily, # 30 tab, 0 Refills    carvediloL (COREG) 6.25 mg tablet Take 1 Tablet by mouth every twelve (12) hours.  isosorbide mononitrate ER (IMDUR) 30 mg tablet Take 1 Tablet by mouth daily.  hydrALAZINE (APRESOLINE) 25 mg tablet Take 1 Tablet by mouth every eight (8) hours.  amLODIPine (NORVASC) 2.5 mg tablet Take 1 Tablet by mouth daily.  rosuvastatin (CRESTOR) 20 mg tablet TAKE 1 TABLET BY MOUTH EVERY NIGHT    clopidogreL (PLAVIX) 75 mg tab TAKE 1 TABLET BY MOUTH EVERY DAY    Symbicort 80-4.5 mcg/actuation HFAA INHALE 2 PUFFS BY MOUTH TWICE DAILY IN THE MORNING AND IN THE EVENING    nitroglycerin (Nitrostat) 0.4 mg SL tablet 1 Tablet by SubLINGual route every five (5) minutes as needed for Chest Pain. Up to 3 doses.  albuterol (ProAir HFA) 90 mcg/actuation inhaler inhale 2 puff by inhalation route  every 4 hours as needed    BD Lali 2nd Gen Pen Needle 32 gauge x 5/32\" ndle USE WITH LANTUS    anastrozole (ARIMIDEX) 1 mg tablet Take 1 mg by mouth three (3) times daily. 0.5 tab TID    aspirin delayed-release 81 mg tablet Aspir-81 mg tablet,delayed release   Take 1 tablet every day by oral route.  glimepiride (AMARYL) 4 mg tablet Take 4 mg by mouth daily.  Every two days    insulin glargine (Lantus U-100 Insulin) 100 unit/mL injection 18U nightly    levothyroxine (SYNTHROID) 100 mcg tablet Take 100 mcg by mouth daily. Morning    testosterone (ANDROGEL) 1.62 % (20.25 mg/1.25 gram) glpk testosterone 1.62 % (20.25 mg/1.25 gram) transdermal gel packet   Apply 1 packet every day by transdermal route. No current facility-administered medications for this visit. There were no vitals filed for this visit. Social History     Socioeconomic History    Marital status:      Spouse name: Not on file    Number of children: Not on file    Years of education: Not on file    Highest education level: Not on file   Occupational History    Not on file   Tobacco Use    Smoking status: Former Smoker     Packs/day: 2.00     Years: 30.00     Pack years: 60.00     Quit date: 1991     Years since quittin.8    Smokeless tobacco: Never Used   Vaping Use    Vaping Use: Never used   Substance and Sexual Activity    Alcohol use: Yes     Comment: rarely    Drug use: Never    Sexual activity: Yes     Partners: Female   Other Topics Concern    Not on file   Social History Narrative    Not on file     Social Determinants of Health     Financial Resource Strain:     Difficulty of Paying Living Expenses: Not on file   Food Insecurity:     Worried About Running Out of Food in the Last Year: Not on file    Linda of Food in the Last Year: Not on file   Transportation Needs:     Lack of Transportation (Medical): Not on file    Lack of Transportation (Non-Medical):  Not on file   Physical Activity:     Days of Exercise per Week: Not on file    Minutes of Exercise per Session: Not on file   Stress:     Feeling of Stress : Not on file   Social Connections:     Frequency of Communication with Friends and Family: Not on file    Frequency of Social Gatherings with Friends and Family: Not on file    Attends Scientologist Services: Not on file    Active Member of Clubs or Organizations: Not on file    Attends Club or Organization Meetings: Not on file    Marital Status: Not on file   Intimate Partner Violence:     Fear of Current or Ex-Partner: Not on file    Emotionally Abused: Not on file    Physically Abused: Not on file    Sexually Abused: Not on file   Housing Stability:     Unable to Pay for Housing in the Last Year: Not on file    Number of Kyra in the Last Year: Not on file    Unstable Housing in the Last Year: Not on file       I have reviewed the nurses notes, vitals, problem list, allergy list, medical history, family, social history and medications. Review of Symptoms  11 systems reviewed, negative other than as stated in the HPI      Physical Exam:      General: Tired appearing, in mild distress distress previous level of work for breathing, cooperative and alert  HEENT: No carotid bruits, no JVD, trach is midline. Neck Supple, PERRL, EOM intact. Heart:  Normal S1/S2 negative S3 or S4. Regular, no murmur, gallop or rub. Respiratory: Diffusely diminished in all fields with prolonged exhalation, scant Rales bilateral bases. Abdomen:   Soft, non-tender, no masses, bowel sounds are active. Extremities: 2+ pitting ankle edema, normal cap refill, no cyanosis, atraumatic. Neuro: A&Ox3, speech clear,   Skin: Skin color is pale. No rashes or lesions. Non diaphoretic  Vascular: 2+ pulses symmetric in all extremities    Cardiographics    ECG:         Cardiology Labs:  No results found for: CHOL, CHOLX, CHLST, CHOLV, 494335, HDL, HDLP, LDL, LDLC, DLDLP, TGLX, TRIGL, TRIGP, CHHD, CHHDX    No results found for: NA, K, CL, CO2, AGAP, GLU, BUN, CREA, BUCR, GFRAA, GFRNA, CA, TBIL, TBILI, AP, TP, ALB, GLOB, AGRAT, ALT        Assessment:     Assessment:     Diagnoses and all orders for this visit:    1. Chronic combined systolic and diastolic congestive heart failure ([address]    2. Aortic valve insufficiency, etiology of cardiac valve disease unspecified    3.  Coronary arteriosclerosis    4. Essential hypertension    5. Type 2 diabetes mellitus without complication, without long-term current use of insulin ([facility]    6. Chronic obstructive pulmonary disease, unspecified COPD type ([facility]    7. Renal insufficiency        ICD-10-CM ICD-9-CM    1. Chronic combined systolic and diastolic congestive heart failure (HCC)  I50.42 428.42      428.0    2. Aortic valve insufficiency, etiology of cardiac valve disease unspecified  I35.1 424.1    3. Coronary arteriosclerosis  I25.10 414.00    4. Essential hypertension  I10 401.9    5. Type 2 diabetes mellitus without complication, without long-term current use of insulin ([facility])  E11.9 250.00    6. Chronic obstructive pulmonary disease, unspecified COPD type ([facility]  J44.9 496    7. Renal insufficiency  N28.9 593.9      No orders of the defined types were placed in this encounter. Plan:     1.  Atherosclerotic heart disease: History of multiple PTCA stents in LAD circumflex most recent in 2020. Type II non-STEMI [facility] 11/2021 in setting of decompensated combined systolic and diastolic heart failure EF 45-50%. On previous visits patient reporting angina after taking hydralazine in the absence of nitrates which triggered significant headaches. At [facility] restarted on hydralazine with Imdur and patient reports he is tolerating without headaches and denies chest pain at this time. Presently on triple antianginal therapy beta-blocker/nitrates/calcium channel blocker. 2.  Heart failure moderately reduced ejection fraction: EF 45% on echo [facility] 11/2021 (55% 2/2021): Switched to carvedilol during admission, resumed on diuretics as initially had BIPIN with aggressive diuresis during inpatient, remainder of nephrotoxic medications held. Avoid ACE/ARB/Arni at this time. Currently on 1 mg twice a day of Bumex, home weight 136-138 pound range. Has 2+ pitting edema, lab work from primary care dated 11/22/2021 BNP 94.9, creatinine 2.83.   Will temporarily increase Bumex to 2 mg in the morning 1 mg in the afternoon and send for chest x-ray. 2.  Hypertension:    Normotensive 134/58  3.  CKD stage III/: Has established with Dr. Brain Woodard, holding nephrotoxic medications for heart failure with the exception of diuretics. 4.  Hyperlipidemia: On Crestor   5.  Aortic stenosis / regurgitation: Mild/Moderate 11/2021 ECHO [facility]   6.  Type 2 diabetes: On Lantus intermittently checking Accu-Cheks, labs followed by primary care  7.  Diastolic dysfunction:  Continues to have +2 ankle edema, temporarily uptitrate diuretics for the next 3 days. 8.  COPD: Followed by Dr Abdirahman Collado. Pending PFTs     Chest x-ray today PA lateral, temporarily uptitrate Bumex 2 mg in the morning 1 mg in the afternoon patient with the understanding of potential for worsening CKD in order to remain euvolemic. COPD also significant contributor awaiting PFTs, desaturates very easily with minimal activity. Scheduling referral to advanced heart failure. If no improvement with increased diuresis and or oxygen desaturations will then require readmission. Follow-up in 2 weeks      Cesia Dougherty NP  Addendum 11/30/2021 1144 hrs.:  Chest x-ray:    AP and lateral views of the chest were obtained. These films were obtained  during a less than optimal degree of inspiration. There is abnormal prominence  of the pulmonary interstitial markings (left greater than right). A somewhat  focal, ill-defined soft tissue density is noted in the medial aspect of the left  lung base. While this may represent an area of developing infiltration, other  etiologies must be considered. A follow-up more inspiratory examination of the  chest is recommended. If there is persistence of this density, a CT examination  of the chest may render additional information.     IMPRESSION  Presence of abnormal density involving both lungs (left greater than  right). Follow-up examinations are recommended.     Discussed findings with patient, states with increased dose of Bumex today he is breathing better but is not as active as he was yesterday, afebrile. Offered patient admission to the hospital versus outpatient treatment with CT scan without contrast.  At this time we will move forward with outpatient CT scan, advised if any desaturations progressive dyspnea or worsening edema would then admit. Advised to continue 2 mg of Bumex in the morning 1 mg of Bumex in the afternoon on Saturday returned to 1 mg twice a day plan to reassess labs next week and review CT scan results. Please note that this dictation was completed with [employer], the [employer] voice recognition software. Quite often unanticipated grammatical, syntax, homophones, and other interpretive errors are inadvertently transcribed by the computer software. Please disregard these errors. Please excuse any errors that have escaped final proofreading. Thank you.

## 2021-11-29 NOTE — LETTER
11/29/2021    Patient: Alix Heredia   YOB: 1950   Date of Visit: 11/29/2021     Abdulkadir Nesbitt NP  [place] [address]  Via Fax: [phone]    Dear Abdulkadir Nesbitt NP,      Thank you for referring Mr. Alix Heredia to [address] [place] [address] for evaluation. My notes for this consultation are attached. If you have questions, please do not hesitate to call me. I look forward to following your patient along with you.       Sincerely,    Jimbo Oreilly NP

## 2021-11-29 NOTE — PROGRESS NOTES
1. Have you been to the ER, urgent care clinic since your last visit? Hospitalized since your last visit? No    2. Have you seen or consulted any other health care providers outside of the [facility] since your last visit? Include any pap smears or colon screening. PCP, Almaz Quinonez, NP at [facility] on 11/16/21. Chief Complaint   Patient presents with    Follow-up     2 week follow up visit.  Shortness of Breath     with portable @ 3 LPM via NC.      Leg Swelling     bilateral    Ankle swelling     bilateral

## 2021-11-30 NOTE — PROGRESS NOTES
Please call patient / wife, advise I am off today and will be back on Thursday. CT scan showing significant inflammatory process in the lungs, possible early pneumonia. He will need additional pulmonary testing. Please fax this CXR and CT report to [facility] Dr Jayden Samuels. Dr Jayden Samuels will need to address these findings, his shortness of breath and desaturations are mainly pulmonary related and not just CHF. Continue the increased dose of Bumex for the next few days to pull off the edema in his legs. If he is not feeling well or unable to get a hold of Pulmonary for outpatient then I want him to go to [facility] ER for admission which is where his Pulmonologist is out of. I discussed previously [facility] for Advanced Heart Failure but his lungs are the main culprit of his difficulty breathing.

## 2021-12-01 NOTE — TELEPHONE ENCOUNTER
I called and spoke with the patient's wife, Juan Saleem, on HIPAA  Patient's two identifiers verified. I discussed Dr. Noe Anaya message below with Juan Saleem. She verbalized understanding. I called Dr. Anthony Lomeli office at [facility] to inform that I will be faxing a patient's CT and chest x-ray result that requires immediate attention, I spoke with Evlira Jaramillo. Fax confirmation received.    ----- Message from Kristi Rizvi NP sent at 11/30/2021  5:50 PM EST -----  \"Please call patient / wife, advise I am off today and will be back on Thursday. CT scan showing significant inflammatory process in the lungs, possible early pneumonia. He will need additional pulmonary testing. Please fax this CXR and CT report to [facility] Dr Zaida Lei. Dr Zaida Lei will need to address these findings, his shortness of breath and desaturations are mainly pulmonary related and not just CHF. Continue the increased dose of Bumex for the next few days to pull off the edema in his legs. If he is not feeling well or unable to get a hold of Pulmonary for outpatient then I want him to go to [id number] ER for admission which is where his Pulmonologist is out of. I discussed previously [facility] for Advanced Heart Failure but his lungs are the main culprit of his difficulty breathing.  \"

## 2021-12-03 NOTE — TELEPHONE ENCOUNTER
Patient's wife, Mary Briones, is calling in regards to the test results that need to be faxed over to Dr. Zaida Lei. Dr. Anthony Lomeli office is saying they never received these results. Please callback and advise.           Callback Number: [phone]          ThanksSkylar Lomeli Fax Number: [phone]        Thanks,  Geovani Colbert

## 2021-12-03 NOTE — TELEPHONE ENCOUNTER
I called and spoke with the patient's wife, Chrissy Byrne. Patient's two identifiers verified. She stated that she called [facility] and she was told that they did not receive the Chest CT and X-ray results faxed by me. These results were faxed on 12/01/21, with confirmation received. I called [facility], spoke with Danny Du from the call center, I requested to speak with Dr. Edwin Marroquin nurse directly. But she's unable to transfer me because the clinic is on going. However, she will send a message to the nurse, Sangeeta regarding my concern and will have her call me back. I called back Roby for this update.

## 2021-12-07 NOTE — TELEPHONE ENCOUNTER
I called [facility] spoke with Vance Valero. She confirmed that they received the Chest CT and X-ray results that I faxed on 12/01/21. Also, they have access to [facility], they can pull out the results via [facility] too. I informed Vance Valero that the results require Dr. Suleiman Reich immediate attention. She will send a message to his Sangeeta EDMOND to check on this. Patient has PFT scheduled with them on 12/28/21 and ff up on 12/29/21. I advised to contact patient directly if Dr. Zully Freeman wants patient to be re-scheduled sooner. I called and spoke with patient's wife, Kira Glover and updated her reagrding this. No other concerns at this time.

## 2021-12-07 NOTE — TELEPHONE ENCOUNTER
Franklindiana Brambiland, patient's wife called to notify our office that she received a call from [address] [facility], patient is rescheduled to see Dr. Verenice Mullins on 12/10/21 at 9.15 AM.     She will call pharmacy and discuss about refills remaining.

## 2021-12-07 NOTE — TELEPHONE ENCOUNTER
Patient'd wife, Makenzie Church called requesting for refill for Bumex, Imdur, Carvedilol. Two identifiers verified. Wife Makenzie Church on HIPAA. I informed her that Dr. Raydell Goldberg electronically sent prescriptions in November 2021 to preferred pharmacy, with refill. She will contact pharmacy. Will call me back if with refill concerns. I also notified her that I have not heard back from [address] [facility] yet, I will ff up with them, and I suggested contacting the pulmonology office directly herself too.

## 2021-12-08 NOTE — Clinical Note
Pt is drowsy and unable to swallow plavix while in cath lab at this time. Pt to receive plavix in recovery.  Marine Rhodes RN aware

## 2021-12-08 NOTE — ED TRIAGE NOTES
Sent from [facility] by MD Jerry Hearn for possible pneumonia. C/o SOB and lethargy. Symptoms ongoing since 11/4 when patient was admitted to [address] [place] [address]. On O2 since then.

## 2021-12-08 NOTE — Clinical Note
Right internal jugular vein. Accessed successfully. Radial access needle used. Using ultrasound guidance.  Number of attempts =  1.

## 2021-12-08 NOTE — PROGRESS NOTES
[facility] in [place], [facility] Note    Patient name: Gerber Chan  Patient : 1950  Patient MRN: [id number]  Date of service: 21    Primary care physician: Cary Devries NP  Primary general cardiologist:  Tami Nam    Primary F cardiologist: Ang Way MD    CHIEF COMPLAINT:  Chronic systolic heart failure    PLAN OF CARE:  · 71 y/o with 2 months h/o of worsened shortness of breath just recently discharged from [facility] on 1L NC O2 after treatment of presumed acute diastolic heart failure; [facility] with [facility]; now on 3 liters of oxygen, in wheelchair; desaturates to 70s after walking 3 steps. · Chest CT done last week shows diffuse infiltrates likely atypical and possibly viral pneumonia; no covid test found in records. · Patient was referred to ER for admission for pneumonia with severe hypoxia    IMPRESSION:  Hypoxia on 3LNC  Shortness of breath at rest  Multiple pulmonary infiltrates  Normal heart function, LVEF 50-55%  Coronary artery disease  · S/p multivessel PTCA stents x 9  · Most recent stent LAD 2020  Cardiac risk factors   HTN   HL   Former smoker  CKD, stage 2    CARDIAC IMAGING:  Echo (21)  · LV: Estimated LVEF is 50 - 55%. Visually measured ejection fraction. Mildly dilated left ventricle. Upper normal wall thickness. Globally reduced systolic function. Low normal systolic function. Mild (grade 1) left ventricular diastolic dysfunction. · LA: Mildly dilated left atrium. Left Atrium volume index is 37.77 mL/m2. · AV: Aortic valve leaflet calcification present. Mild aortic valve stenosis is present. Mild aortic valve regurgitation is present. · MV: Mild mitral valve regurgitation is present.   · IVSd 1.07cm  EKG (11/15/21) NSR 68bpm,     HEMODYNAMICS:  RHC not done  CPEST not done  6MW not done    OTHER IMAGING:  CXR (21)  Presence of abnormal density involving both lungs (left greater than  right). Follow-up examinations are recommended. CT chest (11/30/21)  Widespread patchy opacities, reticulations, and architectural distortion with diffuse varicoid bronchiectasis and bronchial wall thickening. Additionally, few enlarged mediastinal and hilar lymph nodes are noted. Constellation of findings may reflect end-stage sarcoidosis or other interstitial lung disease, but a component of superimposed acute infectious or inflammatory process versus neoplasm cannot be excluded entirely based on imaging alone. Consider PET/CT for further evaluation. HISTORY OF PRESENT ILLNESS:  I had the pleasure of seeing Soniya Mcclain in [facility] at [address] [facility] in [address] [facility]. Briefly, Soniya Mcclain is a 70 y.o. male with h/o HTN, HL, former smoker, coronary artery disease s/p multivessel PTCA stents x 9, most recent stent LAD 2020 recently discharged from [facility] on 1 liters of O2 for treatment of shortness of breath, presumably diastolic HF. Patient presents for evaluation of shortness of breath. Chest CT last week showed diffuse pulmonary infiltrates. Hypoxia worsened now requiring 3 liters of O2 and desaturating to 70% walking 3 steps in clinic. Arrived in wheelchair. INTERVAL HISTORY:  Today, patient presents for routine clinic visit accompanied by his wife. Patient is doing very poorly. Arrived in wheelchair. Dyspnea at rest.      REVIEW OF SYSTEMS:  General: Denies fever, night sweats. Respiratory: Denies cough, wheezing, sputum production, hemoptysis. PHYSICAL EXAM:  Visit Vitals  BP (!) 142/70 (BP 1 Location: Right arm, BP Patient Position: Sitting, BP Cuff Size: Adult)   Pulse 73   Temp 97.4 °F (36.3 °C) (Axillary)   Resp 22   Ht 5' 5\" (1.651 m)   Wt 137 lb 6.4 oz (62.3 kg)   SpO2 93%   BMI 22.86 kg/m²     General: Patient is cachectic, in a wheelchair  HEENT: Normocephalic and atraumatic. No scleral icterus.  Pupils are equal, round and reactive to light and accomodation. No conjunctival injection. Oropharynx is clear. Neck: Supple. No evidence of thyroid enlargements or lymphadenopathy. JVD: Negative  Lungs: Breath sounds are equal and clear bilaterally. No wheezes, rhonchi, or rales. Heart: Regular rate and rhythm with normal S1 and S2. No murmurs, gallops or rubs. Abdomen: Soft, no mass or tenderness. No organomegaly or hernia. Bowel sounds present. Genitourinary and rectal: deferred  Extremities: No cyanosis, clubbing, or edema. Neurologic: No focal sensory or motor deficits are noted. Grossly intact. Psychiatric: Awake, alert an doriented x 3. Appropriate mood and affect. Skin: Warm, dry and well perfused. No lesions, nodules or rashes are noted. PAST MEDICAL HISTORY:  Past Medical History:   Diagnosis Date    Aortic regurgitation     CAD (coronary artery disease)     Taxus stent 2005,  Cypher Prox circ, 2018 [place] LADx2,   [place] LAD [place] LCX,  [place]     Chronic combined systolic and diastolic congestive heart failure ([address] 2021    Chronic obstructive pulmonary disease (HCC)     Congestive heart failure (HCC)     Diabetes ([address]     Essential hypertension     Hyperlipidemia     MI (myocardial infarction) ([address]     Murmur     Renal insufficiency     Thyroid disease        PAST SURGICAL HISTORY:  Past Surgical History:   Procedure Laterality Date    HX CORONARY STENT PLACEMENT      HX CYST REMOVAL      IR ASP BLADDER SUPRA CATH         FAMILY HISTORY:  No family history on file.     SOCIAL HISTORY:  Social History     Socioeconomic History    Marital status:    Tobacco Use    Smoking status: Former Smoker     Packs/day: 2.00     Years: 30.00     Pack years: 60.00     Quit date: 1991     Years since quittin.8    Smokeless tobacco: Never Used   Vaping Use    Vaping Use: Never used   Substance and Sexual Activity    Alcohol use: Yes     Comment: rarely    Drug use: Never    Sexual activity: Yes     Partners: Female       LABORATORY RESULTS:  No flowsheet data found. ALLERGY:  Allergies   Allergen Reactions    Bee Sting [Sting, Bee] Anaphylaxis    Iodinated Contrast Media Rash    Brilinta [Ticagrelor] Other (comments)    Effient [Prasugrel] Other (comments)    Penicillins Rash and Nausea Only    Ranexa [Ranolazine] Other (comments)    Sulfa (Sulfonamide Antibiotics) Rash        CURRENT MEDICATIONS:    Current Outpatient Medications:     bumetanide (BUMEX) 1 mg tablet, TAKE 1 TABLET BY MOUTH TWICE DAILY. REPLACES. LASIX, Disp: 180 Tablet, Rfl: 0    magnesium oxide 250 mg magnesium tablet, Take 250 mg by mouth. Every other day, Disp: , Rfl:     prasterone, dhea, 50 mg tab, 50 mg = 1 tab each dose, PO, daily, # 30 tab, 0 Refills, Disp: , Rfl:     carvediloL (COREG) 6.25 mg tablet, Take 1 Tablet by mouth every twelve (12) hours. , Disp: 180 Tablet, Rfl: 1    isosorbide mononitrate ER (IMDUR) 30 mg tablet, Take 1 Tablet by mouth daily. , Disp: 90 Tablet, Rfl: 1    hydrALAZINE (APRESOLINE) 25 mg tablet, Take 1 Tablet by mouth every eight (8) hours. , Disp: 270 Tablet, Rfl: 1    amLODIPine (NORVASC) 2.5 mg tablet, Take 1 Tablet by mouth daily. , Disp: 90 Tablet, Rfl: 1    rosuvastatin (CRESTOR) 20 mg tablet, TAKE 1 TABLET BY MOUTH EVERY NIGHT, Disp: 90 Tablet, Rfl: 3    clopidogreL (PLAVIX) 75 mg tab, TAKE 1 TABLET BY MOUTH EVERY DAY, Disp: 90 Tablet, Rfl: 3    Symbicort 80-4.5 mcg/actuation HFAA, INHALE 2 PUFFS BY MOUTH TWICE DAILY IN THE MORNING AND IN THE EVENING, Disp: , Rfl:     nitroglycerin (Nitrostat) 0.4 mg SL tablet, 1 Tablet by SubLINGual route every five (5) minutes as needed for Chest Pain. Up to 3 doses. , Disp: 25 Tablet, Rfl: 1    albuterol (ProAir HFA) 90 mcg/actuation inhaler, Take 2 Puffs by inhalation.  4-6 hrs as needed, Disp: , Rfl:     BD Lali 2nd Gen Pen Needle 32 gauge x 5/32\" ndle, USE WITH LANTUS, Disp: , Rfl:     aspirin delayed-release 81 mg tablet, Take 81 mg by mouth daily. At night, Disp: , Rfl:     glimepiride (AMARYL) 4 mg tablet, Take 4 mg by mouth. Every two days, Disp: , Rfl:     insulin glargine (Lantus U-100 Insulin) 100 unit/mL injection, 18 U nightly after dinner, Disp: , Rfl:     levothyroxine (SYNTHROID) 100 mcg tablet, Take 100 mcg by mouth daily. Morning, Disp: , Rfl:     testosterone (ANDROGEL) 1.62 % (20.25 mg/1.25 gram) glpk, 20.25 mg daily. 2 to 3 pumps alternating in the morning., Disp: , Rfl:     Thank you for your referral and allowing me to participate in this patient's care.     Akbar Quintanilla MD PhD  [address] [place] [address], [address]  Phone: [phone]  Fax: [phone]    PATIENT CARE TEAM:  Patient Care Team:  Dean Lopez NP as PCP - General (Nurse Practitioner)     Total visit time: 40 minutes (> 50% spent face-to-face counseling)

## 2021-12-09 PROBLEM — I50.9 CHF (CONGESTIVE HEART FAILURE) (HCC): Status: ACTIVE | Noted: 2021-01-01

## 2021-12-09 NOTE — PROGRESS NOTES
ADVANCED HEART FAILURE NOTE    Patient admitted last night from [facility] for hypoxia and new pulmonary infiltrates. Patient chart reviewed remotely. Afebrile, /150s/60s, HR 60-90s  Cr 2.5, pro-NT-BNP 1630, trop neg    Plan:  1. Discontinue norvasc as it can cause leg edema  2. Discontinue lisinopril due to acute on chronic renal failure  3. Increase hydralazine to 50mg PO q6h; and hydralazine 5mg IV prn SBP > 140mmHg  4. Nephrology consultation re: diuresis; CO2 33   5. Pulmonary consult, per primary team discretion  6. Patient will need RHC once able to lay flat  7.  Plan to postpone plans for LHC until euvolemic and nephrology clears     Arnaud Perry MD  [facility] Cardiology Alert-The patient is alert, awake and responds to voice. The patient is oriented to time, place, and person. The triage nurse is able to obtain subjective information.

## 2021-12-09 NOTE — ED NOTES
Verbal shift change report given to [facility], RN  (oncoming nurse) by Adwoa Maya RN (offgoing nurse). Report included the following information SBAR, ED Summary, Intake/Output, MAR and Recent Results.

## 2021-12-09 NOTE — CONSULTS
Assessment:  CKD stage 4: Serum Cr 2.4mg/dl. Baseline serum Cr 2.5 to 3mg/dl-> 2 to DM/HTN. Followed by my partner Dr. Vicki Gomez. Decompensated CHF: EF 45-50%    Chest pain: hx of 9 stents    DM2: Uncontrolled->Hyperglycemia    HTN: fluctuating control. Hx of urethral obstruction: s/p suprapubic catheter    Plan/Recommendations:  Change Bumex to IV 1mg BID  Send off urine protein/cr ratio  Echo  [facility] planned this admission-> discussed risks of MEL with patient/daughter  Control Bld sugars  Strict I/Os, daily weights  Avoid nephrotoxins  AM labs      Discussed with patient    Thanks for the consultation. Renal service will follow patient with you. Please contact me with any questions or concerns. Initial Consult note         Patient name: Fco Arndt  MR no: [id number]  Date of admission: 12/8/2021  Date of consultation: 12/9/2021  Requested by: Aline Marrero NP  Reason for consult: CKD stage 4    Patient seen and examined. History obtained from patient and chart review. Relevant labs, data and notes reviewed. HPI: Fco Arndt is a 70 y.o. male with PMH significant for CKD stage 4, HFrEF, HTN, DM2, CAD s/p stents advised to come in for admission by [facility] after evaluation yesterday in the office. Patient was admitted at [facility] last month for decompensated CHF. Since being discharge patient/wife noted worsening edema/SOB/orthopnea.      PMH:  Past Medical History:   Diagnosis Date    Aortic regurgitation     CAD (coronary artery disease)     Taxus stent 12/2005, 12/06 Cypher Prox circ, 6/2018 [place] LADx2,  9/19 [place] LAD & LCX, 1/20 [place]     Chronic combined systolic and diastolic congestive heart failure ([address] 11/14/2021    Chronic obstructive pulmonary disease (HCC)     Congestive heart failure (HCC)     Diabetes ([address]     Essential hypertension     Hyperlipidemia     MI (myocardial infarction) ([address]     Murmur     Renal insufficiency     Thyroid disease PSH:  Past Surgical History:   Procedure Laterality Date    HX CORONARY STENT PLACEMENT      HX CYST REMOVAL      IR ASP BLADDER SUPRA CATH         Social history:   Social History     Tobacco Use    Smoking status: Former Smoker     Packs/day: 2.00     Years: 30.00     Pack years: 60.00     Quit date: 1991     Years since quittin.8    Smokeless tobacco: Never Used   Vaping Use    Vaping Use: Never used   Substance Use Topics    Alcohol use: Yes     Comment: rarely    Drug use: Never       Family history:  No history of CKD or ESRD in the family.      Allergies   Allergen Reactions    Bee Sting [Sting, Bee] Anaphylaxis    Iodinated Contrast Media Rash    Brilinta [Ticagrelor] Other (comments)    Effient [Prasugrel] Other (comments)    Penicillins Rash and Nausea Only    Ranexa [Ranolazine] Other (comments)    Sulfa (Sulfonamide Antibiotics) Rash       Current Facility-Administered Medications   Medication Dose Route Frequency Last Admin    heparin (porcine) injection 5,000 Units  5,000 Units SubCUTAneous Q12H 5,000 Units at 21 0527    guaiFENesin ER (MUCINEX) tablet 600 mg  600 mg Oral Q12H 600 mg at 21 0934    benzonatate (TESSALON) capsule 100 mg  100 mg Oral TID PRN      acetaminophen (TYLENOL) tablet 650 mg  650 mg Oral Q6H PRN      aspirin delayed-release tablet 81 mg  81 mg Oral QHS      insulin glargine (LANTUS) injection 15 Units  15 Units SubCUTAneous PCD      levothyroxine (SYNTHROID) tablet 100 mcg  100 mcg Oral  mcg at 21 0700    albuterol-ipratropium (DUO-NEB) 2.5 MG-0.5 MG/3 ML  3 mL Nebulization Q4H PRN      arformoterol 15 mcg/budesonide 0.5 mg neb solution   Nebulization BID RT Given at 21 0716    nitroglycerin (NITROSTAT) tablet 0.4 mg  0.4 mg SubLINGual Q5MIN PRN 0.4 mg at 21 1644    clopidogreL (PLAVIX) tablet 75 mg  75 mg Oral DAILY 75 mg at 21 0934    rosuvastatin (CRESTOR) tablet 20 mg  20 mg Oral QHS      magnesium oxide (MAG-OX) tablet 200 mg  200 mg Oral EVERY OTHER  mg at 12/09/21 0934    carvediloL (COREG) tablet 6.25 mg  6.25 mg Oral Q12H 6.25 mg at 12/09/21 0571    isosorbide mononitrate ER (IMDUR) tablet 30 mg  30 mg Oral DAILY 30 mg at 12/09/21 0934    bumetanide (BUMEX) tablet 1 mg  1 mg Oral BID 1 mg at 12/09/21 0934    hydrALAZINE (APRESOLINE) tablet 50 mg  50 mg Oral Q8H 50 mg at 12/09/21 1520    doxycycline (VIBRAMYCIN) 100 mg in 0.9% sodium chloride (MBP/ADV) 100 mL MBP  100 mg IntraVENous Q12H      sodium chloride (NS) flush 5-10 mL  5-10 mL IntraVENous PRN       Current Outpatient Medications   Medication Sig Dispense    anastrozole (ARIMIDEX) 1 mg tablet Take 0.5 mg by mouth every Monday, Wednesday, Friday.  bumetanide (BUMEX) 1 mg tablet TAKE 1 TABLET BY MOUTH TWICE DAILY. REPLACES. LASIX 180 Tablet    magnesium oxide 250 mg magnesium tablet Take 250 mg by mouth. Every other day - OTC     prasterone, dhea, 50 mg tab 50 mg = 1 tab each dose, PO, daily, # 30 tab, 0 Refills     carvediloL (COREG) 6.25 mg tablet Take 1 Tablet by mouth every twelve (12) hours. 180 Tablet    isosorbide mononitrate ER (IMDUR) 30 mg tablet Take 1 Tablet by mouth daily. 90 Tablet    hydrALAZINE (APRESOLINE) 25 mg tablet Take 1 Tablet by mouth every eight (8) hours. 270 Tablet    amLODIPine (NORVASC) 2.5 mg tablet Take 1 Tablet by mouth daily. 90 Tablet    rosuvastatin (CRESTOR) 20 mg tablet TAKE 1 TABLET BY MOUTH EVERY NIGHT 90 Tablet    clopidogreL (PLAVIX) 75 mg tab TAKE 1 TABLET BY MOUTH EVERY DAY 90 Tablet    Symbicort 80-4.5 mcg/actuation HFAA INHALE 2 PUFFS BY MOUTH TWICE DAILY IN THE MORNING AND IN THE EVENING     nitroglycerin (Nitrostat) 0.4 mg SL tablet 1 Tablet by SubLINGual route every five (5) minutes as needed for Chest Pain. Up to 3 doses. 25 Tablet    albuterol (ProAir HFA) 90 mcg/actuation inhaler Take 2 Puffs by inhalation.  4-6 hrs as needed     BD Lali 2nd Gen Pen Needle 32 gauge x 5/32\" ndle USE WITH LANTUS     aspirin delayed-release 81 mg tablet Take 81 mg by mouth daily. At night     glimepiride (AMARYL) 4 mg tablet Take 4 mg by mouth. Every two days     insulin glargine (Lantus U-100 Insulin) 100 unit/mL injection 18 U nightly after dinner     levothyroxine (SYNTHROID) 100 mcg tablet Take 100 mcg by mouth daily. Morning     testosterone (ANDROGEL) 1.62 % (20.25 mg/1.25 gram) glpk 20.25 mg daily. 2 to 3 pumps alternating in the morning. ROS (besides HPI):    General: No fever. + weight changes  ENT: No hearing loss or visual changes  Cardiovascular: + Chest pain. +Edema. +Orthopnea  Pulmonary: + SOB  GI: No abdominal pain. No Nausea/Vomiting/Diarrhea. No blood in stool  : No blood in urine. No foamy or cloudy urine  Musculoskeletal: No joint swelling or redness. No morning stiffness  Endocrine: no cold or heat intolerance  Psych: denies anxiety or depression  Neuro: No light headedness or dizziness    Objective   Visit Vitals  /75   Pulse 80   Temp 98.1 °F (36.7 °C)   Resp 18   Ht 5' 5\" (1.651 m)   Wt 62.3 kg (137 lb 5.6 oz)   SpO2 99%   BMI 22.86 kg/m²       Physical Exam:    Gen: NAD    HEENT: AT/NC, EOMI, moist mucous membrane, no scleral icterus    Neck: no JVD, no cervical lymphadenopathy, no carotid bruit    Lungs/Chest wall: Breath sounds diminished with bibasilar crackles     Cardiovascular: Normal S1/S2, normal rate, regular rhythm. Abdomen: soft, NT, ND, BS+, no HSM    Ext: no clubbing or cyanosis. +peripheral edema    Skin: warm and dry. No rashes    : Suprapubic catheter/wallace    CNS: alert awake. Answers appropriately.      Labs/Data:    Lab Results   Component Value Date/Time    Sodium 135 (L) 12/09/2021 10:28 AM    Potassium 4.8 12/09/2021 10:28 AM    Chloride 98 12/09/2021 10:28 AM    CO2 31 12/09/2021 10:28 AM    Anion gap 6 12/09/2021 10:28 AM    Glucose 381 (H) 12/09/2021 10:28 AM    BUN 56 (H) 12/09/2021 10:28 AM    Creatinine 2.44 (H) 12/09/2021 10:28 AM    BUN/Creatinine ratio 23 (H) 12/09/2021 10:28 AM    GFR est AA 32 (L) 12/09/2021 10:28 AM    GFR est non-AA 26 (L) 12/09/2021 10:28 AM    Calcium 9.2 12/09/2021 10:28 AM       Lab Results   Component Value Date/Time    WBC 9.2 12/09/2021 10:28 AM    HGB 12.6 12/09/2021 10:28 AM    HCT 40.2 12/09/2021 10:28 AM    PLATELET 324 07/26/9938 10:28 AM    MCV 94.4 12/09/2021 10:28 AM       Urine analysis: 12/9/21 reviewed      Renal US: ordered    No components found for: SPEP, UPEP  No results found for: PUQ, PROTU2, PROTU1, BJP1, CPE1, IMEL1, MET2  No results found for: MCACR, MCA1, MCA2, MCA3, MCAU, MCAU2, MCALPOCT      Intake/Output Summary (Last 24 hours) at 12/9/2021 1715  Last data filed at 12/9/2021 0359  Gross per 24 hour   Intake    Output 900 ml   Net -900 ml       Wt Readings from Last 3 Encounters:   12/08/21 62.3 kg (137 lb 5.6 oz)   12/08/21 62.3 kg (137 lb 6.4 oz)   11/29/21 63.5 kg (140 lb)       Signed by:  Rhys Goodwin MD  Nephrology and Hypertension  [facility]

## 2021-12-09 NOTE — PROGRESS NOTES
[address] [facility] Adult  Hospitalist Group                                                                                          Hospitalist Progress Note  Jennie Rodriguez MD  Answering service: [phone] from in house phone        Date of Service:  2021  NAME:  Aislinn Aguilera  :  1950  MRN:  [id number]      Admission Summary:     Patient with recent admission at [facility] and discharged with diagnosis of acute diastolic heart failure. Patient went home on 1 L of oxygen. Patient presented to heart failure clinic  and found to be hypoxic with increased work of breathing and subsequently sent to the emergency room for further evaluation. CT of the chest shows bilateral pulmonary infiltrates which was unchanged from his recent chest CT . Patient has seen pulmonology once for further evaluation as outpatient and further ongoing investigation for that. Interval history / Subjective:       Patient reports of chest pain this a.m. a rapid response was called. Chest pain was relieved after nitroglycerin.      Assessment & Plan:     Acute on chronic hypoxic respiratory failure  -Acute on chronic hypoxic respiratory failure likely combination of CHF and pulmonary pathology  -Patient on 1 L of oxygen at home but found more hypoxic yesterday  -Pulmonology evaluating the patient  -CT chest shows bilateral infiltrates, negative for Covid  -Screening for connective tissue disease, hypersensitivity panel  -Starting antibiotic with doxycycline for atypical pneumonia    Acute diastolic congestive heart failure  -Discontinue Norvasc due to leg edema, discontinue lisinopril due to BIPIN  -Previous echo in February shows EF of 50 to 55%, repeat echo this admission pending  -On hydralazine, and on diuresis with Bumex  -Advanced heart failure team following  -Plan for right heart cath once patient is more euvolemic  -Plan for left heart cath until euvolemic and nephrology clears    Acute chest pain  -Likely atypical per cardiology  -Low level of troponin and not consistent with ACS  -Patient with history of coronary artery disease and multiple stents  -Continue Plavix, nitrates, beta-blocker and statin    Hypertension  -Continue hydralazine, nitrates    Diabetes  -Continue Lantus along with insulin sliding scale coverage    Dyslipidemia  -Continue statin    Hypothyroidism  -Continue Synthroid    Code status: Full  DVT prophylaxis: SCDs    Care Plan discussed with: Patient/Family  Anticipated Disposition: Home w/Family  Anticipated Discharge: Greater than 48 hours     Hospital Problems  Date Reviewed: 12/9/2021          Codes Class Noted POA    CHF (congestive heart failure) (HCC) ICD-10-CM: I50.9  ICD-9-CM: 428.0  12/9/2021 Unknown        Renal insufficiency ICD-10-CM: N28.9  ICD-9-CM: 593.9  Unknown Yes        Chronic obstructive pulmonary disease ([facility] ICD-10-CM: J44.9  ICD-9-CM: 901  Unknown Yes        Diabetes ([facility] [address] ICD-10-CM: E11.9  ICD-9-CM: 250.00  Unknown Yes        Essential hypertension ICD-10-CM: I10  ICD-9-CM: 401.9  Unknown Yes        Coronary arteriosclerosis ICD-10-CM: I25.10  ICD-9-CM: 414.00  7/3/2018 Yes                Review of Systems:   A comprehensive review of systems was negative except for that written in the HPI. Vital Signs:    Last 24hrs VS reviewed since prior progress note. Most recent are:  Visit Vitals  /75   Pulse 80   Temp 98.1 °F (36.7 °C)   Resp 18   Ht 5' 5\" (1.651 m)   Wt 62.3 kg (137 lb 5.6 oz)   SpO2 99%   BMI 22.86 kg/m²         Intake/Output Summary (Last 24 hours) at 12/9/2021 1840  Last data filed at 12/9/2021 0359  Gross per 24 hour   Intake    Output 900 ml   Net -900 ml        Physical Examination:     I had a face to face encounter with this patient and independently examined them on 12/9/2021 as outlined below:          Constitutional:  No acute distress, cooperative, pleasant    ENT:  Oral mucosa moist, oropharynx benign.     Resp:  CTA bilaterally. No wheezing/rhonchi/rales. No accessory muscle use   CV:  Regular rhythm, normal rate, no murmurs, gallops, rubs    GI:  Soft, non distended, non tender. normoactive bowel sounds, no hepatosplenomegaly     Musculoskeletal:  No edema, warm, 2+ pulses throughout    Neurologic:  Moves all extremities. AAOx3, CN II-XII reviewed            Data Review:    Review and/or order of clinical lab test      Labs:     Recent Labs     12/09/21  1028 12/08/21  2324   WBC 9.2 11.4*   HGB 12.6 13.2   HCT 40.2 41.9    249     Recent Labs     12/09/21  1028 12/08/21  2324 12/08/21  1522   * 140 139  136   K 4.8 4.7 4.8  4.7   CL 98 101 102  104   CO2 31 33* 32  29   BUN 56* 54* 54*  53*   CREA 2.44* 2.50* 2.57*  2.48*   * 150* 126*  136*   CA 9.2 9.4 9.4  9.5   MG  --  2.4  --    PHOS  --  4.1  --      Recent Labs     12/08/21 2324 12/08/21  1522   ALT 88* 86*  79*   * 150*  146*   TBILI 0.3 0.2  0.2   TP 7.2 7.5  7.5   ALB 3.1* 2.8*  2.7*   GLOB 4.1* 4.7*  4.8*     No results for input(s): INR, PTP, APTT, INREXT in the last 72 hours. Recent Labs     12/09/21  1026   TIBC 272   PSAT 17*   FERR 129      No results found for: FOL, RBCF   No results for input(s): PH, PCO2, PO2 in the last 72 hours. No results for input(s): CPK, CKNDX, TROIQ in the last 72 hours.     No lab exists for component: CPKMB  No results found for: CHOL, CHOLX, CHLST, CHOLV, HDL, HDLP, LDL, LDLC, DLDLP, TGLX, TRIGL, TRIGP, CHHD, CHHDX  No results found for: [facility]  Lab Results   Component Value Date/Time    Color YELLOW/STRAW 12/09/2021 05:26 AM    Appearance CLEAR 12/09/2021 05:26 AM    Specific gravity 1.008 12/09/2021 05:26 AM    pH (UA) 6.5 12/09/2021 05:26 AM    Protein 30 (A) 12/09/2021 05:26 AM    Glucose Negative 12/09/2021 05:26 AM    Ketone Negative 12/09/2021 05:26 AM    Bilirubin Negative 12/09/2021 05:26 AM    Urobilinogen 0.2 12/09/2021 05:26 AM    Nitrites Negative 12/09/2021 05:26 AM Leukocyte Esterase SMALL (A) 12/09/2021 05:26 AM    Epithelial cells FEW 12/09/2021 05:26 AM    Bacteria 4+ (A) 12/09/2021 05:26 AM    WBC 20-50 12/09/2021 05:26 AM    RBC 0-5 12/09/2021 05:26 AM         Medications Reviewed:     Current Facility-Administered Medications   Medication Dose Route Frequency    heparin (porcine) injection 5,000 Units  5,000 Units SubCUTAneous Q12H    guaiFENesin ER (MUCINEX) tablet 600 mg  600 mg Oral Q12H    benzonatate (TESSALON) capsule 100 mg  100 mg Oral TID PRN    acetaminophen (TYLENOL) tablet 650 mg  650 mg Oral Q6H PRN    aspirin delayed-release tablet 81 mg  81 mg Oral QHS    insulin glargine (LANTUS) injection 15 Units  15 Units SubCUTAneous PCD    levothyroxine (SYNTHROID) tablet 100 mcg  100 mcg Oral ACB    albuterol-ipratropium (DUO-NEB) 2.5 MG-0.5 MG/3 ML  3 mL Nebulization Q4H PRN    arformoterol 15 mcg/budesonide 0.5 mg neb solution   Nebulization BID RT    nitroglycerin (NITROSTAT) tablet 0.4 mg  0.4 mg SubLINGual Q5MIN PRN    clopidogreL (PLAVIX) tablet 75 mg  75 mg Oral DAILY    rosuvastatin (CRESTOR) tablet 20 mg  20 mg Oral QHS    magnesium oxide (MAG-OX) tablet 200 mg  200 mg Oral EVERY OTHER DAY    carvediloL (COREG) tablet 6.25 mg  6.25 mg Oral Q12H    isosorbide mononitrate ER (IMDUR) tablet 30 mg  30 mg Oral DAILY    hydrALAZINE (APRESOLINE) tablet 50 mg  50 mg Oral Q8H    doxycycline (VIBRAMYCIN) 100 mg in 0.9% sodium chloride (MBP/ADV) 100 mL MBP  100 mg IntraVENous Q12H    bumetanide (BUMEX) injection 1 mg  1 mg IntraVENous BID    sodium chloride (NS) flush 5-10 mL  5-10 mL IntraVENous PRN     Current Outpatient Medications   Medication Sig    anastrozole (ARIMIDEX) 1 mg tablet Take 0.5 mg by mouth every Monday, Wednesday, Friday.  bumetanide (BUMEX) 1 mg tablet TAKE 1 TABLET BY MOUTH TWICE DAILY. REPLACES. LASIX    magnesium oxide 250 mg magnesium tablet Take 250 mg by mouth.  Every other day - OTC    prasterone, dhea, 50 mg tab 50 mg = 1 tab each dose, PO, daily, # 30 tab, 0 Refills    carvediloL (COREG) 6.25 mg tablet Take 1 Tablet by mouth every twelve (12) hours.  isosorbide mononitrate ER (IMDUR) 30 mg tablet Take 1 Tablet by mouth daily.  hydrALAZINE (APRESOLINE) 25 mg tablet Take 1 Tablet by mouth every eight (8) hours.  amLODIPine (NORVASC) 2.5 mg tablet Take 1 Tablet by mouth daily.  rosuvastatin (CRESTOR) 20 mg tablet TAKE 1 TABLET BY MOUTH EVERY NIGHT    clopidogreL (PLAVIX) 75 mg tab TAKE 1 TABLET BY MOUTH EVERY DAY    Symbicort 80-4.5 mcg/actuation HFAA INHALE 2 PUFFS BY MOUTH TWICE DAILY IN THE MORNING AND IN THE EVENING    nitroglycerin (Nitrostat) 0.4 mg SL tablet 1 Tablet by SubLINGual route every five (5) minutes as needed for Chest Pain. Up to 3 doses.  albuterol (ProAir HFA) 90 mcg/actuation inhaler Take 2 Puffs by inhalation. 4-6 hrs as needed    BD Lali 2nd Gen Pen Needle 32 gauge x 5/32\" ndle USE WITH LANTUS    aspirin delayed-release 81 mg tablet Take 81 mg by mouth daily. At night    glimepiride (AMARYL) 4 mg tablet Take 4 mg by mouth. Every two days    insulin glargine (Lantus U-100 Insulin) 100 unit/mL injection 18 U nightly after dinner    levothyroxine (SYNTHROID) 100 mcg tablet Take 100 mcg by mouth daily. Morning    testosterone (ANDROGEL) 1.62 % (20.25 mg/1.25 gram) glpk 20.25 mg daily.  2 to 3 pumps alternating in the morning.     ______________________________________________________________________  EXPECTED LENGTH OF STAY: - - -  ACTUAL LENGTH OF STAY:          0                 Cody Castellano MD

## 2021-12-09 NOTE — ED PROVIDER NOTES
Please note that this dictation was completed with [employer], the computer voice recognition software.  Quite often unanticipated grammatical, syntax, homophones, and other interpretive errors are inadvertently transcribed by the computer software.  Please disregard these errors.  Please excuse any errors that have escaped final proofreading. 24-year-old male past medical history markable for aortic regurgitation, coronary artery disease with stents, congestive heart failure, diabetes, hypertension, hyperlipidemia, previous MI, murmur, renal insufficiency, and thyroid disease presents the ER after being sent from the advanced heart failure clinic for admission for \"increased shortness of breath increased oxygen needs hypoxia with exertion since being discharged from [facility] per chart review. Patient states \" been getting more more swollen peripheral edema and more dyspnea with exertion since I left [facility] on 10 November. \"  Patient adds \"is admitted [facility] for the same thing congestive heart exacerbation. Patient states he has a chronic suprapubic Ruelas which has been functioning normally has been tolerating p.o. normally. He has had normal bowel habits. Patient states he just feels like he is becoming more more winded though he is able to sleep normally on his 3 pillows without an increased oxygen demand. \"I just noticed my legs keep getting bigger and bigger. \"  Discussed results with patient thus far he agrees with administration of Bumex here evaluation for admission. \"That is what the doctor sent me here for. \"    pt denies HA, vison changes, diff swallowing, CP, Abd pain, F/Ch, N/V, D/Cons or other current systemic complaints    Social/ PSH reviewed in EMR    EMR Chart Reviewed           Past Medical History:   Diagnosis Date    Aortic regurgitation     CAD (coronary artery disease)     Taxus stent 12/2005, 12/06 [place] Prox circ, 6/2018 [place] LADx2,  9/19 Sudarshan LAD & LCX, 1/20 Sudarshan     Chronic combined systolic and diastolic congestive heart failure ([facility] 2021    Chronic obstructive pulmonary disease (HCC)     Congestive heart failure (HCC)     Diabetes ([facility]     Essential hypertension     Hyperlipidemia     MI (myocardial infarction) ([facility]     Murmur     Renal insufficiency     Thyroid disease        Past Surgical History:   Procedure Laterality Date    HX CORONARY STENT PLACEMENT      HX CYST REMOVAL      IR ASP BLADDER SUPRA CATH           History reviewed. No pertinent family history. Social History     Socioeconomic History    Marital status:      Spouse name: Not on file    Number of children: Not on file    Years of education: Not on file    Highest education level: Not on file   Occupational History    Not on file   Tobacco Use    Smoking status: Former Smoker     Packs/day: 2.00     Years: 30.00     Pack years: 60.00     Quit date: 1991     Years since quittin.8    Smokeless tobacco: Never Used   Vaping Use    Vaping Use: Never used   Substance and Sexual Activity    Alcohol use: Yes     Comment: rarely    Drug use: Never    Sexual activity: Yes     Partners: Female   Other Topics Concern    Not on file   Social History Narrative    Not on file     Social Determinants of Health     Financial Resource Strain:     Difficulty of Paying Living Expenses: Not on file   Food Insecurity:     Worried About Running Out of Food in the Last Year: Not on file    Linda of Food in the Last Year: Not on file   Transportation Needs:     Lack of Transportation (Medical): Not on file    Lack of Transportation (Non-Medical):  Not on file   Physical Activity:     Days of Exercise per Week: Not on file    Minutes of Exercise per Session: Not on file   Stress:     Feeling of Stress : Not on file   Social Connections:     Frequency of Communication with Friends and Family: Not on file    Frequency of Social Gatherings with Friends and Family: Not on file    Attends Yarsanism Services: Not on file    Active Member of Clubs or Organizations: Not on file    Attends Club or Organization Meetings: Not on file    Marital Status: Not on file   Intimate Partner Violence:     Fear of Current or Ex-Partner: Not on file    Emotionally Abused: Not on file    Physically Abused: Not on file    Sexually Abused: Not on file   Housing Stability:     Unable to Pay for Housing in the Last Year: Not on file    Number of [place] in the Last Year: Not on file    Unstable Housing in the Last Year: Not on file         ALLERGIES: Bee sting [sting, bee]; Iodinated contrast media; Brilinta [ticagrelor]; Effient [prasugrel]; Penicillins; Ranexa [ranolazine]; and Sulfa (sulfonamide antibiotics)    Review of Systems   Constitutional: Positive for chills. Negative for appetite change and fever. HENT: Negative for drooling, trouble swallowing and voice change. Eyes: Negative for visual disturbance. Respiratory: Positive for chest tightness and shortness of breath. Negative for wheezing and stridor. Cardiovascular: Positive for leg swelling. Negative for chest pain and palpitations. Gastrointestinal: Negative for abdominal pain, diarrhea, nausea and vomiting. Genitourinary: Negative for dysuria. Musculoskeletal: Negative for back pain. Skin: Negative for rash. Neurological: Negative for facial asymmetry and speech difficulty. Psychiatric/Behavioral: Negative for confusion. All other systems reviewed and are negative. Vitals:    12/08/21 1446 12/08/21 2044 12/08/21 2051   BP: 129/81 (!) 151/75    Pulse: 74 74    Resp: 26     Temp: 98 °F (36.7 °C) 97.7 °F (36.5 °C)    SpO2: 95% 97% 97%   Weight: 62.3 kg (137 lb 5.6 oz)     Height: 5' 5\" (1.651 m)              Physical Exam  Vitals and nursing note reviewed. Constitutional:       General: He is not in acute distress. Appearance: Normal appearance. He is well-developed.  He is not ill-appearing, toxic-appearing or diaphoretic. Comments: NAD, AxOx4, speaking in complete sentences    On NC, increased to  2.5 LPM;    HENT:      Head: Normocephalic and atraumatic. Right Ear: External ear normal.      Left Ear: External ear normal.      Mouth/Throat:      Pharynx: No oropharyngeal exudate. Eyes:      General:         Right eye: No discharge. Left eye: No discharge. Conjunctiva/sclera: Conjunctivae normal.      Pupils: Pupils are equal, round, and reactive to light. Cardiovascular:      Rate and Rhythm: Normal rate and regular rhythm. Pulses: Normal pulses. Heart sounds: Normal heart sounds. No murmur heard. No friction rub. No gallop. Pulmonary:      Effort: Pulmonary effort is normal. No respiratory distress. Breath sounds: Normal breath sounds. No stridor. No wheezing, rhonchi or rales. Chest:      Chest wall: No tenderness. Abdominal:      General: Bowel sounds are normal. There is no distension. Palpations: Abdomen is soft. There is no mass. Tenderness: There is no abdominal tenderness. There is no guarding or rebound. Hernia: No hernia is present. Comments: Noted suprapubic wallace/ abd nttp       Genitourinary:     Comments: Pt denies urinary/ Testicular/ scrotal or penile  complaints  Musculoskeletal:         General: No swelling, tenderness, deformity or signs of injury. Normal range of motion. Cervical back: Normal range of motion and neck supple. No tenderness. Right lower leg: Edema present. Left lower leg: Edema present. Lymphadenopathy:      Cervical: No cervical adenopathy. Skin:     General: Skin is warm and dry. Capillary Refill: Capillary refill takes less than 2 seconds. Findings: No bruising, erythema or rash. Neurological:      General: No focal deficit present. Mental Status: He is alert and oriented to person, place, and time. Cranial Nerves: No cranial nerve deficit. Sensory: No sensory deficit. Motor: No weakness. Coordination: Coordination normal.      Gait: Gait normal.          MDM  Number of Diagnoses or Management Options  Risk of Complications, Morbidity, and/or Mortality  Presenting problems: high  Diagnostic procedures: moderate  Management options: moderate  General comments: Total critical care time spent exclusive of procedures:  45 min    Patient Progress  Patient progress: improved         Procedures      Chief Complaint   Patient presents with    Referral / Consult       9:19 PM  The patients presenting problems have been discussed, and they are in agreement with the care plan formulated and outlined with them. I have encouraged them to ask questions as they arise throughout their visit. MEDICATIONS GIVEN:  Medications   sodium chloride (NS) flush 5-10 mL (has no administration in time range)       LABS REVIEWED:  Labs Reviewed   METABOLIC PANEL, COMPREHENSIVE - Abnormal; Notable for the following components:       Result Value    Anion gap 3 (*)     Glucose 136 (*)     BUN 53 (*)     Creatinine 2.48 (*)     BUN/Creatinine ratio 21 (*)     GFR est AA 31 (*)     GFR est non-AA 26 (*)     ALT (SGPT) 79 (*)     Alk. phosphatase 146 (*)     Albumin 2.7 (*)     Globulin 4.8 (*)     A-G Ratio 0.6 (*)     All other components within normal limits   CBC WITH AUTOMATED DIFF - Abnormal; Notable for the following components:    RDW 14.6 (*)     LYMPHOCYTES 8 (*)     IMMATURE GRANULOCYTES 1 (*)     ABS. MONOCYTES 1.1 (*)     ABS. EOSINOPHILS 0.7 (*)     ABS. IMM.  GRANS. 0.1 (*)     All other components within normal limits   NT-PRO BNP - Abnormal; Notable for the following components:    NT pro-BNP 2,469 (*)     All other components within normal limits   CULTURE, BLOOD   CULTURE, BLOOD   COVID-19 RAPID TEST   SAMPLES BEING HELD   TROPONIN-HIGH SENSITIVITY   URINALYSIS W/ REFLEX CULTURE   METABOLIC PANEL, COMPREHENSIVE   NT-PRO BNP   PROCALCITONIN   SARS-COV-2       RADIOLOGY RESULTS:  The following have been ordered and reviewed:  _____________________________________________________________________  _____________________________________________________________________    EKG interpretation:   Rhythm: normal sinus rhythm in a LBBB Pattern; and regular . Rate (approx.): 72; Axis: normal; P wave: normal; QRS interval: normal ; ST/T wave: normal; Negative acute significant segmental elevations/ compared to study dated 11/15/2021      PROCEDURES:        CONSULTATIONS:       PROGRESS NOTES:      DIAGNOSIS:    1. Acute congestive heart failure, unspecified heart failure type ([address]        PLAN:  1-acute chf exacerbation - bumex; admit  2 ? PNA - CT/  cxr neg;       ED COURSE: The patients hospital course has been uncomplicated. Perfect Serve Consult for Admission  11:06 PM    ED Room Number: ER25/25  Patient Name and age:  Jhon Wilhelm 70 y.o.  male  Working Diagnosis:   1.  Acute congestive heart failure, unspecified heart failure type ([address]        COVID-19 Suspicion:  no  Sepsis present:  no  Reassessment needed: yes  Code Status:  Full Code  Readmission: yes  Isolation Requirements:  no  Recommended Level of Care:  telemetry  Department:[facility] Adult ED - 21   Other:  Sent by Dr Charles Gonzales, Heart Failure clinic;

## 2021-12-09 NOTE — CONSULTS
[facility]      Cardiology Care Note                                    Initial visit      Patient Name: Fay Ward - KMA:[birth year] - [facility]:[id number]  Primary Cardiologist: Marquis Huizar NP, Dr. Houston Ken Cardiologist: Dav Choi MD  Reason for Consult: chest pain     Subjective 70 y.o. male who presented from [facility] office with chief c/o progressive worsening dyspnea and hypoxia. Cardiology consulted for new onset upper chest discomfort, worse with inspiration. Currently reports chest pain resolved. Still with SOB although on rounds this afternoon with Dr. Anaya Wilson pt feels it is a little better albeit he is mildly tachypneic. Assessment/Plan/Discussion:Cardiology Attending:     Patient seen on the day of progress note and examined  reviewed  with Advance Practice Provider (CARLOS ALBERTO, NP,PA)     S: 49-year-old gentleman with 9 stents in [place] now admitted with respiratory failure and hypoxia with marked dyspnea. He is reported chest pain which is not typical of his previous angina. This discomfort appears to be pleuritic. O: Diffuse severe crackles and abnormal breath sounds throughout  No pitting edema  L: Hemoglobin 12.6 UA with 20-50 white cells  Sodium 130 5K4. 8 creatinine 2.44 lactic acid 1.2   proBNP 2600  A/P:  Chest pain appears to be atypical- troponins are low -this is not an NSTEMI. Chronic CAD with multiple stents    Acute on chronic respiratory failure with markedly abnormal CT scan suspect pulmonary source questionable ILD pulmonary edema airspace disease    Mild reduction in left ventricular systolic function EF 45 to 50% by echo [facility] eleven 521 can continue Coreg hydralazine Imdur as tolerated no ACE due to renal dysfunction  Has CKD 3-4.     Echo pending  Suspect HCVD and LVH  EKG with LVH and STs of chronic strain    Caution with diuretic use given poor baseline renal dysfunction and the lack of overall volume overload on physical exam  Jose Meyer MD        Assessment and Plan     1. Chest pain   -Atypical   -no NSTEMI. Troponins low 15-16.   -EKG: NSR, incomplete LBBB, LVH, NSST abnormality lateral leads   -Pain is different from his anginal pain, with pleuritic component   -Do not suspect acute ACS as culprit. -LHC is planned per AHF once pulmonary status improves    2. Acute on chronic hypoxic respiratory failure   -suspect due to diffuse bilateral infiltrates (underlying lung condition I.e. ? ILD vs ?pulmonary edema vs infection (but no fever or WBC),      3. Chronic CHF (HFmrEF)    -EF 45-50% per echo at [facility] 11/5/21 (was 50-55% in 2/1/21). -pro BNp 2630 but in setting of abnormal renal function.    -Will defer management to AHF. Coreg, hydralaine, imdur.    -no ace-I/ARB due to renal dysfunction. -on Bumex 1 mg po BID     4. History of CAD   -s/p 9 stents. Last to LAD in 2020 [place], [place])   -continue ASA, BB, statin, plavix   -Echo 11/5/21: EF 45-50%, mid to distal inferolateral hypokinesis, mild-mod AI    5. Elevated creatinine   -? BIPIN on CKD. Uncertain baseline   -Follows with Dr. Allen Dasilva. Per F recommendations, will consult for assistance with optimization of renal function especially if [facility] requested this hospitalization. Pt admitted for evaluation, management of increased SOB. Has new diffuse interstitial infiltrates present on CT scan since 11/30/21. Unclear if this represents ILD? Possible edema although creatinine elevated and BLE edema has improved per pt report. Will defer to pulmonary, AHF team for management. In regard to his chest pain, do not suspect ACS, he has ruled out for NSTEMI and pain is different than his angina he has had in the past. Perhaps LHC as per AHF recommendations once pulmonary status and renal status optimized. will ask renal to see as per Dr. Jerry Hearn recommendations.       ____________________________________________________________      HPI:  Mr. Dixon Stephen is a 70 y.o. male with PMh of CAD s/p 9 stents (last in 2020 LAD in [place], [place]), HTN, HLD, CKD stage II, mild AS, HFmrEF. He was recently discharged from [address] [place] [address] last month after presentation for  2 week history of SOB. He was thought to be due to HFmrEF and was diuresed with some improvement. Reportedly also had worsened renal function after aggressive diuresis. At [address] [place] [address] He was also noted to have NSTEMI determined to be type II with troponin peak of 2.45. He went home with home O2 and multiple med changes. He also saw Juju Rothman NP on 11/14/21 post hospitalization and his diuretic was changed from lasix to Bumex as he had increased BLE edema and crackles. He also had CT in 11/30/2020 concerning for inflammatory process and was referred to Dr. Yue Lara but workup is still pending. Chantale Swenson He now presents from [facility] office yesterday with chierf c/o worsening SOB with conversational dyspnea as well as hypoxia on 3 L NC. Today, cardiology has been consulted as pt developed new onset upper chest pain which is somewhat worse with inspiration. Pain is nonradiating and he has no associated dizziness/lightheadedness or nausea. He says that the pain improved with nitroglycerine. His HS troponin is not elevated at 15-16-16 since admission. He reports that this pain was different than the pain \"anginal\" pain that he has had in the past.  Reports he experienced the same pain at [address] [place] [address] hospitalization. States pain comes and goes, mild currently. He reports worsened BLE edema. Reports progressively worsened SOB over past 2 months,  [facility] is following and feels pt will need RHC once able to lay flat. LHC recommended by [facility] once nephrolgy clears and euvolemic. Cardiac testing history:  TTE at [facility]: 11/5/21:   LV ejection fraction = 45-50%. Mid to distal inferolateral hypokinesis. Right atrium not well visualized. Right atrium is small. Thickened, calcified aortic valve with mild to moderate stenosis.    There is mild to moderate aortic regurgitation. CARDIAC IMAGING:  Echo (2/1/21)  · LV: Estimated LVEF is 50 - 55%. Visually measured ejection fraction. Mildly dilated left ventricle. Upper normal wall thickness. Globally reduced systolic function. Low normal systolic function. Mild (grade 1) left ventricular diastolic dysfunction. · LA: Mildly dilated left atrium. Left Atrium volume index is 37.77 mL/m2. · AV: Aortic valve leaflet calcification present. Mild aortic valve stenosis is present. Mild aortic valve regurgitation is present. · MV: Mild mitral valve regurgitation is present. · IVSd 1.07cm  EKG (11/15/21) NSR 68bpm,          Patient Active Problem List   Diagnosis Code    Coronary arteriosclerosis I25.10    Renal insufficiency N28.9    Chronic obstructive pulmonary disease ([address] J44.9    Diabetes ([address] E11.9    Essential hypertension I10    Aortic regurgitation I35.1    MI (myocardial infarction) ([address] I21.9    Chronic combined systolic and diastolic congestive heart failure (HCC) I50.42    CHF (congestive heart failure) ([address] I50.9     No specialty comments available.       Review of Systems:    [] Patient unable to provide secondary to condition    CONSTITUTIONAL: fatigue     ENT/MOUTH: negative     EYES: negative    CARDIOVASCULAR: chest Pain  , SOB, WISEMAN, orthopnea, edema      RESPIRATORY: cough  , SOB      GASTROINTESTINAL: negative     GENITOURINARY: has urostomy due to neurogenic bladder     MUSCULOSKELETAL: negative      SKIN: negative    NEUROLOGICAL: negative     PSYCHOLOGICAL: negative      HEME/LYMPH: negative    ENDOCRINE: negative        Past Medical History:   Diagnosis Date    Aortic regurgitation     CAD (coronary artery disease)     Taxus stent 12/2005, 12/06 Cypher Prox circ, 6/2018 Sudarshan LADx2,  9/19 Sudarshan LAD & LCX, 1/20 Sudarshan     Chronic combined systolic and diastolic congestive heart failure ([address] 11/14/2021    Chronic obstructive pulmonary disease (HCC)     Congestive heart failure [facility]     Diabetes ([facility]     Essential hypertension     Hyperlipidemia     MI (myocardial infarction) ([facility]     Murmur     Renal insufficiency     Thyroid disease      Past Surgical History:   Procedure Laterality Date    HX CORONARY STENT PLACEMENT      HX CYST REMOVAL      IR ASP BLADDER SUPRA CATH       Current Facility-Administered Medications   Medication Dose Route Frequency    heparin (porcine) injection 5,000 Units  5,000 Units SubCUTAneous Q12H    guaiFENesin ER (MUCINEX) tablet 600 mg  600 mg Oral Q12H    benzonatate (TESSALON) capsule 100 mg  100 mg Oral TID PRN    acetaminophen (TYLENOL) tablet 650 mg  650 mg Oral Q6H PRN    aspirin delayed-release tablet 81 mg  81 mg Oral QHS    insulin glargine (LANTUS) injection 15 Units  15 Units SubCUTAneous PCD    levothyroxine (SYNTHROID) tablet 100 mcg  100 mcg Oral ACB    . PHARMACY TO SUBSTITUTE PER PROTOCOL (Reordered from: testosterone (ANDROGEL) 1.62 % (20.25 mg/1.25 gram) [facility])    Per Protocol    albuterol-ipratropium (DUO-NEB) 2.5 MG-0.5 MG/3 ML  3 mL Nebulization Q4H PRN    arformoterol 15 mcg/budesonide 0.5 mg neb solution   Nebulization BID RT    nitroglycerin (NITROSTAT) tablet 0.4 mg  0.4 mg SubLINGual Q5MIN PRN    clopidogreL (PLAVIX) tablet 75 mg  75 mg Oral DAILY    rosuvastatin (CRESTOR) tablet 20 mg  20 mg Oral QHS    magnesium oxide (MAG-OX) tablet 200 mg  200 mg Oral EVERY OTHER DAY    . PHARMACY TO SUBSTITUTE PER PROTOCOL (Reordered from: prasterone, dhea, 50 mg tab)    Per Protocol    carvediloL (COREG) tablet 6.25 mg  6.25 mg Oral Q12H    isosorbide mononitrate ER (IMDUR) tablet 30 mg  30 mg Oral DAILY    bumetanide (BUMEX) tablet 1 mg  1 mg Oral BID    hydrALAZINE (APRESOLINE) tablet 50 mg  50 mg Oral Q8H    sodium chloride (NS) flush 5-10 mL  5-10 mL IntraVENous PRN     Current Outpatient Medications   Medication Sig    furosemide (LASIX) 40 mg tablet Take 1 Tablet by mouth two (2) times a day.     bumetanide (BUMEX) 1 mg tablet TAKE 1 TABLET BY MOUTH TWICE DAILY. REPLACES. LASIX    magnesium oxide 250 mg magnesium tablet Take 250 mg by mouth. Every other day - OTC    prasterone, dhea, 50 mg tab 50 mg = 1 tab each dose, PO, daily, # 30 tab, 0 Refills    carvediloL (COREG) 6.25 mg tablet Take 1 Tablet by mouth every twelve (12) hours.  isosorbide mononitrate ER (IMDUR) 30 mg tablet Take 1 Tablet by mouth daily.  hydrALAZINE (APRESOLINE) 25 mg tablet Take 1 Tablet by mouth every eight (8) hours.  amLODIPine (NORVASC) 2.5 mg tablet Take 1 Tablet by mouth daily.  rosuvastatin (CRESTOR) 20 mg tablet TAKE 1 TABLET BY MOUTH EVERY NIGHT    clopidogreL (PLAVIX) 75 mg tab TAKE 1 TABLET BY MOUTH EVERY DAY    Symbicort 80-4.5 mcg/actuation HFAA INHALE 2 PUFFS BY MOUTH TWICE DAILY IN THE MORNING AND IN THE EVENING    nitroglycerin (Nitrostat) 0.4 mg SL tablet 1 Tablet by SubLINGual route every five (5) minutes as needed for Chest Pain. Up to 3 doses.  albuterol (ProAir HFA) 90 mcg/actuation inhaler Take 2 Puffs by inhalation. 4-6 hrs as needed    BD Lali 2nd Gen Pen Needle 32 gauge x 5/32\" ndle USE WITH LANTUS    aspirin delayed-release 81 mg tablet Take 81 mg by mouth daily. At night    glimepiride (AMARYL) 4 mg tablet Take 4 mg by mouth. Every two days    insulin glargine (Lantus U-100 Insulin) 100 unit/mL injection 18 U nightly after dinner    levothyroxine (SYNTHROID) 100 mcg tablet Take 100 mcg by mouth daily. Morning    testosterone (ANDROGEL) 1.62 % (20.25 mg/1.25 gram) glpk 20.25 mg daily. 2 to 3 pumps alternating in the morning. Allergies   Allergen Reactions    Bee Sting [Sting, Bee] Anaphylaxis    Iodinated Contrast Media Rash    Brilinta [Ticagrelor] Other (comments)    Effient [Prasugrel] Other (comments)    Penicillins Rash and Nausea Only    Ranexa [Ranolazine] Other (comments)    Sulfa (Sulfonamide Antibiotics) Rash        FmHx: family history is not on file.    Social Hx : reports that he quit smoking about 30 years ago. He has a 60.00 pack-year smoking history. He has never used smokeless tobacco. He reports current alcohol use. He reports that he does not use drugs. Objective:    Physical Exam    Vitals:   Vitals:    12/09/21 0527 12/09/21 0705 12/09/21 0716 12/09/21 0821   BP: (!) 162/64 (!) 154/69  (!) 154/70   Pulse: 91 67     Resp: 21 17     Temp:  98.1 °F (36.7 °C)     SpO2:  98% 97%    Weight:       Height:           General:    Alert, cooperative, no distress, appears stated age.+conversational dyspnea   Neck:   Supple,    Back:     Symmetric,     Lungs:     Crackles bilaterally throughout lung fields,  to auscultation bilaterally. tachypneic   Heart[de-identified]    Regular rate and rhythm, S1, S2 normal, no murmur, click, rub or gallop. Abdomen:     Soft, non-tender. Bowel sounds normal.    Extremities:   Extremities normal, atraumatic, no cyanosis, trace edema. Vascular:   Pulses - 2+   Skin:   Skin color normal. No rashes or lesions on visible areas   Neurologic:   CN II-XII grossly intact.         Telemetry: normal sinus rhythm    ECG:   EKG Results     Procedure 720 Value Units Date/Time    EKG, 12 LEAD, INITIAL [[id number]] Collected: 12/09/21 1012    Order Status: Completed Updated: 12/09/21 1017     Ventricular Rate 85 BPM      Atrial Rate 85 BPM      P-R Interval 186 ms      QRS Duration 110 ms      Q-T Interval 402 ms      QTC Calculation (Bezet) 478 ms      Calculated P Axis 59 degrees      Calculated R Axis -34 degrees      Calculated T Axis 122 degrees      Diagnosis --     Normal sinus rhythm  Left axis deviation  Incomplete left bundle branch block  Left ventricular hypertrophy with repolarization abnormality ( R in aVL ,   [place] product , [employer] )  Abnormal ECG  When compared with ECG of 08-DEC-2021 15:19,  MANUAL COMPARISON REQUIRED, DATA IS UNCONFIRMED      EKG, 12 LEAD, INITIAL [[id number]]     Order Status: Sent     EKG, 12 LEAD, INITIAL [[id number]] Collected: 12/08/21 1519    Order Status: Completed Updated: 12/08/21 1526     Ventricular Rate 72 BPM      Atrial Rate 72 BPM      P-R Interval 150 ms      QRS Duration 94 ms      Q-T Interval 380 ms      QTC Calculation (Bezet) 416 ms      Calculated P Axis 33 degrees      Calculated T Axis 6 degrees      Diagnosis --     Normal sinus rhythm  Left ventricular hypertrophy with repolarization abnormality ( R in aVL ,   [place] product )  Abnormal ECG  No previous ECGs available            Data Review:     Radiology:   XR Results (most recent):  Results from Hospital Encounter encounter on 12/08/21    XR CHEST PA LAT    Narrative  INDICATION: PNA    EXAM: CXR 2 Views. COMPARISON: 11/30/2021. FINDINGS:  Frontal and lateral views of the chest show no change of low lung volumes and  severe bilateral pulmonary disease. Heart size is normal. There is no overt pulmonary edema. There is no  pneumothorax, midline shift or pleural effusion. Impression  Stable chronic lung disease. No results for input(s): CPK, TROIQ in the last 72 hours. No lab exists for component: CKQMB, CPKMB, BMPP  Recent Labs     12/09/21  1028 12/08/21  2324   * 140   K 4.8 4.7   CL 98 101   CO2 31 33*   BUN 56* 54*   CREA 2.44* 2.50*   * 150*   PHOS  --  4.1   CA 9.2 9.4     Recent Labs     12/09/21  1028 12/08/21  2324   WBC 9.2 11.4*   HGB 12.6 13.2   HCT 40.2 41.9    249     Recent Labs     12/08/21  2324 12/08/21  1522   * 150*  146*     No results for input(s): CHOL, LDLC in the last 72 hours. No lab exists for component: TGL, HDLC,  HBA1C  Recent Labs     12/09/21  1026   TSH 0.41       Jennifer South NP    [facility] [address] [place] [address], [address] [place] [address] [address]  Jefferson Hernandez  [phone]      CC:Clark Rodgers NP

## 2021-12-09 NOTE — PROGRESS NOTES
Admission Medication Reconciliation:    Information obtained from:  med list and with patient and wife  RxQuery data available¹:  YES    Comments/Recommendations: Updated PTA meds/reviewed patient's allergies. 1)  patient and wife well acquainted with medications and provided updated list    2)  Medication changes (since last review): Added  - anastrazole 0.5 mg M/W/F    Removed  - furosemide    3)  patient recently taken off of lisinopril and put on amlodipine outpatient. ¹RxQuery pharmacy benefit data reflects medications filled and processed through the patient's insurance, however   this data does NOT capture whether the medication was picked up or is currently being taken by the patient. Allergies:  Bee sting [sting, bee]; Iodinated contrast media; Brilinta [ticagrelor]; Effient [prasugrel]; Penicillins; Ranexa [ranolazine]; and Sulfa (sulfonamide antibiotics)    Significant PMH/Disease States:   Past Medical History:   Diagnosis Date    Aortic regurgitation     CAD (coronary artery disease)     Taxus stent 12/2005, 12/06 Cypher Prox circ, 6/2018 Sudarshan LADx2,  9/19 Sudarshan LAD & LCX, 1/20 Sudarshan     Chronic combined systolic and diastolic congestive heart failure ([address] 11/14/2021    Chronic obstructive pulmonary disease ([address]     Congestive heart failure ([address]     Diabetes ([address]     Essential hypertension     Hyperlipidemia     MI (myocardial infarction) ([address]     Murmur     Renal insufficiency     Thyroid disease      Chief Complaint for this Admission:    Chief Complaint   Patient presents with    Referral / Consult     Prior to Admission Medications:   Prior to Admission Medications   Prescriptions Last Dose Informant Taking?    BD Lali 2nd Gen Pen Needle 32 gauge x 5/32\" ndle   No   Sig: USE WITH LANTUS   Symbicort 80-4.5 mcg/actuation HFAA   No   Sig: INHALE 2 PUFFS BY MOUTH TWICE DAILY IN THE MORNING AND IN THE EVENING   albuterol (ProAir HFA) 90 mcg/actuation inhaler   No   Sig: Take 2 Puffs by inhalation. 4-6 hrs as needed   amLODIPine (NORVASC) 2.5 mg tablet   No   Sig: Take 1 Tablet by mouth daily. anastrozole (ARIMIDEX) 1 mg tablet   Yes   Sig: Take 0.5 mg by mouth every Monday, Wednesday, Friday. aspirin delayed-release 81 mg tablet   No   Sig: Take 81 mg by mouth daily. At night   bumetanide (BUMEX) 1 mg tablet   No   Sig: TAKE 1 TABLET BY MOUTH TWICE DAILY. REPLACES. LASIX   carvediloL (COREG) 6.25 mg tablet   No   Sig: Take 1 Tablet by mouth every twelve (12) hours. clopidogreL (PLAVIX) 75 mg tab   No   Sig: TAKE 1 TABLET BY MOUTH EVERY DAY   glimepiride (AMARYL) 4 mg tablet   No   Sig: Take 4 mg by mouth. Every two days   hydrALAZINE (APRESOLINE) 25 mg tablet   No   Sig: Take 1 Tablet by mouth every eight (8) hours. insulin glargine (Lantus U-100 Insulin) 100 unit/mL injection   No   Si U nightly after dinner   isosorbide mononitrate ER (IMDUR) 30 mg tablet   No   Sig: Take 1 Tablet by mouth daily. levothyroxine (SYNTHROID) 100 mcg tablet   No   Sig: Take 100 mcg by mouth daily. Morning   magnesium oxide 250 mg magnesium tablet   No   Sig: Take 250 mg by mouth. Every other day - OTC   nitroglycerin (Nitrostat) 0.4 mg SL tablet   No   Si Tablet by SubLINGual route every five (5) minutes as needed for Chest Pain. Up to 3 doses. prasterone, dhea, 50 mg tab   No   Si mg = 1 tab each dose, PO, daily, # 30 tab, 0 Refills   rosuvastatin (CRESTOR) 20 mg tablet   No   Sig: TAKE 1 TABLET BY MOUTH EVERY NIGHT   testosterone (ANDROGEL) 1.62 % (20.25 mg/1.25 gram) glpk   No   Si.25 mg daily. 2 to 3 pumps alternating in the morning. Facility-Administered Medications: None     Please contact the main inpatient pharmacy with any questions or concerns at [phone] and we will direct you to the clinical pharmacist covering this patient's care while in-house.    Sylvain Roche, BRENDAD

## 2021-12-09 NOTE — H&P
PLEASE NOTE: I HAVE GENERATED THIS NOTE WITH THE ASSISTANCE OF VOICE-RECOGNITION TECHNOLOGY. PLEASE EXCUSE ANY SPELLING, GRAMMATICAL, AND SYNTAX ERRORS YOU MAY FIND. IF YOU NEED CLARIFICATION ON ANYTHING, PLEASE FEEL FREE TO REACH OUT TO ME.  THANK YOU              [facility] Adult  Hospitalist Group  History and Physical - Dr. Shane Tai    Primary Care Provider: Yisel Santiago NP  Date of Service:  12/9/2021    Chief Complaint: SOB    Subjective:     70 y.o. male presents with a few days' duration of gradual onset, gradually worsening, severe, constant, shortness of breath that is associated with bilateral lower extremity pitting edema. Patient denies exacerbating features  and denies remitting features. It appears that patient is having a mild COPD exacerbation. Of note, patient is negative for Covid    Review of Systems:  Patient denies any chest pain, fevers, chills, nausea, vomiting, diarrhea  12 point ROS obtained and otherwise negative, except as per HPI and above. Past Medical History:   Diagnosis Date    Aortic regurgitation     CAD (coronary artery disease)     Taxus stent 12/2005, 12/06 Cypher Prox circ, 6/2018 Sudarshan LADx2,  9/19 Quentin LAD & LCX, 1/20 Sudarshan     Chronic combined systolic and diastolic congestive heart failure ([address] 11/14/2021    Chronic obstructive pulmonary disease (HCC)     Congestive heart failure (HCC)     Diabetes ([address]     Essential hypertension     Hyperlipidemia     MI (myocardial infarction) ([address]     Murmur     Renal insufficiency     Thyroid disease       Past Surgical History:   Procedure Laterality Date    HX CORONARY STENT PLACEMENT      HX CYST REMOVAL      IR ASP BLADDER SUPRA CATH       Prior to Admission medications    Medication Sig Start Date End Date Taking? Authorizing Provider   furosemide (LASIX) 40 mg tablet Take 1 Tablet by mouth two (2) times a day.  11/9/21 11/9/22 Yes Provider, Historical   bumetanide (BUMEX) 1 mg tablet TAKE 1 TABLET BY MOUTH TWICE DAILY. REPLACES. LASIX 11/16/21   Taya Osullivan NP   magnesium oxide 250 mg magnesium tablet Take 250 mg by mouth. Every other day - OTC 11/4/21   Provider, Historical   prasterone, dhea, 50 mg tab 50 mg = 1 tab each dose, PO, daily, # 30 tab, 0 Refills 11/4/21   Provider, Historical   carvediloL (COREG) 6.25 mg tablet Take 1 Tablet by mouth every twelve (12) hours. 11/15/21   Taya Osullivan NP   isosorbide mononitrate ER (IMDUR) 30 mg tablet Take 1 Tablet by mouth daily. 11/15/21   Taya Osullivan NP   hydrALAZINE (APRESOLINE) 25 mg tablet Take 1 Tablet by mouth every eight (8) hours. 11/15/21   Taya Osullivan NP   amLODIPine (NORVASC) 2.5 mg tablet Take 1 Tablet by mouth daily. 11/15/21   Taya Osullivan NP   rosuvastatin (CRESTOR) 20 mg tablet TAKE 1 TABLET BY MOUTH EVERY NIGHT 11/4/21   Taya Osullivan NP   clopidogreL (PLAVIX) 75 mg tab TAKE 1 TABLET BY MOUTH EVERY DAY 10/26/21   Taya Osullivan NP   Symbicort 80-4.5 mcg/actuation HFAA INHALE 2 PUFFS BY MOUTH TWICE DAILY IN THE MORNING AND IN THE EVENING 8/18/21   Provider, Historical   nitroglycerin (Nitrostat) 0.4 mg SL tablet 1 Tablet by SubLINGual route every five (5) minutes as needed for Chest Pain. Up to 3 doses. 9/1/21   Taya Osullivan NP   albuterol (ProAir HFA) 90 mcg/actuation inhaler Take 2 Puffs by inhalation. 4-6 hrs as needed 1/22/21   Provider, Historical   BD Lali 2nd Gen Pen Needle 32 gauge x 5/32\" ndle USE WITH LANTUS 7/13/21   Provider, Historical   aspirin delayed-release 81 mg tablet Take 81 mg by mouth daily. At night    Provider, Historical   glimepiride (AMARYL) 4 mg tablet Take 4 mg by mouth. Every two days    Provider, Historical   insulin glargine (Lantus U-100 Insulin) 100 unit/mL injection 18 U nightly after dinner    Provider, Historical   levothyroxine (SYNTHROID) 100 mcg tablet Take 100 mcg by mouth daily.  Morning    Provider, Historical   testosterone (ANDROGEL) 1.62 % (20.25 mg/1.25 gram) glpk 20.25 mg daily. 2 to 3 pumps alternating in the morning. Provider, Historical     Allergies   Allergen Reactions    Bee Sting [Sting, Bee] Anaphylaxis    Iodinated Contrast Media Rash    Brilinta [Ticagrelor] Other (comments)    Effient [Prasugrel] Other (comments)    Penicillins Rash and Nausea Only    Ranexa [Ranolazine] Other (comments)    Sulfa (Sulfonamide Antibiotics) Rash      History reviewed. No pertinent family history. SOCIAL HISTORY:    Patient ambulates independently. Smoking history: Former  Alcohol history: Yes    Objective:     Physical Exam:     VS as below    Const'l:          Normal body habitus, a&o, no acute distress  Head/Neck:       neck supple, no jvd, trachea midline, carotid midline, no cervical/head mass  Eyes:     gion, nonicteric sclera, eom intact  ENT:      auditory acuity grossly intact, no nasal deformity  Cardio:           Regular rate regular rhythm, no murmurs/rubs/gallops, no carotid bruit, normal s1, s2  Pulm:     no accessory muscle use, poor air movement bilaterally, wheezes tab  Abd:       Soft, nontender, nondistended normal bowel sounds x 4 quadrants, no palpable masses  Derm:     no rashes, no ulcers, no lesions  Extr:      Bilateral 2+ pitting edema, no cyanosis, no calf tenderness, no varicosities  Neuro:    cn II-XII grossly intact, muscle strength intact, sensation intact  Psych:   mood intact, judgement intact    Data Review: All diagnostic labs and studies have been reviewed. CXR: Stable chronic lung disease      Assessment:     Active Problems:    Coronary arteriosclerosis (7/3/2018)      Renal insufficiency ()      Chronic obstructive pulmonary disease (HCC) ()      Diabetes (HCC) ()      Essential hypertension ()      CHF (congestive heart failure) ([facility] (12/9/2021)        Plan:     I believe the patient is having more of a COPD exacerbation - will give duonebs, steroids. Patietn is wheezing, and has lower extremity edema, but does not have rales. For pt's DM, HTN, CHF, COPD - will continue home meds.     Of note, patient is negative for Covid    FUNCTIONAL STATUS PRIOR TO HOSPITALIZATION Ambulates Independently (including history of recent falls)      Signed By: Giovanna Rose DO     December 9, 2021

## 2021-12-09 NOTE — NURSE NAVIGATOR
Chart reviewed by Heart Failure Nurse Navigator. Heart Failure database completed. EF:  Prior echo 2/2021 ef 50/55; repeat echo pending    ACEi/ARB/ARNi: discontinued due to chronic renal failure    BB: coreg 6.25 mg twice daily    Aldosterone Antagonist: not currently indicated    Obstructive Sleep Apnea Screening: Screening priority 1   STOP-BANG score:   Referred to Sleep Medicine:     CRT not currently indicated    NYHA Functional Class documentation requested. Heart Failure Teach Back in Patient Education. Heart Failure Avoiding Triggers on Discharge Instructions. Cardiologist: followed by Dr. Alicia Andrew [facility])      Post discharge follow up phone call to be made within 48-72 hours of discharge.

## 2021-12-09 NOTE — CONSULTS
Pulmonary, Critical Care, and Sleep Medicine~Consult Note    Name: Michael Carter MRN: [id number]   : 1950 Hospital: [facility]   Date: 2021 2:58 PM Admission: 2021     Impression Plan   1. Acute on chronic hypoxic resp failure (O2 only recently started at [address] [place] [address])  2. Abnormal CT scan: Diffuse patchy irregular consolidative opacities and reticular changes and peribronchial thickening Left > right. Notable enlarged mediastinal/hilar adenopathy. Fairly extensive bronchiectasis. left upper lobe with pneumatocele. Query long standing burnt out sarcoid? ?? Offers no signs of new medications, familial CTD, pet birds, farm life. Query as well lymphogenic carcinomatosis? Possible. A coexisting PNA is additionally a possibility as WBC was elevated on admission   3. Suspected COPD, yet to be define via PFTs. Followed by Dr Libia Narayan  4. CAD, s/p 9 stents; intermittent CP improved by nitro  5. decom HFrEF; EF 45-55%, mild MVR/mild AVR at [facility]  6. BIPIN on CKD   7. DM II 1. Has received diuretics   2. Start doxy now  3. Screen CTD, hypersensitivity panel  4. Atypical pnas  5. Negative covid  6. O2 titration above 90%  7. Low threshold to start steroids  8. dvt proph  9. On brovana/pulmicort   10. ECHO pending; noted consideration of RHC on this admission  11. Will discussed with attending   12. Dicussed with wife at bedside      Daily Progression:    Consult Note requested by hospitalist.    Patient was recently seen by outpatient advance Heart team, Dr Prosper Akbar. At that visit patient was told to present to the ER because that he was clinically worsening. He is followed by Dr Libia Narayan and was last seen on 21. At that visit he started symbicort and ordered PFTs that have yet to be completed. He was hospitalized at [address] [place] [address] from -11/10 following presenting to the stand alone ER in [place] for dyspnea. He was treated there for pulmonary edema.      He has a remote hx of aggressive tobacco use stopping in 2000. No formal dx of COPD    On O2 since last admission. No prior dyspnea since last visit. Chest pains improved with nitro    Last ECHO showed EF 40-45%  followled by Dr Carolyn Colbert for CKD    Hx of 9 stens placement. Recent change of duiertics from lasix to bumex. 12/8 CT scan fairly comparably to the 11/30 CT scan  LUNGS: Diffuse patchy irregular consolidative opacities and reticulations with  architectural distortion, left slightly more pronounced than right. 4.9 cm left  upper lobe with pneumatocele    I have reviewed the labs and previous days notes. Pertinent items are noted in HPI. Past Medical History:   Diagnosis Date    Aortic regurgitation     CAD (coronary artery disease)     Taxus stent 12/2005, 12/06 Cypher Prox circ, 6/2018 Sudarshan LADx2,  9/19 Sudarshan LAD & LCX, 1/20 Sudarshan     Chronic combined systolic and diastolic congestive heart failure ([address] 11/14/2021    Chronic obstructive pulmonary disease (HCC)     Congestive heart failure (HCC)     Diabetes ([address]     Essential hypertension     Hyperlipidemia     MI (myocardial infarction) ([address]     Murmur     Renal insufficiency     Thyroid disease       Past Surgical History:   Procedure Laterality Date    HX CORONARY STENT PLACEMENT      HX CYST REMOVAL      IR ASP BLADDER SUPRA CATH        Prior to Admission medications    Medication Sig Start Date End Date Taking? Authorizing Provider   furosemide (LASIX) 40 mg tablet Take 1 Tablet by mouth two (2) times a day. 11/9/21 11/9/22 Yes Provider, Historical   bumetanide (BUMEX) 1 mg tablet TAKE 1 TABLET BY MOUTH TWICE DAILY. REPLACES. LASIX 11/16/21   Alanis Robertson NP   magnesium oxide 250 mg magnesium tablet Take 250 mg by mouth.  Every other day - OTC 11/4/21   Provider, Historical   prasterone, dhea, 50 mg tab 50 mg = 1 tab each dose, PO, daily, # 30 tab, 0 Refills 11/4/21   Provider, Historical   carvediloL (COREG) 6.25 mg tablet Take 1 Tablet by mouth every twelve (12) hours. 11/15/21   Jose Davis NP   isosorbide mononitrate ER (IMDUR) 30 mg tablet Take 1 Tablet by mouth daily. 11/15/21   Jose Davis NP   hydrALAZINE (APRESOLINE) 25 mg tablet Take 1 Tablet by mouth every eight (8) hours. 11/15/21   Jose Davis NP   amLODIPine (NORVASC) 2.5 mg tablet Take 1 Tablet by mouth daily. 11/15/21   Jose Davis NP   rosuvastatin (CRESTOR) 20 mg tablet TAKE 1 TABLET BY MOUTH EVERY NIGHT 11/4/21   Jose Davis NP   clopidogreL (PLAVIX) 75 mg tab TAKE 1 TABLET BY MOUTH EVERY DAY 10/26/21   Jose Davis NP   Symbicort 80-4.5 mcg/actuation HFAA INHALE 2 PUFFS BY MOUTH TWICE DAILY IN THE MORNING AND IN THE EVENING 8/18/21   Provider, Historical   nitroglycerin (Nitrostat) 0.4 mg SL tablet 1 Tablet by SubLINGual route every five (5) minutes as needed for Chest Pain. Up to 3 doses. 9/1/21   Jose Davis NP   albuterol (ProAir HFA) 90 mcg/actuation inhaler Take 2 Puffs by inhalation. 4-6 hrs as needed 1/22/21   Provider, Historical   BD Lali 2nd Gen Pen Needle 32 gauge x 5/32\" ndle USE WITH LANTUS 7/13/21   Provider, Historical   aspirin delayed-release 81 mg tablet Take 81 mg by mouth daily. At night    Provider, Historical   glimepiride (AMARYL) 4 mg tablet Take 4 mg by mouth. Every two days    Provider, Historical   insulin glargine (Lantus U-100 Insulin) 100 unit/mL injection 18 U nightly after dinner    Provider, Historical   levothyroxine (SYNTHROID) 100 mcg tablet Take 100 mcg by mouth daily. Morning    Provider, Historical   testosterone (ANDROGEL) 1.62 % (20.25 mg/1.25 gram) glpk 20.25 mg daily. 2 to 3 pumps alternating in the morning.     Provider, Historical     Allergies   Allergen Reactions    Bee Sting [Sting, Bee] Anaphylaxis    Iodinated Contrast Media Rash    Brilinta [Ticagrelor] Other (comments)    Effient [Prasugrel] Other (comments)    Penicillins Rash and Nausea Only    Ranexa [Ranolazine] Other (comments)    Sulfa (Sulfonamide Antibiotics) Rash Social History     Tobacco Use    Smoking status: Former Smoker     Packs/day: 2.00     Years: 30.00     Pack years: 60.00     Quit date: 1991     Years since quittin.8    Smokeless tobacco: Never Used   Substance Use Topics    Alcohol use: Yes     Comment: rarely      History reviewed. No pertinent family history. OBJECTIVE:     Vital Signs:       Visit Vitals  /75   Pulse 80   Temp 98.1 °F (36.7 °C)   Resp 18   Ht 5' 5\" (1.651 m)   Wt 62.3 kg (137 lb 5.6 oz)   SpO2 99%   BMI 22.86 kg/m²      Temp (24hrs), Av.9 °F (36.6 °C), Min:97.7 °F (36.5 °C), Max:98.1 °F (36.7 °C)     Intake/Output:     Last shift: No intake/output data recorded.     Last 3 shifts:  1901 -  0700  In: -   Out: 900 [Urine:900]          Intake/Output Summary (Last 24 hours) at 2021 1458  Last data filed at 2021 0359  Gross per 24 hour   Intake    Output 900 ml   Net -900 ml       Physical Exam:                                        Exam Findings Other   General: No resp distress noted, appears stated age    [de-identified]:  No ulcers, JVD not elevated, no cervical LAD    Chest: No pectus deformity, normal chest rise b/l    HEART:  RRR, no murmurs/rubs/gallops    Lungs:  Mild wheeze, diminished BS at bases    ABD: Soft/NT, non rigid mildly distended    EXT: No cyanosis/clubbing/edema, normal peripheral pulses    Skin: No rashes or ulcers, no mottling    Neuro: A/O x 3        Medications:  Current Facility-Administered Medications   Medication Dose Route Frequency    heparin (porcine) injection 5,000 Units  5,000 Units SubCUTAneous Q12H    guaiFENesin ER (MUCINEX) tablet 600 mg  600 mg Oral Q12H    benzonatate (TESSALON) capsule 100 mg  100 mg Oral TID PRN    acetaminophen (TYLENOL) tablet 650 mg  650 mg Oral Q6H PRN    aspirin delayed-release tablet 81 mg  81 mg Oral QHS    insulin glargine (LANTUS) injection 15 Units  15 Units SubCUTAneous PCD    levothyroxine (SYNTHROID) tablet 100 mcg  100 mcg Oral ACB    albuterol-ipratropium (DUO-NEB) 2.5 MG-0.5 MG/3 ML  3 mL Nebulization Q4H PRN    arformoterol 15 mcg/budesonide 0.5 mg neb solution   Nebulization BID RT    nitroglycerin (NITROSTAT) tablet 0.4 mg  0.4 mg SubLINGual Q5MIN PRN    clopidogreL (PLAVIX) tablet 75 mg  75 mg Oral DAILY    rosuvastatin (CRESTOR) tablet 20 mg  20 mg Oral QHS    magnesium oxide (MAG-OX) tablet 200 mg  200 mg Oral EVERY OTHER DAY    carvediloL (COREG) tablet 6.25 mg  6.25 mg Oral Q12H    isosorbide mononitrate ER (IMDUR) tablet 30 mg  30 mg Oral DAILY    bumetanide (BUMEX) tablet 1 mg  1 mg Oral BID    hydrALAZINE (APRESOLINE) tablet 50 mg  50 mg Oral Q8H    sodium chloride (NS) flush 5-10 mL  5-10 mL IntraVENous PRN     Current Outpatient Medications   Medication Sig    furosemide (LASIX) 40 mg tablet Take 1 Tablet by mouth two (2) times a day.  bumetanide (BUMEX) 1 mg tablet TAKE 1 TABLET BY MOUTH TWICE DAILY. REPLACES. LASIX    magnesium oxide 250 mg magnesium tablet Take 250 mg by mouth. Every other day - OTC    prasterone, dhea, 50 mg tab 50 mg = 1 tab each dose, PO, daily, # 30 tab, 0 Refills    carvediloL (COREG) 6.25 mg tablet Take 1 Tablet by mouth every twelve (12) hours.  isosorbide mononitrate ER (IMDUR) 30 mg tablet Take 1 Tablet by mouth daily.  hydrALAZINE (APRESOLINE) 25 mg tablet Take 1 Tablet by mouth every eight (8) hours.  amLODIPine (NORVASC) 2.5 mg tablet Take 1 Tablet by mouth daily.  rosuvastatin (CRESTOR) 20 mg tablet TAKE 1 TABLET BY MOUTH EVERY NIGHT    clopidogreL (PLAVIX) 75 mg tab TAKE 1 TABLET BY MOUTH EVERY DAY    Symbicort 80-4.5 mcg/actuation HFAA INHALE 2 PUFFS BY MOUTH TWICE DAILY IN THE MORNING AND IN THE EVENING    nitroglycerin (Nitrostat) 0.4 mg SL tablet 1 Tablet by SubLINGual route every five (5) minutes as needed for Chest Pain. Up to 3 doses.  albuterol (ProAir HFA) 90 mcg/actuation inhaler Take 2 Puffs by inhalation.  4-6 hrs as needed    BD Lali 2nd Gen Pen Needle 32 gauge x 5/32\" ndle USE WITH LANTUS    aspirin delayed-release 81 mg tablet Take 81 mg by mouth daily. At night    glimepiride (AMARYL) 4 mg tablet Take 4 mg by mouth. Every two days    insulin glargine (Lantus U-100 Insulin) 100 unit/mL injection 18 U nightly after dinner    levothyroxine (SYNTHROID) 100 mcg tablet Take 100 mcg by mouth daily. Morning    testosterone (ANDROGEL) 1.62 % (20.25 mg/1.25 gram) glpk 20.25 mg daily. 2 to 3 pumps alternating in the morning.        Labs:  ABG Recent Labs     12/08/21  2326   PHI 7.38   PCO2I 51.3*   PO2I 45*   HCO3I 30.0*   SO2I 78.5*        CBC Recent Labs     12/09/21  1028 12/08/21  2324 12/08/21  1522   WBC 9.2 11.4* 10.6   HGB 12.6 13.2 12.8   HCT 40.2 41.9 40.5    249 250   MCV 94.4 94.4 94.2   MCH 29.6 29.7 35.8        Metabolic  Panel Recent Labs     12/09/21  1028 12/08/21  2324 12/08/21  1522   * 140 139  136   K 4.8 4.7 4.8  4.7   CL 98 101 102  104   CO2 31 33* 32  29   * 150* 126*  136*   BUN 56* 54* 54*  53*   CREA 2.44* 2.50* 2.57*  2.48*   CA 9.2 9.4 9.4  9.5   MG  --  2.4  --    PHOS  --  4.1  --    ALB  --  3.1* 2.8*  2.7*   ALT  --  88* 86*  79*        Pertinent Labs                Ernesto Mcginnis PA-C  12/9/2021

## 2021-12-10 NOTE — PROGRESS NOTES
12/10/2021 -   TRANSITIONS OF CARE PLAN:   1. RUR: 14%; LOW  2. DESTINATION: Likely Own Home  3. TRANSPORT: Spouse  4. NEEDS FOR DISCHARGE: possible DME - spouse is requesting eval for transport wheelchair  5. ANTICIPATED FOLLOW UPS: PCP, Cardio, AHF, Pulmonary  6. ONGOING INPATIENT NEEDS: O2 Support with weaning as possible, ABX, Diuresing, Right and Left Heart Cath Plans Pending, Echo, Monitoring of Labs, PT/OT evals    Anticipated Discharge is: Greater Than 48 Hours    Reason for Admission:  CHF                   RUR Score:     14%; LOW                Plan for utilizing home health:      TBD    PCP: First and Last name:  Kaveh Fry NP     Name of Practice: [address] [place] [address]   Are you a current patient: Yes/No: Yes   Approximate date of last visit: 2 weeks   Can you participate in a virtual visit with your PCP: Yes                    Current Advanced Directive/Advance Care Plan: Full Code    Healthcare Decision Maker:   Click here to complete [address] including selection of the Healthcare Decision Maker Relationship (ie \"Primary\")           PRIMARY: Minerva Wan, Spouse: [phone]                  Transition of Care Plan:   CM notes that patient is currently on Droplet Plus and Airborne precautions. CM contacted patient's spouse (Sneha Capps: [phone]). Per spouse, patient lives with spouse in a 2 story home, first floor living, no exterior steps, 13 interior steps. At baseline, patient is independent in ADLs, to include driving. Home DME includes: cane, rollator, shower seat, Home O2 at 1L provided by [employer], glucometer, bp cuff, pulse ox, scale. Patient has no hx of HH and no hx of Rehab. [facility] preference [facility]. Patient does not have an AMD but patient is legally . Spouse identified that they moved to [place] from [facility] about 1 year ago to be closer to family. Additional support includes 2 sister in laws and a niece.   Patient also has 2 adult sons: 1 in [place] and 1 in [place]. Preferred disposition is for discharge to own home with transport via spouse. Spouse inquired about DME of a transport wheelchair. CM discussed working with PT and OT for DME recommendations. Spouse expressed understanding. CM requested for PT and OT evals from Attending. Attending is in agreement; consults placed. Medicare pt has received, reviewed, and signed IM letter informing them of their right to appeal the discharge. Signed copy has been placed on pt bedside chart. Care Management Interventions  PCP Verified by CM: Yes (last seen 2 weeks ago)  Palliative Care Criteria Met (RRAT>21 & CHF Dx)?: No  Mode of Transport at Discharge:  Other (see comment) (spouse)  Transition of Care Consult (CM Consult): Discharge Planning  MyChart Signup: Yes  Discharge Durable Medical Equipment: No (cane, rollator, shower seat, Home O2 at 1L provided by [employer], glucometer, bp cuff, pulse ox, scale)  Health Maintenance Reviewed: Yes (cm spoke with patient's spouse by phone)  Physical Therapy Consult: Yes  Occupational Therapy Consult: Yes  Speech Therapy Consult: No  Support Systems: Spouse/Significant Other, Child(jorge), Other Family Member(s)  Confirm Follow Up Transport: Family (independent in [place] [address], to include some driving)  [address] Provided?: No  Discharge Location  Discharge Placement: Unable to determine at this time (lives with spouse in a 2 story home, first floor living, no exterior steps, 13 interior steps)  CRM: Daron Russ, MPH, [address] [place] [address]; Z: [phone]

## 2021-12-10 NOTE — ED NOTES
Pt aware of need for sputum sample for lab testing and educated on how to provide sample. Pt states unable to provide at this time. Will continue to encourage pt to provide sample.

## 2021-12-10 NOTE — PROGRESS NOTES
Pt complaints of chest pain today. 2 doses of nitro given.  Notified Dr. Alhaji Ray and paged Dr. Hood Mart.

## 2021-12-10 NOTE — PROGRESS NOTES
Pulmonary, Critical Care, and Sleep Medicine~Consult Note    Name: Fay Ward MRN: [id number]   : 1950 Hospital: [facility]   Date: 12/10/2021 2:58 PM Admission: 2021     Impression Plan   1. Acute on chronic hypoxic resp failure (O2 only recently started at [facility])  2. Abnormal CT scan: Diffuse patchy irregular consolidative opacities and reticular changes and peribronchial thickening Left > right. Notable enlarged mediastinal/hilar adenopathy. Fairly extensive bronchiectasis. left upper lobe with pneumatocele. Query long standing burnt out sarcoid? ?? Offers no signs of new medications, familial CTD, pet birds, farm life. Query as well lymphogenic carcinomatosis? Possible. A coexisting PNA is additionally a possibility as WBC was elevated on admission ? atylical infection   3. Suspected COPD, yet to be define via PFTs. Followed by Dr Melissa Weaver  4. CAD, s/p 9 stents; intermittent CP improved by nitro  5. decom HFrEF; EF 45-55%, mild MVR/mild AVR at [facility]  6. BIPIN on CKD   7. DM II 1. Has received diuretics   2. Start doxy now  3. Screen CTD, hypersensitivity panel- pending  4. Sputum culture for bacteria and atypical mycobacteria  5. Comparison with chest films from [facility] will be beneficial in determining temporal course (he has relocated from [place] a year ago)  6. Atypical pnas  7. Negative covid  8. O2 titration above 90%  9. Low threshold to start steroids  10. dvt proph  11. On brovana/pulmicort   12. ECHO ; noted consideration of RHC on this admission  13. Dicussed with wife at bedside      Daily Progression:  12/10  Seen and examined earlier today. His most distressing symptom has been's midsternal chest pain and radiation into the arms. He denies severe neck or back pain. He denies ability to bring up sputum.   He has relocated from [place] approximately a year ago and was hospitalized at [facility] in early November, comparison of imaging from November admission may help with temporal course of events. Left heart catheterization has been deferred in view of renal insufficiency. Chest pain is midsternal and nonpleuritic and does not appear to be pulmonary in origin. CT scan of the chest shows patchy infiltrates with nonspecific fibrosis and bronchiectasis-atypical mycobacterial infection is also in the differential diagnosis. He does have a follow-up appointment with Dr. Prabha Rhoades on the 28th for PFTs    12/9  Consult Note requested by hospitalist.    Patient was recently seen by outpatient [facility], Dr Mayela Harper. At that visit patient was told to present to the ER because that he was clinically worsening. He is followed by Dr Prabha Rhoades and was last seen on 11/16/21. At that visit he started symbicort and ordered PFTs that have yet to be completed. He was hospitalized at [facility] from 11/4-11/10 following presenting to the stand alone ER in [place] for dyspnea. He was treated there for pulmonary edema. He has a remote hx of aggressive tobacco use stopping in 2000. No formal dx of COPD    On O2 since last admission. No prior dyspnea since last visit. Chest pains improved with nitro    Last ECHO showed EF 40-45%  followled by Dr Sherly Quinn for CKD    Hx of 9 stens placement. Recent change of duiertics from lasix to bumex. 12/8 CT scan fairly comparably to the 11/30 CT scan  LUNGS: Diffuse patchy irregular consolidative opacities and reticulations with  architectural distortion, left slightly more pronounced than right. 4.9 cm left  upper lobe with pneumatocele    I have reviewed the labs and previous days notes. Pertinent items are noted in HPI.   Past Medical History:   Diagnosis Date    Aortic regurgitation     CAD (coronary artery disease)     Taxus stent 12/2005, 12/06 Cypher Prox circ, 6/2018 Sudarshan LADx2,  9/19 Sudarshan LAD & LCX, 1/20 [place]     Chronic combined systolic and diastolic congestive heart failure ([address] 11/14/2021    [facility]     Cong[facility] failure [facility]     [facility]     Essential hypertension     Hyperlipidemia     MI [facility]myocardial infarction) [facility]     Murmur     Renal insufficiency     Thyroid disease       Past Surgical History:   Procedure Laterality Date    HX CORONARY STENT PLACEMENT      HX CYST REMOVAL      IR ASP BLADDER SUPRA CATH        Prior to Admission medications    Medication Sig Start Date End Date Taking? Authorizing Provider   anastrozole (ARIMIDEX) 1 mg tablet Take 0.5 mg by mouth every Monday, Wednesday, Friday. Yes Provider, Historical   bumetanide (BUMEX) 1 mg tablet TAKE 1 TABLET BY MOUTH TWICE DAILY. REPLACES. LASIX 11/16/21   Loren Arriaza NP   magnesium oxide 250 mg magnesium tablet Take 250 mg by mouth. Every other day - OTC 11/4/21   Provider, Historical   prasterone, dhea, 50 mg tab 50 mg = 1 tab each dose, PO, daily, # 30 tab, 0 Refills 11/4/21   Provider, Historical   carvediloL (COREG) 6.25 mg tablet Take 1 Tablet by mouth every twelve (12) hours. 11/15/21   Loren Arriaza NP   isosorbide mononitrate ER (IMDUR) 30 mg tablet Take 1 Tablet by mouth daily. 11/15/21   Loren Arriaza NP   hydrALAZINE (APRESOLINE) 25 mg tablet Take 1 Tablet by mouth every eight (8) hours. 11/15/21   Loren Arriaza NP   amLODIPine (NORVASC) 2.5 mg tablet Take 1 Tablet by mouth daily. 11/15/21   Loren Arriaza NP   rosuvastatin (CRESTOR) 20 mg tablet TAKE 1 TABLET BY MOUTH EVERY NIGHT 11/4/21   Loren Arriaza NP   clopidogreL (PLAVIX) 75 mg tab TAKE 1 TABLET BY MOUTH EVERY DAY 10/26/21   Loren Arriaza NP   Symbicort 80-4.5 mcg/actuation HFAA INHALE 2 PUFFS BY MOUTH TWICE DAILY IN THE MORNING AND IN THE EVENING 8/18/21   Provider, Historical   nitroglycerin (Nitrostat) 0.4 mg SL tablet 1 Tablet by SubLINGual route every five (5) minutes as needed for Chest Pain. Up to 3 doses. 9/1/21   Loren Arriaza NP   albuterol (ProAir HFA) 90 mcg/actuation inhaler Take 2 Puffs by inhalation.  4-6 hrs as needed 1/22/21 Provider, Historical   BD Lali 2nd Gen Pen Needle 32 gauge x \" ndle USE WITH LANTUS 21   Provider, Historical   aspirin delayed-release 81 mg tablet Take 81 mg by mouth daily. At night    Provider, Historical   glimepiride (AMARYL) 4 mg tablet Take 4 mg by mouth. Every two days    Provider, Historical   insulin glargine (Lantus U-100 Insulin) 100 unit/mL injection 18 U nightly after dinner    Provider, Historical   levothyroxine (SYNTHROID) 100 mcg tablet Take 100 mcg by mouth daily. Morning    Provider, Historical   testosterone (ANDROGEL) 1.62 % (20.25 mg/1.25 gram) glpk 20.25 mg daily. 2 to 3 pumps alternating in the morning. Provider, Historical     Allergies   Allergen Reactions    Bee Sting [Sting, Bee] Anaphylaxis    Iodinated Contrast Media Rash    Brilinta [Ticagrelor] Other (comments)    Effient [Prasugrel] Other (comments)    Penicillins Rash and Nausea Only    Ranexa [Ranolazine] Other (comments)    Sulfa (Sulfonamide Antibiotics) Rash      Social History     Tobacco Use    Smoking status: Former Smoker     Packs/day: 2.00     Years: 30.00     Pack years: 60.00     Quit date: 1991     Years since quittin.8    Smokeless tobacco: Never Used   Substance Use Topics    Alcohol use: Yes     Comment: rarely      History reviewed. No pertinent family history.   OBJECTIVE:     Vital Signs:       Visit Vitals  BP (!) 130/54   Pulse 71   Temp 98 °F (36.7 °C)   Resp 15   Ht 5' 5\" (1.651 m)   Wt 62.6 kg (138 lb)   SpO2 99%   BMI 22.96 kg/m²      Temp (24hrs), Av.8 °F (36.6 °C), Min:97.5 °F (36.4 °C), Max:98 °F (36.7 °C)     Intake/Output:     Last shift: 12/10 0701 - 12/10 1900  In: -   Out: 300 [Urine:300]    Last 3 shifts: 1901 - 12/10 0700  In: 100 [I.V.:100]  Out: 1100 [Urine:1100]          Intake/Output Summary (Last 24 hours) at 12/10/2021 1742  Last data filed at 12/10/2021 1056  Gross per 24 hour   Intake 100 ml   Output 500 ml   Net -400 ml       Physical Exam: Exam Findings Other   General: No resp distress noted, appears stated age    [de-identified]:  No ulcers, JVD not elevated, no cervical LAD    Chest: No pectus deformity, normal chest rise b/l    HEART:  RRR, no murmurs/rubs/gallops    Lungs:  Mild wheeze, diminished BS at bases    ABD: Soft/NT, non rigid mildly distended    EXT: No cyanosis/clubbing/edema, normal peripheral pulses    Skin: No rashes or ulcers, no mottling    Neuro: A/O x 3        Medications:  Current Facility-Administered Medications   Medication Dose Route Frequency    hydrALAZINE (APRESOLINE) tablet 75 mg  75 mg Oral QID    insulin lispro (HUMALOG) injection   SubCUTAneous AC&HS    glucose chewable tablet 16 g  4 Tablet Oral PRN    dextrose (D50W) injection syrg 12.5-25 g  12.5-25 g IntraVENous PRN    glucagon (GLUCAGEN) injection 1 mg  1 mg IntraMUSCular PRN    mylanta/viscous lidocaine (GI COCKTAIL)  40 mL Oral Q6H PRN    nitroglycerin (Tridil) 200 mcg/ml infusion  0-200 mcg/min IntraVENous TITRATE    albumin human 25% (BUMINATE) solution 12.5 g  12.5 g IntraVENous BID    iron sucrose (VENOFER) 200 mg in 0.9% sodium chloride 100 mL IVPB  200 mg IntraVENous Q24H    heparin (porcine) injection 5,000 Units  5,000 Units SubCUTAneous Q12H    guaiFENesin ER (MUCINEX) tablet 600 mg  600 mg Oral Q12H    benzonatate (TESSALON) capsule 100 mg  100 mg Oral TID PRN    aspirin delayed-release tablet 81 mg  81 mg Oral QHS    insulin glargine (LANTUS) injection 15 Units  15 Units SubCUTAneous PCD    levothyroxine (SYNTHROID) tablet 100 mcg  100 mcg Oral ACB    arformoterol 15 mcg/budesonide 0.5 mg neb solution   Nebulization BID RT    nitroglycerin (NITROSTAT) tablet 0.4 mg  0.4 mg SubLINGual Q5MIN PRN    clopidogreL (PLAVIX) tablet 75 mg  75 mg Oral DAILY    rosuvastatin (CRESTOR) tablet 20 mg  20 mg Oral QHS    magnesium oxide (MAG-OX) tablet 200 mg  200 mg Oral EVERY OTHER DAY    carvediloL (COREG) tablet 6.25 mg 6.25 mg Oral Q12H    doxycycline (VIBRAMYCIN) 100 mg in 0.9% sodium chloride (MBP/ADV) 100 mL MBP  100 mg IntraVENous Q12H    bumetanide (BUMEX) injection 1 mg  1 mg IntraVENous BID    acetaminophen (TYLENOL) tablet 650 mg  650 mg Oral Q4H PRN    acetaminophen (TYLENOL) tablet 650 mg  650 mg Oral Q6H    albuterol-ipratropium (DUO-NEB) 2.5 MG-0.5 MG/3 ML  3 mL Nebulization Q6H RT    sodium chloride (NS) flush 5-10 mL  5-10 mL IntraVENous PRN     Current Outpatient Medications   Medication Sig    anastrozole (ARIMIDEX) 1 mg tablet Take 0.5 mg by mouth every Monday, Wednesday, Friday.  bumetanide (BUMEX) 1 mg tablet TAKE 1 TABLET BY MOUTH TWICE DAILY. REPLACES. LASIX    magnesium oxide 250 mg magnesium tablet Take 250 mg by mouth. Every other day - OTC    prasterone, dhea, 50 mg tab 50 mg = 1 tab each dose, PO, daily, # 30 tab, 0 Refills    carvediloL (COREG) 6.25 mg tablet Take 1 Tablet by mouth every twelve (12) hours.  isosorbide mononitrate ER (IMDUR) 30 mg tablet Take 1 Tablet by mouth daily.  hydrALAZINE (APRESOLINE) 25 mg tablet Take 1 Tablet by mouth every eight (8) hours.  amLODIPine (NORVASC) 2.5 mg tablet Take 1 Tablet by mouth daily.  rosuvastatin (CRESTOR) 20 mg tablet TAKE 1 TABLET BY MOUTH EVERY NIGHT    clopidogreL (PLAVIX) 75 mg tab TAKE 1 TABLET BY MOUTH EVERY DAY    Symbicort 80-4.5 mcg/actuation HFAA INHALE 2 PUFFS BY MOUTH TWICE DAILY IN THE MORNING AND IN THE EVENING    nitroglycerin (Nitrostat) 0.4 mg SL tablet 1 Tablet by SubLINGual route every five (5) minutes as needed for Chest Pain. Up to 3 doses.  albuterol (ProAir HFA) 90 mcg/actuation inhaler Take 2 Puffs by inhalation. 4-6 hrs as needed    BD Lali 2nd Gen Pen Needle 32 gauge x 5/32\" ndle USE WITH LANTUS    aspirin delayed-release 81 mg tablet Take 81 mg by mouth daily. At night    glimepiride (AMARYL) 4 mg tablet Take 4 mg by mouth.  Every two days    insulin glargine (Lantus U-100 Insulin) 100 unit/mL injection 18 U nightly after dinner    levothyroxine (SYNTHROID) 100 mcg tablet Take 100 mcg by mouth daily. Morning    testosterone (ANDROGEL) 1.62 % (20.25 mg/1.25 gram) glpk 20.25 mg daily. 2 to 3 pumps alternating in the morning. Labs:  ABG Recent Labs     12/08/21 2326   PHI 7.38   PCO2I 51.3*   PO2I 45*   HCO3I 30.0*   SO2I 78.5*        CBC Recent Labs     12/09/21  1028 12/08/21  2324 12/08/21  1522   WBC 9.2 11.4* 10.6   HGB 12.6 13.2 12.8   HCT 40.2 41.9 40.5    249 250   MCV 94.4 94.4 94.2   MCH 29.6 29.7 24.1        Metabolic  Panel Recent Labs     12/10/21  1459 12/10/21  0306 12/09/21  1028 12/08/21  2324 12/08/21  1522 12/08/21  1522   NA  --  136 135* 140   < > 139  136   K  --  4.8 4.8 4.7   < > 4.8  4.7   CL  --  100 98 101   < > 102  104   CO2  --  31 31 33*   < > 32  29   GLU  --  277* 381* 150*   < > 126*  136*   BUN  --  67* 56* 54*   < > 54*  53*   CREA  --  2.76* 2.44* 2.50*   < > 2.57*  2.48*   CA  --  9.0 9.2 9.4   < > 9.4  9.5   MG  --   --   --  2.4  --   --    PHOS  --   --   --  4.1  --   --    ALB  --   --   --  3.1*  --  2.8*  2.7*   ALT  --   --   --  88*  --  86*  79*   INR 1.0  --   --   --   --   --     < > = values in this interval not displayed.         Pertinent Labs                Pablo Marcano MD  12/10/2021

## 2021-12-10 NOTE — PROGRESS NOTES
12/10/2021   Todd Lopes MD  [facility]    . Camiloflavia Cailin Pagentrishabh Boothe is a 70 y.o. male   Looks tired and short of breath  Had chest pain in mid sternum radiated down both arms, worse with breathing, got NTG SL x 2 with some relief, he says it does not feel like his usual angina pain  EKG done with LVH with non-specific ST-T segment/T wave findings   Hgb 12.6  Na 136  Cr 2.76  Troponin 16, & 16 yesterday  Discussion/Plans/Recs    1. Chest pain  He says atypical for his CAD and his troponin yesterday were normal   Did get better with NTG SL, EKG no acute findings  Will check serial troponin and start NTG drip  Cath with this severe CKD would put a high risk for permanent renal failure    2. Resp distress  Acute on chronic respiratory failure with markedly abnormal CT scan suspect pulmonary source questionable ILD pulmonary edema airspace disease  Cant finish full sentences  Does not whole body volume overloaded, skin looks dry, some tenting  Continue ABX and use of diuretics  CT Chest per Pulmonary with patch consolidation and reticular changes with adenopathy     3. CAD chronic   9 stents in [place] per pt  Taxus stent 12/2005, 12/06 Cypher Prox circ, 6/2018 [place] LADx2,  9/19 [place] LAD & LCX, 1/20 [place]   Last to LAD in 2020 Jaclyn Delcid [place])              -continue ASA, BB, statin, plavix              -Echo 11/5/21: EF 45-50%, mid to distal inferolateral hypokinesis, mild-mod AI    4. CHF systolic/diastolic/HCVD/LVH with strain  Has CMY with EF mild reduced at [facility] echo here, lungs are wet, body is not  Limited options for adjusting OMT given lower bp   No ACE due to CKD 4  Was on Coreg hydralazine Imdur   Dr Tonia Blackburn seeing  Lab Results   Component Value Date/Time    NT pro-BNP 2,469 (H) 12/08/2021 03:22 PM    NT pro-BNP 2,630 (H) 12/08/2021 03:22 PM        5.  CKD 4  continue diuretics, baseline Cr per Dr Gao/Pepe is 3    following     Cardiac Studies/Hx:  No specialty comments available. Past Medical History:   Diagnosis Date    Aortic regurgitation     CAD (coronary artery disease)     Taxus stent 12/2005, 12/06 Cypher Prox circ, 6/2018 [place] LADx2,  9/19 [place] LAD & LCX, 1/20 Sudarshan     Chronic combined systolic and diastolic congestive heart failure ([facility] [address] 11/14/2021    Chronic obstructive pulmonary disease (HCC)     Congestive heart failure (HCC)     Diabetes ([facility] [address]     Essential hypertension     Hyperlipidemia     MI (myocardial infarction) ([facility] [address]     Murmur     Renal insufficiency     Thyroid disease       ROS-pertinents  negative except as above  The pertinent portions of the medical history,physician and nursing notes, meds,vitals , labs and Ins/Outs,are reviewed in the electronic record. Results for orders placed or performed during the hospital encounter of 12/08/21   EKG, 12 LEAD, INITIAL   Result Value Ref Range    Ventricular Rate 97 BPM    Atrial Rate 97 BPM    P-R Interval 152 ms    QRS Duration 96 ms    Q-T Interval 372 ms    QTC Calculation (Bezet) 472 ms    Calculated P Axis 31 degrees    Calculated R Axis -22 degrees    Calculated T Axis 110 degrees    Diagnosis       Normal sinus rhythm  Left ventricular hypertrophy with repolarization abnormality ( R in aVL ,   [place] product , Romhilt-Rollins )  Abnormal ECG  When compared with ECG of 09-DEC-2021 10:12,  MANUAL COMPARISON REQUIRED, DATA IS UNCONFIRMED        02/01/21    ECHO ADULT COMPLETE 02/01/2021 2/1/2021    Interpretation Summary  · LV: Estimated LVEF is 50 - 55%. Visually measured ejection fraction. Mildly dilated left ventricle. Upper normal wall thickness. Globally reduced systolic function. Low normal systolic function. Mild (grade 1) left ventricular diastolic dysfunction. · LA: Mildly dilated left atrium. Left Atrium volume index is 37.77 mL/m2. · AV: Aortic valve leaflet calcification present. Mild aortic valve stenosis is present. Mild aortic valve regurgitation is present.   · MV: Mild mitral valve regurgitation is present.     Signed by: Ian Burk MD on 2/1/2021  3:12 PM         Objective:    Physical Exam:   /66   Pulse 89   Temp 97.5 °F (36.4 °C)   Resp 16   Ht 5' 5\" (1.651 m)   Wt 138 lb (62.6 kg)   SpO2 91%   BMI 22.96 kg/m²    General:  alert, cooperative, mild distress, appears stated age   ENT, Neck:  no jvd   Chest Wall: inspection normal - no chest wall deformities or tenderness, respiratory effort normal   Lung: Crackles diffuse   Heart:  normal rate, regular rhythm, normal S1, S2, no murmurs, rubs, clicks or gallops   Abdomen: nondistended   Extremities: extremities normal, atraumatic, no cyanosis or edema     Patient Vitals for the past 12 hrs:   Temp Pulse Resp BP SpO2   12/10/21 1119  89  135/66    12/10/21 1056  86  (!) 111/46    12/10/21 0928    (!) 156/97    12/10/21 0830 97.5 °F (36.4 °C) 71 16 (!) 156/97 91 %   12/10/21 0721     100 %   12/10/21 0715  83 19 (!) 158/53 100 %   12/10/21 0700  73 17 (!) 151/51 99 %   12/10/21 0645  87 21 (!) 141/52 94 %   12/10/21 0630  91 19 (!) 122/57 97 %   12/10/21 0627 97.5 °F (36.4 °C)       12/10/21 0615  83  (!) 166/62 98 %   12/10/21 0613  87  (!) 160/60 97 %   12/10/21 0516  72 10 (!) 167/53 99 %   12/10/21 0400  63 22 (!) 166/48 99 %   12/10/21 0200  63 15 (!) 136/52 100 %   12/10/21 0145  74 19 (!) 133/54 99 %   12/10/21 0130  71 14 (!) 153/58 99 %   12/10/21 0119  76 14 (!) 148/57 99 %      Lab Results   Component Value Date/Time    WBC 9.2 12/09/2021 10:28 AM    HGB 12.6 12/09/2021 10:28 AM    HCT 40.2 12/09/2021 10:28 AM    PLATELET 408 20/77/0358 10:28 AM    MCV 94.4 12/09/2021 10:28 AM     Lab Results   Component Value Date/Time    Sodium 136 12/10/2021 03:06 AM    Potassium 4.8 12/10/2021 03:06 AM    Chloride 100 12/10/2021 03:06 AM    CO2 31 12/10/2021 03:06 AM    Anion gap 5 12/10/2021 03:06 AM    Glucose 277 (H) 12/10/2021 03:06 AM    BUN 67 (H) 12/10/2021 03:06 AM Creatinine 2.76 (H) 12/10/2021 03:06 AM    BUN/Creatinine ratio 24 (H) 12/10/2021 03:06 AM    GFR est AA 28 (L) 12/10/2021 03:06 AM    GFR est non-AA 23 (L) 12/10/2021 03:06 AM    Calcium 9.0 12/10/2021 03:06 AM     No results found for: CPK, RCK1, RCK2, RCK3, RCK4, CKMB, CKNDX, CKND1, TROPT, TROIQ, BNPP, BNP  Lab Results   Component Value Date/Time    NT pro-BNP 2,469 (H) 12/08/2021 03:22 PM    NT pro-BNP 2,630 (H) 12/08/2021 03:22 PM       reports that he quit smoking about 30 years ago. He has a 60.00 pack-year smoking history. He has never used smokeless tobacco. He reports current alcohol use. He reports that he does not use drugs. family history is not on file. Last 24hr Input/Output:    Intake/Output Summary (Last 24 hours) at 12/10/2021 1203  Last data filed at 12/10/2021 1056  Gross per 24 hour   Intake 100 ml   Output 500 ml   Net -400 ml        Data Review:   Lab Results   Component Value Date/Time    WBC 9.2 12/09/2021 10:28 AM    HGB 12.6 12/09/2021 10:28 AM    HCT 40.2 12/09/2021 10:28 AM    PLATELET 433 70/24/1414 10:28 AM    MCV 94.4 12/09/2021 10:28 AM     Lab Results   Component Value Date/Time    Sodium 136 12/10/2021 03:06 AM    Potassium 4.8 12/10/2021 03:06 AM    Chloride 100 12/10/2021 03:06 AM    CO2 31 12/10/2021 03:06 AM    Anion gap 5 12/10/2021 03:06 AM    Glucose 277 (H) 12/10/2021 03:06 AM    BUN 67 (H) 12/10/2021 03:06 AM    Creatinine 2.76 (H) 12/10/2021 03:06 AM    BUN/Creatinine ratio 24 (H) 12/10/2021 03:06 AM    GFR est AA 28 (L) 12/10/2021 03:06 AM    GFR est non-AA 23 (L) 12/10/2021 03:06 AM    Calcium 9.0 12/10/2021 03:06 AM    Bilirubin, total 0.3 12/08/2021 11:24 PM    Alk.  phosphatase 167 (H) 12/08/2021 11:24 PM    Protein, total 7.2 12/08/2021 11:24 PM    Albumin 3.1 (L) 12/08/2021 11:24 PM    Globulin 4.1 (H) 12/08/2021 11:24 PM    A-G Ratio 0.8 (L) 12/08/2021 11:24 PM    ALT (SGPT) 88 (H) 12/08/2021 11:24 PM    AST (SGOT) 41 (H) 12/08/2021 11:24 PM     No results found for: CPK, RCK1, RCK2, RCK3, RCK4, CKMB, CKNDX, CKND1, TROPT, TROIQ, BNPP, BNP  Lab Results   Component Value Date/Time    NT pro-BNP 2,469 (H) 12/08/2021 03:22 PM    NT pro-BNP 2,630 (H) 12/08/2021 03:22 PM       Recent Results (from the past 24 hour(s))   C REACTIVE PROTEIN, QT    Collection Time: 12/09/21  6:02 PM   Result Value Ref Range    C-Reactive protein 3.23 (H) 0.00 - 0.60 mg/dL   RHEUMATOID FACTOR, QT    Collection Time: 12/09/21  6:02 PM   Result Value Ref Range    Rheumatoid factor 19 (H) <15 IU/mL   SED RATE (ESR)    Collection Time: 12/09/21  6:02 PM   Result Value Ref Range    Sed rate, automated 63 (H) 0 - 20 mm/hr   PROTEIN/CREATININE RATIO, URINE    Collection Time: 12/09/21  6:02 PM   Result Value Ref Range    Protein, urine random 75 (H) 0.0 - 11.9 mg/dL    Creatinine, urine 26.60 mg/dL    Protein/Creat. urine Ratio 2.8     SAMPLES BEING HELD    Collection Time: 12/09/21  6:02 PM   Result Value Ref Range    SAMPLES BEING HELD 1RED     COMMENT        Add-on orders for these samples will be processed based on acceptable specimen integrity and analyte stability, which may vary by analyte.    EKG, 12 LEAD, INITIAL    Collection Time: 12/09/21  6:48 PM   Result Value Ref Range    Ventricular Rate 97 BPM    Atrial Rate 97 BPM    P-R Interval 152 ms    QRS Duration 96 ms    Q-T Interval 372 ms    QTC Calculation (Bezet) 472 ms    Calculated P Axis 31 degrees    Calculated R Axis -22 degrees    Calculated T Axis 110 degrees    Diagnosis       Normal sinus rhythm  Left ventricular hypertrophy with repolarization abnormality ( R in aVL ,   [place] product , Romhilt-Rollins )  Abnormal ECG  When compared with ECG of 09-DEC-2021 10:12,  MANUAL COMPARISON REQUIRED, DATA IS UNCONFIRMED     SED RATE (ESR)    Collection Time: 12/10/21  3:06 AM   Result Value Ref Range    Sed rate, automated 76 (H) 0 - 20 mm/hr   METABOLIC PANEL, BASIC    Collection Time: 12/10/21  3:06 AM   Result Value Ref Range    Sodium 136 136 - 145 mmol/L    Potassium 4.8 3.5 - 5.1 mmol/L    Chloride 100 97 - 108 mmol/L    CO2 31 21 - 32 mmol/L    Anion gap 5 5 - 15 mmol/L    Glucose 277 (H) 65 - 100 mg/dL    BUN 67 (H) 6 - 20 MG/DL    Creatinine 2.76 (H) 0.70 - 1.30 MG/DL    BUN/Creatinine ratio 24 (H) 12 - 20      GFR est AA 28 (L) >60 ml/min/1.73m2    GFR est non-AA 23 (L) >60 ml/min/1.73m2    Calcium 9.0 8.5 - 10.1 MG/DL   SAMPLES BEING HELD    Collection Time: 12/10/21  3:07 AM   Result Value Ref Range    SAMPLES BEING HELD 1RED     COMMENT        Add-on orders for these samples will be processed based on acceptable specimen integrity and analyte stability, which may vary by analyte.    ECHO ADULT COMPLETE    Collection Time: 12/10/21  9:44 AM   Result Value Ref Range    IVSd 1.07 (A) 0.6 - 1.0 cm    LVIDd 4.61 4.2 - 5.9 cm    LVIDs 3.59 cm    LVOT d 2.33 cm    LVPWd 1.18 (A) 0.6 - 1.0 cm    LVOT Peak Gradient 3.68 mmHg    LVOT SV 79.9 mL    LVOT Peak Velocity 95.86 cm/s    LVOT VTI 18.71 cm    RVIDd 4.73 cm    Left Atrium Major Axis 4.50 cm    LA Volume 45.16 18 - 58 mL    LA Area 4C 15.32 cm2    LA Vol 2C 49.50 18 - 58 mL    LA Vol 4C 36.97 18 - 58 mL    Aortic Valve Area by Continuity of Peak Velocity 1.58 cm2    Aortic Valve Area by Continuity of VTI 1.83 cm2    AV R PG 93.89 mmHg    Aortic Regurgitant Pressure Half-time 411.89 ms    AR Max [place]um 483.93 cm/s    AoV PG 26.89 mmHg    Aortic Valve Systolic Mean Gradient 65.07 mmHg    Aortic Valve Systolic Peak Velocity 837.24 cm/s    AoV VTI 43.62 cm    MV A N[place]um 81.10 cm/s    Mitral Valve E Wave Deceleration Time 197.22 ms    MV E N[place]um 60.56 cm/s    E/E' lateral 12.98     E/E' septal 10.55     LV E' Lateral Velocity 4.67 cm/s    LV E' Septal Velocity 5.74 cm/s    Mitral Valve Pressure Half-time 57.19 ms    MVA (PHT) 3.85 cm2    Pulmonic Valve Systolic Peak Instantaneous Gradient 2.42 mmHg    Pulmonic Valve Max Velocity 77.84 cm/s    Tapse 2.37 (A) 1.5 - 2.0 cm    Ao Root D 3.61 cm          No future appointments.      Claudette Agudelo MD 12/10/2021

## 2021-12-10 NOTE — PROGRESS NOTES
[address] [facility] Adult  Hospitalist Group                                                                                          Hospitalist Progress Note  Gus Ness MD  Answering service: [phone] OR [phone] from in house phone        Date of Service:  12/10/2021  NAME:  Elyssa Rolle  :  1950  MRN:  [id number]      Admission Summary:     Patient with recent admission at [facility] and discharged with diagnosis of acute diastolic heart failure. Patient went home on 1 L of oxygen. Patient presented to heart failure clinic  and found to be hypoxic with increased work of breathing and subsequently sent to the emergency room for further evaluation. CT of the chest shows bilateral pulmonary infiltrates which was unchanged from his recent chest CT . Patient has seen pulmonology once outpatient for further evaluation and further ongoing investigation for that. Interval history / Subjective:       Patient with multiple episodes of chest pain yesterday and overnight. Cardiology's impression was atypical chest pain, likely pleurisy.   No chest pain this AM.     Assessment & Plan:     Acute on chronic hypoxic respiratory failure  -Acute on chronic hypoxic respiratory failure likely combination of CHF and pulmonary pathology  -Patient on 1 L of oxygen at home but found more hypoxic prior to admission  -CT chest shows bilateral infiltrates, negative for Covid  -Pulmonology evaluating the patient  -Screening for connective tissue disease and multiple labs sent by pulmonology, suspected COPD but yet to be defined via PFTs  -On doxycycline for atypical pneumonia    Acute diastolic congestive heart failure  -Discontinue Norvasc due to leg edema, discontinue lisinopril due to BIPIN  -Previous echo at [facility] shows EF of 45 to 50%, repeat echo this admission pending  -On hydralazine, Coreg and on diuresis with Bumex 1 mg IV twice daily  -Advanced heart failure team following  -Plan for right heart cath once patient is more euvolemic  -Plan for left heart cath until euvolemic and nephrology clears    Acute chest pain  -Likely atypical chest pain per cardiology  -Low level of troponin and not consistent with ACS  -Patient with history of coronary artery disease and multiple stents  -Continue Plavix, nitrates, beta-blocker and statin    CKD stage IV  -Baseline serum creatinine 2.5-3 per nephrology  -Mild bump in creatinine due to diuresis  -Nephrology following    Hypertension  -Continue hydralazine, coreg and nitrates    Diabetes  -Continue Lantus along with insulin sliding scale coverage    Dyslipidemia  -Continue statin    Hypothyroidism  -Continue Synthroid    Code status: Full  DVT prophylaxis: SCDs    Care Plan discussed with: Patient/Family  Anticipated Disposition: Home w/Family  Anticipated Discharge: Greater than 48 hours     Hospital Problems  Date Reviewed: 12/9/2021          Codes Class Noted POA    CHF (congestive heart failure) (HCC) ICD-10-CM: I50.9  ICD-9-CM: 428.0  12/9/2021 Unknown        Renal insufficiency ICD-10-CM: N28.9  ICD-9-CM: 593.9  Unknown Yes        Chronic obstructive pulmonary disease ([facility] [address] ICD-10-CM: J44.9  ICD-9-CM: 823  Unknown Yes        Diabetes ([facility] [address] ICD-10-CM: E11.9  ICD-9-CM: 250.00  Unknown Yes        Essential hypertension ICD-10-CM: I10  ICD-9-CM: 401.9  Unknown Yes        Coronary arteriosclerosis ICD-10-CM: I25.10  ICD-9-CM: 414.00  7/3/2018 Yes                Review of Systems:   A comprehensive review of systems was negative except for that written in the HPI. Vital Signs:    Last 24hrs VS reviewed since prior progress note.  Most recent are:  Visit Vitals  /66   Pulse 89   Temp 97.5 °F (36.4 °C)   Resp 16   Ht 5' 5\" (1.651 m)   Wt 62.6 kg (138 lb)   SpO2 91%   BMI 22.96 kg/m²         Intake/Output Summary (Last 24 hours) at 12/10/2021 1125  Last data filed at 12/10/2021 1056  Gross per 24 hour   Intake 100 ml   Output 500 ml   Net -400 ml        Physical Examination:     I had a face to face encounter with this patient and independently examined them on 12/10/2021 as outlined below:          Constitutional:  No acute distress, cooperative, pleasant    ENT:  Oral mucosa moist, oropharynx benign. Resp:  CTA bilaterally. No wheezing/rhonchi/rales. No accessory muscle use   CV:  Regular rhythm, normal rate, no murmurs, gallops, rubs    GI:  Soft, non distended, non tender. normoactive bowel sounds, no hepatosplenomegaly     Musculoskeletal:  No edema, warm, 2+ pulses throughout    Neurologic:  Moves all extremities. AAOx3            Data Review:    Review and/or order of clinical lab test      Labs:     Recent Labs     12/09/21  1028 12/08/21  2324   WBC 9.2 11.4*   HGB 12.6 13.2   HCT 40.2 41.9    249     Recent Labs     12/10/21  0306 12/09/21  1028 12/08/21  2324    135* 140   K 4.8 4.8 4.7    98 101   CO2 31 31 33*   BUN 67* 56* 54*   CREA 2.76* 2.44* 2.50*   * 381* 150*   CA 9.0 9.2 9.4   MG  --   --  2.4   PHOS  --   --  4.1     Recent Labs     12/08/21  2324 12/08/21  1522   ALT 88* 86*  79*   * 150*  146*   TBILI 0.3 0.2  0.2   TP 7.2 7.5  7.5   ALB 3.1* 2.8*  2.7*   GLOB 4.1* 4.7*  4.8*     No results for input(s): INR, PTP, APTT, INREXT, INREXT in the last 72 hours. Recent Labs     12/09/21  1026   TIBC 272   PSAT 17*   FERR 129      No results found for: FOL, RBCF   No results for input(s): PH, PCO2, PO2 in the last 72 hours. No results for input(s): CPK, CKNDX, TROIQ in the last 72 hours.     No lab exists for component: CPKMB  No results found for: CHOL, CHOLX, CHLST, CHOLV, HDL, HDLP, LDL, LDLC, DLDLP, TGLX, TRIGL, TRIGP, CHHD, CHHDX  No results found for: [facility]  Lab Results   Component Value Date/Time    Color YELLOW/STRAW 12/09/2021 05:26 AM    Appearance CLEAR 12/09/2021 05:26 AM    Specific gravity 1.008 12/09/2021 05:26 AM    pH (UA) 6.5 12/09/2021 05:26 AM    Protein 30 (A) 12/09/2021 05:26 AM    Glucose Negative 12/09/2021 05:26 AM    Ketone Negative 12/09/2021 05:26 AM    Bilirubin Negative 12/09/2021 05:26 AM    Urobilinogen 0.2 12/09/2021 05:26 AM    Nitrites Negative 12/09/2021 05:26 AM    Leukocyte Esterase SMALL (A) 12/09/2021 05:26 AM    Epithelial cells FEW 12/09/2021 05:26 AM    Bacteria 4+ (A) 12/09/2021 05:26 AM    WBC 20-50 12/09/2021 05:26 AM    RBC 0-5 12/09/2021 05:26 AM         Medications Reviewed:     Current Facility-Administered Medications   Medication Dose Route Frequency    hydrALAZINE (APRESOLINE) tablet 75 mg  75 mg Oral QID    isosorbide mononitrate ER (IMDUR) tablet 30 mg  30 mg Oral BID    heparin (porcine) injection 5,000 Units  5,000 Units SubCUTAneous Q12H    guaiFENesin ER (MUCINEX) tablet 600 mg  600 mg Oral Q12H    benzonatate (TESSALON) capsule 100 mg  100 mg Oral TID PRN    aspirin delayed-release tablet 81 mg  81 mg Oral QHS    insulin glargine (LANTUS) injection 15 Units  15 Units SubCUTAneous PCD    levothyroxine (SYNTHROID) tablet 100 mcg  100 mcg Oral ACB    arformoterol 15 mcg/budesonide 0.5 mg neb solution   Nebulization BID RT    nitroglycerin (NITROSTAT) tablet 0.4 mg  0.4 mg SubLINGual Q5MIN PRN    clopidogreL (PLAVIX) tablet 75 mg  75 mg Oral DAILY    rosuvastatin (CRESTOR) tablet 20 mg  20 mg Oral QHS    magnesium oxide (MAG-OX) tablet 200 mg  200 mg Oral EVERY OTHER DAY    carvediloL (COREG) tablet 6.25 mg  6.25 mg Oral Q12H    doxycycline (VIBRAMYCIN) 100 mg in 0.9% sodium chloride (MBP/ADV) 100 mL MBP  100 mg IntraVENous Q12H    bumetanide (BUMEX) injection 1 mg  1 mg IntraVENous BID    acetaminophen (TYLENOL) tablet 650 mg  650 mg Oral Q4H PRN    acetaminophen (TYLENOL) tablet 650 mg  650 mg Oral Q6H    albuterol-ipratropium (DUO-NEB) 2.5 MG-0.5 MG/3 ML  3 mL Nebulization Q6H RT    sodium chloride (NS) flush 5-10 mL  5-10 mL IntraVENous PRN     Current Outpatient Medications   Medication Sig    anastrozole (ARIMIDEX) 1 mg tablet Take 0.5 mg by mouth every Monday, Wednesday, Friday.  bumetanide (BUMEX) 1 mg tablet TAKE 1 TABLET BY MOUTH TWICE DAILY. REPLACES. LASIX    magnesium oxide 250 mg magnesium tablet Take 250 mg by mouth. Every other day - OTC    prasterone, dhea, 50 mg tab 50 mg = 1 tab each dose, PO, daily, # 30 tab, 0 Refills    carvediloL (COREG) 6.25 mg tablet Take 1 Tablet by mouth every twelve (12) hours.  isosorbide mononitrate ER (IMDUR) 30 mg tablet Take 1 Tablet by mouth daily.  hydrALAZINE (APRESOLINE) 25 mg tablet Take 1 Tablet by mouth every eight (8) hours.  amLODIPine (NORVASC) 2.5 mg tablet Take 1 Tablet by mouth daily.  rosuvastatin (CRESTOR) 20 mg tablet TAKE 1 TABLET BY MOUTH EVERY NIGHT    clopidogreL (PLAVIX) 75 mg tab TAKE 1 TABLET BY MOUTH EVERY DAY    Symbicort 80-4.5 mcg/actuation HFAA INHALE 2 PUFFS BY MOUTH TWICE DAILY IN THE MORNING AND IN THE EVENING    nitroglycerin (Nitrostat) 0.4 mg SL tablet 1 Tablet by SubLINGual route every five (5) minutes as needed for Chest Pain. Up to 3 doses.  albuterol (ProAir HFA) 90 mcg/actuation inhaler Take 2 Puffs by inhalation. 4-6 hrs as needed    BD Lali 2nd Gen Pen Needle 32 gauge x 5/32\" ndle USE WITH LANTUS    aspirin delayed-release 81 mg tablet Take 81 mg by mouth daily. At night    glimepiride (AMARYL) 4 mg tablet Take 4 mg by mouth. Every two days    insulin glargine (Lantus U-100 Insulin) 100 unit/mL injection 18 U nightly after dinner    levothyroxine (SYNTHROID) 100 mcg tablet Take 100 mcg by mouth daily. Morning    testosterone (ANDROGEL) 1.62 % (20.25 mg/1.25 gram) glpk 20.25 mg daily.  2 to 3 pumps alternating in the morning.     ______________________________________________________________________  EXPECTED LENGTH OF STAY: - - -  ACTUAL LENGTH OF STAY:          1                 Mickey Bedoya MD

## 2021-12-10 NOTE — PROGRESS NOTES
[facility] in [place], [place]  Heart Failure Inpatient Note    Patient name: Romana Kil  Patient : 1950  Patient MRN: [id number]  Date of service: 12/10/21    Primary care physician: Olena Thomas NP  Primary general cardiologist:  Monica Braden    Primary F cardiologist: Larry Rios MD    CHIEF COMPLAINT:  Chronic systolic heart failure    PLAN OF CARE:  · 71 y/o with 2 months h/o of worsened shortness of breath just recently discharged from [facility] on 1L NC O2 after treatment of presumed acute diastolic heart failure; presents to AHF Clinic with severe dyspnea; now on 3 liters of oxygen, in wheelchair; desaturates to 70s after walking 3 steps. · Chest CT done last week shows diffuse infiltrates likely atypical and possibly viral pneumonia; no covid test found in records. · Patient was referred to ER for admission for pneumonia with severe hypoxia and and new pulmonary infiltrates. Admitted  overnight and diagnosed with PNA  · Would like to obtain RHC once patient is able to lay flat next week; plan to postpone plans for LHC until euvolemic and nephrology clears; cardiology co-managing    RECOMMENDATIONS  Continue GDMT for Heart Failure as able  Continue Coreg 6.25 q12h  Discontinued norvasc as it can cause leg edema  Discontinued lisinopril due to acute on chronic renal failure  Increase hydralazine from 50mg to 75 PO q6h and Imdur 30 mg po BID; However Imdur later discontinued by Cardiology.  Patient started on Nitro infusion  No ACE/ARB/ARNi due to renal dysfunction  Continue hydralazine 5mg IV prn SBP > 140mmHg  Diuresis per nephrology; CO2 33, Cr 2.76   Continue ASA 81 mg and Plavix 75 mg daily  Patient with low iron, Ordered Venofer X 2 doses  Order Lipid panel  Continue Duo-Nebs & Arfomoterol & Budesonide Neb  Continue Doxycycline 100 mg IV q12h; per pulmonary  Continue Crestor 20 mg po daily    IMPRESSION:  Acute on Chronic Respiratory Failure  Hypoxia on 3LNC  Shortness of breath at rest  Multiple pulmonary infiltrates  Normal heart function, LVEF LVEF is 55 - 60%  Coronary artery disease  · S/p multivessel PTCA stents x 9  · Most recent stent LAD 2020  Cardiac risk factors   HTN   HL   Former smoker  CKD, stage 2    CARDIAC IMAGING:  Echo (12/10/2021)  · LV: Estimated LVEF is 55 - 60%. Normal cavity size, wall thickness and systolic function (ejection fraction normal). Wall motion: normal.  · RV: Mildly dilated right ventricle. · AV: Aortic valve sclerosis with no evidence of reduced excursion. Aortic valve leaflet calcification present. Aortic valve mean gradient is 14 mmHg. Aortic valve area is 1.8 cm2. Mild aortic valve stenosis is present. Moderate aortic valve regurgitation is present. · LA: Moderately dilated left atrium. · MV: Moderate mitral annular calcification. Echo 11/5/21: EF 45-50%, mid to distal inferolateral hypokinesis, mild-mod AI    Echo (2/1/21)  · LV: Estimated LVEF is 50 - 55%. Visually measured ejection fraction. Mildly dilated left ventricle. Upper normal wall thickness. Globally reduced systolic function. Low normal systolic function. Mild (grade 1) left ventricular diastolic dysfunction. · LA: Mildly dilated left atrium. Left Atrium volume index is 37.77 mL/m2. · AV: Aortic valve leaflet calcification present. Mild aortic valve stenosis is present. Mild aortic valve regurgitation is present. · MV: Mild mitral valve regurgitation is present. · IVSd 1.07cm  EKG (11/15/21) NSR 68bpm,     HEMODYNAMICS:  RHC not done  CPEST not done  6MW not done    OTHER IMAGING:  CXR (11/29/21)  Presence of abnormal density involving both lungs (left greater than  right). Follow-up examinations are recommended. CT chest (11/30/21)  Widespread patchy opacities, reticulations, and architectural distortion with diffuse varicoid bronchiectasis and bronchial wall thickening.  Additionally, few enlarged mediastinal and hilar lymph nodes are noted. Constellation of findings may reflect end-stage sarcoidosis or other interstitial lung disease, but a component of superimposed acute infectious or inflammatory process versus neoplasm cannot be excluded entirely based on imaging alone. Consider PET/CT for further evaluation. HISTORY OF PRESENT ILLNESS:  Briefly, Oz So is a 70 y.o. male with h/o HTN, HL, former smoker, coronary artery disease s/p multivessel PTCA stents x 9, most recent stent LAD 2020 recently discharged from [facility] on 1 liters of O2 for treatment of shortness of breath, presumably diastolic HF. Patient presented to clinic on 12/7 for evaluation of shortness of breath. Chest CT last week showed diffuse pulmonary infiltrates. Hypoxia worsened now requiring 3 liters of O2 and desaturating to 70% walking 3 steps in clinic. Arrived in wheelchair and after assessment, it was recommended for patient to be admitted on 12/8/2021. Diagnosed with PNA in ED. INTERVAL HISTORY:  Patient reports he is feeling slight better but is having sternal CP that radiates and tingles down his arms/fingers from time to time that last about 15 minutes and goes away. Pt reports some back pain  Family member at the bedside  NT-proBNP 2630      REVIEW OF SYSTEMS:  Review of Systems   Constitutional: Positive for malaise/fatigue. Negative for fever. HENT: Positive for hearing loss. Negative for sore throat. Eyes: Negative. Respiratory: Positive for shortness of breath. Cardiovascular: Positive for chest pain. Negative for palpitations, leg swelling and PND. Gastrointestinal: Negative. Genitourinary: Negative. Musculoskeletal: Negative. Skin: Negative. Neurological: Negative for dizziness, focal weakness and loss of consciousness. Psychiatric/Behavioral: Negative.         PHYSICAL EXAM:  Visit Vitals  BP (!) 119/51   Pulse 88   Temp 98 °F (36.7 °C)   Resp 19   Ht 5' 5\" (1.651 m)   Wt 138 lb (62.6 kg)   SpO2 98%   BMI 22.96 kg/m²     General: Patient is cachectic  HEENT: Normocephalic and atraumatic. No scleral icterus. Pupils are equal, round and reactive to light and accomodation. No conjunctival injection. Oropharynx is clear. Neck: Supple. No evidence of thyroid enlargements or lymphadenopathy. JVD: Negative  Lungs: Breath sounds are equal with some rales at bases. Heart: Regular rate and rhythm with normal S1 and S2. No murmurs, gallops or rubs. Abdomen: Soft, no mass or tenderness. No organomegaly or hernia. Bowel sounds present. Genitourinary and rectal: deferred  Extremities: No cyanosis, clubbing, or edema. Neurologic: No focal sensory or motor deficits are noted. Grossly intact. Psychiatric: Awake, alert an doriented x 3. Appropriate mood and affect. Skin: Warm, dry and well perfused. No lesions, nodules or rashes are noted. PAST MEDICAL HISTORY:  Past Medical History:   Diagnosis Date    Aortic regurgitation     CAD (coronary artery disease)     Taxus stent 2005,  Cypher Prox circ, 2018 Sudarshan LADx2,   Sudarshan LAD & LCX,  Sudarshan     Chronic combined systolic and diastolic congestive heart failure ([address] 2021    Chronic obstructive pulmonary disease (HCC)     Congestive heart failure (HCC)     Diabetes ([address]     Essential hypertension     Hyperlipidemia     MI (myocardial infarction) ([address]     Murmur     Renal insufficiency     Thyroid disease        PAST SURGICAL HISTORY:  Past Surgical History:   Procedure Laterality Date    HX CORONARY STENT PLACEMENT      HX CYST REMOVAL      IR ASP BLADDER SUPRA CATH         FAMILY HISTORY:  History reviewed. No pertinent family history.     SOCIAL HISTORY:  Social History     Socioeconomic History    Marital status:    Tobacco Use    Smoking status: Former Smoker     Packs/day: 2.00     Years: 30.00     Pack years: 60.00     Quit date: 1991     Years since quittin.8    Smokeless tobacco: Never Used   Vaping Use    Vaping Use: Never used   Substance and Sexual Activity    Alcohol use: Yes     Comment: rarely    Drug use: Never    Sexual activity: Yes     Partners: Female       LABORATORY RESULTS:  Labs Latest Ref Rng & Units 12/10/2021 12/9/2021 12/8/2021 12/8/2021 12/8/2021   WBC 4.1 - 11.1 K/uL - 9.2 11. 4(H) - 10.6   RBC 4.10 - 5.70 M/uL - 4.26 4.44 - 4.30   Hemoglobin 12.1 - 17.0 g/dL - 12.6 13.2 - 12.8   Hematocrit 36.6 - 50.3 % - 40.2 41.9 - 40.5   MCV 80.0 - 99.0 FL - 94.4 94.4 - 94.2   Platelets 368 - 503 K/uL - 230 249 - 250   Lymphocytes 12 - 49 % - 4(L) - - 8(L)   Monocytes 5 - 13 % - 1(L) - - 10   Eosinophils 0 - 7 % - 1 - - 7   Basophils 0 - 1 % - 0 - - 1   Albumin 3.5 - 5.0 g/dL - - 3. 1(L) 2. 8(L) 2. 7(L)   Calcium 8.5 - 10.1 MG/DL 9.0 9.2 9.4 9.4 9.5   Glucose 65 - 100 mg/dL 277(H) 381(H) 150(H) 126(H) 136(H)   BUN 6 - 20 MG/DL 67(H) 56(H) 54(H) 54(H) 53(H)   Creatinine 0.70 - 1.30 MG/DL 2.76(H) 2.44(H) 2.50(H) 2.57(H) 2.48(H)   Sodium 136 - 145 mmol/L 136 135(L) 140 139 136   Potassium 3.5 - 5.1 mmol/L 4.8 4.8 4.7 4.8 4.7   TSH 0.36 - 3.74 uIU/mL - 0.41 - - -   Some recent data might be hidden       ALLERGY:  Allergies   Allergen Reactions    Bee Sting [Sting, Bee] Anaphylaxis    Iodinated Contrast Media Rash    Brilinta [Ticagrelor] Other (comments)    Effient [Prasugrel] Other (comments)    Penicillins Rash and Nausea Only    Ranexa [Ranolazine] Other (comments)    Sulfa (Sulfonamide Antibiotics) Rash        CURRENT MEDICATIONS:    Current Facility-Administered Medications:     hydrALAZINE (APRESOLINE) tablet 75 mg, 75 mg, Oral, QID, James, Brcue Augustein T, NP, 75 mg at 12/10/21 1234    insulin lispro (HUMALOG) injection, , SubCUTAneous, AC&HS, Sarabjit Self MD    glucose chewable tablet 16 g, 4 Tablet, Oral, PRN, Sarabjit Self MD    dextrose (D50W) injection syrg 12.5-25 g, 12.5-25 g, IntraVENous, PRN, Sarabjit Self MD    glucagon (GLUCAGEN) injection 1 mg, 1 mg, IntraMUSCular, PRN, Niki Coreas MD   mylanta/viscous lidocaine (GI COCKTAIL), 40 mL, Oral, Q6H PRN, Lori Martinez NP    nitroglycerin (Tridil) 200 mcg/ml infusion, 0-200 mcg/min, IntraVENous, TITRATE, Christen Miller MD, Last Rate: 7.5 mL/hr at 12/10/21 1435, 25 mcg/min at 12/10/21 1435    albumin human 25% (BUMINATE) solution 12.5 g, 12.5 g, IntraVENous, BID, Lori Ceja NP    iron sucrose (VENOFER) 200 mg in 0.9% sodium chloride 100 mL IVPB, 200 mg, IntraVENous, Q24H, Lori Ceja NP, Last Rate: 440 mL/hr at 12/10/21 1331, 200 mg at 12/10/21 1331    heparin (porcine) injection 5,000 Units, 5,000 Units, SubCUTAneous, Q12H, Guru, Jesus, DO, 5,000 Units at 12/10/21 0619    guaiFENesin ER (MUCINEX) tablet 600 mg, 600 mg, Oral, Q12H, Guru, Jesus, DO, 600 mg at 12/10/21 0904    benzonatate (TESSALON) capsule 100 mg, 100 mg, Oral, TID PRN, Scarlet Rho, Jesus, DO    aspirin delayed-release tablet 81 mg, 81 mg, Oral, QHS, Guru, Jesus, DO, 81 mg at 12/09/21 2111    insulin glargine (LANTUS) injection 15 Units, 15 Units, SubCUTAneous, PCD, Scarlet Rho, Jesus, DO, 15 Units at 12/09/21 1912    levothyroxine (SYNTHROID) tablet 100 mcg, 100 mcg, Oral, ACB, Guru, Jesus, DO, 100 mcg at 12/10/21 0648    arformoterol 15 mcg/budesonide 0.5 mg neb solution, , Nebulization, BID RT, Guru, Jesus, DO, Given at 12/10/21 0720    nitroglycerin (NITROSTAT) tablet 0.4 mg, 0.4 mg, SubLINGual, Q5MIN PRN, Scarlet Rho, Jesus, DO, 0.4 mg at 12/10/21 1338    clopidogreL (PLAVIX) tablet 75 mg, 75 mg, Oral, DAILY, Guru, Jesus, DO, 75 mg at 12/10/21 0904    rosuvastatin (CRESTOR) tablet 20 mg, 20 mg, Oral, QHS, Guru, Jesus, DO, 20 mg at 12/09/21 2336    magnesium oxide (MAG-OX) tablet 200 mg, 200 mg, Oral, EVERY OTHER DAY, Guru, Jesus, DO, 200 mg at 12/09/21 0934    carvediloL (COREG) tablet 6.25 mg, 6.25 mg, Oral, Q12H, Guru, Jesus, DO, 6.25 mg at 12/10/21 0904    doxycycline (VIBRAMYCIN) 100 mg in 0.9% sodium chloride (MBP/ADV) 100 mL MBP, 100 mg, IntraVENous, Q12H, Ernesto Small PA-C, Last Rate: 100 mL/hr at 12/10/21 0619, 100 mg at 12/10/21 0619    bumetanide (BUMEX) injection 1 mg, 1 mg, IntraVENous, BID, Estrada Gao MD, 1 mg at 12/10/21 8489    acetaminophen (TYLENOL) tablet 650 mg, 650 mg, Oral, Q4H PRN, Sarabjit Self MD    acetaminophen (TYLENOL) tablet 650 mg, 650 mg, Oral, Q6H, Sarabjit Self MD, 650 mg at 12/10/21 1234    albuterol-ipratropium (DUO-NEB) 2.5 MG-0.5 MG/3 ML, 3 mL, Nebulization, Q6H RT, Pinky Cruz MD, 3 mL at 12/10/21 0720    sodium chloride (NS) flush 5-10 mL, 5-10 mL, IntraVENous, PRN, Gigi Hartman MD    Current Outpatient Medications:     anastrozole (ARIMIDEX) 1 mg tablet, Take 0.5 mg by mouth every Monday, Wednesday, Friday., Disp: , Rfl:     bumetanide (BUMEX) 1 mg tablet, TAKE 1 TABLET BY MOUTH TWICE DAILY. REPLACES. LASIX, Disp: 180 Tablet, Rfl: 0    magnesium oxide 250 mg magnesium tablet, Take 250 mg by mouth. Every other day - OTC, Disp: , Rfl:     prasterone, dhea, 50 mg tab, 50 mg = 1 tab each dose, PO, daily, # 30 tab, 0 Refills, Disp: , Rfl:     carvediloL (COREG) 6.25 mg tablet, Take 1 Tablet by mouth every twelve (12) hours. , Disp: 180 Tablet, Rfl: 1    isosorbide mononitrate ER (IMDUR) 30 mg tablet, Take 1 Tablet by mouth daily. , Disp: 90 Tablet, Rfl: 1    hydrALAZINE (APRESOLINE) 25 mg tablet, Take 1 Tablet by mouth every eight (8) hours. , Disp: 270 Tablet, Rfl: 1    amLODIPine (NORVASC) 2.5 mg tablet, Take 1 Tablet by mouth daily. , Disp: 90 Tablet, Rfl: 1    rosuvastatin (CRESTOR) 20 mg tablet, TAKE 1 TABLET BY MOUTH EVERY NIGHT, Disp: 90 Tablet, Rfl: 3    clopidogreL (PLAVIX) 75 mg tab, TAKE 1 TABLET BY MOUTH EVERY DAY, Disp: 90 Tablet, Rfl: 3    Symbicort 80-4.5 mcg/actuation HFAA, INHALE 2 PUFFS BY MOUTH TWICE DAILY IN THE MORNING AND IN THE EVENING, Disp: , Rfl:     nitroglycerin (Nitrostat) 0.4 mg SL tablet, 1 Tablet by SubLINGual route every five (5) minutes as needed for Chest Pain. Up to 3 doses. , Disp: 25 Tablet, Rfl: 1    albuterol (ProAir HFA) 90 mcg/actuation inhaler, Take 2 Puffs by inhalation. 4-6 hrs as needed, Disp: , Rfl:     BD Lali 2nd Gen Pen Needle 32 gauge x 5/32\" ndle, USE WITH LANTUS, Disp: , Rfl:     aspirin delayed-release 81 mg tablet, Take 81 mg by mouth daily. At night, Disp: , Rfl:     glimepiride (AMARYL) 4 mg tablet, Take 4 mg by mouth. Every two days, Disp: , Rfl:     insulin glargine (Lantus U-100 Insulin) 100 unit/mL injection, 18 U nightly after dinner, Disp: , Rfl:     levothyroxine (SYNTHROID) 100 mcg tablet, Take 100 mcg by mouth daily. Morning, Disp: , Rfl:     testosterone (ANDROGEL) 1.62 % (20.25 mg/1.25 gram) glpk, 20.25 mg daily. 2 to 3 pumps alternating in the morning., Disp: , Rfl:     Thank you for your referral and allowing me to participate in this patient's care. Jyothi Eisenberg DNP, AGAP-BC, [facility], [address] [facility]  Heart Failure Nurse Practitioner   [id number]   [address] [place] [address] Av  W: [phone]/ F: [phone]            PATIENT CARE TEAM:  Patient Care Team:  Zuhair Moore NP as PCP - General (Nurse Practitioner)           F ATTENDING ADDENDUM    Patient was seen and examined in person. Data and notes were reviewed. I have discussed and agree with the plan as noted in the NP note above without further additions.     Ramón Butler MD PhD  [id number]

## 2021-12-10 NOTE — PROGRESS NOTES
Patient name: Wiliam Boothe  MRN: [id number]    Nephrology Progress note:    Assessment:  CKD stage 4: Serum Cr 2.4->2.7mg/dl. Baseline serum Cr 2.5 to 3mg/dl-> 2 to DM/HTN. Followed by my partner Dr. Leatha Hensley.     Decompensated CHF: EF 45-50%     Chest pain: hx of 9 stents     DM2: Uncontrolled->Hyperglycemia     HTN: fluctuating control. Proteinuria: 2.8gm proteinuria    COPD suspected: Possible BRAYDEN PNA. Pulmonary following     Hx of urethral obstruction: s/p suprapubic catheter    Plan/Recommendations:  Serum Cr rising with diuresis  IV Bumex 1mg BID-> change to oral this weekend  [facility] planned [facility] admission-> discussed risks of MEL with patient/daughter yesterday  Control Bld sugars  Strict I/Os, daily weights  Avoid nephrotoxins  AM labs      Subjective:  Reports SOB is better this morning. Getting neb treatment    ROS:   No nausea, no vomiting  No chest pain, no shortness of breath    Exam:  Visit Vitals  BP (!) 156/97   Pulse 71   Temp 97.5 °F (36.4 °C)   Resp 16   Ht 5' 5\" (1.651 m)   Wt 62.6 kg (138 lb)   SpO2 91%   BMI 22.96 kg/m²     Wt Readings from Last 3 Encounters:   12/10/21 62.6 kg (138 lb)   12/08/21 62.3 kg (137 lb 6.4 oz)   11/29/21 63.5 kg (140 lb)       Intake/Output Summary (Last 24 hours) at 12/10/2021 1002  Last data filed at 12/10/2021 7190  Gross per 24 hour   Intake 100 ml   Output 200 ml   Net -100 ml       Gen: NAD  HEENT:  AT/NC  Lungs/Chest wall:Improving aeration  Cardiovascular: Regular rate, normal rhythm. Abdomen/: Soft, NT, ND, BS+.    Ext:  No peripheral edema  CNS: alert and awake.  Answers appropriately      Current Facility-Administered Medications   Medication Dose Route Frequency Last Admin    hydrALAZINE (APRESOLINE) tablet 75 mg  75 mg Oral QID 75 mg at 12/10/21 0904    isosorbide mononitrate ER (IMDUR) tablet 30 mg  30 mg Oral BID      heparin (porcine) injection 5,000 Units  5,000 Units SubCUTAneous Q12H 5,000 Units at 12/10/21 2578    guaiFENesin ER (MUCINEX) tablet 600 mg  600 mg Oral Q12H 600 mg at 12/10/21 0904    benzonatate (TESSALON) capsule 100 mg  100 mg Oral TID PRN      aspirin delayed-release tablet 81 mg  81 mg Oral QHS 81 mg at 12/09/21 2111    insulin glargine (LANTUS) injection 15 Units  15 Units SubCUTAneous PCD 15 Units at 12/09/21 1912    levothyroxine (SYNTHROID) tablet 100 mcg  100 mcg Oral  mcg at 12/10/21 0648    arformoterol 15 mcg/budesonide 0.5 mg neb solution   Nebulization BID RT Given at 12/10/21 0720    nitroglycerin (NITROSTAT) tablet 0.4 mg  0.4 mg SubLINGual Q5MIN PRN 0.4 mg at 12/10/21 0841    clopidogreL (PLAVIX) tablet 75 mg  75 mg Oral DAILY 75 mg at 12/10/21 0904    rosuvastatin (CRESTOR) tablet 20 mg  20 mg Oral QHS 20 mg at 12/09/21 2336    magnesium oxide (MAG-OX) tablet 200 mg  200 mg Oral EVERY OTHER  mg at 12/09/21 0934    carvediloL (COREG) tablet 6.25 mg  6.25 mg Oral Q12H 6.25 mg at 12/10/21 0904    doxycycline (VIBRAMYCIN) 100 mg in 0.9% sodium chloride (MBP/ADV) 100 mL MBP  100 mg IntraVENous Q12H 100 mg at 12/10/21 4209    bumetanide (BUMEX) injection 1 mg  1 mg IntraVENous BID 1 mg at 12/10/21 0904    acetaminophen (TYLENOL) tablet 650 mg  650 mg Oral Q4H PRN      acetaminophen (TYLENOL) tablet 650 mg  650 mg Oral Q6H 650 mg at 12/10/21 0618    albuterol-ipratropium (DUO-NEB) 2.5 MG-0.5 MG/3 ML  3 mL Nebulization Q6H RT 3 mL at 12/10/21 0720    sodium chloride (NS) flush 5-10 mL  5-10 mL IntraVENous PRN       Current Outpatient Medications   Medication Sig Dispense    anastrozole (ARIMIDEX) 1 mg tablet Take 0.5 mg by mouth every Monday, Wednesday, Friday.  bumetanide (BUMEX) 1 mg tablet TAKE 1 TABLET BY MOUTH TWICE DAILY. REPLACES. LASIX 180 Tablet    magnesium oxide 250 mg magnesium tablet Take 250 mg by mouth. Every other day - OTC     prasterone, dhea, 50 mg tab 50 mg = 1 tab each dose, PO, daily, # 30 tab, 0 Refills     carvediloL (COREG) 6.25 mg tablet Take 1 Tablet by mouth every twelve (12) hours. 180 Tablet    isosorbide mononitrate ER (IMDUR) 30 mg tablet Take 1 Tablet by mouth daily. 90 Tablet    hydrALAZINE (APRESOLINE) 25 mg tablet Take 1 Tablet by mouth every eight (8) hours. 270 Tablet    amLODIPine (NORVASC) 2.5 mg tablet Take 1 Tablet by mouth daily. 90 Tablet    rosuvastatin (CRESTOR) 20 mg tablet TAKE 1 TABLET BY MOUTH EVERY NIGHT 90 Tablet    clopidogreL (PLAVIX) 75 mg tab TAKE 1 TABLET BY MOUTH EVERY DAY 90 Tablet    Symbicort 80-4.5 mcg/actuation HFAA INHALE 2 PUFFS BY MOUTH TWICE DAILY IN THE MORNING AND IN THE EVENING     nitroglycerin (Nitrostat) 0.4 mg SL tablet 1 Tablet by SubLINGual route every five (5) minutes as needed for Chest Pain. Up to 3 doses. 25 Tablet    albuterol (ProAir HFA) 90 mcg/actuation inhaler Take 2 Puffs by inhalation. 4-6 hrs as needed     BD Lali 2nd Gen Pen Needle 32 gauge x 5/32\" ndle USE WITH LANTUS     aspirin delayed-release 81 mg tablet Take 81 mg by mouth daily. At night     glimepiride (AMARYL) 4 mg tablet Take 4 mg by mouth. Every two days     insulin glargine (Lantus U-100 Insulin) 100 unit/mL injection 18 U nightly after dinner     levothyroxine (SYNTHROID) 100 mcg tablet Take 100 mcg by mouth daily. Morning     testosterone (ANDROGEL) 1.62 % (20.25 mg/1.25 gram) glpk 20.25 mg daily. 2 to 3 pumps alternating in the morning.         Labs/Data:    Lab Results   Component Value Date/Time    WBC 9.2 12/09/2021 10:28 AM    HGB 12.6 12/09/2021 10:28 AM    HCT 40.2 12/09/2021 10:28 AM PLATELET 567 20/33/3738 10:28 AM    MCV 94.4 12/09/2021 10:28 AM       Lab Results   Component Value Date/Time    Sodium 136 12/10/2021 03:06 AM    Potassium 4.8 12/10/2021 03:06 AM    Chloride 100 12/10/2021 03:06 AM    CO2 31 12/10/2021 03:06 AM    Anion gap 5 12/10/2021 03:06 AM    Glucose 277 (H) 12/10/2021 03:06 AM    BUN 67 (H) 12/10/2021 03:06 AM    Creatinine 2.76 (H) 12/10/2021 03:06 AM    BUN/Creatinine ratio 24 (H) 12/10/2021 03:06 AM    GFR est AA 28 (L) 12/10/2021 03:06 AM    GFR est non-AA 23 (L) 12/10/2021 03:06 AM    Calcium 9.0 12/10/2021 03:06 AM       Patient seen and examined. Chart reviewed. Labs, data and other pertinent notes reviewed in last 24 hrs.     Discussed with patient and Jackie Starr NP    Signed by:  Gita Xavier MD  [id number]

## 2021-12-11 NOTE — ED NOTES
TRANSFER - OUT REPORT:    Verbal report given to Sera Norris RN(name) on Rich Sykes  being transferred to [facility] Rm 466(unit) for routine progression of care       Report consisted of patients Situation, Background, Assessment and   Recommendations(SBAR). Information from the following report(s) SBAR, Kardex, ED Summary, Intake/Output, MAR, Recent Results and Med Rec Status was reviewed with the receiving nurse. Lines:   Peripheral IV 12/08/21 Right Forearm (Active)   Site Assessment Clean, dry, & intact 12/10/21 1507   Phlebitis Assessment 0 12/10/21 1507   Infiltration Assessment 0 12/10/21 1507   Dressing Status Clean, dry, & intact 12/10/21 1507   Dressing Type Transparent; Tape 12/10/21 1507   Hub Color/Line Status Pink 12/10/21 1507   Action Taken Open ports on tubing capped 12/10/21 1507   Alcohol Cap Used Yes 12/10/21 1507       Peripheral IV 12/09/21 Left Antecubital (Active)   Site Assessment Clean, dry, & intact 12/10/21 1507   Phlebitis Assessment 0 12/10/21 1507   Infiltration Assessment 0 12/10/21 1507   Dressing Status Clean, dry, & intact 12/10/21 1507   Dressing Type Transparent 12/10/21 1507   Hub Color/Line Status Pink 12/10/21 1507   Action Taken Open ports on tubing capped 12/10/21 1507   Alcohol Cap Used Yes 12/10/21 1507        Opportunity for questions and clarification was provided.       Patient transported with:   Monitor  O2 @ 4 liters

## 2021-12-11 NOTE — PROGRESS NOTES
[id number]  Hospitalist Group                                                                                          Hospitalist Progress Note  Jennie Rodriguez MD  Answering service: [phone] or [phone] from in house phone        Date of Service:  2021  NAME:  Aislinn Aguilera  :  1950  MRN:  [id number]      Admission Summary:     Patient with recent admission at [facility] and discharged with diagnosis of acute diastolic heart failure. Patient went home on 1 L of oxygen. Patient presented to heart failure clinic  and found to be hypoxic with increased work of breathing and subsequently sent to the emergency room for further evaluation. CT of the chest shows bilateral pulmonary infiltrates which was unchanged from his recent chest CT . Patient has seen pulmonology once outpatient for further evaluation and further ongoing investigation for that. Interval history / Subjective:       Patient's chest pain is now resolved since starting on nitro drip 12/10.      Assessment & Plan:     Acute on chronic hypoxic respiratory failure  -Acute on chronic hypoxic respiratory failure likely combination of CHF and pulmonary pathology  -Patient on 1 L of oxygen at home but found more hypoxic prior to admission  -CT chest shows bilateral infiltrates, negative for Covid  -Pulmonology evaluating the patient  -Screening for connective tissue disease and multiple labs sent by pulmonology, suspected COPD but yet to be defined via PFTs  -On doxycycline for atypical pneumonia    Acute diastolic congestive heart failure  -Discontinued Norvasc due to leg edema, discontinued lisinopril due to BIPIN  -Previous echo at [facility] shows EF of 45 to 50%, repeat echo this admission shows EF 55 to 60%  -On hydralazine, Coreg and on diuresis with Bumex 1 mg IV twice daily  -Advanced heart failure team following  -Plan for right heart cath once patient is more euvolemic  -Plan for left heart cath until euvolemic and nephrology clears    Acute coronary syndrome  -Patient with chest pain and elevated troponin, likely type II MI due to CHF and respiratory failure  -Chest pain has now resolved since starting on nitro drip 12/10  -Troponins trending up, started on heparin drip 12/11  -Check 12-lead EKG, cardiology to reevaluate    Urinary tract infection  -Urine culture on 12/9 growing Klebsiella  -Start cefdinir 300 mg bid    CKD stage IV  -Baseline serum creatinine 2.5-3 per nephrology  -Mild bump in creatinine due to diuresis  -Discussed risk of MEL with cardiac catheterization, with patient and wife  -Nephrology following    Hypertension  -Continue hydralazine, coreg and nitrates    Diabetes  -Continue Lantus along with insulin sliding scale coverage    Dyslipidemia  -Continue statin    Hypothyroidism  -Continue Synthroid    Code status: Full  DVT prophylaxis: SCDs    Care Plan discussed with: Patient/Family  Anticipated Disposition: Home w/Family  Anticipated Discharge: Greater than 48 hours     Hospital Problems  Date Reviewed: 12/9/2021          Codes Class Noted POA    CHF (congestive heart failure) (HCC) ICD-10-CM: I50.9  ICD-9-CM: 428.0  12/9/2021 Unknown        Renal insufficiency ICD-10-CM: N28.9  ICD-9-CM: 593.9  Unknown Yes        Chronic obstructive pulmonary disease ([facility] [address] ICD-10-CM: J44.9  ICD-9-CM: 270  Unknown Yes        Diabetes ([facility] [address] ICD-10-CM: E11.9  ICD-9-CM: 250.00  Unknown Yes        Essential hypertension ICD-10-CM: I10  ICD-9-CM: 401.9  Unknown Yes        Coronary arteriosclerosis ICD-10-CM: I25.10  ICD-9-CM: 414.00  7/3/2018 Yes                Review of Systems:   A comprehensive review of systems was negative except for that written in the HPI. Vital Signs:    Last 24hrs VS reviewed since prior progress note.  Most recent are:  Visit Vitals  BP (!) 110/43 (BP 1 Location: Right arm, BP Patient Position: At rest;Sitting)   Pulse 64   Temp 99.4 °F (37.4 °C)   Resp 19   Ht 5' 5\" (1.651 m)   Wt 62.8 kg (138 lb 7.2 oz)   SpO2 97%   BMI 23.04 kg/m²         Intake/Output Summary (Last 24 hours) at 12/11/2021 1326  Last data filed at 12/11/2021 0730  Gross per 24 hour   Intake 200 ml   Output 1925 ml   Net -1725 ml        Physical Examination:     I had a face to face encounter with this patient and independently examined them on 12/11/2021 as outlined below:          Constitutional:  No acute distress, cooperative, pleasant    ENT:  Oral mucosa moist, oropharynx benign. Resp:  CTA bilaterally. No wheezing/rhonchi/rales. No accessory muscle use   CV:  Regular rhythm, normal rate, no murmurs, gallops, rubs    GI:  Soft, non distended, non tender. normoactive bowel sounds, no hepatosplenomegaly     Musculoskeletal:  No edema, warm, 2+ pulses throughout    Neurologic:  Moves all extremities. AAOx3            Data Review:    Review and/or order of clinical lab test      Labs:     Recent Labs     12/11/21  0128 12/09/21  1028   WBC 10.6 9.2   HGB 11.1* 12.6   HCT 34.6* 40.2    230     Recent Labs     12/11/21  0128 12/10/21  0306 12/09/21  1028 12/08/21  2324 12/08/21  2324    136 135*   < > 140   K 4.2 4.8 4.8   < > 4.7    100 98   < > 101   CO2 29 31 31   < > 33*   BUN 77* 67* 56*   < > 54*   CREA 2.89* 2.76* 2.44*   < > 2.50*   GLU 72 277* 381*   < > 150*   CA 9.0 9.0 9.2   < > 9.4   MG 2.5*  --   --   --  2.4   PHOS 4.7  --   --   --  4.1   URICA 9.4*  --   --   --   --     < > = values in this interval not displayed.      Recent Labs     12/11/21  0128 12/08/21  2324 12/08/21  1522   ALT 43 88* 86*  79*   * 167* 150*  146*   TBILI 0.2 0.3 0.2  0.2   TP 6.8 7.2 7.5  7.5   ALB 2.7* 3.1* 2.8*  2.7*   GLOB 4.1* 4.1* 4.7*  4.8*     Recent Labs     12/11/21  0719 12/10/21  1459   INR  --  1.0   PTP  --  10.8   APTT 29.1  --       Recent Labs     12/09/21  1026   TIBC 272   PSAT 17*   FERR 129      No results found for: FOL, RBCF   No results for input(s): PH, PCO2, PO2 in the last 72 hours. No results for input(s): CPK, CKNDX, TROIQ in the last 72 hours.     No lab exists for component: CPKMB  Lab Results   Component Value Date/Time    Cholesterol, total 76 12/10/2021 02:59 PM    HDL Cholesterol 28 12/10/2021 02:59 PM    LDL, calculated 25.4 12/10/2021 02:59 PM    Triglyceride 113 12/10/2021 02:59 PM    CHOL/HDL Ratio 2.7 12/10/2021 02:59 PM     Lab Results   Component Value Date/Time    Glucose (POC) 114 12/11/2021 12:59 PM    Glucose (POC) 90 12/11/2021 07:30 AM    Glucose (POC) 140 (H) 12/10/2021 10:07 PM    Glucose (POC) 311 (H) 12/10/2021 04:22 PM     Lab Results   Component Value Date/Time    Color YELLOW/STRAW 12/09/2021 05:26 AM    Appearance CLEAR 12/09/2021 05:26 AM    Specific gravity 1.008 12/09/2021 05:26 AM    pH (UA) 6.5 12/09/2021 05:26 AM    Protein 30 (A) 12/09/2021 05:26 AM    Glucose Negative 12/09/2021 05:26 AM    Ketone Negative 12/09/2021 05:26 AM    Bilirubin Negative 12/09/2021 05:26 AM    Urobilinogen 0.2 12/09/2021 05:26 AM    Nitrites Negative 12/09/2021 05:26 AM    Leukocyte Esterase SMALL (A) 12/09/2021 05:26 AM    Epithelial cells FEW 12/09/2021 05:26 AM    Bacteria 4+ (A) 12/09/2021 05:26 AM    WBC 20-50 12/09/2021 05:26 AM    RBC 0-5 12/09/2021 05:26 AM         Medications Reviewed:     Current Facility-Administered Medications   Medication Dose Route Frequency    heparin 25,000 units in D5W 250 ml infusion  12-25 Units/kg/hr IntraVENous TITRATE    cefdinir (OMNICEF) capsule 300 mg  300 mg Oral DAILY    bumetanide (BUMEX) tablet 1 mg  1 mg Oral BID    hydrALAZINE (APRESOLINE) tablet 75 mg  75 mg Oral QID    insulin lispro (HUMALOG) injection   SubCUTAneous AC&HS    glucose chewable tablet 16 g  4 Tablet Oral PRN    dextrose (D50W) injection syrg 12.5-25 g  12.5-25 g IntraVENous PRN    glucagon (GLUCAGEN) injection 1 mg  1 mg IntraMUSCular PRN    mylanta/viscous lidocaine (GI COCKTAIL)  40 mL Oral Q6H PRN    nitroglycerin (Tridil) 200 mcg/ml infusion 0-200 mcg/min IntraVENous TITRATE    iron sucrose (VENOFER) 200 mg in 0.9% sodium chloride 100 mL IVPB  200 mg IntraVENous Q24H    aspirin delayed-release tablet 81 mg  81 mg Oral QHS    guaiFENesin ER (MUCINEX) tablet 600 mg  600 mg Oral Q12H    benzonatate (TESSALON) capsule 100 mg  100 mg Oral TID PRN    insulin glargine (LANTUS) injection 15 Units  15 Units SubCUTAneous PCD    levothyroxine (SYNTHROID) tablet 100 mcg  100 mcg Oral ACB    arformoterol 15 mcg/budesonide 0.5 mg neb solution   Nebulization BID RT    nitroglycerin (NITROSTAT) tablet 0.4 mg  0.4 mg SubLINGual Q5MIN PRN    clopidogreL (PLAVIX) tablet 75 mg  75 mg Oral DAILY    rosuvastatin (CRESTOR) tablet 20 mg  20 mg Oral QHS    magnesium oxide (MAG-OX) tablet 200 mg  200 mg Oral EVERY OTHER DAY    carvediloL (COREG) tablet 6.25 mg  6.25 mg Oral Q12H    doxycycline (VIBRAMYCIN) 100 mg in 0.9% sodium chloride (MBP/ADV) 100 mL MBP  100 mg IntraVENous Q12H    acetaminophen (TYLENOL) tablet 650 mg  650 mg Oral Q4H PRN    acetaminophen (TYLENOL) tablet 650 mg  650 mg Oral Q6H    albuterol-ipratropium (DUO-NEB) 2.5 MG-0.5 MG/3 ML  3 mL Nebulization Q6H RT    sodium chloride (NS) flush 5-10 mL  5-10 mL IntraVENous PRN     ______________________________________________________________________  EXPECTED LENGTH OF STAY: - - -  ACTUAL LENGTH OF STAY:          2                 Lisa Albarran MD

## 2021-12-11 NOTE — PROGRESS NOTES
12/11/2021   Shalonda Beltran MD  [facility]    . Roma Lluvia Martines is a 70 y.o. 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 76Troponin 16, & 16    12/08/21ECHO ADULT COMPLETE 12/10/2021 12/10/2021  Interpretation Summary  · LV: Estimated LVEF is 55 - 60%. Normal cavity size, wall thickness and systolic function (ejection fraction normal). Wall motion: normal.  · RV: Mildly dilated right ventricle. · AV: Aortic valve sclerosis with no evidence of reduced excursion. Aortic valve leaflet calcification present. Aortic valve mean gradient is 14 mmHg. Aortic valve area is 1.8 cm2. Mild aortic valve stenosis is present. Moderate aortic valve regurgitation is present. · LA: Moderately dilated left atrium. · MV: Moderate mitral annular calcification. Discussion/Plans/Recs    1. Chest pain  He says atypical for his CAD and his troponin yesterday were normal   Now some modest elevation and CP seems dependent of IV NTG  He was on Imdur prev  Will restart Imdur and try wean off NTG gtt  Not candidate with LHC with current poor renal fx  Suspect he has \"strain\" of severe residual CAD with acute illness  Did get better with NTG SL, EKG no acute findings  Cath with this severe CKD would put a high risk for permanent renal failure    2.  Resp distress  Acute on chronic respiratory failure with markedly abnormal CT scan suspect pulmonary source questionable ILD pulmonary edema airspace disease  Improving with ABX, nebs and diuretics  Now with dry rales typical for ILD  Does not whole body volume overloaded  Continue ABX and use of diuretics  CT Chest per Pulmonary with patch consolidation and reticular changes with adenopathy     3. CAD chronic   9 stents in [place] per pt  Taxus stent 12/2005, 12/06 [place] [employer]x circ, 6/2018 Sudarshan LADx2,  9/19 Sudarshan LAD & LCX, 1/20 Sudarshan   Last to LAD in 2020 Iliana Fonseca [place])              -continue ASA, BB, statin, plavix              -Echo 11/5/21: EF 45-50%, mid to distal inferolateral hypokinesis, mild-mod AI    4. CHF systolic/diastolic/HCVD/LVH with strain  Has CMY with EF mild reduced at [facility]  Echo here shows normal EF and mild AS  12/08/21    ECHO ADULT COMPLETE 12/10/2021 12/10/2021    Interpretation Summary  · LV: Estimated LVEF is 55 - 60%. Normal cavity size, wall thickness and systolic function (ejection fraction normal). Wall motion: normal.  · RV: Mildly dilated right ventricle. · AV: Aortic valve sclerosis with no evidence of reduced excursion. Aortic valve leaflet calcification present. Aortic valve mean gradient is 14 mmHg. Aortic valve area is 1.8 cm2. Mild aortic valve stenosis is present. Moderate aortic valve regurgitation is present. · LA: Moderately dilated left atrium. · MV: Moderate mitral annular calcification. Signed by: Sydnee Mattson MD on 12/10/2021  2:09 PM      Lungs are wet, body is not  Limited options for adjusting OMT given lower bp   No ACE due to CKD 4  Was on Coreg hydralazine Imdur   Dr Leana Lawson seeing  Lab Results   Component Value Date/Time    NT pro-BNP 5,365 (H) 12/11/2021 01:28 AM    NT pro-BNP 2,469 (H) 12/08/2021 03:22 PM    NT pro-BNP 2,630 (H) 12/08/2021 03:22 PM        5.  CKD 4  continue diuretics, baseline Cr per Dr Gao/Pepe is 3  Lab Results   Component Value Date/Time    Creatinine 2.89 (H) 12/11/2021 01:28 AM      following     Cardiac Studies/Hx:  No specialty comments available. Past Medical History:   Diagnosis Date    Aortic regurgitation     CAD (coronary artery disease)     Taxus stent 12/2005, 12/06 [employer] Prox circ, 6/2018 [place] LADx2,  9/19 Sudarshan LAD & LCX, 1/20 Sudarshan     Chronic combined systolic and diastolic congestive heart failure ([address] 11/14/2021    Chronic obstructive pulmonary disease (HCC)     Congestive heart failure (HCC)     Diabetes ([facility] [address]     Essential hypertension     Hyperlipidemia     MI (myocardial infarction) ([facility] [address]     Murmur     Renal insufficiency     Thyroid disease       ROS-pertinents  negative except as above  The pertinent portions of the medical history,physician and nursing notes, meds,vitals , labs and Ins/Outs,are reviewed in the electronic record. Results for orders placed or performed during the hospital encounter of 12/08/21   EKG, 12 LEAD, INITIAL   Result Value Ref Range    Ventricular Rate 76 BPM    Atrial Rate 76 BPM    P-R Interval 162 ms    QRS Duration 112 ms    Q-T Interval 426 ms    QTC Calculation (Bezet) 479 ms    Calculated P Axis 25 degrees    Calculated R Axis -31 degrees    Calculated T Axis 110 degrees    Diagnosis       Normal sinus rhythm  Left axis deviation  Voltage criteria for left ventricular hypertrophy  ST & T wave abnormality, consider lateral ischemia  Prolonged QT  When compared with ECG of 09-DEC-2021 18:48,  MANUAL COMPARISON REQUIRED, DATA IS UNCONFIRMED        02/01/21    ECHO ADULT COMPLETE 02/01/2021 2/1/2021    Interpretation Summary  · LV: Estimated LVEF is 50 - 55%. Visually measured ejection fraction. Mildly dilated left ventricle. Upper normal wall thickness. Globally reduced systolic function. Low normal systolic function. Mild (grade 1) left ventricular diastolic dysfunction. · LA: Mildly dilated left atrium. Left Atrium volume index is 37.77 mL/m2.   · AV: Aortic valve leaflet calcification present. Mild aortic valve stenosis is present. Mild aortic valve regurgitation is present. · MV: Mild mitral valve regurgitation is present.     Signed by: Nisha Wolff MD on 2/1/2021  3:12 PM         Objective:    Physical Exam:   BP (!) 110/43 (BP 1 Location: Right arm, BP Patient Position: At rest;Sitting)   Pulse 74   Temp 99.4 °F (37.4 °C)   Resp 19   Ht 5' 5\" (1.651 m)   Wt 138 lb 7.2 oz (62.8 kg)   SpO2 97%   BMI 23.04 kg/m²    General:  alert, cooperative, mild distress, appears stated age   ENT, Neck:  no jvd   Chest Wall: inspection normal - no chest wall deformities or tenderness, respiratory effort normal   Lung: Crackles diffuse   Heart:  normal rate, regular rhythm, normal S1, S2, no murmurs, rubs, clicks or gallops   Abdomen: nondistended   Extremities: extremities normal, atraumatic, no cyanosis or edema     Patient Vitals for the past 12 hrs:   Temp Pulse Resp BP SpO2   12/11/21 1413  74      12/11/21 1233  64      12/11/21 1125 99.4 °F (37.4 °C) 66 19 (!) 110/43 97 %   12/11/21 1011  70      12/11/21 0905  68  (!) 116/33    12/11/21 0829 97.6 °F (36.4 °C) 71 12 (!) 116/33 100 %   12/11/21 0812  65      12/11/21 0746     100 %   12/11/21 0453 98.6 °F (37 °C) 63 13 (!) 102/45 100 %      Lab Results   Component Value Date/Time    WBC 10.6 12/11/2021 01:28 AM    HGB 11.1 (L) 12/11/2021 01:28 AM    HCT 34.6 (L) 12/11/2021 01:28 AM    PLATELET 286 12/25/4017 01:28 AM    MCV 93.5 12/11/2021 01:28 AM     Lab Results   Component Value Date/Time    Sodium 137 12/11/2021 01:28 AM    Potassium 4.2 12/11/2021 01:28 AM    Chloride 100 12/11/2021 01:28 AM    CO2 29 12/11/2021 01:28 AM    Anion gap 8 12/11/2021 01:28 AM    Glucose 72 12/11/2021 01:28 AM    BUN 77 (H) 12/11/2021 01:28 AM    Creatinine 2.89 (H) 12/11/2021 01:28 AM    BUN/Creatinine ratio 27 (H) 12/11/2021 01:28 AM    GFR est AA 26 (L) 12/11/2021 01:28 AM    GFR est non-AA 22 (L) 12/11/2021 01:28 AM    Calcium 9.0 12/11/2021 01:28 AM     No results found for: CPK, RCK1, RCK2, RCK3, RCK4, CKMB, CKNDX, CKND1, TROPT, TROIQ, BNPP, BNP  Lab Results   Component Value Date/Time    NT pro-BNP 5,365 (H) 12/11/2021 01:28 AM    NT pro-BNP 2,469 (H) 12/08/2021 03:22 PM    NT pro-BNP 2,630 (H) 12/08/2021 03:22 PM       reports that he quit smoking about 30 years ago. He has a 60.00 pack-year smoking history. He has never used smokeless tobacco. He reports current alcohol use. He reports that he does not use drugs. family history is not on file. Last 24hr Input/Output:    Intake/Output Summary (Last 24 hours) at 12/11/2021 1458  Last data filed at 12/11/2021 0730  Gross per 24 hour   Intake 200 ml   Output 1925 ml   Net -1725 ml        Data Review:   Lab Results   Component Value Date/Time    WBC 10.6 12/11/2021 01:28 AM    HGB 11.1 (L) 12/11/2021 01:28 AM    HCT 34.6 (L) 12/11/2021 01:28 AM    PLATELET 173 38/44/7950 01:28 AM    MCV 93.5 12/11/2021 01:28 AM     Lab Results   Component Value Date/Time    Sodium 137 12/11/2021 01:28 AM    Potassium 4.2 12/11/2021 01:28 AM    Chloride 100 12/11/2021 01:28 AM    CO2 29 12/11/2021 01:28 AM    Anion gap 8 12/11/2021 01:28 AM    Glucose 72 12/11/2021 01:28 AM    BUN 77 (H) 12/11/2021 01:28 AM    Creatinine 2.89 (H) 12/11/2021 01:28 AM    BUN/Creatinine ratio 27 (H) 12/11/2021 01:28 AM    GFR est AA 26 (L) 12/11/2021 01:28 AM    GFR est non-AA 22 (L) 12/11/2021 01:28 AM    Calcium 9.0 12/11/2021 01:28 AM    Bilirubin, total 0.2 12/11/2021 01:28 AM    Alk.  phosphatase 122 (H) 12/11/2021 01:28 AM    Protein, total 6.8 12/11/2021 01:28 AM    Albumin 2.7 (L) 12/11/2021 01:28 AM    Globulin 4.1 (H) 12/11/2021 01:28 AM    A-G Ratio 0.7 (L) 12/11/2021 01:28 AM    ALT (SGPT) 43 12/11/2021 01:28 AM    AST (SGOT) 12 (L) 12/11/2021 01:28 AM     No results found for: CPK, RCK1, RCK2, RCK3, RCK4, CKMB, CKNDX, CKND1, TROPT, TROIQ, BNPP, BNP  Lab Results   Component Value Date/Time    NT pro-BNP 5,365 (H) 12/11/2021 01:28 AM    NT pro-BNP 2,469 (H) 12/08/2021 03:22 PM    NT pro-BNP 2,630 (H) 12/08/2021 03:22 PM       Recent Results (from the past 24 hour(s))   PROTHROMBIN TIME + INR    Collection Time: 12/10/21  2:59 PM   Result Value Ref Range    INR 1.0 0.9 - 1.1      Prothrombin time 10.8 9.0 - 11.1 sec   LIPID PANEL    Collection Time: 12/10/21  2:59 PM   Result Value Ref Range    Cholesterol, total 76 <200 MG/DL    Triglyceride 113 <150 MG/DL    HDL Cholesterol 28 MG/DL    LDL, calculated 25.4 0 - 100 MG/DL    VLDL, calculated 22.6 MG/DL    CHOL/HDL Ratio 2.7 0.0 - 5.0     AVIS BY MULTIPLEX FLOW IA, QL    Collection Time: 12/10/21  3:08 PM   Result Value Ref Range    AVIS, Direct Negative Negative     GLUCOSE, POC    Collection Time: 12/10/21  4:22 PM   Result Value Ref Range    Glucose (POC) 311 (H) 65 - 117 mg/dL    Performed by [facility]    TROPONIN-HIGH SENSITIVITY    Collection Time: 12/10/21  6:07 PM   Result Value Ref Range    Troponin-High Sensitivity 250 (HH) 0 - 76 ng/L   GLUCOSE, POC    Collection Time: 12/10/21 10:07 PM   Result Value Ref Range    Glucose (POC) 140 (H) 65 - 117 mg/dL    Performed by Hubert Mckeon    TROPONIN-HIGH SENSITIVITY    Collection Time: 12/10/21 10:18 PM   Result Value Ref Range    Troponin-High Sensitivity 490 (HH) 0 - 76 ng/L   CBC W/O DIFF    Collection Time: 12/11/21  1:28 AM   Result Value Ref Range    WBC 10.6 4.1 - 11.1 K/uL    RBC 3.70 (L) 4.10 - 5.70 M/uL    HGB 11.1 (L) 12.1 - 17.0 g/dL    HCT 34.6 (L) 36.6 - 50.3 %    MCV 93.5 80.0 - 99.0 FL    MCH 30.0 26.0 - 34.0 PG    MCHC 32.1 30.0 - 36.5 g/dL    RDW 14.9 (H) 11.5 - 14.5 %    PLATELET 387 199 - 373 K/uL    MPV 10.8 8.9 - 12.9 FL    NRBC 0.0 0  WBC    ABSOLUTE NRBC 0.00 0.00 - 0.01 K/uL   PHOSPHORUS    Collection Time: 12/11/21  1:28 AM   Result Value Ref Range    Phosphorus 4.7 2.6 - 4.7 MG/DL   METABOLIC PANEL, COMPREHENSIVE    Collection Time: 12/11/21  1:28 AM Result Value Ref Range    Sodium 137 136 - 145 mmol/L    Potassium 4.2 3.5 - 5.1 mmol/L    Chloride 100 97 - 108 mmol/L    CO2 29 21 - 32 mmol/L    Anion gap 8 5 - 15 mmol/L    Glucose 72 65 - 100 mg/dL    BUN 77 (H) 6 - 20 MG/DL    Creatinine 2.89 (H) 0.70 - 1.30 MG/DL    BUN/Creatinine ratio 27 (H) 12 - 20      GFR est AA 26 (L) >60 ml/min/1.73m2    GFR est non-AA 22 (L) >60 ml/min/1.73m2    Calcium 9.0 8.5 - 10.1 MG/DL    Bilirubin, total 0.2 0.2 - 1.0 MG/DL    ALT (SGPT) 43 12 - 78 U/L    AST (SGOT) 12 (L) 15 - 37 U/L    Alk.  phosphatase 122 (H) 45 - 117 U/L    Protein, total 6.8 6.4 - 8.2 g/dL    Albumin 2.7 (L) 3.5 - 5.0 g/dL    Globulin 4.1 (H) 2.0 - 4.0 g/dL    A-G Ratio 0.7 (L) 1.1 - 2.2     TROPONIN-HIGH SENSITIVITY    Collection Time: 12/11/21  1:28 AM   Result Value Ref Range    Troponin-High Sensitivity 527 (HH) 0 - 76 ng/L   MAGNESIUM    Collection Time: 12/11/21  1:28 AM   Result Value Ref Range    Magnesium 2.5 (H) 1.6 - 2.4 mg/dL   NT-PRO BNP    Collection Time: 12/11/21  1:28 AM   Result Value Ref Range    NT pro-BNP 5,365 (H) <125 PG/ML   URIC ACID    Collection Time: 12/11/21  1:28 AM   Result Value Ref Range    Uric acid 9.4 (H) 3.5 - 7.2 MG/DL   PTT    Collection Time: 12/11/21  7:19 AM   Result Value Ref Range    aPTT 29.1 22.1 - 31.0 sec    aPTT, therapeutic range     58.0 - 77.0 SECS   GLUCOSE, POC    Collection Time: 12/11/21  7:30 AM   Result Value Ref Range    Glucose (POC) 90 65 - 117 mg/dL    Performed by Crystal Freire, POC    Collection Time: 12/11/21 12:59 PM   Result Value Ref Range    Glucose (POC) 114 65 - 117 mg/dL    Performed by Enrico Soliman    EKG, 12 LEAD, INITIAL    Collection Time: 12/11/21  2:15 PM   Result Value Ref Range    Ventricular Rate 76 BPM    Atrial Rate 76 BPM    P-R Interval 162 ms    QRS Duration 112 ms    Q-T Interval 426 ms    QTC Calculation (Bezet) 479 ms    Calculated P Axis 25 degrees    Calculated R Axis -31 degrees    Calculated T Axis 110 degrees    Diagnosis       Normal sinus rhythm  Left axis deviation  Voltage criteria for left ventricular hypertrophy  ST & T wave abnormality, consider lateral ischemia  Prolonged QT  When compared with ECG of 09-DEC-2021 18:48,  MANUAL COMPARISON REQUIRED, DATA IS UNCONFIRMED            No future appointments.      Nubia House MD 12/11/2021

## 2021-12-11 NOTE — PROGRESS NOTES
[facility] in [place], [place]  Heart Failure Inpatient Note    Patient name: Dominique Overton  Patient : 1950  Patient MRN: [id number]  Date of service: 21    Primary care physician: Marquis Dyan NP  Primary general cardiologist:  Juan Luis Davis    Primary F cardiologist: Eliane White MD    CHIEF COMPLAINT:  Chronic systolic heart failure    PLAN OF CARE:  · 71 y/o with 2 months h/o of worsened shortness of breath just recently discharged from [facility] on 1L NC O2 after treatment of presumed acute diastolic heart failure; presents to AHF Clinic with severe dyspnea; now on 3 liters of oxygen, in wheelchair; desaturates to 70s after walking 3 steps. · Chest CT done last week shows diffuse infiltrates likely atypical and possibly viral pneumonia; no covid test found in records. · Patient was referred to ER for admission for pneumonia with severe hypoxia and and new pulmonary infiltrates. Admitted  overnight and diagnosed with PNA  · Would like to obtain RHC once patient is able to lay flat next week; plan to postpone plans for LHC until euvolemic and nephrology clears; cardiology co-managing    RECOMMENDATIONS  Continue GDMT for Heart Failure as able  Continue Coreg 6.25 q12h  Discontinued norvasc as it can cause leg edema  Discontinued lisinopril due to acute on chronic renal failure  Continue hydralazine 75 PO q6h and Nitro infusion  No ACE/ARB/ARNi due to renal dysfunction  Continue hydralazine 5mg IV prn SBP > 140mmHg  Diuresis per nephrology with Bumex 1 mg BID ( may change To po this weekend); CO2 29; Cr 2.89  Continue ASA 81 mg and Plavix 75 mg daily  Patient with low iron, Ordered Venofer X 2 doses  Order Lipid panel - WNL, low HDL and LDL, Trig.  113; TChol 76  Continue Duo-Nebs & Arfomoterol & Budesonide Neb  Continue Doxycycline 100 mg IV q12h; per pulmonary  Continue Crestor 20 mg po daily  Heart failure education  Advanced care plan present on file      IMPRESSION:  Acute on Chronic Respiratory Failure  Hypoxia on 4LNC  Shortness of breath at rest  Multiple pulmonary infiltrates  Normal heart function, LVEF LVEF is 55 - 60%  Coronary artery disease  · S/p multivessel PTCA stents x 9  · Most recent stent LAD 2020  Cardiac risk factors   HTN   HL   Former smoker  CKD, stage 2    CARDIAC IMAGING:  Echo (12/10/2021)  · LV: Estimated LVEF is 55 - 60%. Normal cavity size, wall thickness and systolic function (ejection fraction normal). Wall motion: normal.  · RV: Mildly dilated right ventricle. · AV: Aortic valve sclerosis with no evidence of reduced excursion. Aortic valve leaflet calcification present. Aortic valve mean gradient is 14 mmHg. Aortic valve area is 1.8 cm2. Mild aortic valve stenosis is present. Moderate aortic valve regurgitation is present. · LA: Moderately dilated left atrium. · MV: Moderate mitral annular calcification. Echo 11/5/21: EF 45-50%, mid to distal inferolateral hypokinesis, mild-mod AI    Echo (2/1/21)  · LV: Estimated LVEF is 50 - 55%. Visually measured ejection fraction. Mildly dilated left ventricle. Upper normal wall thickness. Globally reduced systolic function. Low normal systolic function. Mild (grade 1) left ventricular diastolic dysfunction. · LA: Mildly dilated left atrium. Left Atrium volume index is 37.77 mL/m2. · AV: Aortic valve leaflet calcification present. Mild aortic valve stenosis is present. Mild aortic valve regurgitation is present. · MV: Mild mitral valve regurgitation is present. · IVSd 1.07cm  EKG (11/15/21) NSR 68bpm,     HEMODYNAMICS:  RHC not done  CPEST not done  6MW not done    OTHER IMAGING:  CXR (11/29/21)  Presence of abnormal density involving both lungs (left greater than  right). Follow-up examinations are recommended.   CT chest (11/30/21)  Widespread patchy opacities, reticulations, and architectural distortion with diffuse varicoid bronchiectasis and bronchial wall thickening. Additionally, few enlarged mediastinal and hilar lymph nodes are noted. Constellation of findings may reflect end-stage sarcoidosis or other interstitial lung disease, but a component of superimposed acute infectious or inflammatory process versus neoplasm cannot be excluded entirely based on imaging alone. Consider PET/CT for further evaluation. HISTORY OF PRESENT ILLNESS:  Briefly, Alix Heredia is a 70 y.o. male with h/o HTN, HL, former smoker, coronary artery disease s/p multivessel PTCA stents x 9, most recent stent LAD 2020 recently discharged from [facility] on 1 liters of O2 for treatment of shortness of breath, presumably diastolic HF. Patient presented to clinic on 12/7 for evaluation of shortness of breath. Chest CT last week showed diffuse pulmonary infiltrates. Hypoxia worsened now requiring 3 liters of O2 and desaturating to 70% walking 3 steps in clinic. Arrived in wheelchair and after assessment, it was recommended for patient to be admitted on 12/8/2021. Diagnosed with PNA in ED. INTERVAL HISTORY:  Patient reports he is feeling great this morning. Already sitting in the chair  Very stable breathing and on O2 at 4L  Ready to eat breakfast.   Pt reports no back pain, or CP  NT-proBNP 5365  No family at the bedside      REVIEW OF SYSTEMS:  Review of Systems   Constitutional: Positive for malaise/fatigue. Negative for fever. HENT: Positive for hearing loss. Negative for sore throat. Eyes: Negative. Respiratory: Positive for shortness of breath. Cardiovascular: Negative for chest pain, palpitations, leg swelling and PND. Gastrointestinal: Negative. Genitourinary: Negative. Musculoskeletal: Negative. Skin: Negative. Neurological: Negative for dizziness, focal weakness and loss of consciousness. Psychiatric/Behavioral: Negative.         PHYSICAL EXAM:  Visit Vitals  BP (!) 116/33   Pulse 68   Temp 97.6 °F (36.4 °C)   Resp 12   Ht 5' 5\" (1.651 m)   Wt 138 lb 7.2 oz (62.8 kg)   SpO2 100%   BMI 23.04 kg/m²     General: Patient is cachectic  HEENT: Normocephalic and atraumatic. No scleral icterus. Pupils are equal, round and reactive to light and accomodation. No conjunctival injection. Oropharynx is clear. Neck: Supple. No evidence of thyroid enlargements or lymphadenopathy. JVD: Negative  Lungs: Breath sounds are equal with some rales at bases. With NC O2 at 4L  Heart: Regular rate and rhythm with normal S1 and S2. No murmurs, gallops or rubs. Abdomen: Soft, no mass or tenderness. No organomegaly or hernia. Bowel sounds present. Genitourinary and rectal: deferred  Extremities: No cyanosis, clubbing, or +1 AYLIN R>L . Neurologic: No focal sensory or motor deficits are noted. Grossly intact. Psychiatric: Awake, alert an doriented x 3. Appropriate mood and affect. Skin: Warm, dry and well perfused. No lesions, nodules or rashes are noted. PAST MEDICAL HISTORY:  Past Medical History:   Diagnosis Date    Aortic regurgitation     CAD (coronary artery disease)     Taxus stent 2005,  Cypher Prox circ, 2018 [place] LADx2,   [place] LAD & LCX,  [place]     Chronic combined systolic and diastolic congestive heart failure ([address] 2021    Chronic obstructive pulmonary disease (HCC)     Congestive heart failure (HCC)     Diabetes ([address]     Essential hypertension     Hyperlipidemia     MI (myocardial infarction) ([address]     Murmur     Renal insufficiency     Thyroid disease        PAST SURGICAL HISTORY:  Past Surgical History:   Procedure Laterality Date    HX CORONARY STENT PLACEMENT      HX CYST REMOVAL      IR ASP BLADDER SUPRA CATH         FAMILY HISTORY:  History reviewed. No pertinent family history.     SOCIAL HISTORY:  Social History     Socioeconomic History    Marital status:    Tobacco Use    Smoking status: Former Smoker     Packs/day: 2.00     Years: 30.00     Pack years: 60.00     Quit date: 1991     Years since quittin.8    Smokeless tobacco: Never Used   Vaping Use    Vaping Use: Never used   Substance and Sexual Activity    Alcohol use: Yes     Comment: rarely    Drug use: Never    Sexual activity: Yes     Partners: Female       LABORATORY RESULTS:  Labs Latest Ref Rng & Units 12/11/2021 12/10/2021 12/9/2021 12/8/2021 12/8/2021 12/8/2021   WBC 4.1 - 11.1 K/uL 10.6 - 9.2 11. 4(H) - 10.6   RBC 4.10 - 5.70 M/uL 3.70(L) - 4.26 4.44 - 4.30   Hemoglobin 12.1 - 17.0 g/dL 11. 1(L) - 12.6 13.2 - 12.8   Hematocrit 36.6 - 50.3 % 34. 6(L) - 40.2 41.9 - 40.5   MCV 80.0 - 99.0 FL 93.5 - 94.4 94.4 - 94.2   Platelets 075 - 291 K/uL 243 - 230 249 - 250   Lymphocytes 12 - 49 % - - 4(L) - - 8(L)   Monocytes 5 - 13 % - - 1(L) - - 10   Eosinophils 0 - 7 % - - 1 - - 7   Basophils 0 - 1 % - - 0 - - 1   Albumin 3.5 - 5.0 g/dL 2. 7(L) - - 3. 1(L) 2. 8(L) 2. 7(L)   Calcium 8.5 - 10.1 MG/DL 9.0 9.0 9.2 9.4 9.4 9.5   Glucose 65 - 100 mg/dL 72 277(H) 381(H) 150(H) 126(H) 136(H)   BUN 6 - 20 MG/DL 77(H) 67(H) 56(H) 54(H) 54(H) 53(H)   Creatinine 0.70 - 1.30 MG/DL 2.89(H) 2.76(H) 2.44(H) 2.50(H) 2.57(H) 2.48(H)   Sodium 136 - 145 mmol/L 137 136 135(L) 140 139 136   Potassium 3.5 - 5.1 mmol/L 4.2 4.8 4.8 4.7 4.8 4.7   TSH 0.36 - 3.74 uIU/mL - - 0.41 - - -   Some recent data might be hidden       ALLERGY:  Allergies   Allergen Reactions    Bee Sting [Sting, Bee] Anaphylaxis    Iodinated Contrast Media Rash    Brilinta [Ticagrelor] Other (comments)    Effient [Prasugrel] Other (comments)    Penicillins Rash and Nausea Only    Ranexa [Ranolazine] Other (comments)    Sulfa (Sulfonamide Antibiotics) Rash        CURRENT MEDICATIONS:    Current Facility-Administered Medications:     heparin 25,000 units in D5W 250 ml infusion, 12-25 Units/kg/hr, IntraVENous, TITRATE, Guru, Jesus, DO    hydrALAZINE (APRESOLINE) tablet 75 mg, 75 mg, Oral, QID, Polliard, Heather Carbon T, NP, 75 mg at 12/11/21 0905    insulin lispro (HUMALOG) injection, , SubCUTAneous, AC&HS, Sarabjit Self MD FILEMON, 7 Units at 12/10/21 1811    glucose chewable tablet 16 g, 4 Tablet, Oral, PRN, Sarabjit Self MD    dextrose (D50W) injection syrg 12.5-25 g, 12.5-25 g, IntraVENous, PRN, Sarabjit Self MD    glucagon (GLUCAGEN) injection 1 mg, 1 mg, IntraMUSCular, PRN, Simona Giles MD    mylanta/viscous lidocaine (GI COCKTAIL), 40 mL, Oral, Q6H PRN, Alexandra Ceja NP    nitroglycerin (Tridil) 200 mcg/ml infusion, 0-200 mcg/min, IntraVENous, TITRATE, Christen Miller MD, Last Rate: 12 mL/hr at 12/10/21 1923, 40 mcg/min at 12/10/21 1923    iron sucrose (VENOFER) 200 mg in 0.9% sodium chloride 100 mL IVPB, 200 mg, IntraVENous, Q24H, Alexandra Ceja NP, Last Rate: 440 mL/hr at 12/10/21 1331, 200 mg at 12/10/21 1331    aspirin delayed-release tablet 81 mg, 81 mg, Oral, QHS, Guru, Jesus, DO    guaiFENesin ER (MUCINEX) tablet 600 mg, 600 mg, Oral, Q12H, Guru, Jesus, DO, 600 mg at 12/11/21 0906    benzonatate (TESSALON) capsule 100 mg, 100 mg, Oral, TID PRN, Federica Brown Jesus, DO    insulin glargine (LANTUS) injection 15 Units, 15 Units, SubCUTAneous, PCD, Federica Brown Jesus, DO, 15 Units at 12/10/21 1811    levothyroxine (SYNTHROID) tablet 100 mcg, 100 mcg, Oral, ACB, Guru, Jesus, DO, 100 mcg at 12/11/21 0711    arformoterol 15 mcg/budesonide 0.5 mg neb solution, , Nebulization, BID RT, Jesus Dsouza, DO, Given at 12/11/21 0744    nitroglycerin (NITROSTAT) tablet 0.4 mg, 0.4 mg, SubLINGual, Q5MIN PRN, Jesus Dsouza, DO, 0.4 mg at 12/10/21 1338    clopidogreL (PLAVIX) tablet 75 mg, 75 mg, Oral, DAILY, Jesus Garvey, DO, 75 mg at 12/11/21 0905    rosuvastatin (CRESTOR) tablet 20 mg, 20 mg, Oral, QHS, Jesus Garvey, DO, 20 mg at 12/10/21 2208    magnesium oxide (MAG-OX) tablet 200 mg, 200 mg, Oral, EVERY OTHER DAY, Jesus Garvey, DO, 200 mg at 12/11/21 0905    carvediloL (COREG) tablet 6.25 mg, 6.25 mg, Oral, Q12H, Jesus Garvey, DO, 6.25 mg at 12/11/21 0905   doxycycline (VIBRAMYCIN) 100 mg in 0.9% sodium chloride (MBP/ADV) 100 mL MBP, 100 mg, IntraVENous, Q12H, Ernesto Small PA-C, Last Rate: 100 mL/hr at 21 0711, 100 mg at 21 0711    bumetanide (BUMEX) injection 1 mg, 1 mg, IntraVENous, BID, Estrada Gao MD, 1 mg at 21 0905    acetaminophen (TYLENOL) tablet 650 mg, 650 mg, Oral, Q4H PRN, Sarabjit Self MD    acetaminophen (TYLENOL) tablet 650 mg, 650 mg, Oral, Q6H, Sarabjit Self MD, 650 mg at 21 0711    albuterol-ipratropium (DUO-NEB) 2.5 MG-0.5 MG/3 ML, 3 mL, Nebulization, Q6H RT, Katie Winston MD, 3 mL at 21 0744    sodium chloride (NS) flush 5-10 mL, 5-10 mL, IntraVENous, PRN, Jahaira Edwards MD    Thank you for your referral and allowing me to participate in this patient's care.       Jerri Oh DNP, AGACNP-BC, PCCN, [facility]  Heart Failure Nurse Practitioner   [facility] Colon   [address] [address], [address] / [place] [phone]/ F: [phone]            PATIENT CARE TEAM:  Patient Care Team:  Yisel Santiago NP as PCP - General (Nurse Practitioner)

## 2021-12-11 NOTE — PROGRESS NOTES
Problem: Self Care Deficits Care Plan (Adult)  Goal: *Acute Goals and Plan of Care (Insert Text)  Description: FUNCTIONAL STATUS PRIOR TO ADMISSION: Patient required up to maximum assistance for basic  ADLs involving LB care and instrumental ADLs. Recently discarged in Nov 2021 from OSH and since d/c has had difficulty performing LB self-care 2/2 SOB and fatigue. Wife now assists. Performs sponge baths due to fatigue and difficulty breathing    HOME SUPPORT: The patient lived alone with wife to provide assistance. Occupational Therapy Goals  Initiated 12/11/2021  1. Patient will perform grooming standing at sink x 5 min with VSS supervision/set-up within 7 day(s). 2.  Patient will perform LB dressing with moderate assistance and AE PRN within 7 day(s). 3.  Patient will perform bathing with moderate assistance  within 7 day(s). 4.  Patient will perform toilet transfers with minimal assistance/contact guard assist within 7 day(s). 5.  Patient will perform all aspects of toileting with supervision/set-up within 7 day(s). 6.  Patient will participate in upper extremity therapeutic exercise/activities with supervision/set-up for 10 minutes within 7 day(s). 7.  Patient will utilize energy conservation techniques during functional activities with verbal and visual cues within 7 day(s). Outcome: Progressing Towards Goal     OCCUPATIONAL THERAPY EVALUATION  Patient: Gudelia Jennings (72 y.o. male)  Date: 12/11/2021  Primary Diagnosis: CHF (congestive heart failure) ([facility]) [I50.9]        Precautions:  ([place])    ASSESSMENT  Based on the objective data described below, the patient presents with decreased ADL related mobility, performance and activity tolerance with dyspnea with exertion,  decreased cardiopulmonary tolerance, and generalized weakness following admission for CHF exacerbation.  At baseline, patient typically wears 1-3 L/min O2 via nasal cannula, however, is currently requiring 4 L/min O2 in order to maintain SpO2 > 89% with activity. Overall, patient benefits from CGA and RW while performing ADL transfers. Wife present during evaluation. Reports over the last month significant functional decline for self-care. He is now dependent for LB self-care due to breathlessness, he has to sponge bathe due to SOB with showering. Additionally, he now lives on 1st level of his home. Pt will benefit from OT services during hospital stay to facilitate ADL engagement and education/training to optimize functional recovery prior to return to home vs. Rehab pending progress while admitted. Current Level of Function Impacting Discharge (ADLs/self-care): up to total A for LB self-care    Functional Outcome Measure: The patient scored Total: 45/100 on the Barthel Index outcome measure       Other factors to consider for discharge: supportive spouse     Patient will benefit from skilled therapy intervention to address the above noted impairments. PLAN :  Recommendations and Planned Interventions: self care training, functional mobility training, therapeutic exercise, balance training, therapeutic activities, endurance activities, patient education, home safety training, and family training/education    Frequency/Duration: Patient will be followed by occupational therapy 5 times a week to address goals.     Recommendation for discharge: (in order for the patient to meet his/her long term goals)  Occupational therapy at least 2 days/week in the home AND ensure assist and/or supervision for safety with ADLs vs rehab pending progress    This discharge recommendation:  Has not yet been discussed the attending provider and/or case management    IF patient discharges home will need the following DME: AE: long handled bathing, AE: long handled dressing, and bedside commode       SUBJECTIVE:   Patient stated I just get outta breath real fast.    OBJECTIVE DATA SUMMARY:   HISTORY:   Past Medical History:   Diagnosis Date    Aortic regurgitation     CAD (coronary artery disease)     Taxus stent 12/2005, 12/06 [place] Prox circ, 6/2018 [place] LADx2,  9/19 [place] LAD & LCX, 1/20 Sudarshan     Chronic combined systolic and diastolic congestive heart failure ([address] 11/14/2021    Chronic obstructive pulmonary disease ([address]     Congestive heart failure ([address]     Diabetes ([address]     Essential hypertension     Hyperlipidemia     MI (myocardial infarction) ([address]     Murmur     Renal insufficiency     Thyroid disease      Past Surgical History:   Procedure Laterality Date    HX CORONARY STENT PLACEMENT      HX CYST REMOVAL      IR ASP BLADDER SUPRA CATH         Expanded or extensive additional review of patient history:     Home Situation  Home Environment: Private residence  # Steps to Enter:  (1, 3 inch step up into home)  Wheelchair Ramp: No  One/Two Story Residence: Two story, live on 1st floor  Living Alone: Yes  Support Systems: Spouse/Significant Other, Other Family Member(s)  Patient Expects to be Discharged to[de-identified] House  Current DME Used/Available at Home: [employer], straight, Gisela Gareth, rollator, Shower chair  Tub or Shower Type: Shower    Hand dominance: Right    EXAMINATION OF PERFORMANCE DEFICITS:  Cognitive/Behavioral Status:  Neurologic State: Alert; Appropriate for age  Orientation Level: Oriented X4                Skin: see nursing notes    Edema: see nursing notes    Hearing:   [place] bilaterally    Vision/Perceptual:       Corrective lenses                              Range of Motion:    AROM: Within functional limits  PROM: Within functional limits                      Strength:    Strength: Generally decreased, functional                Coordination:  Coordination: Within functional limits            Tone & Sensation:    Tone: Normal  Sensation: Impaired (B feet neuropathy)                      Balance:  Sitting: Intact; Without support  Standing: Impaired;  Without support  Standing - Static: Good  Standing - Dynamic : Fair; Occasional    Functional Mobility and Transfers for ADLs:  Bed Mobility:  Rolling: Modified independent  Supine to Sit: Modified independent  Sit to Supine: Modified independent  Scooting: Modified independent    Transfers:  Sit to Stand: Contact guard assistance; Adaptive equipment; Additional time  Stand to Sit: Minimum assistance (cues to safely sit)  Bed to Chair: Contact guard assistance; Adaptive equipment; Additional time    ADL Assessment:                                            ADL Intervention and task modifications:  Feeding  Feeding Assistance: Modified independent                        Lower Body Dressing Assistance  Pants With Button/Zipper: Maximum assistance  Socks: Total assistance (dependent)    Toileting  Bladder Hygiene:  (surpapubic catheter)            Functional Measure:    Barthel Index:  Bathin  Bladder: 0  Bowels: 10  Groomin  Dressin  Feeding: 10  Mobility: 0  Stairs: 0  Toilet Use: 5  Transfer (Bed to Chair and Back): 10  Total: 45/100      The Barthel ADL Index: Guidelines  1. The index should be used as a record of what a patient does, not as a record of what a patient could do. 2. The main aim is to establish degree of independence from any help, physical or verbal, however minor and for whatever reason. 3. The need for supervision renders the patient not independent. 4. A patient's performance should be established using the best available evidence. Asking the patient, friends/relatives and nurses are the usual sources, but direct observation and common sense are also important. However direct testing is not needed. 5. Usually the patient's performance over the preceding 24-48 hours is important, but occasionally longer periods will be relevant. 6. Middle categories imply that the patient supplies over 50 per cent of the effort. 7. Use of aids to be independent is allowed.     Score Interpretation (from [address] [place] [address])    Independent   60-79 Minimally independent   40-59 Partially dependent   20-39 Very dependent   <20 Totally dependent     -Maty Castaneda, Barthel, D.W. (5015). Functional evaluation: the Barthel Index. [address] ([address] (1997). The Barthel activities of daily living index: self-reporting versus actual performance in the old (> or = 75 years). Journal of [address] [place] [address] 45(7), 14 [facility], J.LUIS A, Ashleigh Reeder., Jorge Angeline. (1999). Measuring the change in disability after inpatient rehabilitation; comparison of the responsiveness of the Barthel Index and Functional [place] Measure. Journal of Neurology, Neurosurgery, and Psychiatry, 66(4), 832-581. Ame Zaidi, AMNA, AZUCENA Coffey, & Rober Blackburn M.A. (2004) Assessment of post-stroke quality of life in cost-effectiveness studies: The usefulness of the Barthel Index and the EuroQoL-5D. Quality of Life Research, 15, 395-08     Occupational Therapy Evaluation Charge Determination   History Examination Decision-Making   MEDIUM Complexity : Expanded review of history including physical, cognitive and psychosocial  history  MEDIUM Complexity : 3-5 performance deficits relating to physical, cognitive , or psychosocial skils that result in activity limitations and / or participation restrictions MEDIUM Complexity : Patient may present with comorbidities that affect occupational performnce.  Miniml to moderate modification of tasks or assistance (eg, physical or verbal ) with assesment(s) is necessary to enable patient to complete evaluation       Based on the above components, the patient evaluation is determined to be of the following complexity level: MEDIUM  Pain Rating:  Not rated    Activity Tolerance:   Fair, desaturates with exertion and requires oxygen, and observed SOB with activity    After treatment patient left in no apparent distress:    Sitting in chair, Call bell within reach, and Caregiver / family present    COMMUNICATION/EDUCATION: The patients plan of care was discussed with: Physical therapist and Registered nurse. Home safety education was provided and the patient/caregiver indicated understanding. and Patient/family have participated as able in goal setting and plan of care. This patients plan of care is appropriate for delegation to [facility].     Thank you for this referral.  Annalee Lara OT  Time Calculation: 33 mins

## 2021-12-11 NOTE — PROGRESS NOTES
Problem: Mobility Impaired (Adult and Pediatric)  Goal: *Acute Goals and Plan of Care (Insert Text)  Outcome: Not Met  Note: FUNCTIONAL STATUS PRIOR TO ADMISSION: Patient was modified independent using a rollator and single point cane for functional mobility. Patient typically wears nasal cannula (1-3 L/min). Patient denies any recent fall. + orthopnea. + Supra-pubic catheter. Sponge bathes with intermittent assistance from wife. HOME SUPPORT PRIOR TO ADMISSION: The patient lived with his wife who assists with ADLs intermittently. Physical Therapy Goals  Initiated 12/11/2021  1. Patient will move from supine to sit and sit to supine , scoot up and down, and roll side to side in bed with independence within 7 day(s). 2.  Patient will transfer from bed to chair and chair to bed with modified independence using the least restrictive device within 7 day(s). 3.  Patient will perform sit to stand with modified independence within 7 day(s). 4.  Patient will ambulate with modified independence for 150 feet with the least restrictive device within 7 day(s). 5.  Patient will ascend/descend 1 step (3 inches) without handrail(s) with supervision/set-up within 7 day(s). 6. Patient will participate in a 6 MWT for his diagnosis of heart failure within 48 hours of discharge. 7. Patient will verbally and functionally recall 3 pacing/energy conservation strategies to implement with functional tasks/mobility within 7 days. PHYSICAL THERAPY EVALUATION  Patient: Arely Chew (30 y.o. male)  Date: 12/11/2021  Primary Diagnosis: CHF (congestive heart failure) ([facility]) [I50.9]        Precautions:   ([place])    ASSESSMENT  Based on the objective data described below, the patient presents with decreased functional endurance, + dyspnea with limited ambulation (~ 15 ft), decreased cardiopulmonary tolerance, and generalized weakness following admission for CHF exacerbation.  Patient typically wears 1-3 L/min O2 via nasal cannula, however, is currently requiring 4 L/min O2 in order to maintain SpO2 > 89% with activity. Overall, patient is requiring contact guard assist + rolling walker support for dynamic standing balance safety. Reviewed pursed-lip breathing technique and several pacing strategies to implement with functional tasks. Will continue to follow in order to progress patient's activity tolerance and dynamic standing balance. Of note, patient currently resides on the 1st level of his home, however, ~ 2-3 weeks ago, he was able to negotiate flight of steps to 2nd level of home. Current Level of Function Impacting Discharge (mobility/balance): Contact guard     Functional Outcome Measure: The patient scored 45/100 on the Barthel Index outcome measure which is indicative of a 55% impaired-ability to independently perform functional tasks and ADLs. Other factors to consider for discharge: Owns supplemental oxygen, Has assist at home, Has appropriate DME for safe ambulation     Patient will benefit from skilled therapy intervention to address the above noted impairments. PLAN :  Recommendations and Planned Interventions: bed mobility training, transfer training, gait training, therapeutic exercises, edema management/control, patient and family training/education, and therapeutic activities      Frequency/Duration: Patient will be followed by physical therapy:  5 times a week to address goals. Recommendation for discharge: (in order for the patient to meet his/her long term goals)  Physical therapy at least 2 days/week in the home AND ensure assist and/or supervision for safety with community re-integration    This discharge recommendation:  Has not yet been discussed the attending provider and/or case management    IF patient discharges home will need the following DME: patient owns DME required for discharge         SUBJECTIVE:   Patient stated I do feel pretty short of breath right now.     OBJECTIVE DATA SUMMARY:   HISTORY:    Past Medical History:   Diagnosis Date    Aortic regurgitation     CAD (coronary artery disease)     Taxus stent 12/2005, 12/06 Cypher Prox circ, 6/2018 Sudarshan LADx2,  9/19 Sudarshan LAD & LCX, 1/20 Sudarshan     Chronic combined systolic and diastolic congestive heart failure ([facility] [address] 11/14/2021    Chronic obstructive pulmonary disease (HCC)     Congestive heart failure (HCC)     Diabetes ([facility] [address]     Essential hypertension     Hyperlipidemia     MI (myocardial infarction) ([facility] [address]     Murmur     Renal insufficiency     Thyroid disease      Past Surgical History:   Procedure Laterality Date    HX CORONARY STENT PLACEMENT      HX CYST REMOVAL      IR ASP BLADDER SUPRA CATH         Personal factors and/or comorbidities impacting plan of care: PMH    Home Situation  Home Environment: Private residence  # Steps to Enter:  (1, 3 inch step up into home)  Wheelchair Ramp: No  One/Two Story Residence: Two story, live on 1st floor  Living Alone: Yes  Support Systems: Spouse/Significant Other, Other Family Member(s)  Patient Expects to be Discharged to[de-identified] House  Current DME Used/Available at Home: [address] [place] [address], [place], [facility], rollator, Shower chair  Tub or Shower Type: Shower    EXAMINATION/PRESENTATION/DECISION MAKING:   Critical Behavior:  Neurologic State: Alert, Appropriate for age  Orientation Level: Oriented X4        Hearing:   [facility]  Skin:  + 1 pitting edema in LEs  Edema: + 1 pitting edema in LEs  Range Of Motion:  AROM: Within functional limits           PROM: Within functional limits           Strength:    Strength: Generally decreased, functional                    Tone & Sensation:   Tone: Normal              Sensation: Impaired (B feet neuropathy)               Coordination:  Coordination: Within functional limits  Vision:    Glasses  Functional Mobility:  Bed Mobility:  Rolling: Modified independent  Supine to Sit: Modified independent  Sit to Supine: Modified independent  Scooting: Modified independent  Transfers:  Sit to Stand: Contact guard assistance; Adaptive equipment; Additional time  Stand to Sit: Contact guard assistance; Adaptive equipment; Additional time        Bed to Chair: Contact guard assistance; Adaptive equipment; Additional time              Balance:   Sitting: Intact; Without support  Standing: Impaired; Without support  Standing - Static: Good  Standing - Dynamic : Fair; Occasional  Ambulation/Gait Training:  Distance (ft): 15 Feet (ft) (+ Dyspnea, SpO2 89% on 4 L/min)  Assistive Device: Gait belt; Walker, rolling (+ 4 L/min NC)  Ambulation - Level of Assistance: Contact guard assistance; Adaptive equipment; Additional time (+ 4 L/min NC)        Gait Abnormalities: Decreased step clearance        Base of Support: Widened     Speed/[place]: Slow  Step Length: Right shortened; Left shortened      Functional Measure:  Barthel Index:    Bathin  Bladder: 0  Bowels: 10  Groomin  Dressin  Feeding: 10  Mobility: 0  Stairs: 0  Toilet Use: 5  Transfer (Bed to Chair and Back): 10  Total: 45/100       The Barthel ADL Index: Guidelines  1. The index should be used as a record of what a patient does, not as a record of what a patient could do. 2. The main aim is to establish degree of independence from any help, physical or verbal, however minor and for whatever reason. 3. The need for supervision renders the patient not independent. 4. A patient's performance should be established using the best available evidence. Asking the patient, friends/relatives and nurses are the usual sources, but direct observation and common sense are also important. However direct testing is not needed. 5. Usually the patient's performance over the preceding 24-48 hours is important, but occasionally longer periods will be relevant. 6. Middle categories imply that the patient supplies over 50 per cent of the effort. 7. Use of aids to be independent is allowed.     Score Interpretation (from [facility] [facility] [place] [facility] )    Independent   60-79 Minimally independent   40-59 Partially dependent   20-39 Very dependent   <20 Totally dependent     -Shun Castaneda., Barthel, D.W. (1965). Functional evaluation: the Barthel Index. [address] ([address] (). The Barthel activities of daily living index: self-reporting versus actual performance in the old (> or = 75 years). Journal of [address] [place] [address] 45(7), [facility], CONNER, Shad Dyer., Wale Gutierrez. (1999). Measuring the change in disability after inpatient rehabilitation; comparison of the responsiveness of the Barthel Index and Functional Kimble Measure. Journal of Neurology, Neurosurgery, and Psychiatry, 66(4), 333-158. AMNA Doll, AZUCENA Coffey, & Yamile Loaiza MSamirA. (2004) Assessment of post-stroke quality of life in cost-effectiveness studies: The usefulness of the Barthel Index and the EuroQoL-5D. Quality of Life Research, 13, 549-50          Based on the above components, the patient evaluation is determined to be of the following complexity level: LOW     Pain Ratin/10 chest - Currently on a nitro drip    Activity Tolerance:   Fair, desaturates with exertion and requires oxygen, requires frequent rest breaks, and observed SOB with activity    After treatment patient left in no apparent distress:   Sitting in chair, Call bell within reach, and Bed / chair alarm activated    COMMUNICATION/EDUCATION:   The patients plan of care was discussed with: Occupational therapist and Registered nurse. Fall prevention education was provided and the patient/caregiver indicated understanding., Patient/family have participated as able in goal setting and plan of care. , and Patient/family agree to work toward stated goals and plan of care.     Thank you for this referral.  Juleen Angelucci, PT, DPT   Time Calculation: 24 mins

## 2021-12-11 NOTE — ACP (ADVANCE CARE PLANNING)
Advance Care Planning     Advance Care Planning (ACP) Physician/NP/PA Conversation      Date of Conversation: 12/8/2021  Conducted with: Patient with Decision Making Capacity    Healthcare Decision Maker:     Primary Decision Maker: delaney celeste - Spouse - [phone]  Click here to complete [address] including selection of the Healthcare Decision Maker Relationship (ie \"Primary\")      Today we documented Decision Maker(s) consistent with Legal Next of Kin hierarchy. Care Preferences:    Hospitalization: \"If your health worsens and it becomes clear that your chance of recovery is unlikely, what would be your preference regarding hospitalization? \"  The patient would prefer hospitalization. Ventilation: \"If you were unable to breathe on your own and your chance of recovery was unlikely, what would be your preference about the use of a ventilator (breathing machine) if it was available to you? \"   The patient would desire the use of a ventilator. Resuscitation: \"In the event your heart stopped as a result of an underlying serious health condition, would you want attempts to be made to restart your heart, or would you prefer a natural death? \"   Yes, attempt to resuscitate.     Additional topics discussed: treatment goals    Conversation Outcomes / Follow-Up Plan:   ACP complete - no further action today  Reviewed DNR/DNI and patient elects Full Code (Attempt Resuscitation)     Length of Voluntary ACP Conversation in minutes:  16 minutes    Pelon Will MD

## 2021-12-11 NOTE — PROGRESS NOTES
RENAL  PROGRESS NOTE        Subjective:   Feels better  Objective:   VITALS SIGNS:    Visit Vitals  BP (!) 110/43 (BP 1 Location: Right arm, BP Patient Position: At rest;Sitting)   Pulse 64   Temp 99.4 °F (37.4 °C)   Resp 19   Ht 5' 5\" (1.651 m)   Wt 62.8 kg (138 lb 7.2 oz)   SpO2 97%   BMI 23.04 kg/m²       O2 Device: Nasal cannula   O2 Flow Rate (L/min): 4 l/min   Temp (24hrs), Av.1 °F (36.7 °C), Min:97.5 °F (36.4 °C), Max:99.4 °F (37.4 °C)         PHYSICAL EXAM:  NAD  Trace edema    DATA REVIEW:     INTAKE / OUTPUT:   Last shift:      701 - 1900  In: -   Out: 375 [Urine:375]  Last 3 shifts: 1901 - 700  In: 300 [P.O.:200; I.V.:100]  Out:  [Urine:]    Intake/Output Summary (Last 24 hours) at 2021 1311  Last data filed at 2021 0730  Gross per 24 hour   Intake 200 ml   Output 1925 ml   Net -1725 ml         LABS:   Recent Labs     21  0128 21  1028 21  2324   WBC 10.6 9.2 11.4*   HGB 11.1* 12.6 13.2   HCT 34.6* 40.2 41.9    230 249     Recent Labs     21  0128 12/10/21  1459 12/10/21  0306 21  1028 21  2324 21  2324 21  1522 21  1522     --  136 135*   < > 140   < > 139  136   K 4.2  --  4.8 4.8   < > 4.7   < > 4.8  4.7     --  100 98   < > 101   < > 102  104   CO2 29  --  31 31   < > 33*   < > 32  29   GLU 72  --  277* 381*   < > 150*   < > 126*  136*   BUN 77*  --  67* 56*   < > 54*   < > 54*  53*   CREA 2.89*  --  2.76* 2.44*   < > 2.50*   < > 2.57*  2.48*   CA 9.0  --  9.0 9.2   < > 9.4   < > 9.4  9.5   MG 2.5*  --   --   --   --  2.4  --   --    PHOS 4.7  --   --   --   --  4.1  --   --    ALB 2.7*  --   --   --   --  3.1*  --  2.8*  2.7*   TBILI 0.2  --   --   --   --  0.3  --  0.2  0.2   ALT 43  --   --   --   --  88*  --  86*  79*   INR  --  1.0  --   --   --   --   --   --     < > = values in this interval not displayed.           Assessment:  CKD stage 4: Serum Cr 2. 4->2.7mg/dl. Baseline serum Cr 2.5 to 3mg/dl-> 2 to DM/HTN. Followed by my partner Dr. Ta Jackman.     Decompensated CHF: EF 45-50%     Chest pain: hx of 9 stents     DM2: Uncontrolled->Hyperglycemia     HTN: fluctuating control.     Proteinuria: 2.8gm proteinuria     COPD suspected: Possible BRAYDEN PNA.  Pulmonary following     Hx of urethral obstruction: s/p suprapubic catheter     Plan/Recommendations:  Serum Cr rising with diuresis  Po bumex  [facility] planned this admission-> discussed risks of MEL with patient/daughter yesterday  Discussed with him  Robert Deshpande MD

## 2021-12-11 NOTE — PROGRESS NOTES
0730 Bedside and Verbal shift change report given to NELY Carr (oncoming nurse) by Jessica Soto (offgoing nurse). Report included the following information SBAR, Kardex, Intake/Output, MAR, Recent Results and Cardiac Rhythm SR.     1002 Hep gtt started. 12 units/kg/hr.

## 2021-12-11 NOTE — PROGRESS NOTES
Reviewed Dr. Michelle Yee note from 12/10. NO new updates at this point. Stable on Current oxygen levels. Will see as needed over weekend. Please call if any acute issues.

## 2021-12-11 NOTE — PROGRESS NOTES
Bedside shift change report given to Tsering Weaver (oncoming nurse) by Cathrine Dandy, RN (offgoing nurse). Report included the following information SBAR, Kardex, MAR, Recent Results and Cardiac Rhythm NSR.

## 2021-12-11 NOTE — ED NOTES
Bedside and Verbal shift change report given to Roberto Jaramillo RN (oncoming nurse) by Rekha Knowles RN (offgoing nurse). Report included the following information SBAR, Kardex, ED Summary, Intake/Output, MAR, Recent Results and Med Rec Status.

## 2021-12-12 NOTE — PROGRESS NOTES
Suprapubic tube changed undeer sterile conditions  Return of clear urine  6cc water placed in balloon

## 2021-12-12 NOTE — PROGRESS NOTES
1930  Bedside shift change report given to Radha Bach (oncoming nurse) by Rosa Maria Qureshi (offgoing nurse). Report included the following information SBAR, Kardex, Intake/Output, MAR, Accordion, Recent Results and Cardiac Rhythm NSR.    0245  Ptt resulted >130. Pt is asymptomatic. Heparin gtt stopped and MD notified. Will redraw ptt in 2 hours     0730  Bedside shift change report given to Lilly(oncoming nurse) by Radha Bach (offgoing nurse). Report included the following information SBAR, Kardex, Intake/Output, MAR, Accordion, Recent Results and Cardiac Rhythm NSR.

## 2021-12-12 NOTE — PROGRESS NOTES
12/12/2021   True Mejia MD  [facility]    . Antwan Moncada Anatoliy Mclean is a 70 y.o. 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 76Troponin 16, & 16    12/08/21ECHO ADULT COMPLETE 12/10/2021 12/10/2021  Interpretation Summary  · LV: Estimated LVEF is 55 - 60%. Normal cavity size, wall thickness and systolic function (ejection fraction normal). Wall motion: normal.  · RV: Mildly dilated right ventricle. · AV: Aortic valve sclerosis with no evidence of reduced excursion. Aortic valve leaflet calcification present. Aortic valve mean gradient is 14 mmHg. Aortic valve area is 1.8 cm2. Mild aortic valve stenosis is present. Moderate aortic valve regurgitation is present. · LA: Moderately dilated left atrium. · MV: Moderate mitral annular calcification. Discussion/Plans/Recs    1.  Chest pain  He says atypical for his CAD and his troponinwere normal   Now some modest elevation and CP resolved, he feels it is not his typical angina  He was on Imdur prev  Will restart Imdur off NTG gtt  Not candidate with LHC with current poor renal fx  Suspect he has \"strain\" of severe residual CAD with acute illness and CP seems more pleuritic now  Did get better with NTG SL, EKG no acute findings  Cath with this severe CKD would put a high risk for permanent renal failure    2. Resp distress  Acute on chronic respiratory failure with markedly abnormal CT scan suspect pulmonary source questionable ILD pulmonary edema airspace disease  Improving with ABX, nebs and diuretics  Dry rales posterior, not apparent when listening anteriorly  Does not whole body volume overloaded  Continue ABX and use of diuretics  CT Chest per Pulmonary with patch consolidation and reticular changes with adenopathy  Wife would like to review CT and \"burnt out sarcoid\" with Pulmonary on Monday     3. CAD chronic   9 stents in [place] per pt  Taxus stent 12/2005, 12/06 [place] Prox circ, 6/2018 [place] LADx2,  9/19 Sudarshan LAD & LCX, 1/20 [place]   Last to LAD in 2020 [place], [place])              -continue ASA, BB, statin, plavix              -Echo 11/5/21: EF 45-50%, mid to distal inferolateral hypokinesis, mild-mod AI    4. CHF systolic/diastolic/HCVD/LVH with strain  Has CMY with EF mild reduced [facility]  Echo here shows normal EF and mild AS  No ACE due to CKD 4  Was on Coreg hydralazine Imdur   Dr Corwin Garcia seeing  12/08/21    ECHO ADULT COMPLETE 12/10/2021 12/10/2021    Interpretation Summary  · LV: Estimated LVEF is 55 - 60%. Normal cavity size, wall thickness and systolic function (ejection fraction normal). Wall motion: normal.  · RV: Mildly dilated right ventricle. · AV: Aortic valve sclerosis with no evidence of reduced excursion. Aortic valve leaflet calcification present. Aortic valve mean gradient is 14 mmHg. Aortic valve area is 1.8 cm2. Mild aortic valve stenosis is present. Moderate aortic valve regurgitation is present. · LA: Moderately dilated left atrium. · MV: Moderate mitral annular calcification.     Signed by: Poly Beasley MD on 12/10/2021  2:09 PM        Lab Results   Component Value Date/Time    NT pro-BNP 2,527 (H) 12/12/2021 12:57 AM    NT pro-BNP 5,365 (H) 12/11/2021 01:28 AM    NT pro-BNP 2,469 (H) 12/08/2021 03:22 PM    NT pro-BNP 2,630 (H) 12/08/2021 03:22 PM        5. CKD 4  continue diuretics, baseline Cr per Dr Gao/Pepe is 3  Lab Results   Component Value Date/Time    Creatinine 2.62 (H) 12/12/2021 12:57 AM      following     Cardiac Studies/Hx:  No specialty comments available. Past Medical History:   Diagnosis Date    Aortic regurgitation     CAD (coronary artery disease)     Taxus stent 12/2005, 12/06 Cypher Prox circ, 6/2018 [place] LADx2,  9/19 Sudarshan LAD & LCX, 1/20 [place]     Chronic combined systolic and diastolic congestive heart failure ([address] 11/14/2021    Chronic obstructive pulmonary disease (HCC)     Congestive heart failure (HCC)     Diabetes ([facility] [address]     Essential hypertension     Hyperlipidemia     MI (myocardial infarction) ([facility] [address]     Murmur     Renal insufficiency     Thyroid disease       ROS-pertinents  negative except as above  The pertinent portions of the medical history,physician and nursing notes, meds,vitals , labs and Ins/Outs,are reviewed in the electronic record. Results for orders placed or performed during the hospital encounter of 12/08/21   EKG, 12 LEAD, INITIAL   Result Value Ref Range    Ventricular Rate 76 BPM    Atrial Rate 76 BPM    P-R Interval 162 ms    QRS Duration 112 ms    Q-T Interval 426 ms    QTC Calculation (Bezet) 479 ms    Calculated P Axis 25 degrees    Calculated R Axis -31 degrees    Calculated T Axis 110 degrees    Diagnosis       Normal sinus rhythm  Left axis deviation  Voltage criteria for left ventricular hypertrophy  ST & T wave abnormality, consider lateral ischemia  Prolonged QT  When compared with ECG of 09-DEC-2021 18:48,  MANUAL COMPARISON REQUIRED, DATA IS UNCONFIRMED        02/01/21    ECHO ADULT COMPLETE 02/01/2021 2/1/2021    Interpretation Summary  · LV: Estimated LVEF is 50 - 55%. Visually measured ejection fraction.  Mildly dilated left ventricle. Upper normal wall thickness. Globally reduced systolic function. Low normal systolic function. Mild (grade 1) left ventricular diastolic dysfunction. · LA: Mildly dilated left atrium. Left Atrium volume index is 37.77 mL/m2. · AV: Aortic valve leaflet calcification present. Mild aortic valve stenosis is present. Mild aortic valve regurgitation is present. · MV: Mild mitral valve regurgitation is present.     Signed by: Domingo Stovall MD on 2/1/2021  3:12 PM         Objective:    Physical Exam:   BP (!) 114/37 (BP 1 Location: Left arm, BP Patient Position: At rest;Sitting)   Pulse 63   Temp 97.5 °F (36.4 °C)   Resp 23   Ht 5' 5\" (1.651 m)   Wt 139 lb 15.9 oz (63.5 kg)   SpO2 100%   BMI 23.30 kg/m²    General:  alert, cooperative, mild distress, appears stated age   ENT, Neck:  no jvd   Chest Wall: inspection normal - no chest wall deformities or tenderness, respiratory effort normal   Lung: Crackles diffuse   Heart:  normal rate, regular rhythm, normal S1, S2, no murmurs, rubs, clicks or gallops   Abdomen: nondistended   Extremities: extremities normal, atraumatic, no cyanosis or edema     Patient Vitals for the past 12 hrs:   Temp Pulse Resp BP SpO2   12/12/21 1105 97.5 °F (36.4 °C) 63 23 (!) 114/37 100 %   12/12/21 1000  63      12/12/21 0807 98.2 °F (36.8 °C) 62 12 (!) 137/48 98 %   12/12/21 0600  76      12/12/21 0450 98 °F (36.7 °C) 65 20 (!) 127/48 99 %   12/12/21 0400  72      12/12/21 0200  70      12/12/21 0008 97.9 °F (36.6 °C) 76 25 (!) 82/40 95 %      Lab Results   Component Value Date/Time    WBC 10.6 12/11/2021 01:28 AM    HGB 11.1 (L) 12/11/2021 01:28 AM    HCT 34.6 (L) 12/11/2021 01:28 AM    PLATELET 317 51/36/4260 01:28 AM    MCV 93.5 12/11/2021 01:28 AM     Lab Results   Component Value Date/Time    Sodium 135 (L) 12/12/2021 12:57 AM    Potassium 4.1 12/12/2021 12:57 AM    Chloride 99 12/12/2021 12:57 AM    CO2 29 12/12/2021 12:57 AM    Anion gap 7 12/12/2021 12:57 AM    Glucose 195 (H) 12/12/2021 12:57 AM    BUN 86 (H) 12/12/2021 12:57 AM    Creatinine 2.62 (H) 12/12/2021 12:57 AM    BUN/Creatinine ratio 33 (H) 12/12/2021 12:57 AM    GFR est AA 29 (L) 12/12/2021 12:57 AM    GFR est non-AA 24 (L) 12/12/2021 12:57 AM    Calcium 8.4 (L) 12/12/2021 12:57 AM     No results found for: CPK, RCK1, RCK2, RCK3, RCK4, CKMB, CKNDX, CKND1, TROPT, TROIQ, BNPP, BNP  Lab Results   Component Value Date/Time    NT pro-BNP 2,527 (H) 12/12/2021 12:57 AM    NT pro-BNP 5,365 (H) 12/11/2021 01:28 AM    NT pro-BNP 2,469 (H) 12/08/2021 03:22 PM    NT pro-BNP 2,630 (H) 12/08/2021 03:22 PM       reports that he quit smoking about 30 years ago. He has a 60.00 pack-year smoking history. He has never used smokeless tobacco. He reports current alcohol use. He reports that he does not use drugs. family history is not on file. Last 24hr Input/Output:    Intake/Output Summary (Last 24 hours) at 12/12/2021 1117  Last data filed at 12/12/2021 1105  Gross per 24 hour   Intake 240 ml   Output 1050 ml   Net -810 ml        Data Review:   Lab Results   Component Value Date/Time    WBC 10.6 12/11/2021 01:28 AM    HGB 11.1 (L) 12/11/2021 01:28 AM    HCT 34.6 (L) 12/11/2021 01:28 AM    PLATELET 351 84/12/2870 01:28 AM    MCV 93.5 12/11/2021 01:28 AM     Lab Results   Component Value Date/Time    Sodium 135 (L) 12/12/2021 12:57 AM    Potassium 4.1 12/12/2021 12:57 AM    Chloride 99 12/12/2021 12:57 AM    CO2 29 12/12/2021 12:57 AM    Anion gap 7 12/12/2021 12:57 AM    Glucose 195 (H) 12/12/2021 12:57 AM    BUN 86 (H) 12/12/2021 12:57 AM    Creatinine 2.62 (H) 12/12/2021 12:57 AM    BUN/Creatinine ratio 33 (H) 12/12/2021 12:57 AM    GFR est AA 29 (L) 12/12/2021 12:57 AM    GFR est non-AA 24 (L) 12/12/2021 12:57 AM    Calcium 8.4 (L) 12/12/2021 12:57 AM    Bilirubin, total 0.2 12/11/2021 01:28 AM    Alk.  phosphatase 122 (H) 12/11/2021 01:28 AM    Protein, total 6.8 12/11/2021 01:28 AM    Albumin 2.7 (L) 12/11/2021 01:28 AM    Globulin 4.1 (H) 12/11/2021 01:28 AM    A-G Ratio 0.7 (L) 12/11/2021 01:28 AM    ALT (SGPT) 43 12/11/2021 01:28 AM    AST (SGOT) 12 (L) 12/11/2021 01:28 AM     No results found for: CPK, RCK1, RCK2, RCK3, RCK4, CKMB, CKNDX, CKND1, TROPT, TROIQ, BNPP, BNP  Lab Results   Component Value Date/Time    NT pro-BNP 2,527 (H) 12/12/2021 12:57 AM    NT pro-BNP 5,365 (H) 12/11/2021 01:28 AM    NT pro-BNP 2,469 (H) 12/08/2021 03:22 PM    NT pro-BNP 2,630 (H) 12/08/2021 03:22 PM       Recent Results (from the past 24 hour(s))   GLUCOSE, POC    Collection Time: 12/11/21 12:59 PM   Result Value Ref Range    Glucose (POC) 114 65 - 117 mg/dL    Performed by John Whitlock    EKG, 12 LEAD, INITIAL    Collection Time: 12/11/21  2:15 PM   Result Value Ref Range    Ventricular Rate 76 BPM    Atrial Rate 76 BPM    P-R Interval 162 ms    QRS Duration 112 ms    Q-T Interval 426 ms    QTC Calculation (Bezet) 479 ms    Calculated P Axis 25 degrees    Calculated R Axis -31 degrees    Calculated T Axis 110 degrees    Diagnosis       Normal sinus rhythm  Left axis deviation  Voltage criteria for left ventricular hypertrophy  ST & T wave abnormality, consider lateral ischemia  Prolonged QT  When compared with ECG of 09-DEC-2021 18:48,  MANUAL COMPARISON REQUIRED, DATA IS UNCONFIRMED     PTT    Collection Time: 12/11/21  4:21 PM   Result Value Ref Range    aPTT 79.8 (H) 22.1 - 31.0 sec    aPTT, therapeutic range     58.0 - 77.0 SECS   GLUCOSE, POC    Collection Time: 12/11/21  4:23 PM   Result Value Ref Range    Glucose (POC) 196 (H) 65 - 117 mg/dL    Performed by Tatiana ZEE)    GLUCOSE, POC    Collection Time: 12/11/21  9:58 PM   Result Value Ref Range    Glucose (POC) 274 (H) 65 - 117 mg/dL    Performed by Jackelyn Reyes (CON)    PTT    Collection Time: 12/12/21 12:57 AM   Result Value Ref Range    aPTT >130.0 (HH) 22.1 - 31.0 sec    aPTT, therapeutic range     58.0 - 77.0 SECS   NT-PRO BNP    Collection Time: 12/12/21 12:57 AM   Result Value Ref Range    NT pro-BNP 2,527 (H) <125 PG/ML   MAGNESIUM    Collection Time: 12/12/21 12:57 AM   Result Value Ref Range    Magnesium 2.5 (H) 1.6 - 2.4 mg/dL   METABOLIC PANEL, BASIC    Collection Time: 12/12/21 12:57 AM   Result Value Ref Range    Sodium 135 (L) 136 - 145 mmol/L    Potassium 4.1 3.5 - 5.1 mmol/L    Chloride 99 97 - 108 mmol/L    CO2 29 21 - 32 mmol/L    Anion gap 7 5 - 15 mmol/L    Glucose 195 (H) 65 - 100 mg/dL    BUN 86 (H) 6 - 20 MG/DL    Creatinine 2.62 (H) 0.70 - 1.30 MG/DL    BUN/Creatinine ratio 33 (H) 12 - 20      GFR est AA 29 (L) >60 ml/min/1.73m2    GFR est non-AA 24 (L) >60 ml/min/1.73m2    Calcium 8.4 (L) 8.5 - 10.1 MG/DL   SAMPLES BEING HELD    Collection Time: 12/12/21 12:57 AM   Result Value Ref Range    SAMPLES BEING HELD 1lav     COMMENT        Add-on orders for these samples will be processed based on acceptable specimen integrity and analyte stability, which may vary by analyte. PTT    Collection Time: 12/12/21  5:03 AM   Result Value Ref Range    aPTT 55.9 (H) 22.1 - 31.0 sec    aPTT, therapeutic range     58.0 - 77.0 SECS   GLUCOSE, POC    Collection Time: 12/12/21  6:15 AM   Result Value Ref Range    Glucose (POC) 89 65 - 117 mg/dL    Performed by Miri Johnson           No future appointments.      Dinah Ruelas MD 12/12/2021

## 2021-12-12 NOTE — PROGRESS NOTES
[facility] [facility]  Hospitalist Group                                                                                          Hospitalist Progress Note  Linda Guevara MD  Answering service: [phone] from in house phone        Date of Service:  2021  NAME:  Flip Hicks  :  1950  MRN:  [id number]      Admission Summary:     Patient with recent admission at [facility] and discharged with diagnosis of acute diastolic heart failure. Patient went home on 1 L of oxygen. Patient presented to heart failure clinic  and found to be hypoxic with increased work of breathing and subsequently sent to the emergency room for further evaluation. CT of the chest shows bilateral pulmonary infiltrates which was unchanged from his recent chest CT . Patient has seen pulmonology once outpatient for further evaluation and further ongoing investigation for that. Interval history / Subjective:       Patient's chest pain is now resolved. No more recurrence. Also breathing better.      Assessment & Plan:     Acute on chronic hypoxic respiratory failure  -Acute on chronic hypoxic respiratory failure likely combination of CHF and pulmonary pathology  -Patient on 1 L of oxygen at home but found more hypoxic prior to admission  -CT chest shows bilateral infiltrates, negative for Covid  -Pulmonology evaluating the patient  -Screening for connective tissue disease and multiple labs sent by pulmonology, suspected COPD but yet to be defined via PFTs  -On doxycycline for atypical pneumonia    Acute diastolic congestive heart failure  -Discontinued Norvasc due to leg edema, discontinued lisinopril due to BIPIN  -Previous echo at [facility] shows EF of 45 to 50%, repeat echo this admission shows EF 55 to 60%  -On hydralazine, Coreg and on diuresis with Bumex 1 mg IV twice daily  -Advanced heart failure team following  -Plan for right heart cath once patient is more euvolemic  -Cardiology not recommending left heart cath now due to risks to kidney    Acute coronary syndrome  -Patient with chest pain and elevated troponin, likely type II MI due to CHF and respiratory failure  -Chest pain has now resolved, nitro drip discontinued and started on Imdur  -Troponins trending up, started on heparin drip 12/11  -Check 12-lead EKG, cardiology to reevaluate    Urinary tract infection  -Urine culture on 12/9 growing Klebsiella  -Start cefdinir 300 mg bid    CKD stage IV  -Baseline serum creatinine 2.5-3 per nephrology  -Mild bump in creatinine due to diuresis  -Discussed risk of MEL with cardiac catheterization, with patient and wife  -Nephrology following    Hypertension  -Continue hydralazine, coreg and nitrates    Diabetes  -Continue Lantus along with insulin sliding scale coverage    Dyslipidemia  -Continue statin    Hypothyroidism  -Continue Synthroid    Code status: Full  DVT prophylaxis: SCDs    Care Plan discussed with: Patient/Family  Anticipated Disposition: Home w/Family  Anticipated Discharge: Greater than 48 hours     Hospital Problems  Date Reviewed: 12/9/2021          Codes Class Noted POA    CHF (congestive heart failure) (HCC) ICD-10-CM: I50.9  ICD-9-CM: 428.0  12/9/2021 Unknown        Renal insufficiency ICD-10-CM: N28.9  ICD-9-CM: 593.9  Unknown Yes        Chronic obstructive pulmonary disease ([facility] [address] ICD-10-CM: J44.9  ICD-9-CM: 271  Unknown Yes        Diabetes ([facility] [address] ICD-10-CM: E11.9  ICD-9-CM: 250.00  Unknown Yes        Essential hypertension ICD-10-CM: I10  ICD-9-CM: 401.9  Unknown Yes        Coronary arteriosclerosis ICD-10-CM: I25.10  ICD-9-CM: 414.00  7/3/2018 Yes                Review of Systems:   A comprehensive review of systems was negative except for that written in the HPI. Vital Signs:    Last 24hrs VS reviewed since prior progress note.  Most recent are:  Visit Vitals  BP (!) 114/37 (BP 1 Location: Left arm, BP Patient Position: At rest;Sitting)   Pulse 63   Temp 97.5 °F (36.4 °C)   Resp 23   Ht 5' 5\" (1.651 m)   Wt 63.5 kg (139 lb 15.9 oz)   SpO2 100%   BMI 23.30 kg/m²         Intake/Output Summary (Last 24 hours) at 12/12/2021 1153  Last data filed at 12/12/2021 1105  Gross per 24 hour   Intake 240 ml   Output 1050 ml   Net -810 ml        Physical Examination:     I had a face to face encounter with this patient and independently examined them on 12/12/2021 as outlined below:          Constitutional:  No acute distress, cooperative, pleasant    ENT:  Oral mucosa moist, oropharynx benign. Resp:  CTA bilaterally. No wheezing/rhonchi/rales. No accessory muscle use   CV:  Regular rhythm, normal rate, no murmurs, gallops, rubs    GI:  Soft, non distended, non tender. normoactive bowel sounds, no hepatosplenomegaly     Musculoskeletal:  No edema, warm, 2+ pulses throughout    Neurologic:  Moves all extremities. AAOx3            Data Review:    Review and/or order of clinical lab test      Labs:     Recent Labs     12/11/21  0128   WBC 10.6   HGB 11.1*   HCT 34.6*        Recent Labs     12/12/21  0057 12/11/21  0128 12/10/21  0306   * 137 136   K 4.1 4.2 4.8   CL 99 100 100   CO2 29 29 31   BUN 86* 77* 67*   CREA 2.62* 2.89* 2.76*   * 72 277*   CA 8.4* 9.0 9.0   MG 2.5* 2.5*  --    PHOS  --  4.7  --    URICA  --  9.4*  --      Recent Labs     12/11/21  0128   ALT 43   *   TBILI 0.2   TP 6.8   ALB 2.7*   GLOB 4.1*     Recent Labs     12/12/21  0503 12/12/21  0057 12/11/21  1621 12/11/21  0719 12/10/21  1459   INR  --   --   --   --  1.0   PTP  --   --   --   --  10.8   APTT 55.9* >130.0* 79.8*   < >  --     < > = values in this interval not displayed. No results for input(s): FE, TIBC, PSAT, FERR in the last 72 hours. No results found for: FOL, RBCF   No results for input(s): PH, PCO2, PO2 in the last 72 hours. No results for input(s): CPK, CKNDX, TROIQ in the last 72 hours.     No lab exists for component: CPKMB  Lab Results   Component Value Date/Time    Cholesterol, total 76 12/10/2021 02:59 PM    HDL Cholesterol 28 12/10/2021 02:59 PM    LDL, calculated 25.4 12/10/2021 02:59 PM    Triglyceride 113 12/10/2021 02:59 PM    CHOL/HDL Ratio 2.7 12/10/2021 02:59 PM     Lab Results   Component Value Date/Time    Glucose (POC) 89 12/12/2021 06:15 AM    Glucose (POC) 274 (H) 12/11/2021 09:58 PM    Glucose (POC) 196 (H) 12/11/2021 04:23 PM    Glucose (POC) 114 12/11/2021 12:59 PM    Glucose (POC) 90 12/11/2021 07:30 AM     Lab Results   Component Value Date/Time    Color YELLOW/STRAW 12/09/2021 05:26 AM    Appearance CLEAR 12/09/2021 05:26 AM    Specific gravity 1.008 12/09/2021 05:26 AM    pH (UA) 6.5 12/09/2021 05:26 AM    Protein 30 (A) 12/09/2021 05:26 AM    Glucose Negative 12/09/2021 05:26 AM    Ketone Negative 12/09/2021 05:26 AM    Bilirubin Negative 12/09/2021 05:26 AM    Urobilinogen 0.2 12/09/2021 05:26 AM    Nitrites Negative 12/09/2021 05:26 AM    Leukocyte Esterase SMALL (A) 12/09/2021 05:26 AM    Epithelial cells FEW 12/09/2021 05:26 AM    Bacteria 4+ (A) 12/09/2021 05:26 AM    WBC 20-50 12/09/2021 05:26 AM    RBC 0-5 12/09/2021 05:26 AM         Medications Reviewed:     Current Facility-Administered Medications   Medication Dose Route Frequency    heparin 25,000 units in D5W 250 ml infusion  12-25 Units/kg/hr IntraVENous TITRATE    cefdinir (OMNICEF) capsule 300 mg  300 mg Oral DAILY    bumetanide (BUMEX) tablet 1 mg  1 mg Oral BID    isosorbide mononitrate ER (IMDUR) tablet 60 mg  60 mg Oral DAILY    carvediloL (COREG) tablet 12.5 mg  12.5 mg Oral Q12H    hydrALAZINE (APRESOLINE) tablet 75 mg  75 mg Oral QID    insulin lispro (HUMALOG) injection   SubCUTAneous AC&HS    glucose chewable tablet 16 g  4 Tablet Oral PRN    dextrose (D50W) injection syrg 12.5-25 g  12.5-25 g IntraVENous PRN    glucagon (GLUCAGEN) injection 1 mg  1 mg IntraMUSCular PRN    mylanta/viscous lidocaine (GI COCKTAIL)  40 mL Oral Q6H PRN    aspirin delayed-release tablet 81 mg  81 mg Oral QHS    guaiFENesin ER (MUCINEX) tablet 600 mg  600 mg Oral Q12H    benzonatate (TESSALON) capsule 100 mg  100 mg Oral TID PRN    insulin glargine (LANTUS) injection 15 Units  15 Units SubCUTAneous PCD    levothyroxine (SYNTHROID) tablet 100 mcg  100 mcg Oral ACB    arformoterol 15 mcg/budesonide 0.5 mg neb solution   Nebulization BID RT    nitroglycerin (NITROSTAT) tablet 0.4 mg  0.4 mg SubLINGual Q5MIN PRN    clopidogreL (PLAVIX) tablet 75 mg  75 mg Oral DAILY    rosuvastatin (CRESTOR) tablet 20 mg  20 mg Oral QHS    magnesium oxide (MAG-OX) tablet 200 mg  200 mg Oral EVERY OTHER DAY    doxycycline (VIBRAMYCIN) 100 mg in 0.9% sodium chloride (MBP/ADV) 100 mL MBP  100 mg IntraVENous Q12H    acetaminophen (TYLENOL) tablet 650 mg  650 mg Oral Q4H PRN    acetaminophen (TYLENOL) tablet 650 mg  650 mg Oral Q6H    albuterol-ipratropium (DUO-NEB) 2.5 MG-0.5 MG/3 ML  3 mL Nebulization Q6H RT    sodium chloride (NS) flush 5-10 mL  5-10 mL IntraVENous PRN     ______________________________________________________________________  EXPECTED LENGTH OF STAY: - - -  ACTUAL LENGTH OF STAY:          3                 Nancy Villanueva MD

## 2021-12-12 NOTE — PROGRESS NOTES
RENAL  PROGRESS NOTE        Subjective:   Was resting  Objective:   VITALS SIGNS:    Visit Vitals  BP (!) 137/48 (BP 1 Location: Left upper arm, BP Patient Position: At rest)   Pulse 62   Temp 98.2 °F (36.8 °C)   Resp 12   Ht 5' 5\" (1.651 m)   Wt 63.5 kg (139 lb 15.9 oz)   SpO2 98%   BMI 23.30 kg/m²       O2 Device: Nasal cannula   O2 Flow Rate (L/min): 4 l/min   Temp (24hrs), Av.1 °F (36.7 °C), Min:97.4 °F (36.3 °C), Max:99.4 °F (37.4 °C)         PHYSICAL EXAM:  NAD  Trace edema    DATA REVIEW:     INTAKE / OUTPUT:   Last shift:      No intake/output data recorded. Last 3 shifts: 12/10 1901 -  0700  In: 200 [P.O.:200]  Out: 1875 [Urine:1875]    Intake/Output Summary (Last 24 hours) at 2021 0817  Last data filed at 2021 0450  Gross per 24 hour   Intake 0 ml   Output 550 ml   Net -550 ml         LABS:   Recent Labs     21  0128 21  1028   WBC 10.6 9.2   HGB 11.1* 12.6   HCT 34.6* 40.2    230     Recent Labs     21  0057 21  0128 12/10/21  1459 12/10/21  0306   * 137  --  136   K 4.1 4.2  --  4.8   CL 99 100  --  100   CO2 29 29  --  31   * 72  --  277*   BUN 86* 77*  --  67*   CREA 2.62* 2.89*  --  2.76*   CA 8.4* 9.0  --  9.0   MG 2.5* 2.5*  --   --    PHOS  --  4.7  --   --    ALB  --  2.7*  --   --    TBILI  --  0.2  --   --    ALT  --  43  --   --    INR  --   --  1.0  --           Assessment:  CKD stage 4: Serum Cr 2.4->2.7mg/dl. Baseline serum Cr 2.5 to 3mg/dl-> 2 to DM/HTN. Followed by my partner Dr. Allen Dasilva.     Decompensated CHF: EF 45-50%     Chest pain: hx of 9 stents     DM2: Uncontrolled->Hyperglycemia     HTN: fluctuating control.     Proteinuria: 2.8gm proteinuria     COPD suspected: Possible BRAYDEN PNA.  Pulmonary following     Hx of urethral obstruction: s/p suprapubic catheter     Plan/Recommendations:  Serum Cr  better  Po bumex(hold it if LHC to be done)  [facility] planned this admission-> discussed risks of MEL with patient/daughter previously by dr Eliot Buckner  Will follow  Yue Magana MD

## 2021-12-12 NOTE — PROGRESS NOTES
[facility] in [place], [place]  Heart Failure Inpatient Note    Patient name: Valdemar Moore  Patient : 1950  Patient MRN: [id number]  Date of service: 21    Primary care physician: Valeri Monteiro NP  Primary general cardiologist:  Tao Delgado    Primary AHF cardiologist: Rl Jonas MD    CHIEF COMPLAINT:  Chronic systolic heart failure    PLAN OF CARE:  · 69 y/o with 2 months h/o of worsened shortness of breath just recently discharged from [facility] on 1L NC O2 after treatment of presumed acute diastolic heart failure; presents to AHF Clinic with severe dyspnea; now on 3 liters of oxygen, in wheelchair; desaturates to 70s after walking 3 steps. · Chest CT done last week shows diffuse infiltrates likely atypical and possibly viral pneumonia; no covid test found in records. · Patient was referred to ER for admission for pneumonia with severe hypoxia and and new pulmonary infiltrates. Admitted  overnight and diagnosed with PNA & UTI  · Would like to obtain RHC once patient is able to lay flat next week; plan to postpone plans for LHC until euvolemic and nephrology clears; cardiology co-managing    RECOMMENDATIONS  Continue GDMT for Heart Failure as able  Continue Coreg 6.25 q12h  Discontinued norvasc as it can cause leg edema  Discontinued lisinopril due to acute on chronic renal failure  Continue hydralazine 75 PO q6h  Nitro infusion discontinued and started Imdur 60 mg daily by Cardiology   Troponins trending up, started on heparin drip  by Cardiology;  Patient with CP reported as non-anginal.  ECG  LVH with non-specific ST-T segment/T wave findings  No ACE/ARB/ARNi due to renal dysfunction  Continue hydralazine 5mg IV prn SBP > 140mmHg  Diuresis per nephrology with Bumex 1 mg BID ( may change To po this weekend); CO2 29; Cr 2.89  Continue ASA 81 mg and Plavix 75 mg daily  Patient with low iron, Ordered Venofer X 2 doses  Order Lipid panel - WNL, low HDL and LDL, Trig. 113; TChol 76  Patient with UTI - Urine culture on 12/9 growing Klebsiella  Continue Cefdinir 300 mg bid for UTI  Continue Duo-Nebs & Arfomoterol & Budesonide Neb per Pulm  Continue Doxycycline 100 mg IV q12h for atypical PNA; per pulmonary  Screening for connective tissue disease and multiple labs sent by pulmonology  Continue Crestor 20 mg po daily  Diabetes management per primary team`  Heart failure education  Advanced care plan present on file      IMPRESSION:  Acute on Chronic Respiratory Failure  Hypoxia on 4LNC  Shortness of breath at rest  Multiple pulmonary infiltrates  Normal heart function, LVEF LVEF is 55 - 60%  Coronary artery disease  · S/p multivessel PTCA stents x 9  · Most recent stent LAD 2020  Cardiac risk factors   HTN   HL   Former smoker  CKD, stage 4 with baseline Cr 2.5-3.0    CARDIAC IMAGING:  Echo (12/10/2021)  · LV: Estimated LVEF is 55 - 60%. Normal cavity size, wall thickness and systolic function (ejection fraction normal). Wall motion: normal.  · RV: Mildly dilated right ventricle. · AV: Aortic valve sclerosis with no evidence of reduced excursion. Aortic valve leaflet calcification present. Aortic valve mean gradient is 14 mmHg. Aortic valve area is 1.8 cm2. Mild aortic valve stenosis is present. Moderate aortic valve regurgitation is present. · LA: Moderately dilated left atrium. · MV: Moderate mitral annular calcification. Echo 11/5/21: EF 45-50%, mid to distal inferolateral hypokinesis, mild-mod AI    Echo (2/1/21)  · LV: Estimated LVEF is 50 - 55%. Visually measured ejection fraction. Mildly dilated left ventricle. Upper normal wall thickness. Globally reduced systolic function. Low normal systolic function. Mild (grade 1) left ventricular diastolic dysfunction. · LA: Mildly dilated left atrium. Left Atrium volume index is 37.77 mL/m2. · AV: Aortic valve leaflet calcification present. Mild aortic valve stenosis is present.  Mild aortic valve regurgitation is present. · MV: Mild mitral valve regurgitation is present. · IVSd 1.07cm  EKG (11/15/21) NSR 68bpm,     HEMODYNAMICS:  RHC not done  CPEST not done  6MW not done    OTHER IMAGING:  CXR (11/29/21)  Presence of abnormal density involving both lungs (left greater than  right). Follow-up examinations are recommended. CT chest (11/30/21)  Widespread patchy opacities, reticulations, and architectural distortion with diffuse varicoid bronchiectasis and bronchial wall thickening. Additionally, few enlarged mediastinal and hilar lymph nodes are noted. Constellation of findings may reflect end-stage sarcoidosis or other interstitial lung disease, but a component of superimposed acute infectious or inflammatory process versus neoplasm cannot be excluded entirely based on imaging alone. Consider PET/CT for further evaluation. HISTORY OF PRESENT ILLNESS:  Briefly, Dominique Overton is a 70 y.o. male with h/o HTN, HL, former smoker, coronary artery disease s/p multivessel PTCA stents x 9, most recent stent LAD 2020 recently discharged from [facility] on 1 liters of O2 for treatment of shortness of breath, presumably diastolic HF. Patient presented to clinic on 12/7 for evaluation of shortness of breath. Chest CT last week showed diffuse pulmonary infiltrates. Hypoxia worsened now requiring 3 liters of O2 and desaturating to 70% walking 3 steps in clinic. Arrived in wheelchair and after assessment, it was recommended for patient to be admitted on 12/8/2021. Diagnosed with PNA in ED. INTERVAL HISTORY:  No acute distress  Patient reports he is feeling well this morning and sitting in the chair  Reported he didn't get breakfast this morning  Speaking in full sentences  Wife at the bedside   Pt reports no back pain, or CP  NT-proBNP 2527 from 5365    REVIEW OF SYSTEMS:  Review of Systems   Constitutional: Positive for malaise/fatigue. Negative for fever. HENT: Positive for hearing loss.  Negative for sore throat. Eyes: Negative. Respiratory: Positive for shortness of breath. Cardiovascular: Negative for chest pain, palpitations, leg swelling and PND. Gastrointestinal: Negative. Genitourinary: Negative. Musculoskeletal: Negative. Skin: Negative. Neurological: Negative for dizziness, focal weakness and loss of consciousness. Psychiatric/Behavioral: Negative. PHYSICAL EXAM:  Visit Vitals  BP (!) 114/37 (BP 1 Location: Left arm, BP Patient Position: At rest;Sitting)   Pulse 63   Temp 97.5 °F (36.4 °C)   Resp 23   Ht 5' 5\" (1.651 m)   Wt 139 lb 15.9 oz (63.5 kg)   SpO2 100%   BMI 23.30 kg/m²     General: Patient is cachectic  HEENT: Normocephalic and atraumatic. No scleral icterus. Pupils are equal, round and reactive to light and accomodation. No conjunctival injection. Oropharynx is clear. Neck: Supple. No evidence of thyroid enlargements or lymphadenopathy. JVD: Negative  Lungs: Breath sounds are equal with some rales at bases. With NC O2 at 4L  Heart: Regular rate and rhythm with normal S1 and S2. No murmurs, gallops or rubs. Abdomen: Soft, no mass or tenderness. No organomegaly or hernia. Bowel sounds present. Genitourinary and rectal: deferred  Extremities: No cyanosis, clubbing, or +1 AYLIN R>L . Neurologic: No focal sensory or motor deficits are noted. Grossly intact. Psychiatric: Awake, alert an doriented x 3. Appropriate mood and affect. Skin: Warm, dry and well perfused. No lesions, nodules or rashes are noted.     PAST MEDICAL HISTORY:  Past Medical History:   Diagnosis Date    Aortic regurgitation     CAD (coronary artery disease)     Taxus stent 12/2005, 12/06 Cypher Prox circ, 6/2018 Sudarshan LADx2,  9/19 Sudarshan LAD & LCX, 1/20 Sudarshan     Chronic combined systolic and diastolic congestive heart failure ([address] 11/14/2021    Chronic obstructive pulmonary disease (HCC)     Congestive heart failure ([address]     Diabetes ([address]     Essential hypertension     Hyperlipidemia  MI [facility]     Murmur     Renal insufficiency     Thyroid disease        PAST SURGICAL HISTORY:  Past Surgical History:   Procedure Laterality Date    HX CORONARY STENT PLACEMENT      HX CYST REMOVAL      IR ASP BLADDER SUPRA CATH         FAMILY HISTORY:  History reviewed. No pertinent family history. SOCIAL HISTORY:  Social History     Socioeconomic History    Marital status:    Tobacco Use    Smoking status: Former Smoker     Packs/day: 2.00     Years: 30.00     Pack years: 60.00     Quit date: 1991     Years since quittin.8    Smokeless tobacco: Never Used   Vaping Use    Vaping Use: Never used   Substance and Sexual Activity    Alcohol use: Yes     Comment: rarely    Drug use: Never    Sexual activity: Yes     Partners: Female       LABORATORY RESULTS:  Labs Latest Ref Rng & Units 2021 2021 12/10/2021 2021 2021 2021 2021   WBC 4.1 - 11.1 K/uL - 10.6 - 9.2 11. 4(H) - 10.6   RBC 4.10 - 5.70 M/uL - 3.70(L) - 4.26 4.44 - 4.30   Hemoglobin 12.1 - 17.0 g/dL - 11. 1(L) - 12.6 13.2 - 12.8   Hematocrit 36.6 - 50.3 % - 34. 6(L) - 40.2 41.9 - 40.5   MCV 80.0 - 99.0 FL - 93.5 - 94.4 94.4 - 94.2   Platelets 644 - 560 K/uL - 243 - 230 249 - 250   Lymphocytes 12 - 49 % - - - 4(L) - - 8(L)   Monocytes 5 - 13 % - - - 1(L) - - 10   Eosinophils 0 - 7 % - - - 1 - - 7   Basophils 0 - 1 % - - - 0 - - 1   Albumin 3.5 - 5.0 g/dL - 2. 7(L) - - 3. 1(L) 2. 8(L) 2. 7(L)   Calcium 8.5 - 10.1 MG/DL 8.4(L) 9.0 9.0 9.2 9.4 9.4 9.5   Glucose 65 - 100 mg/dL 195(H) 72 277(H) 381(H) 150(H) 126(H) 136(H)   BUN 6 - 20 MG/DL 86(H) 77(H) 67(H) 56(H) 54(H) 54(H) 53(H)   Creatinine 0.70 - 1.30 MG/DL 2.62(H) 2.89(H) 2.76(H) 2.44(H) 2.50(H) 2.57(H) 2.48(H)   Sodium 136 - 145 mmol/L 135(L) 137 136 135(L) 140 139 136   Potassium 3.5 - 5.1 mmol/L 4.1 4.2 4.8 4.8 4.7 4.8 4.7   TSH 0.36 - 3.74 uIU/mL - - - 0.41 - - -   Some recent data might be hidden       ALLERGY:  Allergies Allergen Reactions    Bee Sting [Sting, Bee] Anaphylaxis    Iodinated Contrast Media Rash    Brilinta [Ticagrelor] Other (comments)    Effient [Prasugrel] Other (comments)    Penicillins Rash and Nausea Only    Ranexa [Ranolazine] Other (comments)    Sulfa (Sulfonamide Antibiotics) Rash        CURRENT MEDICATIONS:    Current Facility-Administered Medications:     heparin 25,000 units in D5W 250 ml infusion, 12-25 Units/kg/hr, IntraVENous, TITRATE, Jesus King, DO, Last Rate: 4.4 mL/hr at 12/12/21 0602, 7 Units/kg/hr at 12/12/21 0602    cefdinir (OMNICEF) capsule 300 mg, 300 mg, Oral, DAILY, Sarabjit Self MD, 300 mg at 12/12/21 0815    bumetanide (BUMEX) tablet 1 mg, 1 mg, Oral, BID, Jose Simon MD, 1 mg at 12/12/21 3314    isosorbide mononitrate ER (IMDUR) tablet 60 mg, 60 mg, Oral, DAILY, Christen Miller MD, 60 mg at 12/12/21 0812    carvediloL (COREG) tablet 12.5 mg, 12.5 mg, Oral, Q12H, Christen Miller MD, 12.5 mg at 12/12/21 3097    hydrALAZINE (APRESOLINE) tablet 75 mg, 75 mg, Oral, QID, Felipe Ramirez NP, 75 mg at 12/12/21 0812    insulin lispro (HUMALOG) injection, , SubCUTAneous, AC&HS, Lucio Woods MD, 3 Units at 12/11/21 2232    glucose chewable tablet 16 g, 4 Tablet, Oral, PRN, Sarabjit Self MD    dextrose (D50W) injection syrg 12.5-25 g, 12.5-25 g, IntraVENous, PRN, Sarabjit Self MD    glucagon (GLUCAGEN) injection 1 mg, 1 mg, IntraMUSCular, PRN, Derek Self MD    mylanta/viscous lidocaine (GI COCKTAIL), 40 mL, Oral, Q6H PRN, Carlos Alberto Baig NP    aspirin delayed-release tablet 81 mg, 81 mg, Oral, QHS, Guru, Jesus, DO, 81 mg at 12/11/21 2231    guaiFENesin ER (MUCINEX) tablet 600 mg, 600 mg, Oral, Q12H, Guru, Jesus, DO, 600 mg at 12/12/21 2112    benzonatate (TESSALON) capsule 100 mg, 100 mg, Oral, TID PRN, Rian Mar, Jesus, DO    insulin glargine (LANTUS) injection 15 Units, 15 Units, SubCUTAneous, PCD, Guru, Jesus, DO, 15 Units at 12/11/21 1748    levothyroxine (SYNTHROID) tablet 100 mcg, 100 mcg, Oral, ACB, Guru, Jesus, DO, 100 mcg at 12/12/21 0612    arformoterol 15 mcg/budesonide 0.5 mg neb solution, , Nebulization, BID RT, Guru, Jesus, DO, Given at 12/11/21 0744    nitroglycerin (NITROSTAT) tablet 0.4 mg, 0.4 mg, SubLINGual, Q5MIN PRN, Paulette Hallers, Jesus, DO, 0.4 mg at 12/10/21 1338    clopidogreL (PLAVIX) tablet 75 mg, 75 mg, Oral, DAILY, Guru, Jesus, DO, 75 mg at 12/12/21 2738    rosuvastatin (CRESTOR) tablet 20 mg, 20 mg, Oral, QHS, Guru, Jesus, DO, 20 mg at 12/11/21 2231    magnesium oxide (MAG-OX) tablet 200 mg, 200 mg, Oral, EVERY OTHER DAY, Guru, Jesus, DO, 200 mg at 12/11/21 0905    doxycycline (VIBRAMYCIN) 100 mg in 0.9% sodium chloride (MBP/ADV) 100 mL MBP, 100 mg, IntraVENous, Q12H, Ernesto Small PA-C, Last Rate: 100 mL/hr at 12/12/21 0618, 100 mg at 12/12/21 0618    acetaminophen (TYLENOL) tablet 650 mg, 650 mg, Oral, Q4H PRN, Mago Yu MD    acetaminophen (TYLENOL) tablet 650 mg, 650 mg, Oral, Q6H, Sarabjit Self MD, 650 mg at 12/12/21 0612    albuterol-ipratropium (DUO-NEB) 2.5 MG-0.5 MG/3 ML, 3 mL, Nebulization, Q6H RT, June COLBERT MD, 3 mL at 12/11/21 0744    sodium chloride (NS) flush 5-10 mL, 5-10 mL, IntraVENous, PRN, Mckenzie Montgomery MD    Thank you for your referral and allowing me to participate in this patient's care.       Nell Guzmán, SEBASTIAN, AGACNP-BC, PCCN, [facility]  Heart Failure Nurse Practitioner   [phone]   [address] [facility] [address] [address], [place] [address] / Norma Shane: [phone]/ F: [phone]            PATIENT CARE TEAM:  Patient Care Team:  Radha Davis NP as PCP - General (Nurse Practitioner)

## 2021-12-12 NOTE — PROGRESS NOTES
0730 Bedside and Verbal shift change report given to NELY Carr (oncoming nurse) by Maureen Brown (offgoing nurse). Report included the following information SBAR, Kardex, Intake/Output, MAR, Recent Results and Cardiac Rhythm SR.     1930 Bedside and Verbal shift change report given to NELY vega (oncoming nurse) by Trent Kebede (offgoing nurse). Report included the following information SBAR, Kardex, Intake/Output, MAR, Recent Results and Cardiac Rhythm SR. Problem: Risk for Spread of Infection  Goal: Prevent transmission of infectious organism to others  Description: Prevent the transmission of infectious organisms to other patients, staff members, and visitors. Outcome: Progressing Towards Goal     Problem: Patient Education:  Go to Education Activity  Goal: Patient/Family Education  Outcome: Progressing Towards Goal     Problem: Chronic Obstructive Pulmonary Disease (COPD)  Goal: *Oxygen saturation during activity within specified parameters  Outcome: Progressing Towards Goal  Goal: *Able to remain out of bed as prescribed  Outcome: Progressing Towards Goal  Goal: *Absence of hypoxia  Outcome: Progressing Towards Goal  Goal: *Optimize nutritional status  Outcome: Progressing Towards Goal     Problem: Patient Education: Go to Patient Education Activity  Goal: Patient/Family Education  Outcome: Progressing Towards Goal     Problem: Patient Education: Go to Patient Education Activity  Goal: Patient/Family Education  Outcome: Progressing Towards Goal     Problem: Falls - Risk of  Goal: *Absence of Falls  Description: Document Franco Godinez Fall Risk and appropriate interventions in the flowsheet.   Outcome: Progressing Towards Goal  Note: Fall Risk Interventions:  Mobility Interventions: Communicate number of staff needed for ambulation/transfer, Bed/chair exit alarm, Patient to call before getting OOB, PT Consult for mobility concerns         Medication Interventions: Patient to call before getting OOB, Teach patient to arise slowly, Evaluate medications/consider consulting pharmacy, Bed/chair exit alarm    Elimination Interventions: Call light in reach, Patient to call for help with toileting needs, Stay With Me (per policy), Toileting schedule/hourly rounds              Problem: Patient Education: Go to Patient Education Activity  Goal: Patient/Family Education  Outcome: Progressing Towards Goal

## 2021-12-13 NOTE — PROGRESS NOTES
RENAL  PROGRESS NOTE        Subjective:   Feels ok  Objective:   VITALS SIGNS:    Visit Vitals  BP (!) 138/53   Pulse 67   Temp 98 °F (36.7 °C)   Resp 16   Ht 5' 5\" (1.651 m)   Wt 62.8 kg (138 lb 7.2 oz)   SpO2 99%   BMI 23.04 kg/m²       O2 Device: Nasal cannula   O2 Flow Rate (L/min): 3 l/min   Temp (24hrs), Av.6 °F (36.4 °C), Min:97.3 °F (36.3 °C), Max:98 °F (36.7 °C)         PHYSICAL EXAM:  NAD  Trace edema    DATA REVIEW:     INTAKE / OUTPUT:   Last shift:      No intake/output data recorded. Last 3 shifts:  1901 -  0700  In: 1220 [P.O.:1220]  Out: 2500 [Urine:2500]    Intake/Output Summary (Last 24 hours) at 2021 0937  Last data filed at 2021 0441  Gross per 24 hour   Intake 1220 ml   Output 1950 ml   Net -730 ml         LABS:   Recent Labs     21  012   WBC 10.6   HGB 11.1*   HCT 34.6*        Recent Labs     21  0455 21  1352 21  0057 21  0128 21  0128 12/10/21  1459     --  135*  --  137  --    K 3.9 4.4 4.1   < > 4.2  --      --  99  --  100  --    CO2 30  --  29  --  29  --    GLU 39*  --  195*  --  72  --    BUN 84*  --  86*  --  77*  --    CREA 2.41*  --  2.62*  --  2.89*  --    CA 8.8  --  8.4*  --  9.0  --    MG 2.5*  --  2.5*  --  2.5*  --    PHOS  --   --   --   --  4.7  --    ALB  --   --   --   --  2.7*  --    TBILI  --   --   --   --  0.2  --    ALT  --   --   --   --  43  --    INR  --   --   --   --   --  1.0    < > = values in this interval not displayed. Assessment:  CKD stage 4: Serum Cr 2.4->2.7mg/dl. Baseline serum Cr 2.5 to 3mg/dl-> 2 to DM/HTN. Followed by my partner Dr. Juan Price.     Decompensated CHF: EF 45-50%     Chest pain: hx of 9 stents     DM2: Uncontrolled->Hyperglycemia     HTN: fluctuating control.     Proteinuria: 2.8gm proteinuria     COPD suspected: Possible BRAYDEN PNA.  Pulmonary following     Hx of urethral obstruction: s/p suprapubic catheter     Plan/Recommendations:  Serum Cr better  Po bumex(hold it if LHC to be done)  [facility] planned [facility] admission-> discussed risks of MEL with patient/daughter previously by dr Castro Brothers  Will follow  Roshan Jiménez MD

## 2021-12-13 NOTE — PROGRESS NOTES
0730: Bedside and Verbal shift change report given to [address] [place] [address] (oncoming nurse) by Bonnie Mejia (offgoing nurse). Report included the following information SBAR, Kardex, Intake/Output, MAR, Accordion, Recent Results and Cardiac Rhythm NSR.     1555: patient family member at nurses station, stating patient feels like he \"is about to pass out and isn't making sense\". RN entered room, patient eyes closed but opens upon verbal stimulation. Patient able to follow commands, NIH negative but incoherent speech. Blood glucose check 37 and repeat 41. 25 g D50 given at this time. HYPOGLYCEMIC EPISODE DOCUMENTATION    Patient with hypoglycemic episode(s) at 1557(time) on 12/13(date). BG value(s) pre-treatment 39    Was patient symptomatic? [x] yes, [] no  Patient was treated with the following rescue medications/treatments: [x] D50   BG value post-treatment: 189  Once BG treated and value greater than 80mg/dl, pt was provided with the following:  Ensure  Name of MD notified: Danika Cole    1601: patient drowsy but able to coherently answer questions. A&Ox4    1612: patient alert and conversing with RN and wife    33 64 74: blood glucose check 189    1719: spoke with Dr. Danika Cole regarding patient increase work of breathing and tachypnic in the 40s-50s. Verbal orders with readback for stat ABG and Chest xray. Titrated O2 to 5L at this time    1726: RT at bedside for ABG    1920: BG 55, recheck 64. 12.5 G D50 given    1930: Bedside and Verbal shift change report given to Bonnie Mejia (oncoming nurse) by [address] [place] [address] (offgoing nurse). Report included the following information SBAR, Kardex, Intake/Output, MAR, Accordion, Recent Results and Cardiac Rhythm NSR.

## 2021-12-13 NOTE — PROGRESS NOTES
Reported by nursing that patient's work of breathing has increased. Patient is tachypneic with respiratory rate in high 30s to 50s. Patient was more lethargic to, patient has been hypoglycemic with poor p.o. intake. On exam, patient with tachypnea, mild crackles at the bases of the lungs. Repeat ABG shows compensated respiratory acidosis, improved from previous ABGs 5 days ago. Chest x-ray shows mild interstitial edema with superimposed stable left lower lobe pneumonia. Give Bumex 1.5 mg IV x1. Skip the evening dose of p.o. Bumex. Discussed with RN.

## 2021-12-13 NOTE — PROGRESS NOTES
[address] [place] [address]               Division of Cardiology  [phone]                       Progress note              Jason Wadsworth M.D., F.A.CSamirC. NAME:  Flip Hicks   :   1950   MRN:   [id number]         ICD-10-CM ICD-9-CM    1. Acute congestive heart failure, unspecified heart failure type (HCC)  I50.9 428.0    2. Chronic combined systolic and diastolic congestive heart failure (HCC)  I50.42 428.42      428.0    3. Coronary arteriosclerosis  I25.10 414.00    4. Essential hypertension  I10 401.9    5. Renal insufficiency  N28.9 593.9    6. Shortness of breath  R06.02 786.05    7. Chronic obstructive pulmonary disease, unspecified COPD type ([facility]  J44.9 80                  HPI  70years old male with a past medical history remarkable for coronary disease status post 9 stents in the past, diabetes, hypertension, hyperlipidemia, previous myocardial infarction, aortic insufficiency and renal insufficiency who presented to the emergency room after being sent from the advanced heart failure team for increasing shortness of breath with hypoxia. Patient also had developed some chest pain which appeared to be pleuritic. Nonetheless he was started on IV heparin. Today the patient tells me that he feels much better and does not think that the episode that is occurred to him was related to his heart. He tells me that he recognizes he is angina very well and did not have any of the usual symptoms of angina. His dyspnea is also improved at this time. He is off IV nitroglycerin on p.o. Imdur. Assessment/Plan: 1. Chest pain: Not recurrent thus far today the patient feels well. Seemingly pleuritic in origin previously. Discussed at length with the patient and his wife in the room.   His troponin is mildly elevated and this may be related demand ischemia with superimposed underlying coronary disease \"fixed\". His electrocardiogram reveals a normal sinus rhythm LVH ST-T wave abnormalities in the lateral leads no gross changes from previous. At this time as previously indicated by Dr. Sarah Solano I concur that in the absence of recurrent symptoms and only mild elevation in troponin, given also the underlying renal dysfunction cardiac catheterization should be put on hold. Continue Coreg statin aspirin Plavix and Imdur. Stop IV heparin for now. Continue po diuretics    Patient with previous history of stent to the circumflex of the LAD. Ejection fraction now  normal.  proBNP is also improved. As above continue for now medical therapy. Patient and wife in agreement. 2.  CKD: Creatinine slightly improved but still elevated 2.4. Nephrology following. 3.  Acute on chronic hypoxic respiratory failure. Chest CT noted pulmonology following. 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 Defer to pulmonologist.    4.  Aortic stenosis: Mild with moderate aortic regurgitation continue echo surveillance. CARDIAC STUDIES      12/08/21    ECHO ADULT COMPLETE 12/10/2021 12/10/2021    Interpretation Summary  · LV: Estimated LVEF is 55 - 60%. Normal cavity size, wall thickness and systolic function (ejection fraction normal). Wall motion: normal.  · RV: Mildly dilated right ventricle. · AV: Aortic valve sclerosis with no evidence of reduced excursion. Aortic valve leaflet calcification present. Aortic valve mean gradient is 14 mmHg. Aortic valve area is 1.8 cm2. Mild aortic valve stenosis is present. Moderate aortic valve regurgitation is present. · LA: Moderately dilated left atrium. · MV: Moderate mitral annular calcification.     Signed by: Sydnee Mattson MD on 12/10/2021  2:09 PM               @[email or web address]      IMAGING      CT Results (most recent):  Results from Hospital Encounter encounter on 12/08/21    CT CHEST WO CONT    Narrative  INDICATION: Shortness of breath    COMPARISON: November 30    CONTRAST: None. TECHNIQUE:  5 mm axial images were obtained through the . Coronal and sagittal  reformats were generated. CT dose reduction was achieved through use of a  standardized protocol tailored for this examination and automatic exposure  control for dose modulation. The absence of intravenous contrast reduces the sensitivity for evaluation of  the mediastinum, imelda, vasculature, and upper abdominal organs. FINDINGS:    CHEST WALL: No mass or axillary lymphadenopathy. THYROID: No nodule. MEDIASTINUM: Prominent nodes unchanged  IMELDA: No mass or lymphadenopathy. THORACIC AORTA: No aneurysm. MAIN PULMONARY ARTERY: Normal in caliber. TRACHEA/BRONCHI: Patent. ESOPHAGUS: No wall thickening or dilatation. HEART: Prominent in size  PLEURA: No effusion or pneumothorax. LUNGS: Diffuse interstitial infiltrates and emphysema unchanged. INCIDENTALLY IMAGED UPPER ABDOMEN: No significant abnormality in the  incidentally imaged upper abdomen. BONES: No destructive bone lesion. Impression  No Change      XR Results (most recent):  Results from Hospital Encounter encounter on 12/08/21    XR CHEST PA LAT    Narrative  INDICATION: PNA    EXAM: CXR 2 Views. COMPARISON: 11/30/2021. FINDINGS:  Frontal and lateral views of the chest show no change of low lung volumes and  severe bilateral pulmonary disease. Heart size is normal. There is no overt pulmonary edema. There is no  pneumothorax, midline shift or pleural effusion. Impression  Stable chronic lung disease. MRI Results (most recent):  No results found for this or any previous visit.           Past Medical History:   Diagnosis Date    Aortic regurgitation     CAD (coronary artery disease)     Taxus stent 12/2005, 12/06 Cypher Prox circ, 6/2018 [place] LADx2,  9/19 Sudarshan LAD & LCX, 1/20 Sudarshan     Chronic combined systolic and diastolic congestive heart failure ([address] 11/14/2021    Chronic obstructive pulmonary disease ([address]     Congestive heart failure (HCC)     [facility]     Essential hypertension     Hyperlipidemia     MI (myocardial infarction) ([facility]     Murmur     Renal insufficiency     Thyroid disease            Past Surgical History:   Procedure Laterality Date    HX CORONARY STENT PLACEMENT      HX CYST REMOVAL      IR ASP BLADDER SUPRA CATH                 Visit Vitals  BP (!) 138/53   Pulse 67   Temp 98 °F (36.7 °C)   Resp 16   Ht 5' 5\" (1.651 m)   Wt 138 lb 7.2 oz (62.8 kg)   SpO2 99%   BMI 23.04 kg/m²         Wt Readings from Last 3 Encounters:   12/13/21 138 lb 7.2 oz (62.8 kg)   12/08/21 137 lb 6.4 oz (62.3 kg)   11/29/21 140 lb (63.5 kg)         Review of Systems:   Pertinent items are noted in the History of Present Illness. 12/13 0701 - 12/13 1900  In: 240 [P.O.:240]  Out: -     12/11 1901 - 12/13 0700  In: 4130 [P.O.:1220]  Out: 2500 [Urine:2500]      Telemetry: normal sinus rhythm    Physical Exam:    Neck: no JVD  Heart: regular rate and rhythm  Lungs: rales R apex, R base, L apex, L base, rhonchi R apex, R base, L apex, L base  Abdomen: soft, non-tender.  Bowel sounds normal. No masses,  no organomegaly  Extremities: no edema    Current Facility-Administered Medications   Medication Dose Route Frequency    albuterol-ipratropium (DUO-NEB) 2.5 MG-0.5 MG/3 ML  3 mL Nebulization BID RT    heparin 25,000 units in D5W 250 ml infusion  12-25 Units/kg/hr IntraVENous TITRATE    cefdinir (OMNICEF) capsule 300 mg  300 mg Oral DAILY    bumetanide (BUMEX) tablet 1 mg  1 mg Oral BID    isosorbide mononitrate ER (IMDUR) tablet 60 mg  60 mg Oral DAILY    carvediloL (COREG) tablet 12.5 mg  12.5 mg Oral Q12H    hydrALAZINE (APRESOLINE) tablet 75 mg  75 mg Oral QID    insulin lispro (HUMALOG) injection   SubCUTAneous AC&HS    glucose chewable tablet 16 g  4 Tablet Oral PRN    dextrose (D50W) injection syrg 12.5-25 g  12.5-25 g IntraVENous PRN    glucagon (GLUCAGEN) injection 1 mg  1 mg IntraMUSCular PRN  mylanta/viscous lidocaine (GI COCKTAIL)  40 mL Oral Q6H PRN    aspirin delayed-release tablet 81 mg  81 mg Oral QHS    guaiFENesin ER (MUCINEX) tablet 600 mg  600 mg Oral Q12H    benzonatate (TESSALON) capsule 100 mg  100 mg Oral TID PRN    [Held by provider] insulin glargine (LANTUS) injection 15 Units  15 Units SubCUTAneous PCD    levothyroxine (SYNTHROID) tablet 100 mcg  100 mcg Oral ACB    arformoterol 15 mcg/budesonide 0.5 mg neb solution   Nebulization BID RT    nitroglycerin (NITROSTAT) tablet 0.4 mg  0.4 mg SubLINGual Q5MIN PRN    clopidogreL (PLAVIX) tablet 75 mg  75 mg Oral DAILY    rosuvastatin (CRESTOR) tablet 20 mg  20 mg Oral QHS    magnesium oxide (MAG-OX) tablet 200 mg  200 mg Oral EVERY OTHER DAY    doxycycline (VIBRAMYCIN) 100 mg in 0.9% sodium chloride (MBP/ADV) 100 mL MBP  100 mg IntraVENous Q12H    acetaminophen (TYLENOL) tablet 650 mg  650 mg Oral Q4H PRN    acetaminophen (TYLENOL) tablet 650 mg  650 mg Oral Q6H    sodium chloride (NS) flush 5-10 mL  5-10 mL IntraVENous PRN         All Cardiac Markers in the last 24 hours:    Lab Results   Component Value Date/Time    BNPNT 1,300 (H) 12/13/2021 04:55 AM        Lab Results   Component Value Date/Time    Creatinine 2.41 (H) 12/13/2021 04:55 AM          No results found for: CPK, RCK1, RCK2, RCK3, RCK4, CKMB, CKNDX, CKND1, TROPT, TROIQ, BNPP, BNP      Lab Results   Component Value Date/Time    WBC 10.6 12/11/2021 01:28 AM    HGB 11.1 (L) 12/11/2021 01:28 AM    HCT 34.6 (L) 12/11/2021 01:28 AM    PLATELET 147 07/86/7318 01:28 AM    MCV 93.5 12/11/2021 01:28 AM         Lab Results   Component Value Date/Time    Sodium 137 12/13/2021 04:55 AM    Potassium 3.9 12/13/2021 04:55 AM    Chloride 101 12/13/2021 04:55 AM    CO2 30 12/13/2021 04:55 AM    Anion gap 6 12/13/2021 04:55 AM    Glucose 39 (LL) 12/13/2021 04:55 AM    BUN 84 (H) 12/13/2021 04:55 AM    Creatinine 2.41 (H) 12/13/2021 04:55 AM    BUN/Creatinine ratio 35 (H) 12/13/2021 04:55 AM    GFR est AA 32 (L) 12/13/2021 04:55 AM    GFR est non-AA 27 (L) 12/13/2021 04:55 AM    Calcium 8.8 12/13/2021 04:55 AM         Lab Results   Component Value Date/Time    NT pro-BNP 1,300 (H) 12/13/2021 04:55 AM    NT pro-BNP 2,527 (H) 12/12/2021 12:57 AM    NT pro-BNP 5,365 (H) 12/11/2021 01:28 AM    NT pro-BNP 2,469 (H) 12/08/2021 03:22 PM    NT pro-BNP 2,630 (H) 12/08/2021 03:22 PM         Lab Results   Component Value Date/Time    Cholesterol, total 76 12/10/2021 02:59 PM    HDL Cholesterol 28 12/10/2021 02:59 PM    LDL, calculated 25.4 12/10/2021 02:59 PM    VLDL, calculated 22.6 12/10/2021 02:59 PM    Triglyceride 113 12/10/2021 02:59 PM    CHOL/HDL Ratio 2.7 12/10/2021 02:59 PM         Lab Results   Component Value Date/Time    ALT (SGPT) 43 12/11/2021 01:28 AM    Alk.  phosphatase 122 (H) 12/11/2021 01:28 AM    Bilirubin, total 0.2 12/11/2021 01:28 AM

## 2021-12-13 NOTE — PROGRESS NOTES
[address] [facility] in [facility]  Heart Failure Inpatient Note    Patient name: Ankit Vyas  Patient : 1950  Patient MRN: [id number]  Date of service: 21    Primary care physician: Payal Santiago NP  Primary general cardiologist:  Siomara Guerrier    Primary F cardiologist: William Antony MD    CHIEF COMPLAINT:  Chronic systolic heart failure    PLAN OF CARE:  71 y/o with 2 months h/o of worsened shortness of breath just recently discharged from [facility] on 1L NC O2 after treatment of presumed acute diastolic heart failure; presents to AHF Clinic with severe dyspnea; now on 3 liters of oxygen, in wheelchair; desaturates to 70s after walking 3 steps. Chest CT done last week shows diffuse infiltrates likely atypical and possibly viral pneumonia; no covid test found in records. Patient was referred to ER for admission for pneumonia with severe hypoxia and and new pulmonary infiltrates. Admitted  overnight and diagnosed with PNA & UTI  Consider RHC in the near future to assess for diastolic HF, can do OP. No need for LHC at this time per cardiology.      RECOMMENDATIONS  Continue Coreg 12.5mg BID  Discontinued lisinopril due to acute on chronic renal failure  Continue hydralazine 75 PO q6h  Continue Crestor 20 mg po daily  Continue Imdur 60 mg daily  ECG  LVH with non-specific ST-T segment/T wave findings  No ACE/ARB/ARNi due to renal dysfunction  Continue hydralazine 5mg IV prn SBP > 140mmHg  Agree with transitioning to PO bumes  Continue ASA 81 mg and Plavix 75 mg daily  S/p Venofer X 2 doses  Continue Cefdinir 300 mg bid for UTI  Continue Duo-Nebs & Arfomoterol & Budesonide Neb per Pulm  Continue Doxycycline 100 mg IV q12h for atypical PNA; per pulmonary  Screening for connective tissue disease and multiple labs sent by pulmonology  Diabetes management per primary team  Heart failure education  Advanced care plan present on file    All other care per primary, will see patient PRN     IMPRESSION:  Acute on Chronic Respiratory Failure  Hypoxia on 4LNC  Shortness of breath at rest  Multiple pulmonary infiltrates  Normal heart function, LVEF LVEF is 55 - 60%  Coronary artery disease  S/p multivessel PTCA stents x 9  Most recent stent LAD 2020  Cardiac risk factors  HTN  HL  Former smoker  CKD, stage 4 with baseline Cr 2.5-3.0    CARDIAC IMAGING:  Echo (12/10/2021)  LV: Estimated LVEF is 55 - 60%. Normal cavity size, wall thickness and systolic function (ejection fraction normal). Wall motion: normal.  RV: Mildly dilated right ventricle. AV: Aortic valve sclerosis with no evidence of reduced excursion. Aortic valve leaflet calcification present. Aortic valve mean gradient is 14 mmHg. Aortic valve area is 1.8 cm2. Mild aortic valve stenosis is present. Moderate aortic valve regurgitation is present. LA: Moderately dilated left atrium. MV: Moderate mitral annular calcification. Echo 11/5/21: EF 45-50%, mid to distal inferolateral hypokinesis, mild-mod AI    Echo (2/1/21)  LV: Estimated LVEF is 50 - 55%. Visually measured ejection fraction. Mildly dilated left ventricle. Upper normal wall thickness. Globally reduced systolic function. Low normal systolic function. Mild (grade 1) left ventricular diastolic dysfunction. LA: Mildly dilated left atrium. Left Atrium volume index is 37.77 mL/m2. AV: Aortic valve leaflet calcification present. Mild aortic valve stenosis is present. Mild aortic valve regurgitation is present. MV: Mild mitral valve regurgitation is present. IVSd 1.07cm  EKG (11/15/21) NSR 68bpm,     HEMODYNAMICS:  RHC not done  CPEST not done  6MW not done    OTHER IMAGING:  CXR (11/29/21)  Presence of abnormal density involving both lungs (left greater than  right). Follow-up examinations are recommended.   CT chest (11/30/21)  Widespread patchy opacities, reticulations, and architectural distortion with diffuse varicoid bronchiectasis and bronchial wall thickening. Additionally, few enlarged mediastinal and hilar lymph nodes are noted. Constellation of findings may reflect end-stage sarcoidosis or other interstitial lung disease, but a component of superimposed acute infectious or inflammatory process versus neoplasm cannot be excluded entirely based on imaging alone. Consider PET/CT for further evaluation. HISTORY OF PRESENT ILLNESS:  Briefly, Shaun Cummings is a 70 y.o. male with h/o HTN, HL, former smoker, coronary artery disease s/p multivessel PTCA stents x 9, most recent stent LAD 2020 recently discharged from [facility] on 1 liters of O2 for treatment of shortness of breath, presumably diastolic HF. Patient presented to clinic on 12/7 for evaluation of shortness of breath. Chest CT last week showed diffuse pulmonary infiltrates. Hypoxia worsened now requiring 3 liters of O2 and desaturating to 70% walking 3 steps in clinic. Arrived in wheelchair and after assessment, it was recommended for patient to be admitted on 12/8/2021. Diagnosed with PNA in ED. INTERVAL HISTORY:  No acute overnight events  Creatinine improved to 2.41  PBNP trendin down  Overall feels much better   No chest pain     REVIEW OF SYSTEMS:  Review of Systems   Constitutional: Negative for fever and malaise/fatigue. HENT: Positive for hearing loss. Negative for sore throat. Eyes: Negative. Respiratory: Negative for shortness of breath. Cardiovascular: Negative for chest pain, palpitations, leg swelling and PND. Gastrointestinal: Negative. Genitourinary: Negative. Musculoskeletal: Negative. Skin: Negative. Neurological: Negative for dizziness, focal weakness and loss of consciousness. Psychiatric/Behavioral: Negative.         PHYSICAL EXAM:  Visit Vitals  BP (!) 121/42 (BP 1 Location: Right arm, BP Patient Position: At rest)   Pulse 69   Temp 97.2 °F (36.2 °C)   Resp 20   Ht 5' 5\" (1.651 m)   Wt 138 lb 7.2 oz (62.8 kg)   SpO2 98%   BMI 23.04 kg/m² Physical Exam  Vitals and nursing note reviewed. Constitutional:       Appearance: He is normal weight. HENT:      Right Ear: Decreased hearing noted. Left Ear: Decreased hearing noted. Cardiovascular:      Rate and Rhythm: Normal rate and regular rhythm. Pulses: Normal pulses. Heart sounds: Normal heart sounds. Pulmonary:      Breath sounds: Wheezing present. Abdominal:      General: There is no distension. Palpations: Abdomen is soft. Musculoskeletal:         General: No swelling. Skin:     General: Skin is warm and dry. Findings: Bruising present. Neurological:      General: No focal deficit present. Mental Status: He is alert and oriented to person, place, and time. Psychiatric:         Mood and Affect: Mood normal.         PAST MEDICAL HISTORY:  Past Medical History:   Diagnosis Date    Aortic regurgitation     CAD (coronary artery disease)     Taxus stent 2005,  Cypher Prox circ, 2018 Sudarshan LADx2,   Sudarshan LAD & LCX,  Sudarshan     Chronic combined systolic and diastolic congestive heart failure ([address] 2021    Chronic obstructive pulmonary disease ([address]     Congestive heart failure (HCC)     Diabetes ([address]     Essential hypertension     Hyperlipidemia     MI (myocardial infarction) ([address]     Murmur     Renal insufficiency     Thyroid disease        PAST SURGICAL HISTORY:  Past Surgical History:   Procedure Laterality Date    HX CORONARY STENT PLACEMENT      HX CYST REMOVAL      IR ASP BLADDER SUPRA CATH         FAMILY HISTORY:  History reviewed. No pertinent family history. SOCIAL HISTORY:  Social History     Socioeconomic History    Marital status:    Tobacco Use    Smoking status: Former Smoker     Packs/day: 2.00     Years: 30.00     Pack years: 60.00     Quit date: 1991     Years since quittin.8    Smokeless tobacco: Never Used   Vaping Use    Vaping Use: Never used   Substance and Sexual Activity    Alcohol use:  Yes Comment: rarely    Drug use: Never    Sexual activity: Yes     Partners: Female       LABORATORY RESULTS:  Labs Latest Ref Rng & Units 12/13/2021 12/12/2021 12/12/2021 12/11/2021 12/10/2021 12/9/2021 12/8/2021   WBC 4.1 - 11.1 K/uL - - - 10.6 - 9.2 11. 4(H)   RBC 4.10 - 5.70 M/uL - - - 3.70(L) - 4.26 4.44   Hemoglobin 12.1 - 17.0 g/dL - - - 11. 1(L) - 12.6 13.2   Hematocrit 36.6 - 50.3 % - - - 34. 6(L) - 40.2 41.9   MCV 80.0 - 99.0 FL - - - 93.5 - 94.4 94.4   Platelets 455 - 176 K/uL - - - 243 - 230 249   Lymphocytes 12 - 49 % - - - - - 4(L) -   Monocytes 5 - 13 % - - - - - 1(L) -   Eosinophils 0 - 7 % - - - - - 1 -   Basophils 0 - 1 % - - - - - 0 -   Albumin 3.5 - 5.0 g/dL - - - 2. 7(L) - - 3. 1(L)   Calcium 8.5 - 10.1 MG/DL 8.8 - 8.4(L) 9.0 9.0 9.2 9.4   Glucose 65 - 100 mg/dL 39(LL) - 195(H) 72 277(H) 381(H) 150(H)   BUN 6 - 20 MG/DL 84(H) - 86(H) 77(H) 67(H) 56(H) 54(H)   Creatinine 0.70 - 1.30 MG/DL 2.41(H) - 2.62(H) 2.89(H) 2.76(H) 2.44(H) 2.50(H)   Sodium 136 - 145 mmol/L 137 - 135(L) 137 136 135(L) 140   Potassium 3.5 - 5.1 mmol/L 3.9 4.4 4.1 4.2 4.8 4.8 4.7   TSH 0.36 - 3.74 uIU/mL - - - - - 0.41 -   Some recent data might be hidden       ALLERGY:  Allergies   Allergen Reactions    Bee Sting [Sting, Bee] Anaphylaxis    Iodinated Contrast Media Rash    Brilinta [Ticagrelor] Other (comments)    Effient [Prasugrel] Other (comments)    Penicillins Rash and Nausea Only    Ranexa [Ranolazine] Other (comments)    Sulfa (Sulfonamide Antibiotics) Rash        CURRENT MEDICATIONS:    Current Facility-Administered Medications:     albuterol-ipratropium (DUO-NEB) 2.5 MG-0.5 MG/3 ML, 3 mL, Nebulization, BID RT, Estrada Gao MD, 3 mL at 12/13/21 0846    cefdinir (OMNICEF) capsule 300 mg, 300 mg, Oral, DAILY, AramisKatie MD, 300 mg at 12/13/21 0909    bumetanide (BUMEX) tablet 1 mg, 1 mg, Oral, BID, Tyron Simon MD, 1 mg at 12/13/21 3712    isosorbide mononitrate ER (IMDUR) tablet 60 mg, 60 mg, Oral, DAILY, Angela MD Christen, 60 mg at 12/13/21 0909    carvediloL (COREG) tablet 12.5 mg, 12.5 mg, Oral, Q12H, Christen Miller MD, 12.5 mg at 12/13/21 0909    hydrALAZINE (APRESOLINE) tablet 75 mg, 75 mg, Oral, QID, Janet Ramirez NP, 75 mg at 12/13/21 0909    insulin lispro (HUMALOG) injection, , SubCUTAneous, AC&HS, Aneudy Pérez MD, 3 Units at 12/13/21 0707    glucose chewable tablet 16 g, 4 Tablet, Oral, PRN, AramisSarabjit MD    dextrose (D50W) injection syrg 12.5-25 g, 12.5-25 g, IntraVENous, PRN, Aneudy Pérez MD    glucagon (GLUCAGEN) injection 1 mg, 1 mg, IntraMUSCular, PRN, Aneudy Pérez MD    mylanta/viscous lidocaine (GI COCKTAIL), 40 mL, Oral, Q6H PRN, Luis Miramontes NP    aspirin delayed-release tablet 81 mg, 81 mg, Oral, QHS, Guru, Jesus, DO, 81 mg at 12/12/21 2115    guaiFENesin ER (MUCINEX) tablet 600 mg, 600 mg, Oral, Q12H, Guru, Jesus, DO, 600 mg at 12/13/21 0909    benzonatate (TESSALON) capsule 100 mg, 100 mg, Oral, TID PRN, Anibal Herrmann DO    [Held by provider] insulin glargine (LANTUS) injection 15 Units, 15 Units, SubCUTAneous, PCDNico Jesus, DO, 15 Units at 12/12/21 1754    levothyroxine (SYNTHROID) tablet 100 mcg, 100 mcg, Oral, ACBNico Tarun, DO, 100 mcg at 12/13/21 0707    arformoterol 15 mcg/budesonide 0.5 mg neb solution, , Nebulization, BID RT, Guru Jesus, DO, Given at 12/13/21 0846    nitroglycerin (NITROSTAT) tablet 0.4 mg, 0.4 mg, SubLINGual, Q5MIN PRN, Nico Rey Jesus, DO, 0.4 mg at 12/10/21 1338    clopidogreL (PLAVIX) tablet 75 mg, 75 mg, Oral, DAILY, Guru, Jesus, DO, 75 mg at 12/13/21 0909    rosuvastatin (CRESTOR) tablet 20 mg, 20 mg, Oral, QHS, Guru, Jesus, DO, 20 mg at 12/12/21 2115    magnesium oxide (MAG-OX) tablet 200 mg, 200 mg, Oral, EVERY OTHER DAY, Guru, Jesus, DO, 200 mg at 12/13/21 0910    doxycycline (VIBRAMYCIN) 100 mg in 0.9% sodium chloride (MBP/ADV) 100 mL MBP, 100 mg, IntraVENous, Q12H, Ernesto Small PA-C, Last Rate: 100 mL/hr at 12/13/21 0707, 100 mg at 12/13/21 9153    acetaminophen (TYLENOL) tablet 650 mg, 650 mg, Oral, Q4H PRN, Uma Cuevas MD    acetaminophen (TYLENOL) tablet 650 mg, 650 mg, Oral, Q6H, Sarabjit Self MD, 650 mg at 12/13/21 0550    sodium chloride (NS) flush 5-10 mL, 5-10 mL, IntraVENous, PRN, Bud Galeano MD    Thank you for your referral and allowing me to participate in this patient's care. Sherry Jaquez, ORACIO  Heart Failure Nurse Practitioner   [facility]   [address] [place], [address] [facility]              PATIENT CARE TEAM:  Patient Care Team:  Evgeny eNgron NP as PCP - General (Nurse Practitioner)           [facility] ATTENDING ADDENDUM    Patient was seen and examined in person. Data and notes were reviewed. I have discussed and agree with the plan as noted in the NP note above without further additions.     Carrie Almaguer MD PhD  [facility]

## 2021-12-13 NOTE — PROGRESS NOTES
Physical Therapy  12/13/2021    Chart reviewed. Attempted to see patient for PT, however he reported recent hypoglycemia and not feeling well today. Hold PT at this time and f/u later today as able/appropriate. Thank you.     Noreen Castro, PT, DPT

## 2021-12-13 NOTE — PROGRESS NOTES
[facility] [facility] Adult  Hospitalist Group                                                                                          Hospitalist Progress Note  Leelee Zamarripa MD  Answering service: [phone] OR [phone] from in house phone        Date of Service:  2021  NAME:  Gudelia Jennings  :  1950  MRN:  [id number]      Admission Summary:     Patient with recent admission at [facility] and discharged with diagnosis of acute diastolic heart failure. Patient went home on 1 L of oxygen. Patient presented to heart failure clinic  and found to be hypoxic with increased work of breathing and subsequently sent to the emergency room for further evaluation. CT of the chest shows bilateral pulmonary infiltrates which was unchanged from his recent chest CT . Patient has seen pulmonology once outpatient for further evaluation and further ongoing investigation for that. Interval history / Subjective:       Patient's chest pain is now resolved. No more recurrence. Also breathing better.      Assessment & Plan:     Acute on chronic hypoxic respiratory failure  -Acute on chronic hypoxic respiratory failure likely combination of CHF and pulmonary pathology  -Patient on 1 L of oxygen at home but found more hypoxic prior to admission  -CT chest shows bilateral infiltrates, negative for Covid  -Pulmonology evaluating the patient  -Screening for connective tissue disease and multiple labs sent by pulmonology, suspected COPD but yet to be defined via PFTs  -On doxycycline for atypical pneumonia    Acute diastolic congestive heart failure  -Discontinued Norvasc due to leg edema, discontinued lisinopril due to BIPIN  -Previous echo at [facility] shows EF of 45 to 50%, repeat echo this admission shows EF 55 to 60%  -On hydralazine, Coreg and on diuresis with Bumex 1 mg IV twice daily  -Advanced heart failure team following  -Plan for right heart cath once patient is more euvolemic  -Cardiology not recommending left heart cath now due to risks to kidney    Acute coronary syndrome  -Patient with chest pain and elevated troponin, likely type II MI due to CHF and respiratory failure  -Chest pain has now resolved, nitro drip discontinued and started on Imdur  -Troponins trending up, started on heparin drip 12/11  -Check 12-lead EKG, cardiology to reevaluate    Urinary tract infection  -Urine culture on 12/9 growing Klebsiella  -Start cefdinir 300 mg bid    CKD stage IV  -Baseline serum creatinine 2.5-3 per nephrology  -Mild bump in creatinine due to diuresis  -Discussed risk of MEL with cardiac catheterization, with patient and wife  -Nephrology following    Hypertension  -Continue hydralazine, coreg and nitrates    Diabetes  -Continue Lantus along with insulin sliding scale coverage    Dyslipidemia  -Continue statin    Hypothyroidism  -Continue Synthroid    Code status: Full  DVT prophylaxis: SCDs    Care Plan discussed with: Patient/Family  Anticipated Disposition: Home w/Family  Anticipated Discharge: Greater than 48 hours     Hospital Problems  Date Reviewed: 12/9/2021          Codes Class Noted POA    CHF (congestive heart failure) (HCC) ICD-10-CM: I50.9  ICD-9-CM: 428.0  12/9/2021 Unknown        Renal insufficiency ICD-10-CM: N28.9  ICD-9-CM: 593.9  Unknown Yes        Chronic obstructive pulmonary disease ([facility] [address] ICD-10-CM: J44.9  ICD-9-CM: 926  Unknown Yes        Diabetes ([facility] [address] ICD-10-CM: E11.9  ICD-9-CM: 250.00  Unknown Yes        Essential hypertension ICD-10-CM: I10  ICD-9-CM: 401.9  Unknown Yes        Coronary arteriosclerosis ICD-10-CM: I25.10  ICD-9-CM: 414.00  7/3/2018 Yes                Review of Systems:   A comprehensive review of systems was negative except for that written in the HPI. Vital Signs:    Last 24hrs VS reviewed since prior progress note.  Most recent are:  Visit Vitals  BP (!) 121/42 (BP 1 Location: Right arm, BP Patient Position: At rest)   Pulse 74   Temp 97.2 °F (36.2 °C)   Resp 20   Ht 5' 5\" (1.651 m)   Wt 62.8 kg (138 lb 7.2 oz)   SpO2 98%   BMI 23.04 kg/m²         Intake/Output Summary (Last 24 hours) at 12/13/2021 1352  Last data filed at 12/13/2021 1137  Gross per 24 hour   Intake 1220 ml   Output 1450 ml   Net -230 ml        Physical Examination:     I had a face to face encounter with this patient and independently examined them on 12/13/2021 as outlined below:          Constitutional:  No acute distress, cooperative, pleasant    ENT:  Oral mucosa moist, oropharynx benign. Resp:  CTA bilaterally. No wheezing/rhonchi/rales. No accessory muscle use   CV:  Regular rhythm, normal rate, no murmurs, gallops, rubs    GI:  Soft, non distended, non tender. normoactive bowel sounds, no hepatosplenomegaly     Musculoskeletal:  No edema, warm, 2+ pulses throughout    Neurologic:  Moves all extremities. AAOx3            Data Review:    Review and/or order of clinical lab test      Labs:     Recent Labs     12/11/21 0128   WBC 10.6   HGB 11.1*   HCT 34.6*        Recent Labs     12/13/21  0455 12/12/21  1352 12/12/21  0057 12/11/21  0128 12/11/21  0128     --  135*  --  137   K 3.9 4.4 4.1   < > 4.2     --  99  --  100   CO2 30  --  29  --  29   BUN 84*  --  86*  --  77*   CREA 2.41*  --  2.62*  --  2.89*   GLU 39*  --  195*  --  72   CA 8.8  --  8.4*  --  9.0   MG 2.5*  --  2.5*  --  2.5*   PHOS  --   --   --   --  4.7   URICA  --   --   --   --  9.4*    < > = values in this interval not displayed. Recent Labs     12/11/21 0128   ALT 43   *   TBILI 0.2   TP 6.8   ALB 2.7*   GLOB 4.1*     Recent Labs     12/13/21  0455 12/12/21  2124 12/12/21  1255 12/11/21  0719 12/10/21  1459   INR  --   --   --   --  1.0   PTP  --   --   --   --  10.8   APTT 69.3* 57.5* 51.7*   < >  --     < > = values in this interval not displayed. No results for input(s): FE, TIBC, PSAT, FERR in the last 72 hours.    No results found for: FOL, RBCF   No results for input(s): PH, PCO2, PO2 in the last 72 hours. No results for input(s): CPK, CKNDX, TROIQ in the last 72 hours.     No lab exists for component: CPKMB  Lab Results   Component Value Date/Time    Cholesterol, total 76 12/10/2021 02:59 PM    HDL Cholesterol 28 12/10/2021 02:59 PM    LDL, calculated 25.4 12/10/2021 02:59 PM    Triglyceride 113 12/10/2021 02:59 PM    CHOL/HDL Ratio 2.7 12/10/2021 02:59 PM     Lab Results   Component Value Date/Time    Glucose (POC) 97 12/13/2021 12:24 PM    Glucose (POC) 65 12/13/2021 11:46 AM    Glucose (POC) 53 (LL) 12/13/2021 11:28 AM    Glucose (POC) 61 (L) 12/13/2021 11:25 AM    Glucose (POC) 227 (H) 12/13/2021 06:25 AM     Lab Results   Component Value Date/Time    Color YELLOW/STRAW 12/09/2021 05:26 AM    Appearance CLEAR 12/09/2021 05:26 AM    Specific gravity 1.008 12/09/2021 05:26 AM    pH (UA) 6.5 12/09/2021 05:26 AM    Protein 30 (A) 12/09/2021 05:26 AM    Glucose Negative 12/09/2021 05:26 AM    Ketone Negative 12/09/2021 05:26 AM    Bilirubin Negative 12/09/2021 05:26 AM    Urobilinogen 0.2 12/09/2021 05:26 AM    Nitrites Negative 12/09/2021 05:26 AM    Leukocyte Esterase SMALL (A) 12/09/2021 05:26 AM    Epithelial cells FEW 12/09/2021 05:26 AM    Bacteria 4+ (A) 12/09/2021 05:26 AM    WBC 20-50 12/09/2021 05:26 AM    RBC 0-5 12/09/2021 05:26 AM         Medications Reviewed:     Current Facility-Administered Medications   Medication Dose Route Frequency    albuterol-ipratropium (DUO-NEB) 2.5 MG-0.5 MG/3 ML  3 mL Nebulization BID RT    cefdinir (OMNICEF) capsule 300 mg  300 mg Oral DAILY    bumetanide (BUMEX) tablet 1 mg  1 mg Oral BID    isosorbide mononitrate ER (IMDUR) tablet 60 mg  60 mg Oral DAILY    carvediloL (COREG) tablet 12.5 mg  12.5 mg Oral Q12H    hydrALAZINE (APRESOLINE) tablet 75 mg  75 mg Oral QID    insulin lispro (HUMALOG) injection   SubCUTAneous AC&HS    glucose chewable tablet 16 g  4 Tablet Oral PRN    dextrose (D50W) injection syrg 12.5-25 g  12.5-25 g IntraVENous PRN    glucagon (GLUCAGEN) injection 1 mg  1 mg IntraMUSCular PRN    mylanta/viscous lidocaine (GI COCKTAIL)  40 mL Oral Q6H PRN    aspirin delayed-release tablet 81 mg  81 mg Oral QHS    guaiFENesin ER (MUCINEX) tablet 600 mg  600 mg Oral Q12H    benzonatate (TESSALON) capsule 100 mg  100 mg Oral TID PRN    [Held by provider] insulin glargine (LANTUS) injection 15 Units  15 Units SubCUTAneous PCD    levothyroxine (SYNTHROID) tablet 100 mcg  100 mcg Oral ACB    arformoterol 15 mcg/budesonide 0.5 mg neb solution   Nebulization BID RT    nitroglycerin (NITROSTAT) tablet 0.4 mg  0.4 mg SubLINGual Q5MIN PRN    clopidogreL (PLAVIX) tablet 75 mg  75 mg Oral DAILY    rosuvastatin (CRESTOR) tablet 20 mg  20 mg Oral QHS    magnesium oxide (MAG-OX) tablet 200 mg  200 mg Oral EVERY OTHER DAY    doxycycline (VIBRAMYCIN) 100 mg in 0.9% sodium chloride (MBP/ADV) 100 mL MBP  100 mg IntraVENous Q12H    acetaminophen (TYLENOL) tablet 650 mg  650 mg Oral Q4H PRN    acetaminophen (TYLENOL) tablet 650 mg  650 mg Oral Q6H    sodium chloride (NS) flush 5-10 mL  5-10 mL IntraVENous PRN     ______________________________________________________________________  EXPECTED LENGTH OF STAY: 4d 2h  ACTUAL LENGTH OF STAY:          4                 Carlos Roblero MD

## 2021-12-13 NOTE — PROGRESS NOTES
Pulmonary, Critical Care, and Sleep Medicine~Consult Note    Name: Duyen Finnegan MRN: [id number]   : 1950 Hospital: [facility]   Date: 2021 2:58 PM Admission: 2021     Impression Plan   1. Acute on chronic hypoxic resp failure (O2 only recently started at [facility])  2. Abnormal CT scan: Diffuse patchy irregular consolidative opacities and reticular changes and peribronchial thickening Left > right. Notable enlarged mediastinal/hilar adenopathy. Fairly extensive bronchiectasis. left upper lobe with pneumatocele. Query long standing burnt out sarcoid? ?? Offers no signs of new medications, familial CTD, pet birds, farm life [[A. pullulans positive on hypersensitivity pneumonitis panel but this could just be prior exposure, clinical significance unclear]]. Query as well lymphogenic carcinomatosis? Possible. A coexisting PNA is additionally a possibility as WBC was elevated on admission ? atypical infection   3. Suspected COPD, yet to be define via PFTs. Followed by Dr Yue Lara  4. CAD, s/p 9 stents; intermittent CP improved by nitro  5. decom HFrEF; EF 45-55%, mild MVR/mild AVR at [facility]  6. BIPIN on CKD   7. DM II    A. pullulans antibodies positive on HP panel, rest negative. IgE 264, CRP 3.23  Negative AVIS, ANCA, RF, CCP 1. Volume management  2. Antibiotics   3. Sputum culture for bacteria and atypical mycobacteria - hasn't been able to produce much  4. Comparison with chest films from [facility] will be beneficial in determining temporal course (he has relocated from [place] a year ago) -- there may also be an old CT scan in [place] at a freestanding ER we will try and request  5. O2 titration above 90%  6. Low threshold to start steroids  7. dvt proph  8. On brovana/pulmicort   9. ECHO ; noted consideration of RHC on this admission, possibly will be done as an outpatient  10. Dicussed with wife at bedside   11.  PT/OT     Daily Progression:    Did seem to be doing better each day through the weekend per wife. Able to talk without gasping for breath. Unfortunately didn't sleep well last night and had some hypoglycemia this morning, so quite tired. 12/10  Seen and examined earlier today. His most distressing symptom has been's midsternal chest pain and radiation into the arms. He denies severe neck or back pain. He denies ability to bring up sputum. He has relocated from [place] approximately a year ago and was hospitalized at [facility] in early November, comparison of imaging from November admission may help with temporal course of events. Left heart catheterization has been deferred in view of renal insufficiency. Chest pain is midsternal and nonpleuritic and does not appear to be pulmonary in origin. CT scan of the chest shows patchy infiltrates with nonspecific fibrosis and bronchiectasis-atypical mycobacterial infection is also in the differential diagnosis. He does have a follow-up appointment with Dr. Eliz Gomez on the 28th for PFTs    12/9  Consult Note requested by hospitalist.    Patient was recently seen by outpatient advance Heart team, Dr Darlene Duenas. At that visit patient was told to present to the ER because that he was clinically worsening. He is followed by Dr Eliz Gomez and was last seen on 11/16/21. At that visit he started symbicort and ordered PFTs that have yet to be completed. He was hospitalized at [facility] from 11/4-11/10 following presenting to the stand alone ER in [place] for dyspnea. He was treated there for pulmonary edema. He has a remote hx of aggressive tobacco use stopping in 2000. No formal dx of COPD    On O2 since last admission. No prior dyspnea since last visit. Chest pains improved with nitro    Last ECHO showed EF 40-45%  followled by Dr Mónica Trujillo for CKD    Hx of 9 stens placement. Recent change of duiertics from lasix to bumex.      12/8 CT scan fairly comparably to the 11/30 CT scan  LUNGS: Diffuse patchy irregular consolidative opacities and reticulations with  architectural distortion, left slightly more pronounced than right. 4.9 cm left  upper lobe with pneumatocele    I have reviewed the labs and previous days notes. Pertinent items are noted in HPI. Past Medical History:   Diagnosis Date    Aortic regurgitation     CAD (coronary artery disease)     Taxus stent 12/2005, 12/06 Cypher Prox circ, 6/2018 Larue LADx2,  9/19 Larue LAD & LCX, 1/20 Larue     Chronic combined systolic and diastolic congestive heart failure ([facility] [address] 11/14/2021    Chronic obstructive pulmonary disease (HCC)     Congestive heart failure (HCC)     Diabetes ([facility] [address]     Essential hypertension     Hyperlipidemia     MI (myocardial infarction) ([facility] [address]     Murmur     Renal insufficiency     Thyroid disease       Past Surgical History:   Procedure Laterality Date    HX CORONARY STENT PLACEMENT      HX CYST REMOVAL      IR ASP BLADDER SUPRA CATH        Prior to Admission medications    Medication Sig Start Date End Date Taking? Authorizing Provider   anastrozole (ARIMIDEX) 1 mg tablet Take 0.5 mg by mouth every Monday, Wednesday, Friday. Yes Provider, Historical   bumetanide (BUMEX) 1 mg tablet TAKE 1 TABLET BY MOUTH TWICE DAILY. REPLACES. LASIX 11/16/21   Reyes Labrum, NP   magnesium oxide 250 mg magnesium tablet Take 250 mg by mouth. Every other day - OTC 11/4/21   Provider, Historical   prasterone, dhea, 50 mg tab 50 mg = 1 tab each dose, PO, daily, # 30 tab, 0 Refills 11/4/21   Provider, Historical   carvediloL (COREG) 6.25 mg tablet Take 1 Tablet by mouth every twelve (12) hours. 11/15/21   Reyes Labrum, NP   isosorbide mononitrate ER (IMDUR) 30 mg tablet Take 1 Tablet by mouth daily. 11/15/21   Reyes Labrum, NP   hydrALAZINE (APRESOLINE) 25 mg tablet Take 1 Tablet by mouth every eight (8) hours. 11/15/21   Reyes Labrum, NP   amLODIPine (NORVASC) 2.5 mg tablet Take 1 Tablet by mouth daily.  11/15/21   Reyes Labrum, NP   rosuvastatin (CRESTOR) 20 mg tablet TAKE 1 TABLET BY MOUTH EVERY NIGHT 21   Loren Arriaza NP   clopidogreL (PLAVIX) 75 mg tab TAKE 1 TABLET BY MOUTH EVERY DAY 10/26/21   Loren Arriaza NP   Symbicort 80-4.5 mcg/actuation HFAA INHALE 2 PUFFS BY MOUTH TWICE DAILY IN THE MORNING AND IN THE EVENING 21   Provider, Historical   nitroglycerin (Nitrostat) 0.4 mg SL tablet 1 Tablet by SubLINGual route every five (5) minutes as needed for Chest Pain. Up to 3 doses. 21   Loren Arriaza NP   albuterol (ProAir HFA) 90 mcg/actuation inhaler Take 2 Puffs by inhalation. 4-6 hrs as needed 21   Provider, Historical   BD Lali 2nd Gen Pen Needle 32 gauge x \" ndle USE WITH LANTUS 21   Provider, Historical   aspirin delayed-release 81 mg tablet Take 81 mg by mouth daily. At night    Provider, Historical   glimepiride (AMARYL) 4 mg tablet Take 4 mg by mouth. Every two days    Provider, Historical   insulin glargine (Lantus U-100 Insulin) 100 unit/mL injection 18 U nightly after dinner    Provider, Historical   levothyroxine (SYNTHROID) 100 mcg tablet Take 100 mcg by mouth daily. Morning    Provider, Historical   testosterone (ANDROGEL) 1.62 % (20.25 mg/1.25 gram) glpk 20.25 mg daily. 2 to 3 pumps alternating in the morning. Provider, Historical     Allergies   Allergen Reactions    Bee Sting [Sting, Bee] Anaphylaxis    Iodinated Contrast Media Rash    Brilinta [Ticagrelor] Other (comments)    Effient [Prasugrel] Other (comments)    Penicillins Rash and Nausea Only    Ranexa [Ranolazine] Other (comments)    Sulfa (Sulfonamide Antibiotics) Rash      Social History     Tobacco Use    Smoking status: Former Smoker     Packs/day: 2.00     Years: 30.00     Pack years: 60.00     Quit date: 1991     Years since quittin.8    Smokeless tobacco: Never Used   Substance Use Topics    Alcohol use: Yes     Comment: rarely      History reviewed. No pertinent family history.   OBJECTIVE:     Vital Signs:       Visit Vitals  BP (!) 121/42 (BP 1 Location: Right arm, BP Patient Position: At rest)   Pulse 69   Temp 97.2 °F (36.2 °C)   Resp 20   Ht 5' 5\" (1.651 m)   Wt 62.8 kg (138 lb 7.2 oz)   SpO2 98%   BMI 23.04 kg/m²      Temp (24hrs), Av.6 °F (36.4 °C), Min:97.2 °F (36.2 °C), Max:98 °F (36.7 °C)     Intake/Output:     Last shift: 701 - 1900  In: 480 [P.O.:480]  Out: -     Last 3 shifts: 1901 -  07  In: 5224 [P.O.:1220]  Out: 2500 [Urine:2500]          Intake/Output Summary (Last 24 hours) at 2021 1339  Last data filed at 2021 1137  Gross per 24 hour   Intake 1460 ml   Output 1450 ml   Net 10 ml       Physical Exam:                                        Exam Findings Other   General: No resp distress noted, appears stated age    [de-identified]:  No ulcers, JVD not elevated, no cervical LAD    Chest: No pectus deformity, normal chest rise b/l    HEART:  RRR, no murmurs/rubs/gallops    Lungs:  Mild wheeze, diminished BS at bases    ABD: Soft/NT, non rigid mildly distended    EXT: No cyanosis/clubbing/edema, normal peripheral pulses    Skin: No rashes or ulcers, no mottling    Neuro: A/O x 3        Medications:  Current Facility-Administered Medications   Medication Dose Route Frequency    albuterol-ipratropium (DUO-NEB) 2.5 MG-0.5 MG/3 ML  3 mL Nebulization BID RT    cefdinir (OMNICEF) capsule 300 mg  300 mg Oral DAILY    bumetanide (BUMEX) tablet 1 mg  1 mg Oral BID    isosorbide mononitrate ER (IMDUR) tablet 60 mg  60 mg Oral DAILY    carvediloL (COREG) tablet 12.5 mg  12.5 mg Oral Q12H    hydrALAZINE (APRESOLINE) tablet 75 mg  75 mg Oral QID    insulin lispro (HUMALOG) injection   SubCUTAneous AC&HS    glucose chewable tablet 16 g  4 Tablet Oral PRN    dextrose (D50W) injection syrg 12.5-25 g  12.5-25 g IntraVENous PRN    glucagon (GLUCAGEN) injection 1 mg  1 mg IntraMUSCular PRN    mylanta/viscous lidocaine (GI COCKTAIL)  40 mL Oral Q6H PRN    aspirin delayed-release tablet 81 mg  81 mg Oral QHS    guaiFENesin ER (MUCINEX) tablet 600 mg  600 mg Oral Q12H    benzonatate (TESSALON) capsule 100 mg  100 mg Oral TID PRN    [Held by provider] insulin glargine (LANTUS) injection 15 Units  15 Units SubCUTAneous PCD    levothyroxine (SYNTHROID) tablet 100 mcg  100 mcg Oral ACB    arformoterol 15 mcg/budesonide 0.5 mg neb solution   Nebulization BID RT    nitroglycerin (NITROSTAT) tablet 0.4 mg  0.4 mg SubLINGual Q5MIN PRN    clopidogreL (PLAVIX) tablet 75 mg  75 mg Oral DAILY    rosuvastatin (CRESTOR) tablet 20 mg  20 mg Oral QHS    magnesium oxide (MAG-OX) tablet 200 mg  200 mg Oral EVERY OTHER DAY    doxycycline (VIBRAMYCIN) 100 mg in 0.9% sodium chloride (MBP/ADV) 100 mL MBP  100 mg IntraVENous Q12H    acetaminophen (TYLENOL) tablet 650 mg  650 mg Oral Q4H PRN    acetaminophen (TYLENOL) tablet 650 mg  650 mg Oral Q6H    sodium chloride (NS) flush 5-10 mL  5-10 mL IntraVENous PRN       Labs:  ABG No results for input(s): PHI, PCO2I, PO2I, HCO3I, SO2I, FIO2I in the last 72 hours. CBC Recent Labs     12/11/21  0128   WBC 10.6   HGB 11.1*   HCT 34.6*      MCV 93.5   MCH 49.7        Metabolic  Panel Recent Labs     12/13/21  0455 12/12/21  1352 12/12/21  0057 12/11/21  0128 12/11/21  0128 12/10/21  1459     --  135*  --  137  --    K 3.9 4.4 4.1   < > 4.2  --      --  99  --  100  --    CO2 30  --  29  --  29  --    GLU 39*  --  195*  --  72  --    BUN 84*  --  86*  --  77*  --    CREA 2.41*  --  2.62*  --  2.89*  --    CA 8.8  --  8.4*  --  9.0  --    MG 2.5*  --  2.5*  --  2.5*  --    PHOS  --   --   --   --  4.7  --    ALB  --   --   --   --  2.7*  --    ALT  --   --   --   --  43  --    INR  --   --   --   --   --  1.0    < > = values in this interval not displayed.         Pertinent Labs                [place], [place]  12/13/2021

## 2021-12-14 NOTE — PROGRESS NOTES
0745hrs: Bedside and Verbal shift change report given to [facility] RN (oncoming nurse) by Garfield Gómez RN (offgoing nurse). Report included the following information SBAR, Intake/Output, MAR, Recent Results, Med Rec Status and Cardiac Rhythm NSR 80's.     1330hrs:  PT/OT at bedside to work with patient. SBP 86/31, NSR 80's.   RN notified MD.  Will hold afternoon hydralazine and requested albumin order from MD.

## 2021-12-14 NOTE — PROGRESS NOTES
Day #1 of Cefepime  Indication:  Please dose Zyvox and Cefepime for HCAP.   I don't want to renally dose Vanc at this point, want to avoid any more renal damage  Current regimen:  Rx to dose  Abx regimen: Zyvox and Cefepime  Recent Labs     21  1751 21  0455 21  0057   WBC 12.6*  --   --    CREA  --  2.41* 2.62*   BUN  --  84* 86*     Est CrCl: 25 ml/min  Temp (24hrs), Av.6 °F (37 °C), Min:97.2 °F (36.2 °C), Max:100.3 °F (37.9 °C)    Cultures: none    Plan: Change to Cefepime 2 grams q24h

## 2021-12-14 NOTE — PROGRESS NOTES
[address] [place] [address]               Division of Cardiology  [phone]                       Progress note              Lele Iglesias M.D., [email or web address]. NAME:  Dasia Martino   :   1950   MRN:   [id number]         ICD-10-CM ICD-9-CM    1. Acute congestive heart failure, unspecified heart failure type (HCC)  I50.9 428.0    2. Chronic combined systolic and diastolic congestive heart failure (HCC)  I50.42 428.42      428.0    3. Coronary arteriosclerosis  I25.10 414.00    4. Essential hypertension  I10 401.9    5. Renal insufficiency  N28.9 593.9    6. Shortness of breath  R06.02 786.05    7. Chronic obstructive pulmonary disease, unspecified COPD type ([facility]  J44.9 80                  HPI  70years old male with a past medical history remarkable for coronary disease status post 9 stents in the past, diabetes, hypertension, hyperlipidemia, previous myocardial infarction, aortic insufficiency and renal insufficiency who presented to the emergency room after being sent from the advanced heart failure team for increasing shortness of breath with hypoxia.     Patient also had developed some chest pain which appeared to be pleuritic. Nonetheless he was started on IV heparin.     Today the patient tells me that he feels much better and does not think that the episode that is occurred to him was related to his heart. He tells me that he recognizes he is angina very well and did not have any of the usual symptoms of angina.     His dyspnea is also improved at this time.     He is off IV nitroglycerin on p.o. Imdur. Assessment/Plan: 1. Coronary artery disease: Episode yesterday evening noted with desaturation and decreased mentation. Hypoglycemia noted that time also chest x-ray some degree of pulmonary edema. Patient today feels a little bit more short of breath.     He did have some chest discomfort yesterday evening relieved by 2 sublingual nitroglycerin. He tells me the chest discomfort will possibly repeat similar to the one he had prior to his stents. EKG with no significant ischemic changes and troponin okay thus far. Nonetheless is now off IV heparin and IV nitroglycerin. Previous history of stent to the circumflex and the LAD. Normal ejection fraction. Episode of mild pulmonary edema may be all pulmonary related but unfortunately given the occurrence of above-mentioned symptoms underlying worsening coronary disease will likely need to be ruled out. As I discussed with the patient, given recurring symptoms as described above we now have to consider the possibility of proceeding with cardiac catheterization. He seems to be agreeable cardiac catheterization about of course his creatinine is now worsened to 2.8 from 2.4 and therefore cardiac cath will be on hold until creatinine goes hopefully back to baseline and when and if okay with nephrology to proceed. Continue with Coreg statin aspirin Plavix and increase Imdur to twice a day. 2.  Acute on chronic hypoxic respiratory failure: Pulmonology following. 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 Some superimposed pulmonary edema on chest x-ray difficult to determine continue diuretics for now. 3.  Aortic stenosis: Mild to moderate with moderate aortic regurgitation continue echo surveillance. 4.  CKD: Closely followed by nephrology. CARDIAC STUDIES      12/08/21    ECHO ADULT COMPLETE 12/10/2021 12/10/2021    Interpretation Summary  · LV: Estimated LVEF is 55 - 60%. Normal cavity size, wall thickness and systolic function (ejection fraction normal). Wall motion: normal.  · RV: Mildly dilated right ventricle. · AV: Aortic valve sclerosis with no evidence of reduced excursion. Aortic valve leaflet calcification present. Aortic valve mean gradient is 14 mmHg. Aortic valve area is 1.8 cm2. Mild aortic valve stenosis is present.  Moderate aortic valve regurgitation is present. · LA: Moderately dilated left atrium. · MV: Moderate mitral annular calcification. Signed by: Magali Haddad MD on 12/10/2021  2:09 PM               @[email or web address]      IMAGING      CT Results (most recent):  Results from Hospital Encounter encounter on 12/08/21    CT CHEST WO CONT    Narrative  INDICATION: Shortness of breath    COMPARISON: November 30    CONTRAST: None. TECHNIQUE:  5 mm axial images were obtained through the . Coronal and sagittal  reformats were generated. CT dose reduction was achieved through use of a  standardized protocol tailored for this examination and automatic exposure  control for dose modulation. The absence of intravenous contrast reduces the sensitivity for evaluation of  the mediastinum, imelda, vasculature, and upper abdominal organs. FINDINGS:    CHEST WALL: No mass or axillary lymphadenopathy. THYROID: No nodule. MEDIASTINUM: Prominent nodes unchanged  IMELDA: No mass or lymphadenopathy. THORACIC AORTA: No aneurysm. MAIN PULMONARY ARTERY: Normal in caliber. TRACHEA/BRONCHI: Patent. ESOPHAGUS: No wall thickening or dilatation. HEART: Prominent in size  PLEURA: No effusion or pneumothorax. LUNGS: Diffuse interstitial infiltrates and emphysema unchanged. INCIDENTALLY IMAGED UPPER ABDOMEN: No significant abnormality in the  incidentally imaged upper abdomen. BONES: No destructive bone lesion. Impression  No Change      XR Results (most recent):  Results from Hospital Encounter encounter on 12/08/21    XR CHEST PORT    Narrative  EXAM: XR CHEST PORT    INDICATION: SOB    COMPARISON: December 13, 2021    FINDINGS: A portable AP radiograph of the chest was obtained at 0546 hours. The  patient is on a cardiac monitor. There is bilateral airspace opacification, left  greater than right. There are coronary artery stents. The bones and soft  tissues are grossly within normal limits.     Impression  No change in bilateral airspace opacification. MRI Results (most recent):  No results found for this or any previous visit. Past Medical History:   Diagnosis Date    Aortic regurgitation     CAD (coronary artery disease)     Taxus stent 12/2005, 12/06 Cypher Prox circ, 6/2018 Sudarshan LADx2,  9/19 Sudarshan LAD & LCX, 1/20 [place]     Chronic combined systolic and diastolic congestive heart failure ([facility] 11/14/2021    Chronic obstructive pulmonary disease (HCC)     Congestive heart failure (HCC)     Diabetes (HCC)     Essential hypertension     Hyperlipidemia     MI (myocardial infarction) ([facility]     Murmur     Renal insufficiency     Thyroid disease            Past Surgical History:   Procedure Laterality Date    HX CORONARY STENT PLACEMENT      HX CYST REMOVAL      IR ASP BLADDER SUPRA CATH                 Visit Vitals  BP (!) 138/44 (BP 1 Location: Right upper arm, BP Patient Position: At rest)   Pulse 71   Temp 97.4 °F (36.3 °C)   Resp 22   Ht 5' 5\" (1.651 m)   Wt 139 lb 5.3 oz (63.2 kg)   SpO2 98%   BMI 23.19 kg/m²         Wt Readings from Last 3 Encounters:   12/14/21 139 lb 5.3 oz (63.2 kg)   12/08/21 137 lb 6.4 oz (62.3 kg)   11/29/21 140 lb (63.5 kg)         Review of Systems:   Pertinent items are noted in the History of Present Illness. 12/14 0701 - 12/14 1900  In: 500 [P.O.:200; I.V.:300]  Out: 325 [Urine:325]    12/12 1901 - 12/14 0700  In: 9062 [P.O.:1420]  Out: 1850 [Urine:1850]      Telemetry: normal sinus rhythm    Physical Exam:    Neck: no JVD  Heart: regular rate and rhythm  Lungs: diminished breath sounds R base, L base  Abdomen: soft, non-tender.  Bowel sounds normal. No masses,  no organomegaly  Extremities: edema 1+    Current Facility-Administered Medications   Medication Dose Route Frequency    albuterol-ipratropium (DUO-NEB) 2.5 MG-0.5 MG/3 ML  3 mL Nebulization NOW    linezolid in dextrose 5% (ZYVOX) IVPB premix in D5W 600 mg  600 mg IntraVENous Q12H    cefepime (MAXIPIME) 2 g in 0.9% sodium chloride 10 mL IV syringe  2 g IntraVENous Q24H    ondansetron (ZOFRAN) injection 4 mg  4 mg IntraVENous Q8H PRN    dextrose 20% infusion   IntraVENous CONTINUOUS    albuterol-ipratropium (DUO-NEB) 2.5 MG-0.5 MG/3 ML  3 mL Nebulization BID RT    bumetanide (BUMEX) tablet 1 mg  1 mg Oral BID    isosorbide mononitrate ER (IMDUR) tablet 60 mg  60 mg Oral DAILY    carvediloL (COREG) tablet 12.5 mg  12.5 mg Oral Q12H    hydrALAZINE (APRESOLINE) tablet 75 mg  75 mg Oral QID    insulin lispro (HUMALOG) injection   SubCUTAneous AC&HS    glucose chewable tablet 16 g  4 Tablet Oral PRN    dextrose (D50W) injection syrg 12.5-25 g  12.5-25 g IntraVENous PRN    glucagon (GLUCAGEN) injection 1 mg  1 mg IntraMUSCular PRN    mylanta/viscous lidocaine (GI COCKTAIL)  40 mL Oral Q6H PRN    aspirin delayed-release tablet 81 mg  81 mg Oral QHS    guaiFENesin ER (MUCINEX) tablet 600 mg  600 mg Oral Q12H    benzonatate (TESSALON) capsule 100 mg  100 mg Oral TID PRN    [Held by provider] insulin glargine (LANTUS) injection 15 Units  15 Units SubCUTAneous PCD    levothyroxine (SYNTHROID) tablet 100 mcg  100 mcg Oral ACB    arformoterol 15 mcg/budesonide 0.5 mg neb solution   Nebulization BID RT    nitroglycerin (NITROSTAT) tablet 0.4 mg  0.4 mg SubLINGual Q5MIN PRN    clopidogreL (PLAVIX) tablet 75 mg  75 mg Oral DAILY    rosuvastatin (CRESTOR) tablet 20 mg  20 mg Oral QHS    magnesium oxide (MAG-OX) tablet 200 mg  200 mg Oral EVERY OTHER DAY    acetaminophen (TYLENOL) tablet 650 mg  650 mg Oral Q4H PRN    acetaminophen (TYLENOL) tablet 650 mg  650 mg Oral Q6H    sodium chloride (NS) flush 5-10 mL  5-10 mL IntraVENous PRN         All Cardiac Markers in the last 24 hours:    Lab Results   Component Value Date/Time    BNPNT 2,307 (H) 12/14/2021 12:50 AM        Lab Results   Component Value Date/Time    Creatinine 2.83 (H) 12/14/2021 04:51 AM          No results found for: CPK, RCK1, RCK2, RCK3, RCK4, CKMB, CKNDX, CKND1, TROPT, TROIQ, BNPP, BNP      Lab Results   Component Value Date/Time    WBC 12.6 (H) 12/13/2021 05:51 PM    HGB 11.4 (L) 12/13/2021 05:51 PM    HCT 36.0 (L) 12/13/2021 05:51 PM    PLATELET 373 48/21/4255 05:51 PM    MCV 93.3 12/13/2021 05:51 PM         Lab Results   Component Value Date/Time    Sodium 135 (L) 12/14/2021 04:51 AM    Potassium 4.8 12/14/2021 04:51 AM    Chloride 98 12/14/2021 04:51 AM    CO2 28 12/14/2021 04:51 AM    Anion gap 9 12/14/2021 04:51 AM    Glucose 107 (H) 12/14/2021 04:51 AM    BUN 81 (H) 12/14/2021 04:51 AM    Creatinine 2.83 (H) 12/14/2021 04:51 AM    BUN/Creatinine ratio 29 (H) 12/14/2021 04:51 AM    GFR est AA 27 (L) 12/14/2021 04:51 AM    GFR est non-AA 22 (L) 12/14/2021 04:51 AM    Calcium 8.6 12/14/2021 04:51 AM         Lab Results   Component Value Date/Time    NT pro-BNP 2,307 (H) 12/14/2021 12:50 AM    NT pro-BNP 1,300 (H) 12/13/2021 04:55 AM    NT pro-BNP 2,527 (H) 12/12/2021 12:57 AM    NT pro-BNP 5,365 (H) 12/11/2021 01:28 AM    NT pro-BNP 2,469 (H) 12/08/2021 03:22 PM    NT pro-BNP 2,630 (H) 12/08/2021 03:22 PM         Lab Results   Component Value Date/Time    Cholesterol, total 76 12/10/2021 02:59 PM    HDL Cholesterol 28 12/10/2021 02:59 PM    LDL, calculated 25.4 12/10/2021 02:59 PM    VLDL, calculated 22.6 12/10/2021 02:59 PM    Triglyceride 113 12/10/2021 02:59 PM    CHOL/HDL Ratio 2.7 12/10/2021 02:59 PM         Lab Results   Component Value Date/Time    ALT (SGPT) 76 12/14/2021 04:51 AM    Alk.  phosphatase 116 12/14/2021 04:51 AM    Bilirubin, total 0.4 12/14/2021 04:51 AM

## 2021-12-14 NOTE — PROGRESS NOTES
Chart reviewed and attempted to see patient for OT intervention. Entered room and patient shouted \"No, I am not doing it today! \". Patient's daughter explained how it had been not a great couple of days (also noted patient with tachypnea with RR 30s-50s, lethargic, and with poor PO intake). Acquired BP and it was 86/31 at rest. Will follow up with patient when he is more medically stable. Thank you.

## 2021-12-14 NOTE — PROGRESS NOTES
Pulmonary, Critical Care, and Sleep Medicine~Consult Note    Name: Gomez Sepulveda MRN: [id number]   : 1950 Hospital: [facility]   Date: 2021 2:58 PM Admission: 2021     Impression Plan   1. Acute on chronic hypoxic resp failure (O2 only recently started at [facility])  2. Abnormal CT scan: Diffuse patchy irregular consolidative opacities and reticular changes and peribronchial thickening Left > right. Notable enlarged mediastinal/hilar adenopathy. Fairly extensive bronchiectasis. left upper lobe with pneumatocele. Query long standing burnt out sarcoid? ?? Offers no signs of new medications, familial CTD, pet birds, farm life [[A. pullulans positive on hypersensitivity pneumonitis panel but this could just be prior exposure, clinical significance unclear]]. Query as well lymphogenic carcinomatosis? Possible. A coexisting PNA is additionally a possibility as WBC was elevated on admission ? atypical infection   3. Suspected COPD, yet to be define via PFTs. Followed by Dr Sussy Rizvi  4. CAD, s/p 9 stents; intermittent CP improved by nitro  5. decom HFrEF; EF 45-55%, mild MVR/mild AVR at [facility]  6. BIPIN on CKD   7. DM II    A. pullulans antibodies positive on HP panel, rest negative. IgE 264, CRP 3.23  Negative AVIS, ANCA, RF, CCP 1. Volume management  2. Antibiotics - on linezolid, cefepime  3. Follow cxs  4. Sputum culture for bacteria and atypical mycobacteria - hasn't been able to produce much  5. mucinex bid  6. Comparison with chest films [place] [facility] [place] be beneficial in determining temporal course (he has relocated from [place] a year ago) -- there may also be an old CT scan in [place] at a freestanding ER we will try and request  7. O2 titration above 90%, currently on 2L o20-- spo2 upper 90's at rest, noted titration for pt comfort not for desats  8. Low threshold to start steroids-- no indication for now as no wheezing on exam  9. dvt proph  10. On brovana/pulmicort   11.  Noted consideration of RHC on this admission, possibly will be done as an outpatient  12. Dicussed with wife at bedside   15. PT/OT     Daily Progression:  12/14 Unchanged breathing per pt. He was on 3L o2 but I weaned to 2L o2 as sats in the upper 90's. Pt reports titration of o2 for comfort. No other complaints. 12/13  Did seem to be doing better each day through the weekend per wife. Able to talk without gasping for breath. Unfortunately didn't sleep well last night and had some hypoglycemia this morning, so quite tired. 12/10  Seen and examined earlier today. His most distressing symptom has been's midsternal chest pain and radiation into the arms. He denies severe neck or back pain. He denies ability to bring up sputum. He has relocated from [place] approximately a year ago and was hospitalized at [facility] in early November, comparison of imaging from November admission may help with temporal course of events. Left heart catheterization has been deferred in view of renal insufficiency. Chest pain is midsternal and nonpleuritic and does not appear to be pulmonary in origin. CT scan of the chest shows patchy infiltrates with nonspecific fibrosis and bronchiectasis-atypical mycobacterial infection is also in the differential diagnosis. He does have a follow-up appointment with Dr. Eugenio Worley on the 28th for PFTs    12/9  Consult Note requested by hospitalist.    Patient was recently seen by outpatient advance Heart team, Dr Gavino Carver. At that visit patient was told to present to the ER because that he was clinically worsening. He is followed by Dr Eugenio Worley and was last seen on 11/16/21. At that visit he started symbicort and ordered PFTs that have yet to be completed. He was hospitalized at [facility] from 11/4-11/10 following presenting to the stand alone ER in [place] for dyspnea. He was treated there for pulmonary edema. He has a remote hx of aggressive tobacco use stopping in 2000.  No formal dx of COPD    On O2 since last admission. No prior dyspnea since last visit. Chest pains improved with nitro    Last ECHO showed EF 40-45%  followled by Dr Miriam Holloway for CKD    Hx of 9 stens placement. Recent change of duiertics from lasix to bumex. 12/8 CT scan fairly comparably to the 11/30 CT scan  LUNGS: Diffuse patchy irregular consolidative opacities and reticulations with  architectural distortion, left slightly more pronounced than right. 4.9 cm left  upper lobe with pneumatocele    I have reviewed the labs and previous days notes. Pertinent items are noted in HPI. Past Medical History:   Diagnosis Date    Aortic regurgitation     CAD (coronary artery disease)     Taxus stent 12/2005, 12/06 Cypher Prox circ, 6/2018 Sudarshan LADx2,  9/19 Sudarshan LAD & LCX, 1/20 [place]     Chronic combined systolic and diastolic congestive heart failure ([address] 11/14/2021    Chronic obstructive pulmonary disease (HCC)     Congestive heart failure (HCC)     Diabetes ([address]     Essential hypertension     Hyperlipidemia     MI (myocardial infarction) ([address]     Murmur     Renal insufficiency     Thyroid disease       Past Surgical History:   Procedure Laterality Date    HX CORONARY STENT PLACEMENT      HX CYST REMOVAL      IR ASP BLADDER SUPRA CATH        Prior to Admission medications    Medication Sig Start Date End Date Taking? Authorizing Provider   anastrozole (ARIMIDEX) 1 mg tablet Take 0.5 mg by mouth every Monday, Wednesday, Friday. Yes Provider, Historical   bumetanide (BUMEX) 1 mg tablet TAKE 1 TABLET BY MOUTH TWICE DAILY. REPLACES. LASIX 11/16/21   Catherine Leonard NP   magnesium oxide 250 mg magnesium tablet Take 250 mg by mouth. Every other day - OTC 11/4/21   Provider, Historical   prasterone, dhea, 50 mg tab 50 mg = 1 tab each dose, PO, daily, # 30 tab, 0 Refills 11/4/21   Provider, Historical   carvediloL (COREG) 6.25 mg tablet Take 1 Tablet by mouth every twelve (12) hours.  11/15/21   Catherine Leonard NP   isosorbide mononitrate ER (IMDUR) 30 mg tablet Take 1 Tablet by mouth daily. 11/15/21   Deborra Marking, NP   hydrALAZINE (APRESOLINE) 25 mg tablet Take 1 Tablet by mouth every eight (8) hours. 11/15/21   Deborra Marking, NP   amLODIPine (NORVASC) 2.5 mg tablet Take 1 Tablet by mouth daily. 11/15/21   Deborra Marking, NP   rosuvastatin (CRESTOR) 20 mg tablet TAKE 1 TABLET BY MOUTH EVERY NIGHT 11/4/21   Deborra Marking, NP   clopidogreL (PLAVIX) 75 mg tab TAKE 1 TABLET BY MOUTH EVERY DAY 10/26/21   Deborra Marking, NP   Symbicort 80-4.5 mcg/actuation HFAA INHALE 2 PUFFS BY MOUTH TWICE DAILY IN THE MORNING AND IN THE EVENING 8/18/21   Provider, Historical   nitroglycerin (Nitrostat) 0.4 mg SL tablet 1 Tablet by SubLINGual route every five (5) minutes as needed for Chest Pain. Up to 3 doses. 9/1/21   Deborra Marking, NP   albuterol (ProAir HFA) 90 mcg/actuation inhaler Take 2 Puffs by inhalation. 4-6 hrs as needed 1/22/21   Provider, Historical   BD Lali 2nd Gen Pen Needle 32 gauge x 5/32\" ndle USE WITH LANTUS 7/13/21   Provider, Historical   aspirin delayed-release 81 mg tablet Take 81 mg by mouth daily. At night    Provider, Historical   glimepiride (AMARYL) 4 mg tablet Take 4 mg by mouth. Every two days    Provider, Historical   insulin glargine (Lantus U-100 Insulin) 100 unit/mL injection 18 U nightly after dinner    Provider, Historical   levothyroxine (SYNTHROID) 100 mcg tablet Take 100 mcg by mouth daily. Morning    Provider, Historical   testosterone (ANDROGEL) 1.62 % (20.25 mg/1.25 gram) glpk 20.25 mg daily. 2 to 3 pumps alternating in the morning.     Provider, Historical     Allergies   Allergen Reactions    Bee Sting [Sting, Bee] Anaphylaxis    Iodinated Contrast Media Rash    Brilinta [Ticagrelor] Other (comments)    Effient [Prasugrel] Other (comments)    Penicillins Rash and Nausea Only    Ranexa [Ranolazine] Other (comments)    Sulfa (Sulfonamide Antibiotics) Rash      Social History     Tobacco Use    Smoking status: Former Smoker     Packs/day: 2.00     Years: 30.00     Pack years: 60.00     Quit date: 1991     Years since quittin.8    Smokeless tobacco: Never Used   Substance Use Topics    Alcohol use: Yes     Comment: rarely      History reviewed. No pertinent family history. OBJECTIVE:     Vital Signs:       Visit Vitals  BP (!) 99/35 (BP 1 Location: Right upper arm, BP Patient Position: At rest)   Pulse 79   Temp 97.2 °F (36.2 °C)   Resp 20   Ht 5' 5\" (1.651 m)   Wt 63.2 kg (139 lb 5.3 oz)   SpO2 98%   BMI 23.19 kg/m²      Temp (24hrs), Av.7 °F (37.1 °C), Min:97.2 °F (36.2 °C), Max:100.3 °F (37.9 °C)     Intake/Output:     Last shift:  0701 -  190  In: 500 [P.O.:200;  I.V.:300]  Out: 325 [Urine:325]    Last 3 shifts: 1901 -  0700  In: 1420 [P.O.:1420]  Out: 1850 [Urine:1850]          Intake/Output Summary (Last 24 hours) at 2021 1342  Last data filed at 2021 1021  Gross per 24 hour   Intake 940 ml   Output 1175 ml   Net -235 ml       Physical Exam:                                        Exam Findings Other   General: No resp distress noted, appears stated age    [de-identified]:  No ulcers, JVD not elevated, no cervical LAD    Chest: No pectus deformity, normal chest rise b/l    HEART:  RRR, no murmurs/rubs/gallops    Lungs:  diminished BS at bases, no wheezing, rhonchi or rales    ABD: Soft/NT, non rigid mildly distended    EXT: No cyanosis/clubbing/edema, normal peripheral pulses, tenderness w/ palpation    Skin: No rashes or ulcers, no mottling    Neuro: A/O x 3        Medications:  Current Facility-Administered Medications   Medication Dose Route Frequency    albuterol-ipratropium (DUO-NEB) 2.5 MG-0.5 MG/3 ML  3 mL Nebulization NOW    linezolid in dextrose 5% (ZYVOX) IVPB premix in D5W 600 mg  600 mg IntraVENous Q12H    cefepime (MAXIPIME) 2 g in 0.9% sodium chloride 10 mL IV syringe  2 g IntraVENous Q24H    isosorbide mononitrate ER (IMDUR) tablet 60 mg  60 mg Oral BID    ondansetron (ZOFRAN) injection 4 mg  4 mg IntraVENous Q8H PRN    dextrose 20% infusion   IntraVENous CONTINUOUS    albuterol-ipratropium (DUO-NEB) 2.5 MG-0.5 MG/3 ML  3 mL Nebulization BID RT    bumetanide (BUMEX) tablet 1 mg  1 mg Oral BID    carvediloL (COREG) tablet 12.5 mg  12.5 mg Oral Q12H    hydrALAZINE (APRESOLINE) tablet 75 mg  75 mg Oral QID    insulin lispro (HUMALOG) injection   SubCUTAneous AC&HS    glucose chewable tablet 16 g  4 Tablet Oral PRN    dextrose (D50W) injection syrg 12.5-25 g  12.5-25 g IntraVENous PRN    glucagon (GLUCAGEN) injection 1 mg  1 mg IntraMUSCular PRN    mylanta/viscous lidocaine (GI COCKTAIL)  40 mL Oral Q6H PRN    aspirin delayed-release tablet 81 mg  81 mg Oral QHS    guaiFENesin ER (MUCINEX) tablet 600 mg  600 mg Oral Q12H    benzonatate (TESSALON) capsule 100 mg  100 mg Oral TID PRN    [Held by provider] insulin glargine (LANTUS) injection 15 Units  15 Units SubCUTAneous PCD    levothyroxine (SYNTHROID) tablet 100 mcg  100 mcg Oral ACB    arformoterol 15 mcg/budesonide 0.5 mg neb solution   Nebulization BID RT    nitroglycerin (NITROSTAT) tablet 0.4 mg  0.4 mg SubLINGual Q5MIN PRN    clopidogreL (PLAVIX) tablet 75 mg  75 mg Oral DAILY    rosuvastatin (CRESTOR) tablet 20 mg  20 mg Oral QHS    magnesium oxide (MAG-OX) tablet 200 mg  200 mg Oral EVERY OTHER DAY    acetaminophen (TYLENOL) tablet 650 mg  650 mg Oral Q4H PRN    acetaminophen (TYLENOL) tablet 650 mg  650 mg Oral Q6H    sodium chloride (NS) flush 5-10 mL  5-10 mL IntraVENous PRN       Labs:  ABG Recent Labs     12/14/21  0520 12/13/21  1728   PHI 7.40 7.44   PCO2I 53.9* 47.0*   PO2I 150* 64*   HCO3I 33.1* 31.6*   SO2I 99.2* 92.4   FIO2I 5 5        CBC Recent Labs     12/13/21  1751   WBC 12.6*   HGB 11.4*   HCT 36.0*      MCV 93.3   MCH 66.6        Metabolic  Panel Recent Labs     12/14/21  0451 12/14/21  0050 12/13/21  0455 12/12/21  1352 12/12/21  0057 12/12/21  0057   *  -- 137  --   --  135*   K 4.8  --  3.9 4.4   < > 4.1   CL 98  --  101  --   --  99   CO2 28  --  30  --   --  29   *  --  39*  --   --  195*   BUN 81*  --  84*  --   --  86*   CREA 2.83*  --  2.41*  --   --  2.62*   CA 8.6  --  8.8  --   --  8.4*   MG  --  2.3 2.5*  --   --  2.5*   ALB 3.2*  --   --   --   --   --    ALT 76  --   --   --   --   --     < > = values in this interval not displayed.         Pertinent Labs                Alfonso Salazar NP  12/14/2021

## 2021-12-14 NOTE — NURSE NAVIGATOR
Heart Failure Nurse Navigator rounds completed. HF NN provided introduction to self and nurse navigator role. Living With Heart Failure booklet given to patient, along with weight calendar, magnet, and HF Support Group flyer. Introduced patient to Preparing for Successful Discharge - Heart Failure check list and explained that knowledge of these topics will help facilitate successful self management upon discharge. Reviewed low salt diet, signs and symptoms of heart failure and daily weights. Advised patient that follow up appointment will be scheduled and placed on Discharge Instructions prior to discharge. Will continue to follow until discharge.

## 2021-12-14 NOTE — PROGRESS NOTES
[facility]  Hospitalist Group                                                                                          Hospitalist Progress Note  Lisa Albarran MD  Answering service: [phone] OR [phone] from in house phone        Date of Service:  2021  NAME:  Oz So  :  1950  MRN:  [id number]      Admission Summary:     Patient with recent admission at [address] [place] [address] and discharged with diagnosis of acute diastolic heart failure. Patient went home on 1 L of oxygen. Patient presented to heart failure clinic  and found to be hypoxic with increased work of breathing and subsequently sent to the emergency room for further evaluation. CT of the chest shows bilateral pulmonary infiltrates which was unchanged from his recent chest CT . Patient has seen pulmonology once outpatient for further evaluation and further ongoing investigation for that. Interval history / Subjective:       Patient's chest pain is now resolved. On  evening, patient with increased work of breathing, tachypneic and hypoxic. Work-up shows increased pulmonary edema on the chest x-ray was giving one-time dose of IV Bumex 1.5 mg. Patient's breathing status much improved this AM.  Noted low blood pressure while working with PT.      Assessment & Plan:     Acute on chronic hypoxic respiratory failure  -Acute on chronic hypoxic respiratory failure likely combination of CHF and pulmonary pathology  -Patient on 1 L of oxygen at home but found more hypoxic prior to admission  -CT chest shows bilateral infiltrates, negative for Covid  -Pulmonology evaluating the patient  -Screening for connective tissue disease and multiple labs sent by pulmonology, suspected COPD but yet to be defined via PFTs  -Acute respiratory distress on  evening, likely more related to volume overload, patient improved with IV Bumex one-time dose  -Previously on doxycycline, antibiotics were changed to Zyvox and cefepime for broader spectrum coverage, blood cultures obtained    Hypotension  -Patient is currently asymptomatic, likely may be due to orthostasis related to diuresis  -Closely monitor for developing sepsis as patient with mild leukocytosis, hypoglycemic  -Broad-spectrum antibiotics on board  -Continue monitor, may need IV albumin as indicated, may need pressors if worsening hypotension    Acute diastolic congestive heart failure  -Discontinued Norvasc due to leg edema, discontinued lisinopril due to BIPIN  -Previous echo at [facility] shows EF of 45 to 50%, repeat echo this admission shows EF 55 to 60%  -On hydralazine, Coreg and on diuresis with Bumex  -Advanced heart failure team following  -Plan for right heart cath once patient is more euvolemic    Acute coronary syndrome  -Patient with chest pain and elevated troponin, likely type II MI due to CHF and respiratory failure  -Chest pain has now resolved, nitro drip discontinued and started on Imdur  -On heparin drip 12/11 and discontinued 12/13  -Given pulmonary edema with increasing shortness of breath, cardiology revisiting the idea of cardiac cath for evaluation of coronary artery disease  -Currently creatinine has bumped from yesterday, and cardiac cath will be on hold until creatinine improves    Urinary tract infection  -Urine culture on 12/9 growing Klebsiella  -Previously on cefdinir, now with broad-spectrum antibiotics including Zyvox and cefepime    CKD stage IV  -Baseline serum creatinine 2.5-3 per nephrology  -Mild bump in creatinine due to diuresis  -Discussed risk of MEL with cardiac catheterization, with patient and wife  -Nephrology following    Hypertension  -Continue hydralazine, coreg and nitrates    Diabetes  -Lantus currently on hold due to patient's hypoglycemia  -Blood sugars improving, continue sliding scale insulin coverage    Dyslipidemia  -Continue statin    Hypothyroidism  -Continue Synthroid    Code status: Full  DVT prophylaxis: SCDs    Care Plan discussed with: Patient/Family  Anticipated Disposition: Home w/Family  Anticipated Discharge: Greater than 48 hours     Hospital Problems  Date Reviewed: 12/9/2021          Codes Class Noted POA    CHF (congestive heart failure) (HCC) ICD-10-CM: I50.9  ICD-9-CM: 428.0  12/9/2021 Unknown        Renal insufficiency ICD-10-CM: N28.9  ICD-9-CM: 593.9  Unknown Yes        Chronic obstructive pulmonary disease ([facility] [address] ICD-10-CM: J44.9  ICD-9-CM: 854  Unknown Yes        Diabetes ([facility] [address] ICD-10-CM: E11.9  ICD-9-CM: 250.00  Unknown Yes        Essential hypertension ICD-10-CM: I10  ICD-9-CM: 401.9  Unknown Yes        Coronary arteriosclerosis ICD-10-CM: I25.10  ICD-9-CM: 414.00  7/3/2018 Yes                Review of Systems:   A comprehensive review of systems was negative except for that written in the HPI. Vital Signs:    Last 24hrs VS reviewed since prior progress note. Most recent are:  Visit Vitals  BP (!) 99/35 (BP 1 Location: Right upper arm, BP Patient Position: At rest)   Pulse 74   Temp 97.2 °F (36.2 °C)   Resp 20   Ht 5' 5\" (1.651 m)   Wt 63.2 kg (139 lb 5.3 oz)   SpO2 98%   BMI 23.19 kg/m²         Intake/Output Summary (Last 24 hours) at 12/14/2021 1547  Last data filed at 12/14/2021 1338  Gross per 24 hour   Intake 700 ml   Output 1475 ml   Net -775 ml        Physical Examination:     I had a face to face encounter with this patient and independently examined them on 12/14/2021 as outlined below:          Constitutional:  No acute distress, cooperative, pleasant    ENT:  Oral mucosa moist, oropharynx benign. Resp:  CTA bilaterally. No wheezing/rhonchi/rales. No accessory muscle use   CV:  Regular rhythm, normal rate, no murmurs, gallops, rubs    GI:  Soft, non distended, non tender. normoactive bowel sounds, no hepatosplenomegaly     Musculoskeletal:  No edema, warm, 2+ pulses throughout    Neurologic:  Moves all extremities.   AAOx3            Data Review:    Review and/or order of clinical lab test      Labs: Recent Labs     12/13/21  1751   WBC 12.6*   HGB 11.4*   HCT 36.0*        Recent Labs     12/14/21  0451 12/14/21  0050 12/13/21  0455 12/12/21  1352 12/12/21  0057 12/12/21  0057   *  --  137  --   --  135*   K 4.8  --  3.9 4.4   < > 4.1   CL 98  --  101  --   --  99   CO2 28  --  30  --   --  29   BUN 81*  --  84*  --   --  86*   CREA 2.83*  --  2.41*  --   --  2.62*   *  --  39*  --   --  195*   CA 8.6  --  8.8  --   --  8.4*   MG  --  2.3 2.5*  --   --  2.5*    < > = values in this interval not displayed. Recent Labs     12/14/21 0451   ALT 76      TBILI 0.4   TP 6.6   ALB 3.2*   GLOB 3.4     Recent Labs     12/13/21 0455 12/12/21  2124 12/12/21  1255   APTT 69.3* 57.5* 51.7*      No results for input(s): FE, TIBC, PSAT, FERR in the last 72 hours. No results found for: FOL, RBCF   No results for input(s): PH, PCO2, PO2 in the last 72 hours. No results for input(s): CPK, CKNDX, TROIQ in the last 72 hours.     No lab exists for component: CPKMB  Lab Results   Component Value Date/Time    Cholesterol, total 76 12/10/2021 02:59 PM    HDL Cholesterol 28 12/10/2021 02:59 PM    LDL, calculated 25.4 12/10/2021 02:59 PM    Triglyceride 113 12/10/2021 02:59 PM    CHOL/HDL Ratio 2.7 12/10/2021 02:59 PM     Lab Results   Component Value Date/Time    Glucose (POC) 256 (H) 12/14/2021 11:16 AM    Glucose (POC) 234 (H) 12/14/2021 07:17 AM    Glucose (POC) 110 12/14/2021 04:36 AM    Glucose (POC) 105 12/14/2021 02:19 AM    Glucose (POC) 102 12/14/2021 12:22 AM     Lab Results   Component Value Date/Time    Color YELLOW/STRAW 12/09/2021 05:26 AM    Appearance CLEAR 12/09/2021 05:26 AM    Specific gravity 1.008 12/09/2021 05:26 AM    pH (UA) 6.5 12/09/2021 05:26 AM    Protein 30 (A) 12/09/2021 05:26 AM    Glucose Negative 12/09/2021 05:26 AM    Ketone Negative 12/09/2021 05:26 AM    Bilirubin Negative 12/09/2021 05:26 AM    Urobilinogen 0.2 12/09/2021 05:26 AM    Nitrites Negative 12/09/2021 05:26 AM    Leukocyte Esterase SMALL (A) 12/09/2021 05:26 AM    Epithelial cells FEW 12/09/2021 05:26 AM    Bacteria 4+ (A) 12/09/2021 05:26 AM    WBC 20-50 12/09/2021 05:26 AM    RBC 0-5 12/09/2021 05:26 AM         Medications Reviewed:     Current Facility-Administered Medications   Medication Dose Route Frequency    albuterol-ipratropium (DUO-NEB) 2.5 MG-0.5 MG/3 ML  3 mL Nebulization NOW    linezolid in dextrose 5% (ZYVOX) IVPB premix in D5W 600 mg  600 mg IntraVENous Q12H    isosorbide mononitrate ER (IMDUR) tablet 60 mg  60 mg Oral BID    [START ON 12/15/2021] cefepime (MAXIPIME) 2 g in sterile water (preservative free) 10 mL IV syringe  2 g IntraVENous Q24H    ondansetron (ZOFRAN) injection 4 mg  4 mg IntraVENous Q8H PRN    dextrose 20% infusion   IntraVENous CONTINUOUS    albuterol-ipratropium (DUO-NEB) 2.5 MG-0.5 MG/3 ML  3 mL Nebulization BID RT    bumetanide (BUMEX) tablet 1 mg  1 mg Oral BID    carvediloL (COREG) tablet 12.5 mg  12.5 mg Oral Q12H    hydrALAZINE (APRESOLINE) tablet 75 mg  75 mg Oral QID    insulin lispro (HUMALOG) injection   SubCUTAneous AC&HS    glucose chewable tablet 16 g  4 Tablet Oral PRN    dextrose (D50W) injection syrg 12.5-25 g  12.5-25 g IntraVENous PRN    glucagon (GLUCAGEN) injection 1 mg  1 mg IntraMUSCular PRN    mylanta/viscous lidocaine (GI COCKTAIL)  40 mL Oral Q6H PRN    aspirin delayed-release tablet 81 mg  81 mg Oral QHS    guaiFENesin ER (MUCINEX) tablet 600 mg  600 mg Oral Q12H    benzonatate (TESSALON) capsule 100 mg  100 mg Oral TID PRN    [Held by provider] insulin glargine (LANTUS) injection 15 Units  15 Units SubCUTAneous PCD    levothyroxine (SYNTHROID) tablet 100 mcg  100 mcg Oral ACB    arformoterol 15 mcg/budesonide 0.5 mg neb solution   Nebulization BID RT    nitroglycerin (NITROSTAT) tablet 0.4 mg  0.4 mg SubLINGual Q5MIN PRN    clopidogreL (PLAVIX) tablet 75 mg  75 mg Oral DAILY    rosuvastatin (CRESTOR) tablet 20 mg  20 mg Oral QHS    magnesium oxide (MAG-OX) tablet 200 mg  200 mg Oral EVERY OTHER DAY    acetaminophen (TYLENOL) tablet 650 mg  650 mg Oral Q4H PRN    acetaminophen (TYLENOL) tablet 650 mg  650 mg Oral Q6H    sodium chloride (NS) flush 5-10 mL  5-10 mL IntraVENous PRN     ______________________________________________________________________  EXPECTED LENGTH OF STAY: 4d 2h  ACTUAL LENGTH OF STAY:          5                 Naresh Hodges MD

## 2021-12-14 NOTE — PROGRESS NOTES
Physical Therapy    Reviewed chart. Pt declining reporting unable participate today. BP 86/31 at rest.RN notified.  Will defer at this time and continue to follow

## 2021-12-14 NOTE — PROGRESS NOTES
1900  Bedside shift change report given to Fausto Bowling (oncoming nurse) by Alyssa (offgoing nurse). Report included the following information SBAR, Kardex, Intake/Output, MAR, Accordion and Cardiac Rhythm NSR/ST. 1950  BS 87. Pt continues to be drowsy. Unable to wake him up long enough to drink soda. Paged Hospitalist and received order for continuous D20.    2110  BS 50  12.5g D50 given    2200  D20 gtt started    2245  BS 52  Pt is somewhat more awake and able to drink liquids. Will try to have him drink before giving IV glucose    0020    Pt began having sys BPs running below 80 w/ MAPs less than 60. Talked w/ Dr. Jennifer Crews and received orders for IV albumin. 0100  Pt BP stable 119/61    0430  Pt began experiencing severe chest pain  Rapid response was called    0435  .4mg sublingual Nitro given CP not resolved    0440  .4mg sublingual Nitro given w/ some improved pain    0520  2mg Glucagon given IM and D20 gtt stopped    0530  Rapid response resolved  The pt was taken off D20 gtt to keep him from gaining fluids. Dr. Ginny Hernandez suspects possible SEPSIS, blood cultures drawn and sent to the lab. Doxycylcline replaced by Zyvox incase of SEPSIS. CXR showed no significant changes in pulmonary edema. 0730  Mild chest pain resolved by .4mg sublingual Nitro    0745  Bedside shift change report given to [facility] (oncoming nurse) by Fausto Bowling (offgoing nurse). Report included the following information SBAR, Kardex, Intake/Output, MAR, Accordion, Recent Results and Cardiac Rhythm NSR.

## 2021-12-14 NOTE — PROGRESS NOTES
RENAL  PROGRESS NOTE        Subjective:   resting  Objective:   VITALS SIGNS:    Visit Vitals  BP (!) 138/44 (BP 1 Location: Right upper arm, BP Patient Position: At rest)   Pulse 71   Temp 97.4 °F (36.3 °C)   Resp 22   Ht 5' 5\" (1.651 m)   Wt 63.2 kg (139 lb 5.3 oz)   SpO2 98%   BMI 23.19 kg/m²       O2 Device: Nasal cannula   O2 Flow Rate (L/min): 2 l/min   Temp (24hrs), Av.7 °F (37.1 °C), Min:97.2 °F (36.2 °C), Max:100.3 °F (37.9 °C)         PHYSICAL EXAM:  NAD  Trace edema    DATA REVIEW:     INTAKE / OUTPUT:   Last shift:       07 - 1900  In: 500 [P.O.:200; I.V.:300]  Out: 325 [Urine:325]  Last 3 shifts: 1901 -  0700  In: 1420 [P.O.:1420]  Out: 1850 [Urine:1850]    Intake/Output Summary (Last 24 hours) at 2021 1106  Last data filed at 2021 1021  Gross per 24 hour   Intake 1180 ml   Output 1175 ml   Net 5 ml         LABS:   Recent Labs     21  1751   WBC 12.6*   HGB 11.4*   HCT 36.0*        Recent Labs     21  0451 21  0050 21  0455 21  1352 21  0057 21  0057   *  --  137  --   --  135*   K 4.8  --  3.9 4.4   < > 4.1   CL 98  --  101  --   --  99   CO2 28  --  30  --   --  29   *  --  39*  --   --  195*   BUN 81*  --  84*  --   --  86*   CREA 2.83*  --  2.41*  --   --  2.62*   CA 8.6  --  8.8  --   --  8.4*   MG  --  2.3 2.5*  --   --  2.5*   ALB 3.2*  --   --   --   --   --    TBILI 0.4  --   --   --   --   --    ALT 76  --   --   --   --   --     < > = values in this interval not displayed. Assessment:  CKD stage 4: Serum Cr 2.4->2.7mg/dl. Baseline serum Cr 2.5 to 3mg/dl-> 2 to DM/HTN. Followed by my partner Dr. Tim Delaney.     Decompensated CHF: EF 45-50%     Chest pain: hx of 9 stents     DM2: Uncontrolled->Hyperglycemia     HTN: fluctuating control.     Proteinuria: 2.8gm proteinuria     COPD suspected: Possible BRAYDEN PNA.  Pulmonary following     Hx of urethral obstruction: s/p suprapubic catheter     Plan/Recommendations:  Creat up  recom hold  Bumex   ? [facility] planned this admission-> discussed risks of MEL with patient/daughter previously by dr Hugo Pardo  Will follow  Bright Herrera MD

## 2021-12-15 NOTE — PROGRESS NOTES
RENAL  PROGRESS NOTE        Subjective:   Feels weak  Objective:   VITALS SIGNS:    Visit Vitals  BP (!) 114/39 (BP 1 Location: Right upper arm, BP Patient Position: At rest)   Pulse 75   Temp 97.7 °F (36.5 °C)   Resp 22   Ht 5' 5\" (1.651 m)   Wt 63 kg (138 lb 14.2 oz)   SpO2 100%   BMI 23.11 kg/m²       O2 Device: Nasal cannula   O2 Flow Rate (L/min): 3 l/min   Temp (24hrs), Av.6 °F (36.4 °C), Min:97.2 °F (36.2 °C), Max:97.9 °F (36.6 °C)         PHYSICAL EXAM:  NAD  Trace edema    DATA REVIEW:     INTAKE / OUTPUT:   Last shift:      12/15 0701 - 12/15 1900  In: 300 [I.V.:300]  Out: -   Last 3 shifts: 1901 - 12/15 07  In: 1660 [P.O.:1360; I.V.:300]  Out: 2800 [Urine:2800]    Intake/Output Summary (Last 24 hours) at 12/15/2021 0919  Last data filed at 12/15/2021 0809  Gross per 24 hour   Intake 1460 ml   Output 1625 ml   Net -165 ml         LABS:   Recent Labs     12/15/21  0529 21  1751   WBC 15.0* 12.6*   HGB 9.2* 11.4*   HCT 29.1* 36.0*   * 176     Recent Labs     12/15/21  0529 21  0451 21  0050 21  0455   * 135*  --  137   K 4.3 4.8  --  3.9   CL 96* 98  --  101   CO2 32 28  --  30   GLU 71 107*  --  39*   BUN 85* 81*  --  84*   CREA 2.85* 2.83*  --  2.41*   CA 8.7 8.6  --  8.8   MG 2.4  --  2.3 2.5*   ALB  --  3.2*  --   --    TBILI  --  0.4  --   --    ALT  --  76  --   --           Assessment:  CKD stage 4: Serum Cr 2.4->2.7mg/dl. Baseline serum Cr 2.5 to 3mg/dl-> 2 to DM/HTN. Followed by my partner Dr. Nguyễn Sutton.     Decompensated CHF: EF 45-50%     Chest pain: hx of 9 stents     DM2: Uncontrolled->Hyperglycemia     HTN: fluctuating control.     Proteinuria: 2.8gm proteinuria     COPD suspected: Possible BRAYDEN PNA. Pulmonary following     Hx of urethral obstruction: s/p suprapubic catheter     Plan/Recommendations:  Creat up  recom hold  Bumex   ? [facility] planned this admission-> discussed risks of MEL with patient/daughter previously by dr Terry Pyle  Will follow  Rebel Sauer MD Alfred

## 2021-12-15 NOTE — PROGRESS NOTES
[facility] Adult  Hospitalist Group                                                                                          Hospitalist Progress Note  Blue Crowder MD  Answering service: [phone] OR [phone] from in house phone        Date of Service:  12/15/2021  NAME:  Kaleb Martines  :  1950  MRN:  [id number]      Admission Summary:     Patient with recent admission at [facility] and discharged with diagnosis of acute diastolic heart failure. Patient went home on 1 L of oxygen. Patient presented to heart failure clinic  and found to be hypoxic with increased work of breathing and subsequently sent to the emergency room for further evaluation. CT of the chest shows bilateral pulmonary infiltrates which was unchanged from his recent chest CT . Patient has seen pulmonology once outpatient for further evaluation and further ongoing investigation for that. Interval history / Subjective:       Patient's chest pain is now resolved. On  evening, patient with increased work of breathing, tachypneic and hypoxic. Work-up shows increased pulmonary edema on the chest x-ray was giving one-time dose of IV Bumex 1.5 mg. Patient's breathing status much improved.      Assessment & Plan:     Acute on chronic hypoxic respiratory failure  -Acute on chronic hypoxic respiratory failure likely combination of CHF and pulmonary pathology  -Patient on 1 L of oxygen at home but found more hypoxic prior to admission  -CT chest shows bilateral infiltrates, negative for Covid  -Pulmonology evaluating the patient  -Screening for connective tissue disease and multiple labs sent by pulmonology, suspected COPD but yet to be defined via PFTs  -Acute respiratory distress on  evening, likely more related to volume overload, patient improved with IV Bumex one-time dose  -Sputum culture from bacterial and atypical mycobacteria, has not been able to produce much  -Patient on Zyvox and cefepime which were started 12/14, given less likelihood of sepsis, I do not think patient will need broader spectrum coverage, patient having a lot of GI side effects with the antibiotics    Hypotension  -Patient is currently asymptomatic, likely may be due to orthostasis related to diuresis  -Closely monitor for developing sepsis as patient with mild leukocytosis, hypoglycemic  -Broad-spectrum antibiotics on board  -Continue monitor, may need IV albumin as indicated, may need pressors if worsening hypotension    Acute diastolic congestive heart failure  -Discontinued Norvasc due to leg edema, discontinued lisinopril due to BIPIN  -Previous echo at [facility] shows EF of 45 to 50%, repeat echo this admission shows EF 55 to 60%  -On hydralazine, Coreg and on diuresis with Bumex  -Advanced heart failure team following  -Plan for right heart cath once patient is more euvolemic    Acute coronary syndrome  -Patient with chest pain and elevated troponin, likely type II MI due to CHF and respiratory failure  -Recurrence of chest pain overnight, cardiology restarting nitro drip and stopping Imdur  -On heparin drip 12/11 and discontinued 12/13  -Given pulmonary edema with increasing shortness of breath, cardiology revisiting the idea of cardiac cath for evaluation of coronary artery disease  -Currently creatinine has bumped from yesterday, and cardiac cath will be on hold until creatinine improves    Urinary tract infection  -Urine culture on 12/9 growing Klebsiella  -Previously on cefdinir, now with broad-spectrum antibiotics including Zyvox and cefepime    CKD stage IV  -Baseline serum creatinine 2.5-3 per nephrology  -Mild bump in creatinine due to diuresis  -Discussed risk of MEL with cardiac catheterization, with patient and wife  -Nephrology following    Hypertension  -Continue hydralazine, coreg and nitrates    Diabetes  -Lantus currently on hold due to patient's hypoglycemia  -Blood sugars improving, continue sliding scale insulin coverage    Dyslipidemia  -Continue statin    Hypothyroidism  -Continue Synthroid    Code status: Full  DVT prophylaxis: SCDs    Care Plan discussed with: Patient/Family  Anticipated Disposition: Home w/Family  Anticipated Discharge: Greater than 48 hours     Hospital Problems  Date Reviewed: 12/9/2021          Codes Class Noted POA    CHF (congestive heart failure) (HCC) ICD-10-CM: I50.9  ICD-9-CM: 428.0  12/9/2021 Unknown        Renal insufficiency ICD-10-CM: N28.9  ICD-9-CM: 593.9  Unknown Yes        Chronic obstructive pulmonary disease ([address] ICD-10-CM: J44.9  ICD-9-CM: 270  Unknown Yes        Diabetes ([address] ICD-10-CM: E11.9  ICD-9-CM: 250.00  Unknown Yes        Essential hypertension ICD-10-CM: I10  ICD-9-CM: 401.9  Unknown Yes        Coronary arteriosclerosis ICD-10-CM: I25.10  ICD-9-CM: 414.00  7/3/2018 Yes                Review of Systems:   A comprehensive review of systems was negative except for that written in the HPI. Vital Signs:    Last 24hrs VS reviewed since prior progress note. Most recent are:  Visit Vitals  BP (!) 130/36 (BP 1 Location: Right upper arm, BP Patient Position: Sitting)   Pulse 73   Temp 97.4 °F (36.3 °C)   Resp 22   Ht 5' 5\" (1.651 m)   Wt 63 kg (138 lb 14.2 oz)   SpO2 99%   BMI 23.11 kg/m²         Intake/Output Summary (Last 24 hours) at 12/15/2021 1332  Last data filed at 12/15/2021 1143  Gross per 24 hour   Intake 1460 ml   Output 1825 ml   Net -365 ml        Physical Examination:     I had a face to face encounter with this patient and independently examined them on 12/15/2021 as outlined below:          Constitutional:  No acute distress, cooperative, pleasant    ENT:  Oral mucosa moist, oropharynx benign. Resp:  CTA bilaterally. No wheezing/rhonchi/rales. No accessory muscle use   CV:  Regular rhythm, normal rate, no murmurs, gallops, rubs    GI:  Soft, non distended, non tender.  normoactive bowel sounds, no hepatosplenomegaly     Musculoskeletal:  No edema, warm, 2+ pulses throughout    Neurologic:  Moves all extremities. AAOx3            Data Review:    Review and/or order of clinical lab test      Labs:     Recent Labs     12/15/21  0529 12/13/21  1751   WBC 15.0* 12.6*   HGB 9.2* 11.4*   HCT 29.1* 36.0*   * 176     Recent Labs     12/15/21  0529 12/14/21  0451 12/14/21  0050 12/13/21  0455   * 135*  --  137   K 4.3 4.8  --  3.9   CL 96* 98  --  101   CO2 32 28  --  30   BUN 85* 81*  --  84*   CREA 2.85* 2.83*  --  2.41*   GLU 71 107*  --  39*   CA 8.7 8.6  --  8.8   MG 2.4  --  2.3 2.5*     Recent Labs     12/14/21 0451   ALT 76      TBILI 0.4   TP 6.6   ALB 3.2*   GLOB 3.4     Recent Labs     12/13/21 0455 12/12/21  2124   APTT 69.3* 57.5*      No results for input(s): FE, TIBC, PSAT, FERR in the last 72 hours. No results found for: FOL, RBCF   No results for input(s): PH, PCO2, PO2 in the last 72 hours. No results for input(s): CPK, CKNDX, TROIQ in the last 72 hours.     No lab exists for component: CPKMB  Lab Results   Component Value Date/Time    Cholesterol, total 76 12/10/2021 02:59 PM    HDL Cholesterol 28 12/10/2021 02:59 PM    LDL, calculated 25.4 12/10/2021 02:59 PM    Triglyceride 113 12/10/2021 02:59 PM    CHOL/HDL Ratio 2.7 12/10/2021 02:59 PM     Lab Results   Component Value Date/Time    Glucose (POC) 190 (H) 12/15/2021 11:38 AM    Glucose (POC) 84 12/15/2021 06:17 AM    Glucose (POC) 226 (H) 12/14/2021 09:27 PM    Glucose (POC) 194 (H) 12/14/2021 06:52 PM    Glucose (POC) 76 12/14/2021 04:35 PM     Lab Results   Component Value Date/Time    Color YELLOW/STRAW 12/09/2021 05:26 AM    Appearance CLEAR 12/09/2021 05:26 AM    Specific gravity 1.008 12/09/2021 05:26 AM    pH (UA) 6.5 12/09/2021 05:26 AM    Protein 30 (A) 12/09/2021 05:26 AM    Glucose Negative 12/09/2021 05:26 AM    Ketone Negative 12/09/2021 05:26 AM    Bilirubin Negative 12/09/2021 05:26 AM    Urobilinogen 0.2 12/09/2021 05:26 AM    Nitrites Negative 12/09/2021 05:26 AM Leukocyte Esterase SMALL (A) 12/09/2021 05:26 AM    Epithelial cells FEW 12/09/2021 05:26 AM    Bacteria 4+ (A) 12/09/2021 05:26 AM    WBC 20-50 12/09/2021 05:26 AM    RBC 0-5 12/09/2021 05:26 AM         Medications Reviewed:     Current Facility-Administered Medications   Medication Dose Route Frequency    hydrALAZINE (APRESOLINE) tablet 50 mg  50 mg Oral TID    linezolid in dextrose 5% (ZYVOX) IVPB premix in D5W 600 mg  600 mg IntraVENous Q12H    isosorbide mononitrate ER (IMDUR) tablet 60 mg  60 mg Oral BID    cefepime (MAXIPIME) 2 g in sterile water (preservative free) 10 mL IV syringe  2 g IntraVENous Q24H    ondansetron (ZOFRAN) injection 4 mg  4 mg IntraVENous Q8H PRN    dextrose 20% infusion   IntraVENous CONTINUOUS    albuterol-ipratropium (DUO-NEB) 2.5 MG-0.5 MG/3 ML  3 mL Nebulization BID RT    carvediloL (COREG) tablet 12.5 mg  12.5 mg Oral Q12H    insulin lispro (HUMALOG) injection   SubCUTAneous AC&HS    glucose chewable tablet 16 g  4 Tablet Oral PRN    dextrose (D50W) injection syrg 12.5-25 g  12.5-25 g IntraVENous PRN    glucagon (GLUCAGEN) injection 1 mg  1 mg IntraMUSCular PRN    mylanta/viscous lidocaine (GI COCKTAIL)  40 mL Oral Q6H PRN    aspirin delayed-release tablet 81 mg  81 mg Oral QHS    guaiFENesin ER (MUCINEX) tablet 600 mg  600 mg Oral Q12H    benzonatate (TESSALON) capsule 100 mg  100 mg Oral TID PRN    [Held by provider] insulin glargine (LANTUS) injection 15 Units  15 Units SubCUTAneous PCD    levothyroxine (SYNTHROID) tablet 100 mcg  100 mcg Oral ACB    arformoterol 15 mcg/budesonide 0.5 mg neb solution   Nebulization BID RT    nitroglycerin (NITROSTAT) tablet 0.4 mg  0.4 mg SubLINGual Q5MIN PRN    clopidogreL (PLAVIX) tablet 75 mg  75 mg Oral DAILY    rosuvastatin (CRESTOR) tablet 20 mg  20 mg Oral QHS    magnesium oxide (MAG-OX) tablet 200 mg  200 mg Oral EVERY OTHER DAY    acetaminophen (TYLENOL) tablet 650 mg  650 mg Oral Q4H PRN    acetaminophen (TYLENOL) tablet 650 mg  650 mg Oral Q6H    sodium chloride (NS) flush 5-10 mL  5-10 mL IntraVENous PRN     ______________________________________________________________________  EXPECTED LENGTH OF STAY: 4d 2h  ACTUAL LENGTH OF STAY:          6                 Lis Rolle MD

## 2021-12-15 NOTE — PROGRESS NOTES
0200: Patient had episode of chest pain 7/10 and  tachycardia RR 35\. Gave Nitro SL x3. Chest pain relieved BP dropped to 127/50.

## 2021-12-15 NOTE — PROGRESS NOTES
Pulmonary, Critical Care, and Sleep Medicine~Consult Note    Name: Ce Ag MRN: [id number]   : 1950 Hospital: [facility]   Date: 12/15/2021 2:58 PM Admission: 2021     Impression Plan   1. Acute on chronic hypoxic resp failure (O2 only recently started at [facility])  2. Abnormal CT scan: Diffuse patchy irregular consolidative opacities and reticular changes and peribronchial thickening Left > right. Notable enlarged mediastinal/hilar adenopathy. Fairly extensive bronchiectasis. left upper lobe with pneumatocele. Offers no signs of new medications, familial CTD, pet birds, farm life [[A. pullulans positive on hypersensitivity pneumonitis panel but this could just be prior exposure, clinical significance unclear]]. A coexisting PNA cannot be ruled out since the PCT is HIGH. D/D burnt out sarcoid vs chronic hypersensitivity pneumonitis. 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 Chronic mycobacterial infection a possibility. 3. Suspected COPD, yet to be define via PFTs. Followed by Dr María Britton  4. CAD, s/p 9 stents; intermittent CP improved by nitro  5. decom HFrEF; EF 45-55%, mild MVR/mild AVR at [facility]  6. BIPIN on CKD   7. DM II    A. pullulans antibodies positive on HP panel, rest negative. IgE 264, CRP 3.23  Negative AVIS, ANCA, RF, CCP 1. Volume management  2. Antibiotics - on linezolid, cefepime  3. Recheck PCT tomorrow. 4. Reluctant to start steroids in view of High PCT. 5. Sputum culture for bacteria and atypical mycobacteria - hasn't been able to produce much  6. mucinex bid  7. Comparison with chest films from [facility] will be beneficial in determining temporal course (he has relocated from [place] a year ago) -- there may also be an old CT scan in [place], [place] at a freestanding ER we will try and request  8. O2 titration above 90%, currently on 2L O2-- spo2 upper 90's at rest, noted titration for pt comfort not for desats  9. dvt proph  10.  Noted consideration of RHC on this admission, possibly will be done as an outpatient  11. Dicussed with wife at bedside   12. PT/OT     Daily Progression:    12/15 unchanged overall status. wife at bedside. Discussed CT Chest with the patient. On 3 lpm by nc all the time. No significant cough. Weight loss for sometime. CT Chest in 2019 at [facility].    12/14 Unchanged breathing per pt. He was on 3L o2 but I weaned to 2L o2 as sats in the upper 90's. Pt reports titration of o2 for comfort. No other complaints. 12/13  Did seem to be doing better each day through the weekend per wife. Able to talk without gasping for breath. Unfortunately didn't sleep well last night and had some hypoglycemia this morning, so quite tired. 12/10  Seen and examined earlier today. His most distressing symptom has been's midsternal chest pain and radiation into the arms. He denies severe neck or back pain. He denies ability to bring up sputum. He has relocated from [place] approximately a year ago and was hospitalized at [facility] in early November, comparison of imaging from November admission may help with temporal course of events. Left heart catheterization has been deferred in view of renal insufficiency. Chest pain is midsternal and nonpleuritic and does not appear to be pulmonary in origin. CT scan of the chest shows patchy infiltrates with nonspecific fibrosis and bronchiectasis-atypical mycobacterial infection is also in the differential diagnosis. He does have a follow-up appointment with Dr. Sheryl Juarez on the 28th for PFTs    12/9  Consult Note requested by hospitalist.    Patient was recently seen by outpatient advance Heart team, Dr Janet Maria. At that visit patient was told to present to the ER because that he was clinically worsening. He is followed by Dr Sheryl Juarez and was last seen on 11/16/21. At that visit he started symbicort and ordered PFTs that have yet to be completed.  He was hospitalized at [facility] from 11/4-11/10 following presenting to the stand alone ER in [facility] for dyspnea. He was treated there for pulmonary edema. He has a remote hx of aggressive tobacco use stopping in 2000. No formal dx of COPD    On O2 since last admission. No prior dyspnea since last visit. Chest pains improved with nitro    Last ECHO showed EF 40-45%  followled by Dr Nuvia Hernández for CKD    Hx of 9 stens placement. Recent change of duiertics from lasix to bumex. 12/8 CT scan fairly comparably to the 11/30 CT scan  LUNGS: Diffuse patchy irregular consolidative opacities and reticulations with  architectural distortion, left slightly more pronounced than right. 4.9 cm left  upper lobe with pneumatocele    I have reviewed the labs and previous days notes. Pertinent items are noted in HPI. Past Medical History:   Diagnosis Date    Aortic regurgitation     CAD (coronary artery disease)     Taxus stent 12/2005, 12/06 Cypher Prox circ, 6/2018 Sudarshan LADx2,  9/19 Sudarshan LAD & LCX, 1/20 [place]     Chronic combined systolic and diastolic congestive heart failure ([address] 11/14/2021    Chronic obstructive pulmonary disease (HCC)     Congestive heart failure (HCC)     Diabetes ([address]     Essential hypertension     Hyperlipidemia     MI (myocardial infarction) ([address]     Murmur     Renal insufficiency     Thyroid disease       Past Surgical History:   Procedure Laterality Date    HX CORONARY STENT PLACEMENT      HX CYST REMOVAL      IR ASP BLADDER SUPRA CATH        Prior to Admission medications    Medication Sig Start Date End Date Taking? Authorizing Provider   anastrozole (ARIMIDEX) 1 mg tablet Take 0.5 mg by mouth every Monday, Wednesday, Friday. Yes Provider, Historical   bumetanide (BUMEX) 1 mg tablet TAKE 1 TABLET BY MOUTH TWICE DAILY. REPLACES. LASIX 11/16/21   Kenisha Scott NP   magnesium oxide 250 mg magnesium tablet Take 250 mg by mouth.  Every other day - OTC 11/4/21   Provider, Historical   prasterone, dhea, 50 mg tab 50 mg = 1 tab each dose, PO, daily, # 30 tab, 0 Refills 11/4/21   Provider, Historical   carvediloL (COREG) 6.25 mg tablet Take 1 Tablet by mouth every twelve (12) hours. 11/15/21   Madalyn Carrillo NP   isosorbide mononitrate ER (IMDUR) 30 mg tablet Take 1 Tablet by mouth daily. 11/15/21   Madalyn Carrillo NP   hydrALAZINE (APRESOLINE) 25 mg tablet Take 1 Tablet by mouth every eight (8) hours. 11/15/21   Madalyn Carrillo NP   amLODIPine (NORVASC) 2.5 mg tablet Take 1 Tablet by mouth daily. 11/15/21   Madalyn Carrillo NP   rosuvastatin (CRESTOR) 20 mg tablet TAKE 1 TABLET BY MOUTH EVERY NIGHT 11/4/21   Madalyn Carrillo NP   clopidogreL (PLAVIX) 75 mg tab TAKE 1 TABLET BY MOUTH EVERY DAY 10/26/21   Madalyn Carrillo NP   Symbicort 80-4.5 mcg/actuation HFAA INHALE 2 PUFFS BY MOUTH TWICE DAILY IN THE MORNING AND IN THE EVENING 8/18/21   Provider, Historical   nitroglycerin (Nitrostat) 0.4 mg SL tablet 1 Tablet by SubLINGual route every five (5) minutes as needed for Chest Pain. Up to 3 doses. 9/1/21   Madalyn Carrillo NP   albuterol (ProAir HFA) 90 mcg/actuation inhaler Take 2 Puffs by inhalation. 4-6 hrs as needed 1/22/21   Provider, Historical   BD Lali 2nd Gen Pen Needle 32 gauge x 5/32\" ndle USE WITH LANTUS 7/13/21   Provider, Historical   aspirin delayed-release 81 mg tablet Take 81 mg by mouth daily. At night    Provider, Historical   glimepiride (AMARYL) 4 mg tablet Take 4 mg by mouth. Every two days    Provider, Historical   insulin glargine (Lantus U-100 Insulin) 100 unit/mL injection 18 U nightly after dinner    Provider, Historical   levothyroxine (SYNTHROID) 100 mcg tablet Take 100 mcg by mouth daily. Morning    Provider, Historical   testosterone (ANDROGEL) 1.62 % (20.25 mg/1.25 gram) glpk 20.25 mg daily. 2 to 3 pumps alternating in the morning.     Provider, Historical     Allergies   Allergen Reactions    Bee Sting [Sting, Bee] Anaphylaxis    Iodinated Contrast Media Rash    Brilinta [Ticagrelor] Other (comments)    Effient [Prasugrel] Other (comments)    Penicillins Rash and Nausea Only    Ranexa [Ranolazine] Other (comments)    Sulfa (Sulfonamide Antibiotics) Rash      Social History     Tobacco Use    Smoking status: Former Smoker     Packs/day: 2.00     Years: 30.00     Pack years: 60.00     Quit date: 1991     Years since quittin.9    Smokeless tobacco: Never Used   Substance Use Topics    Alcohol use: Yes     Comment: rarely      History reviewed. No pertinent family history. OBJECTIVE:     Vital Signs:       Visit Vitals  BP (!) 130/36 (BP 1 Location: Right upper arm, BP Patient Position: Sitting)   Pulse 69   Temp 97.4 °F (36.3 °C)   Resp 22   Ht 5' 5\" (1.651 m)   Wt 63 kg (138 lb 14.2 oz)   SpO2 99%   BMI 23.11 kg/m²      Temp (24hrs), Av.6 °F (36.4 °C), Min:97.2 °F (36.2 °C), Max:97.9 °F (36.6 °C)     Intake/Output:     Last shift: 12/15 07 - 12/15 1900  In: 500 [P.O.:200;  I.V.:300]  Out: 200 [Urine:200]    Last 3 shifts:  190 - 12/15 0700  In: 1660 [P.O.:1360; I.V.:300]  Out: 2800 [Urine:2800]          Intake/Output Summary (Last 24 hours) at 12/15/2021 1157  Last data filed at 12/15/2021 1143  Gross per 24 hour   Intake 1460 ml   Output 1825 ml   Net -365 ml       Physical Exam:                                        Exam Findings Other   General: No resp distress noted, appears stated age    [de-identified]:  No ulcers, JVD not elevated, no cervical LAD    Chest: No pectus deformity, normal chest rise b/l    HEART:  RRR, no murmurs/rubs/gallops    Lungs:  Bilateral crackles    ABD: Soft/NT, non rigid mildly distended    EXT: No cyanosis/clubbing/edema, normal peripheral pulses, tenderness w/ palpation    Skin: No rashes or ulcers, no mottling    Neuro: A/O x 3        Medications:  Current Facility-Administered Medications   Medication Dose Route Frequency    hydrALAZINE (APRESOLINE) tablet 50 mg  50 mg Oral TID    linezolid in dextrose 5% (ZYVOX) IVPB premix in D5W 600 mg  600 mg IntraVENous Q12H    isosorbide mononitrate ER (IMDUR) tablet 60 mg  60 mg Oral BID    cefepime (MAXIPIME) 2 g in sterile water (preservative free) 10 mL IV syringe  2 g IntraVENous Q24H    ondansetron (ZOFRAN) injection 4 mg  4 mg IntraVENous Q8H PRN    dextrose 20% infusion   IntraVENous CONTINUOUS    albuterol-ipratropium (DUO-NEB) 2.5 MG-0.5 MG/3 ML  3 mL Nebulization BID RT    carvediloL (COREG) tablet 12.5 mg  12.5 mg Oral Q12H    insulin lispro (HUMALOG) injection   SubCUTAneous AC&HS    glucose chewable tablet 16 g  4 Tablet Oral PRN    dextrose (D50W) injection syrg 12.5-25 g  12.5-25 g IntraVENous PRN    glucagon (GLUCAGEN) injection 1 mg  1 mg IntraMUSCular PRN    mylanta/viscous lidocaine (GI COCKTAIL)  40 mL Oral Q6H PRN    aspirin delayed-release tablet 81 mg  81 mg Oral QHS    guaiFENesin ER (MUCINEX) tablet 600 mg  600 mg Oral Q12H    benzonatate (TESSALON) capsule 100 mg  100 mg Oral TID PRN    [Held by provider] insulin glargine (LANTUS) injection 15 Units  15 Units SubCUTAneous PCD    levothyroxine (SYNTHROID) tablet 100 mcg  100 mcg Oral ACB    arformoterol 15 mcg/budesonide 0.5 mg neb solution   Nebulization BID RT    nitroglycerin (NITROSTAT) tablet 0.4 mg  0.4 mg SubLINGual Q5MIN PRN    clopidogreL (PLAVIX) tablet 75 mg  75 mg Oral DAILY    rosuvastatin (CRESTOR) tablet 20 mg  20 mg Oral QHS    magnesium oxide (MAG-OX) tablet 200 mg  200 mg Oral EVERY OTHER DAY    acetaminophen (TYLENOL) tablet 650 mg  650 mg Oral Q4H PRN    acetaminophen (TYLENOL) tablet 650 mg  650 mg Oral Q6H    sodium chloride (NS) flush 5-10 mL  5-10 mL IntraVENous PRN       Labs:  ABG Recent Labs     12/14/21  0520 12/13/21  1728   PHI 7.40 7.44   PCO2I 53.9* 47.0*   PO2I 150* 64*   HCO3I 33.1* 31.6*   SO2I 99.2* 92.4   FIO2I 5 5        CBC Recent Labs     12/15/21  0529 12/13/21  1751   WBC 15.0* 12.6*   HGB 9.2* 11.4*   HCT 29.1* 36.0*   * 176   MCV 94.2 93.3   MCH 29.8 49.6        Metabolic  Panel Recent Labs 12/15/21  0529 12/14/21  0451 12/14/21  0050 12/13/21  0455   * 135*  --  137   K 4.3 4.8  --  3.9   CL 96* 98  --  101   CO2 32 28  --  30   GLU 71 107*  --  39*   BUN 85* 81*  --  84*   CREA 2.85* 2.83*  --  2.41*   CA 8.7 8.6  --  8.8   MG 2.4  --  2.3 2.5*   ALB  --  3.2*  --   --    ALT  --  76  --   --         Pertinent Labs                Cortez Dowd MD  12/15/2021

## 2021-12-15 NOTE — PROGRESS NOTES
Problem: Mobility Impaired (Adult and Pediatric)  Goal: *Acute Goals and Plan of Care (Insert Text)  Description: FUNCTIONAL STATUS PRIOR TO ADMISSION: Patient was modified independent using a rollator and single point cane for functional mobility. Patient typically wears nasal cannula (1-3 L/min). Patient denies any recent fall. + orthopnea. + Supra-pubic catheter. Sponge bathes with intermittent assistance from wife. HOME SUPPORT PRIOR TO ADMISSION: The patient lived with his wife who assists with ADLs intermittently. Physical Therapy Goals  Initiated 12/11/2021  1. Patient will move from supine to sit and sit to supine , scoot up and down, and roll side to side in bed with independence within 7 day(s). 2.  Patient will transfer from bed to chair and chair to bed with modified independence using the least restrictive device within 7 day(s). 3.  Patient will perform sit to stand with modified independence within 7 day(s). 4.  Patient will ambulate with modified independence for 150 feet with the least restrictive device within 7 day(s). 5.  Patient will ascend/descend 1 step (3 inches) without handrail(s) with supervision/set-up within 7 day(s). 6. Patient will participate in a 6 MWT for his diagnosis of heart failure within 48 hours of discharge. 7. Patient will verbally and functionally recall 3 pacing/energy conservation strategies to implement with functional tasks/mobility within 7 days. Outcome: Progressing Towards Goal   PHYSICAL THERAPY TREATMENT  Patient: Kaleb Martines (82 y.o. male)  Date: 12/15/2021  Diagnosis: CHF (congestive heart failure) ([facility]) [I50.9] <principal problem not specified>  Procedure(s) (LRB):  LEFT AND RIGHT HEART CATH / CORONARY ANGIOGRAPHY (N/A)    Precautions: Fall,Bed Alarm ([place])  Chart, physical therapy assessment, plan of care and goals were reviewed. ASSESSMENT  Patient continues with skilled PT services and is progressing towards goals.  Patient received seated in recliner chair with LEs elevated. Agreeable to mobility with encouragement. Resting on 3L/min. With brief ambulation in room (6 ft), patient with posterior LOB episodes, knee buckling, and delayed responses. Found to be orthostatic and returned to bed. RR up to 41 with 6 ft of ambulation but SpO2 94%. Vitals:    12/15/21 1514 12/15/21 1516 12/15/21 1520 12/15/21 1525   BP: (!) 105/37 (!) 90/52 (!) 85/33 (!) 94/51   BP 1 Location: Right upper arm Right upper arm Right upper arm Right upper arm   BP Patient Position: Sitting Standing Sitting  Comment: post activity Supine   Pulse:       Temp:       Resp:       Height:       Weight:       SpO2:             Current Level of Function Impacting Discharge (mobility/balance): CGA-mod A    Other factors to consider for discharge: orthostatic         PLAN :  Patient continues to benefit from skilled intervention to address the above impairments. Continue treatment per established plan of care. to address goals. Recommendation for discharge: (in order for the patient to meet his/her long term goals)  To be determined: at this time recommend rehab setting, although could d/c home with family and HHPT pending improvement     This discharge recommendation:  Has been made in collaboration with the attending provider and/or case management    IF patient discharges home will need the following DME: to be determined (TBD)       SUBJECTIVE:   Patient stated I get winded.     OBJECTIVE DATA SUMMARY:   Critical Behavior:  Neurologic State: Alert  Orientation Level: Oriented X4  Functional Mobility Training:  Transfers:  Sit to Stand: Contact guard assistance  Stand to Sit: Moderate assistance  Bed to Chair: Moderate assistance  Balance:  Sitting: Intact; With support  Standing: Impaired; With support  Standing - Static: Fair  Standing - Dynamic : Poor  Ambulation/Gait Training:  Distance (ft): 6 Feet (ft)  Assistive Device: Gait belt;Walker, rolling  Ambulation - Level of Assistance: Moderate assistance  Gait Abnormalities: Decreased step clearance;Shuffling gait  Base of Support: Widened  Speed/[place]: Slow;Shuffled    Activity Tolerance:   Poor, requires frequent rest breaks, observed SOB with activity, and signs and symptoms of orthostatic hypotension    After treatment patient left in no apparent distress:   Supine in bed, Call bell within reach, Bed / chair alarm activated, and Caregiver / family present    COMMUNICATION/COLLABORATION:   The patients plan of care was discussed with: Occupational therapist and Registered nurse.      Serenity Douglas, PT, DPT   Time Calculation: 18 mins

## 2021-12-15 NOTE — PROGRESS NOTES
Transitions of Care Plan   RUR: 16% - moderate   Admission Dx:  CHF; hypoxia; pulmonary infiltrates   [facility]; Pulmonary   Baseline: ambulates with cane or rollator; resides with wife   Barrier(s) to Discharge: medical   Disposition: Home Health vs rehab pending medical progress   Estimated Discharge Date: 2+ days      Clinical update per chart review and/or patient discussed during Interdisciplinary Rounds:    Patient is not medically stable for discharge due to ongoing medical needs. CM continues to follow treatment plan for medical progress and disposition needs.     Disposition:  Home Health vs Rehab    Jessica Lyons, MPH  Care Manager [facility]  Available via [address] [place] [address] or

## 2021-12-15 NOTE — PROGRESS NOTES
Problem: Self Care Deficits Care Plan (Adult)  Goal: *Acute Goals and Plan of Care (Insert Text)  Description: FUNCTIONAL STATUS PRIOR TO ADMISSION: Patient required up to maximum assistance for basic  ADLs involving LB care and instrumental ADLs. Recently discarged in Nov 2021 from OSH and since d/c has had difficulty performing LB self-care 2/2 SOB and fatigue. Wife now assists. Performs sponge baths due to fatigue and difficulty breathing    HOME SUPPORT: The patient lived alone with wife to provide assistance. Occupational Therapy Goals  Initiated 12/11/2021  1. Patient will perform grooming standing at sink x 5 min with VSS supervision/set-up within 7 day(s). 2.  Patient will perform LB dressing with moderate assistance and AE PRN within 7 day(s). 3.  Patient will perform bathing with moderate assistance  within 7 day(s). 4.  Patient will perform toilet transfers with minimal assistance/contact guard assist within 7 day(s). 5.  Patient will perform all aspects of toileting with supervision/set-up within 7 day(s). 6.  Patient will participate in upper extremity therapeutic exercise/activities with supervision/set-up for 10 minutes within 7 day(s). 7.  Patient will utilize energy conservation techniques during functional activities with verbal and visual cues within 7 day(s). Outcome: Progressing Towards Goal   OCCUPATIONAL THERAPY TREATMENT  Patient: Gerber Chan (60 y.o. male)  Date: 12/15/2021  Diagnosis: CHF (congestive heart failure) ([facility]) [I50.9] <principal problem not specified>  Procedure(s) (LRB):  LEFT AND RIGHT HEART CATH / CORONARY ANGIOGRAPHY (N/A)    Precautions: Fall,Bed Alarm ([place])  Chart, occupational therapy assessment, plan of care, and goals were reviewed. ASSESSMENT  Patient continues with skilled OT services and is progressing towards goals. Patient limited by orthostatic hypotension this session (MAPs 64-50).  Patient continues to present with decreased endurance (3L NC O2), decreased activity tolerance (RR 20s-30s with activity), and impaired standing balance d/t BP, limiting his ability to participate in ADL tasks. Recommend rehab vs home with [facility] and family assistance. Current Level of Function Impacting Discharge (ADLs): up to MOD A    Other factors to consider for discharge: O2 needs         PLAN :  Patient continues to benefit from skilled intervention to address the above impairments. Continue treatment per established plan of care to address goals. Recommend with staff: OOB x 3 to chair pending vitals    Recommend next OT session: continued progression with ADLs if vitals stable    Recommendation for discharge: (in order for the patient to meet his/her long term goals)  To be determined: rehab vs HHOT    This discharge recommendation:  Has not yet been discussed the attending provider and/or case management    IF patient discharges home will need the following DME: TBD       SUBJECTIVE:   Patient stated I am trying to keep my head up (after functional mobility back to bed).     OBJECTIVE DATA SUMMARY:   Cognitive/Behavioral Status:  Neurologic State: Alert  Orientation Level: Oriented X4  Cognition: Appropriate for age attention/concentration  Perception: Appears intact  Perseveration: No perseveration noted  Safety/Judgement: Decreased insight into deficits; Decreased awareness of need for safety    Functional Mobility and Transfers for ADLs:  Bed Mobility:  Sit to Supine: Minimum assistance;Assist x1;Additional time    Transfers:  Sit to Stand: Contact guard assistance     Bed to Chair: Moderate assistance    Balance:  Sitting: Intact; With support  Standing: Impaired; With support  Standing - Static: Fair  Standing - Dynamic : Poor    ADL Intervention:     Patient required up to MOD A for functional mobility in preparation for grooming tasks at the sink. Patient with decreased responsiveness and found to have orthostatic hypotension.       Cognitive Retraining  Safety/Judgement: Decreased insight into deficits; Decreased awareness of need for safety    Pain:  No complaints    Activity Tolerance:   Poor and signs and symptoms of orthostatic hypotension    After treatment patient left in no apparent distress:   Supine in bed, Call bell within reach, Bed / chair alarm activated, and Caregiver / family present    COMMUNICATION/COLLABORATION:   The patients plan of care was discussed with: Physical therapist and Registered nurse.      Merced Luque  Time Calculation: 17 mins

## 2021-12-15 NOTE — PROGRESS NOTES
[address] [place] [address]               Division of Cardiology  [phone]                       Progress note              Jason Wadsworth M.D., F.A.CSamirC. NAME:  Flip Hicks   :   1950   MRN:   [id number]         ICD-10-CM ICD-9-CM    1. Acute congestive heart failure, unspecified heart failure type (HCC)  I50.9 428.0    2. Chronic combined systolic and diastolic congestive heart failure (HCC)  I50.42 428.42      428.0    3. Coronary arteriosclerosis  I25.10 414.00    4. Essential hypertension  I10 401.9    5. Renal insufficiency  N28.9 593.9    6. Shortness of breath  R06.02 786.05    7. Chronic obstructive pulmonary disease, unspecified COPD type ([facility]  J44.9 80                  HPI  70years old male with a past medical history remarkable for coronary disease status post 9 stents in the past, diabetes, hypertension, hyperlipidemia, previous myocardial infarction, aortic insufficiency and renal insufficiency who presented to the emergency room after being sent from the advanced heart failure team for increasing shortness of breath with hypoxia.     Patient also had developed some chest pain which appeared to be pleuritic.  Nonetheless he was started on IV heparin.     Today the patient tells me that he feels much better and does not think that the episode that is occurred to him was related to his heart.  He tells me that he recognizes he is angina very well and did not have any of the usual symptoms of angina.     His dyspnea is also improved at this time.     He is off IV nitroglycerin on p.o. Imdur. Unfortunately have recurrent again chest pain last night requiring 3 sublingual nitroglycerin. He now tells me that the chest pain is similar to the chest pain he experienced prior to his stents. No chest pain this morning. Assessment/Plan: 1. Coronary disease:  Once again episode of chest discomfort requiring administration of sublingual nitroglycerin. The patient tells me chest discomfort is similar to the one he had prior to his stents. Difficult situation with his renal dysfunction. Given recurrent chest pain which the patient deems similar to chest pain prior to his stents we will proceed with cardiac catheterization but will discuss with interventionalists as well given his renal dysfunction. The patient is fully aware of potential for severe reduction in renal function requiring dialysis post cardiac catheterization and is agreeable to proceed. Resume IV nitroglycerin stop Imdur. He has a previous history of stent to the circumflex of the LAD normal ejection fraction. Continue Coreg statin aspirin Plavix. 2.  Hypoxemia: Pulmonary following possible burned-out sarcoid as well some superimposed pulmonary edema chest x-ray. Continue diuretics. 3.  Aortic stenosis: Mild to moderate with moderate aortic regurgitation continue echo surveillance. 4.  CKD: Closely followed by nephrology creatinine remains approximately 2.85 at this time. CARDIAC STUDIES      12/08/21    ECHO ADULT COMPLETE 12/10/2021 12/10/2021    Interpretation Summary  · LV: Estimated LVEF is 55 - 60%. Normal cavity size, wall thickness and systolic function (ejection fraction normal). Wall motion: normal.  · RV: Mildly dilated right ventricle. · AV: Aortic valve sclerosis with no evidence of reduced excursion. Aortic valve leaflet calcification present. Aortic valve mean gradient is 14 mmHg. Aortic valve area is 1.8 cm2. Mild aortic valve stenosis is present. Moderate aortic valve regurgitation is present. · LA: Moderately dilated left atrium. · MV: Moderate mitral annular calcification.     Signed by: Claudia Watson MD on 12/10/2021  2:09 PM               @[email or web address]      IMAGING      CT Results (most recent):  Results from [place] encounter on 12/08/21    CT CHEST WO CONT    Narrative  INDICATION: Shortness of breath    COMPARISON: November 30    CONTRAST: None. TECHNIQUE:  5 mm axial images were obtained through the . Coronal and sagittal  reformats were generated. CT dose reduction was achieved through use of a  standardized protocol tailored for this examination and automatic exposure  control for dose modulation. The absence of intravenous contrast reduces the sensitivity for evaluation of  the mediastinum, imelda, vasculature, and upper abdominal organs. FINDINGS:    CHEST WALL: No mass or axillary lymphadenopathy. THYROID: No nodule. MEDIASTINUM: Prominent nodes unchanged  IMELDA: No mass or lymphadenopathy. THORACIC AORTA: No aneurysm. MAIN PULMONARY ARTERY: Normal in caliber. TRACHEA/BRONCHI: Patent. ESOPHAGUS: No wall thickening or dilatation. HEART: Prominent in size  PLEURA: No effusion or pneumothorax. LUNGS: Diffuse interstitial infiltrates and emphysema unchanged. INCIDENTALLY IMAGED UPPER ABDOMEN: No significant abnormality in the  incidentally imaged upper abdomen. BONES: No destructive bone lesion. Impression  No Change      XR Results (most recent):  Results from Hospital Encounter encounter on 12/08/21    XR CHEST PORT    Narrative  EXAM: XR CHEST PORT    INDICATION: SOB    COMPARISON: December 13, 2021    FINDINGS: A portable AP radiograph of the chest was obtained at 0546 hours. The  patient is on a cardiac monitor. There is bilateral airspace opacification, left  greater than right. There are coronary artery stents. The bones and soft  tissues are grossly within normal limits. Impression  No change in bilateral airspace opacification. MRI Results (most recent):  No results found for this or any previous visit.           Past Medical History:   Diagnosis Date    Aortic regurgitation     CAD (coronary artery disease)     Taxus stent 12/2005, 12/06 [facility] Prox circ, 6/2018 [facility] LADx2,  9/19 [place] LAD & LCX, 1/20 [facility]     Chronic combined systolic and diastolic congestive heart failure ([facility] 11/14/2021    Chronic obstructive pulmonary disease (HCC)     Congestive heart failure (HCC)     Diabetes (HCC)     Essential hypertension     Hyperlipidemia     [facility]     Murmur     Renal insufficiency     Thyroid disease            Past Surgical History:   Procedure Laterality Date    HX CORONARY STENT PLACEMENT      HX CYST REMOVAL      IR ASP BLADDER SUPRA CATH                 Visit Vitals  BP (!) 114/39 (BP 1 Location: Right upper arm, BP Patient Position: At rest)   Pulse 66   Temp 97.7 °F (36.5 °C)   Resp 22   Ht 5' 5\" (1.651 m)   Wt 138 lb 14.2 oz (63 kg)   SpO2 100%   BMI 23.11 kg/m²         Wt Readings from Last 3 Encounters:   12/15/21 138 lb 14.2 oz (63 kg)   12/08/21 137 lb 6.4 oz (62.3 kg)   11/29/21 140 lb (63.5 kg)         Review of Systems:   Pertinent items are noted in the History of Present Illness. 12/15 0701 - 12/15 1900  In: 300 [I.V.:300]  Out: -     12/13 1901 - 12/15 0700  In: 1660 [P.O.:1360; I.V.:300]  Out: 2800 [Urine:2800]      Telemetry: normal sinus rhythm    Physical Exam:    Neck: no JVD  Heart: regular rate and rhythm  Lungs: diminished breath sounds R apex, R base, L apex, L base  Abdomen: soft, non-tender.  Bowel sounds normal. No masses,  no organomegaly  Extremities: no edema    Current Facility-Administered Medications   Medication Dose Route Frequency    hydrALAZINE (APRESOLINE) tablet 50 mg  50 mg Oral TID    linezolid in dextrose 5% (ZYVOX) IVPB premix in D5W 600 mg  600 mg IntraVENous Q12H    isosorbide mononitrate ER (IMDUR) tablet 60 mg  60 mg Oral BID    cefepime (MAXIPIME) 2 g in sterile water (preservative free) 10 mL IV syringe  2 g IntraVENous Q24H    ondansetron (ZOFRAN) injection 4 mg  4 mg IntraVENous Q8H PRN    dextrose 20% infusion   IntraVENous CONTINUOUS    albuterol-ipratropium (DUO-NEB) 2.5 MG-0.5 MG/3 ML  3 mL Nebulization BID RT    carvediloL (COREG) tablet 12.5 mg  12.5 mg Oral Q12H    insulin lispro (HUMALOG) injection   SubCUTAneous AC&HS    glucose chewable tablet 16 g  4 Tablet Oral PRN    dextrose (D50W) injection syrg 12.5-25 g  12.5-25 g IntraVENous PRN    glucagon (GLUCAGEN) injection 1 mg  1 mg IntraMUSCular PRN    mylanta/viscous lidocaine (GI COCKTAIL)  40 mL Oral Q6H PRN    aspirin delayed-release tablet 81 mg  81 mg Oral QHS    guaiFENesin ER (MUCINEX) tablet 600 mg  600 mg Oral Q12H    benzonatate (TESSALON) capsule 100 mg  100 mg Oral TID PRN    [Held by provider] insulin glargine (LANTUS) injection 15 Units  15 Units SubCUTAneous PCD    levothyroxine (SYNTHROID) tablet 100 mcg  100 mcg Oral ACB    arformoterol 15 mcg/budesonide 0.5 mg neb solution   Nebulization BID RT    nitroglycerin (NITROSTAT) tablet 0.4 mg  0.4 mg SubLINGual Q5MIN PRN    clopidogreL (PLAVIX) tablet 75 mg  75 mg Oral DAILY    rosuvastatin (CRESTOR) tablet 20 mg  20 mg Oral QHS    magnesium oxide (MAG-OX) tablet 200 mg  200 mg Oral EVERY OTHER DAY    acetaminophen (TYLENOL) tablet 650 mg  650 mg Oral Q4H PRN    acetaminophen (TYLENOL) tablet 650 mg  650 mg Oral Q6H    sodium chloride (NS) flush 5-10 mL  5-10 mL IntraVENous PRN         All Cardiac Markers in the last 24 hours:    Lab Results   Component Value Date/Time    BNPNT 4,772 (H) 12/15/2021 05:29 AM        Lab Results   Component Value Date/Time    Creatinine 2.85 (H) 12/15/2021 05:29 AM          No results found for: CPK, RCK1, RCK2, RCK3, RCK4, CKMB, CKNDX, CKND1, TROPT, TROIQ, BNPP, BNP      Lab Results   Component Value Date/Time    WBC 15.0 (H) 12/15/2021 05:29 AM    HGB 9.2 (L) 12/15/2021 05:29 AM    HCT 29.1 (L) 12/15/2021 05:29 AM    PLATELET 950 (L) 29/35/0582 05:29 AM    MCV 94.2 12/15/2021 05:29 AM         Lab Results   Component Value Date/Time    Sodium 133 (L) 12/15/2021 05:29 AM    Potassium 4.3 12/15/2021 05:29 AM    Chloride 96 (L) 12/15/2021 05:29 AM    CO2 32 12/15/2021 05:29 AM    Anion gap 5 12/15/2021 05:29 AM    Glucose 71 12/15/2021 05:29 AM    BUN 85 (H) 12/15/2021 05:29 AM    Creatinine 2.85 (H) 12/15/2021 05:29 AM    BUN/Creatinine ratio 30 (H) 12/15/2021 05:29 AM    GFR est AA 27 (L) 12/15/2021 05:29 AM    GFR est non-AA 22 (L) 12/15/2021 05:29 AM    Calcium 8.7 12/15/2021 05:29 AM         Lab Results   Component Value Date/Time    NT pro-BNP 4,772 (H) 12/15/2021 05:29 AM    NT pro-BNP 2,307 (H) 12/14/2021 12:50 AM    NT pro-BNP 1,300 (H) 12/13/2021 04:55 AM    NT pro-BNP 2,527 (H) 12/12/2021 12:57 AM    NT pro-BNP 5,365 (H) 12/11/2021 01:28 AM         Lab Results   Component Value Date/Time    Cholesterol, total 76 12/10/2021 02:59 PM    HDL Cholesterol 28 12/10/2021 02:59 PM    LDL, calculated 25.4 12/10/2021 02:59 PM    VLDL, calculated 22.6 12/10/2021 02:59 PM    Triglyceride 113 12/10/2021 02:59 PM    CHOL/HDL Ratio 2.7 12/10/2021 02:59 PM         Lab Results   Component Value Date/Time    ALT (SGPT) 76 12/14/2021 04:51 AM    Alk.  phosphatase 116 12/14/2021 04:51 AM    Bilirubin, total 0.4 12/14/2021 04:51 AM

## 2021-12-16 NOTE — PROGRESS NOTES
TRANSFER - OUT REPORT:    Verbal report given to Zesehan Mckeonvenkatesh on Johnathan Push being transferred to [facility] for routine progression of care       Report consisted of patients Situation, Background, Assessment and   Recommendations(SBAR). Information from the following report(s) Procedure Summary, MAR and Recent Results was reviewed with the receiving nurse. Opportunity for questions and clarification was provided. Patient's blood pressure is increased: 179/62, patient has been given his Coreg 12.5mg PO here in the Cardiac Cath Lab Recovery Department. Patient states that he is not having any chest pain at this time.

## 2021-12-16 NOTE — PROGRESS NOTES
Chart reviewed. Patient currently off the floor in CCL at this time. F/u later as able/appropriate for OT. Thank you.

## 2021-12-16 NOTE — PROGRESS NOTES
RENAL  PROGRESS NOTE        Subjective: In cath labs  Objective:   VITALS SIGNS:    Visit Vitals  BP (!) 105/38 (BP 1 Location: Right upper arm, BP Patient Position: At rest;Sitting)   Pulse 75   Temp 97.8 °F (36.6 °C)   Resp 19   Ht 5' 5\" (1.651 m)   Wt 59.6 kg (131 lb 6.3 oz)   SpO2 95%   BMI 21.87 kg/m²       O2 Device: Nasal cannula   O2 Flow Rate (L/min): 3 l/min   Temp (24hrs), Av.6 °F (36.4 °C), Min:97.3 °F (36.3 °C), Max:97.9 °F (36.6 °C)         PHYSICAL EXAM:  Unable to find    DATA REVIEW:     INTAKE / OUTPUT:   Last shift:      701 - 1900  In: -   Out: 250 [Urine:250]  Last 3 shifts: 1901 - 700  In: 1950 [P.O.:1200; I.V.:750]  Out:  [Urine:0]    Intake/Output Summary (Last 24 hours) at 2021 0939  Last data filed at 2021 0837  Gross per 24 hour   Intake 1450 ml   Output 1350 ml   Net 100 ml         LABS:   Recent Labs     21  0345 12/15/21  0529 21  1751   WBC 9.9 15.0* 12.6*   HGB 9.3* 9.2* 11.4*   HCT 29.2* 29.1* 36.0*   * 131* 176     Recent Labs     21  0345 12/15/21  0529 21  0451 21  0050   * 133* 135*  --    K 4.4 4.3 4.8  --    CL 95* 96* 98  --    CO2 30 32 28  --    * 71 107*  --    BUN 83* 85* 81*  --    CREA 3.05* 2.85* 2.83*  --    CA 8.3* 8.7 8.6  --    MG 2.6* 2.4  --  2.3   ALB  --   --  3.2*  --    TBILI  --   --  0.4  --    ALT  --   --  76  --           Assessment:  CKD stage 4: Serum Cr 2.4->2.7mg/dl. Baseline serum Cr 2.5 to 3mg/dl-> 2 to DM/HTN. Followed by my partner Dr. Mancini Wapiti.     Decompensated CHF: EF 45-50%     Chest pain: hx of 9 stents     DM2: Uncontrolled->Hyperglycemia     HTN: fluctuating control.     Proteinuria: 2.8gm proteinuria     COPD suspected: Possible BARYDEN PNA.  Pulmonary following     Hx of urethral obstruction: s/p suprapubic catheter     Plan/Recommendations:  Creat up   hold  Bumex   Got hypotensive yesterday   [facility] planned -> discussed risks of MEL with patient/daughter previously by dr Isaac Jones  At risk for contrast nephropathy/need for dialysis  Nehal Casas MD

## 2021-12-16 NOTE — PROCEDURES
Findings:  1) normal LVEDP  2) Severe single vessel coronary artery disease with 80% stenosis in proximal RCA treated with 3.25 by 33 mm of Hg  3)Patent stent in LAD and LCx with subtotal occlusion of small OM distal to LCx stent. Small D2 with high grade disease.       Contrast: 40 cc  Access: right radial    Recommendations  1)GDMT for Secondary secondary prevention  2)Plavix based DAPT

## 2021-12-16 NOTE — PROGRESS NOTES
[address] [place] [address]               Division of Cardiology  [phone]                       Progress note              Bernard Souza M.D., RUDYC. NAME:  Gudelia Jennings   :   1950   MRN:   [id number]         ICD-10-CM ICD-9-CM    1. Acute congestive heart failure, unspecified heart failure type (HCC)  I50.9 428.0    2. Chronic combined systolic and diastolic congestive heart failure (HCC)  I50.42 428.42      428.0    3. Coronary arteriosclerosis  I25.10 414.00    4. Essential hypertension  I10 401.9    5. Renal insufficiency  N28.9 593.9    6. Shortness of breath  R06.02 786.05    7. Chronic obstructive pulmonary disease, unspecified COPD type ([facility] [address]  J44.9 496    8. Coronary artery disease due to lipid rich plaque  I25.10 414.00 CARDIAC PROCEDURE    I25.83 414.3 CARDIAC PROCEDURE                 HPI  70years old male with a past medical history remarkable for coronary disease status post 9 stents in the past, diabetes, hypertension, hyperlipidemia, previous myocardial infarction, aortic insufficiency and renal insufficiency who presented to the emergency room after being sent from the advanced heart failure team for increasing shortness of breath with hypoxia.     Patient also had developed some chest pain which appeared to be pleuritic.  Nonetheless he was started on IV heparin.     Today the patient tells me that he feels much better and does not think that the episode that is occurred to him was related to his heart.  He tells me that he recognizes he is angina very well and did not have any of the usual symptoms of angina.     His dyspnea is also improved at this time.     He is off IV nitroglycerin on p.o. Imdur. No recurrent cp overnight      Assessment/Plan: 1. Coronary disease: No chest pain overnight. The patient is undergoing left and right cardiac catheterization today.     Is aware of the risks and benefits of cardiac catheterization particularly concerned about his renal dysfunction is aware of potential worsening renal function and still agreeable to proceed with heart catheterization. Continue Coreg statin aspirin Plavix at this time. To be noted he does have a previous history of stent to the circumflex and the LAD. 2.  Hypoxemia: Pulmonary following for possible burnout sarcoid as well some superimposed pulmonary edema. For right heart catheterization today as well. 3.  Aortic stenosis: Mild to moderate    4. CKD: Closely followed by nephrology. With mild aortic regurgitation continue echo surveillance. CARDIAC STUDIES      12/08/21    ECHO ADULT COMPLETE 12/10/2021 12/10/2021    Interpretation Summary  · LV: Estimated LVEF is 55 - 60%. Normal cavity size, wall thickness and systolic function (ejection fraction normal). Wall motion: normal.  · RV: Mildly dilated right ventricle. · AV: Aortic valve sclerosis with no evidence of reduced excursion. Aortic valve leaflet calcification present. Aortic valve mean gradient is 14 mmHg. Aortic valve area is 1.8 cm2. Mild aortic valve stenosis is present. Moderate aortic valve regurgitation is present. · LA: Moderately dilated left atrium. · MV: Moderate mitral annular calcification. Signed by: Aaliyah Shah MD on 12/10/2021  2:09 PM               @[email or web address]      IMAGING      CT Results (most recent):  Results from Hospital Encounter encounter on 12/08/21    CT CHEST WO CONT    Narrative  INDICATION: Shortness of breath    COMPARISON: November 30    CONTRAST: None. TECHNIQUE:  5 mm axial images were obtained through the . Coronal and sagittal  reformats were generated. CT dose reduction was achieved through use of a  standardized protocol tailored for this examination and automatic exposure  control for dose modulation.     The absence of intravenous contrast reduces the sensitivity for evaluation of  the mediastinum, guy, vasculature, and upper abdominal organs. FINDINGS:    CHEST WALL: No mass or axillary lymphadenopathy. THYROID: No nodule. MEDIASTINUM: Prominent nodes unchanged  IMELDA: No mass or lymphadenopathy. THORACIC AORTA: No aneurysm. MAIN PULMONARY ARTERY: Normal in caliber. TRACHEA/BRONCHI: Patent. ESOPHAGUS: No wall thickening or dilatation. HEART: Prominent in size  PLEURA: No effusion or pneumothorax. LUNGS: Diffuse interstitial infiltrates and emphysema unchanged. INCIDENTALLY IMAGED UPPER ABDOMEN: No significant abnormality in the  incidentally imaged upper abdomen. BONES: No destructive bone lesion. Impression  No Change      XR Results (most recent):  Results from Hospital Encounter encounter on 12/08/21    XR CHEST PORT    Narrative  EXAM: XR CHEST PORT    INDICATION: SOB    COMPARISON: December 13, 2021    FINDINGS: A portable AP radiograph of the chest was obtained at 0546 hours. The  patient is on a cardiac monitor. There is bilateral airspace opacification, left  greater than right. There are coronary artery stents. The bones and soft  tissues are grossly within normal limits. Impression  No change in bilateral airspace opacification. MRI Results (most recent):  No results found for this or any previous visit.           Past Medical History:   Diagnosis Date    Aortic regurgitation     CAD (coronary artery disease)     Taxus stent 12/2005, 12/06 [employer] Prox circ, 6/2018 [place] LADx2,  9/19 [place] LAD & LCX, 1/20 Sudarshan     Chronic combined systolic and diastolic congestive heart failure ([address] 11/14/2021    Chronic obstructive pulmonary disease (HCC)     Congestive heart failure ([address]     Diabetes ([address]     Essential hypertension     Hyperlipidemia     MI (myocardial infarction) ([address]     Murmur     Renal insufficiency     Thyroid disease            Past Surgical History:   Procedure Laterality Date    HX CORONARY STENT PLACEMENT      HX CYST REMOVAL      IR ASP BLADDER SUPRA CATH                 Visit Vitals  BP (!) 129/54 (BP 1 Location: Right arm, BP Patient Position: At rest)   Pulse 71   Temp 97.8 °F (36.6 °C)   Resp 22   Ht 5' 5\" (1.651 m)   Wt 131 lb 6.3 oz (59.6 kg)   SpO2 98%   BMI 21.87 kg/m²         Wt Readings from Last 3 Encounters:   12/16/21 131 lb 6.3 oz (59.6 kg)   12/08/21 137 lb 6.4 oz (62.3 kg)   11/29/21 140 lb (63.5 kg)         Review of Systems:   Pertinent items are noted in the History of Present Illness. 12/16 0701 - 12/16 1900  In: -   Out: 250 [Urine:250]    12/14 1901 - 12/16 0700  In: 1950 [P.O.:1200; I.V.:750]  Out: 2050 [Urine:2050]      Telemetry: normal sinus rhythm    Physical Exam:    Neck: no JVD  Heart: regular rate and rhythm  Lungs: diminished breath sounds R anterior, L anterior  Abdomen: soft, non-tender.  Bowel sounds normal. No masses,  no organomegaly  Extremities: no edema    Current Facility-Administered Medications   Medication Dose Route Frequency    0.9% sodium chloride infusion    CONTINUOUS    lidocaine (XYLOCAINE) 10 mg/mL (1 %) injection    PRN    heparinized saline 2 units/mL infusion    CONTINUOUS    nitroglycerin 0.1 mg/mL in D5W injection    PRN    heparin (porcine) 1,000 unit/mL injection    PRN    midazolam (VERSED) injection    PRN    fentaNYL citrate (PF) injection    PRN    hydrALAZINE (APRESOLINE) tablet 50 mg  50 mg Oral TID    ondansetron (ZOFRAN) injection 4 mg  4 mg IntraVENous Q6H PRN    linezolid in dextrose 5% (ZYVOX) IVPB premix in D5W 600 mg  600 mg IntraVENous Q12H    isosorbide mononitrate ER (IMDUR) tablet 60 mg  60 mg Oral BID    cefepime (MAXIPIME) 2 g in sterile water (preservative free) 10 mL IV syringe  2 g IntraVENous Q24H    dextrose 20% infusion   IntraVENous CONTINUOUS    carvediloL (COREG) tablet 12.5 mg  12.5 mg Oral Q12H    insulin lispro (HUMALOG) injection   SubCUTAneous AC&HS    glucose chewable tablet 16 g  4 Tablet Oral PRN    dextrose (D50W) injection syrg 12.5-25 g 12.5-25 g IntraVENous PRN    glucagon (GLUCAGEN) injection 1 mg  1 mg IntraMUSCular PRN    mylanta/viscous lidocaine (GI COCKTAIL)  40 mL Oral Q6H PRN    aspirin delayed-release tablet 81 mg  81 mg Oral QHS    guaiFENesin ER (MUCINEX) tablet 600 mg  600 mg Oral Q12H    benzonatate (TESSALON) capsule 100 mg  100 mg Oral TID PRN    [Held by provider] insulin glargine (LANTUS) injection 15 Units  15 Units SubCUTAneous PCD    levothyroxine (SYNTHROID) tablet 100 mcg  100 mcg Oral ACB    arformoterol 15 mcg/budesonide 0.5 mg neb solution   Nebulization BID RT    nitroglycerin (NITROSTAT) tablet 0.4 mg  0.4 mg SubLINGual Q5MIN PRN    clopidogreL (PLAVIX) tablet 75 mg  75 mg Oral DAILY    rosuvastatin (CRESTOR) tablet 20 mg  20 mg Oral QHS    magnesium oxide (MAG-OX) tablet 200 mg  200 mg Oral EVERY OTHER DAY    acetaminophen (TYLENOL) tablet 650 mg  650 mg Oral Q4H PRN    acetaminophen (TYLENOL) tablet 650 mg  650 mg Oral Q6H    sodium chloride (NS) flush 5-10 mL  5-10 mL IntraVENous PRN         All Cardiac Markers in the last 24 hours:    Lab Results   Component Value Date/Time    BNPNT 3,351 (H) 12/16/2021 03:45 AM        Lab Results   Component Value Date/Time    Creatinine 3.05 (H) 12/16/2021 03:45 AM          No results found for: CPK, RCK1, RCK2, RCK3, RCK4, CKMB, CKNDX, CKND1, TROPT, TROIQ, BNPP, BNP      Lab Results   Component Value Date/Time    WBC 9.9 12/16/2021 03:45 AM    HGB 9.3 (L) 12/16/2021 03:45 AM    HCT 29.2 (L) 12/16/2021 03:45 AM    PLATELET 160 (L) 01/86/4901 03:45 AM    MCV 93.0 12/16/2021 03:45 AM         Lab Results   Component Value Date/Time    Sodium 132 (L) 12/16/2021 03:45 AM    Potassium 4.4 12/16/2021 03:45 AM    Chloride 95 (L) 12/16/2021 03:45 AM    CO2 30 12/16/2021 03:45 AM    Anion gap 7 12/16/2021 03:45 AM    Glucose 332 (H) 12/16/2021 03:45 AM    BUN 83 (H) 12/16/2021 03:45 AM    Creatinine 3.05 (H) 12/16/2021 03:45 AM    BUN/Creatinine ratio 27 (H) 12/16/2021 03:45 AM    GFR est AA 25 (L) 12/16/2021 03:45 AM    GFR est non-AA 20 (L) 12/16/2021 03:45 AM    Calcium 8.3 (L) 12/16/2021 03:45 AM         Lab Results   Component Value Date/Time    NT pro-BNP 3,351 (H) 12/16/2021 03:45 AM    NT pro-BNP 4,772 (H) 12/15/2021 05:29 AM    NT pro-BNP 2,307 (H) 12/14/2021 12:50 AM    NT pro-BNP 1,300 (H) 12/13/2021 04:55 AM    NT pro-BNP 2,527 (H) 12/12/2021 12:57 AM         Lab Results   Component Value Date/Time    Cholesterol, total 76 12/10/2021 02:59 PM    HDL Cholesterol 28 12/10/2021 02:59 PM    LDL, calculated 25.4 12/10/2021 02:59 PM    VLDL, calculated 22.6 12/10/2021 02:59 PM    Triglyceride 113 12/10/2021 02:59 PM    CHOL/HDL Ratio 2.7 12/10/2021 02:59 PM         Lab Results   Component Value Date/Time    ALT (SGPT) 76 12/14/2021 04:51 AM    Alk.  phosphatase 116 12/14/2021 04:51 AM    Bilirubin, total 0.4 12/14/2021 04:51 AM

## 2021-12-16 NOTE — PROGRESS NOTES
0730: Bedside and Verbal shift change report given to Willi Lombardi RN (oncoming nurse) by Osbaldo Prieto RN (offgoing nurse). Report included the following information SBAR, Kardex, Intake/Output, MAR, Recent Results, and Cardiac Rhythm Sinus  . 0900: TRANSFER - OUT REPORT:    Verbal report given to Yodit Mares RN (name) on Elyssa Rolle  being transferred to Cath Lab (unit) for ordered procedure       Report consisted of patients Situation, Background, Assessment and   Recommendations(SBAR). Information from the following report(s) SBAR, Kardex, Intake/Output, MAR, Recent Results and Cardiac Rhythm Sinus Rhythm was reviewed with the receiving nurse. Lines:   Peripheral IV 12/09/21 Left Antecubital (Active)   Site Assessment Clean, dry, & intact 12/16/21 0837   Phlebitis Assessment 0 12/16/21 0837   Infiltration Assessment 0 12/16/21 0837   Dressing Status Clean, dry, & intact 12/16/21 0837   Dressing Type Tape;Transparent 12/16/21 0837   Hub Color/Line Status Pink;Flushed;Capped 12/16/21 1818   Action Taken Open ports on tubing capped 12/16/21 0837   Alcohol Cap Used Yes 12/16/21 0837       Peripheral IV 12/12/21 Anterior; Left Forearm (Active)   Site Assessment Clean, dry, & intact 12/16/21 0837   Phlebitis Assessment 0 12/16/21 0837   Infiltration Assessment 0 12/16/21 0837   Dressing Status Clean, dry, & intact 12/16/21 0837   Dressing Type Tape;Transparent 12/16/21 0837   Hub Color/Line Status Blue;Flushed;Capped 12/16/21 2545   Action Taken Open ports on tubing capped 12/16/21 0837   Alcohol Cap Used Yes 12/16/21 7277        Opportunity for questions and clarification was provided.       Patient transported with:   Monitor  O2 @ 3 liters  Registered Nurse (Cath lab nurse Yodit Mares RN)    1400: TRANSFER - IN REPORT:    Verbal report received from Yodit Mares, [facility] (name) on Elyssa oRlle  being received from Cath Lab(unit) for routine post - op      Report consisted of patients Situation, Background, Assessment and Recommendations(SBAR). Information from the following report(s) SBAR, Kardex, Procedure Summary, Intake/Output, MAR, Recent Results and Cardiac Rhythm Sinus Rhythm-Sinus Gloria Marmolejo was reviewed with the receiving nurse. Opportunity for questions and clarification was provided. Assessment completed upon patients arrival to unit and care assumed. 1930: Bedside and Verbal shift change report given to Esther Purvis RN/NELY Cohen (oncoming nurse) by Chana Alicia RN (offgoing nurse). Report included the following information SBAR, Kardex, Intake/Output, MAR, Recent Results and Cardiac Rhythm Sinus Rhythm. Care plans:   Problem: Risk for Spread of Infection  Goal: Prevent transmission of infectious organism to others  Description: Prevent the transmission of infectious organisms to other patients, staff members, and visitors. Outcome: Progressing Towards Goal     Problem: Patient Education:  Go to Education Activity  Goal: Patient/Family Education  Outcome: Progressing Towards Goal     Problem: Chronic Obstructive Pulmonary Disease (COPD)  Goal: *Oxygen saturation during activity within specified parameters  Outcome: Progressing Towards Goal  Goal: *Able to remain out of bed as prescribed  Outcome: Progressing Towards Goal  Goal: *Absence of hypoxia  Outcome: Progressing Towards Goal  Goal: *Optimize nutritional status  Outcome: Progressing Towards Goal     Problem: Patient Education: Go to Patient Education Activity  Goal: Patient/Family Education  Outcome: Progressing Towards Goal     Problem: Patient Education: Go to Patient Education Activity  Goal: Patient/Family Education  Outcome: Progressing Towards Goal     Problem: Falls - Risk of  Goal: *Absence of Falls  Description: Document Kaylee Andrea Fall Risk and appropriate interventions in the flowsheet.   Outcome: Progressing Towards Goal  Note: Fall Risk Interventions:  Mobility Interventions: Bed/chair exit alarm,Communicate number of staff needed for ambulation/transfer,Patient to call before getting OOB         Medication Interventions: Assess postural VS orthostatic hypotension,Bed/chair exit alarm,Patient to call before getting OOB,Teach patient to arise slowly    Elimination Interventions: Bed/chair exit alarm,Call light in reach,Patient to call for help with toileting needs,Stay With Me (per policy),Toilet paper/wipes in reach,Toileting schedule/hourly rounds              Problem: Patient Education: Go to Patient Education Activity  Goal: Patient/Family Education  Outcome: Progressing Towards Goal

## 2021-12-16 NOTE — PROGRESS NOTES
[facility] [facility] Adult  Hospitalist Group                                                                                          Hospitalist Progress Note  Adrián Hawk MD  Answering service: [phone] or [phone] from in house phone        Date of Service:  2021  NAME:  Alix Heredia  :  1950  MRN:  [id number]      Admission Summary:     Patient with recent admission at [address] [place] [address] and discharged with diagnosis of acute diastolic heart failure. Patient went home on 1 L of oxygen. Patient presented to heart failure clinic  and found to be hypoxic with increased work of breathing and subsequently sent to the emergency room for further evaluation. CT of the chest shows bilateral pulmonary infiltrates which was unchanged from his recent chest CT . Patient has seen pulmonology once outpatient for further evaluation and further ongoing investigation for that. Interval history / Subjective:   Underwent LHC today, seen afterwards. Drowsy from anesthesia. No obvious complaints. Wife updated at bedside. Assessment & Plan:     Acute on chronic hypoxic respiratory failure -  (1lNC baseline) likely CHF vs. CAP or other   -Pulmonology following  - On linezolid and cefepime started   -Screening for connective tissue disease   -Sputum cultures pending, not able to produce much  - Repeat procalcitonin today much higher, however in setting of CKD difficult to interpret. WBC improved, afebrile and remains on 3LNC    Hypotension - responded to albumin previously.  Monitor and if symptomatic may need additional bolus     Acute diastolic congestive heart failure  -Discontinued Norvasc due to leg edema, discontinued lisinopril due to BIPIN  -Previous echo at [facility] shows EF of 45 to 50%, repeat echo this admission shows EF 55 to 60%  -On hydralazine, Coreg and on diuresis with Bumex  -Advanced heart failure team following  -RHC and LHC today     NSTEMI   -s/p heparin gtt   - LHC today with PCI, cath note still pending.   - will need close monitoring post cath of his Cr.   - Contineu coreg, ASA, imdur, hydralazine, statin     UTI   -Urine culture on 12/9 growing Klebsiella  -Previously on cefdinir, now with broad-spectrum antibiotics including Zyvox and cefepime, continue for 7 days total for UTI. CKD stage IV  -Baseline serum creatinine 2.5-3 per nephrology  -Mild bump in creatinine due to diuresis  -previous hospitalist discussed risk of MEL with cardiac catheterization, with patient and wife  -Nephrology following  -Monitor post LHC, and fluids per renal     Hypertension  -Continue hydralazine, coreg and nitrates    Diabetes  -Lantus currently on hold due to patient's hypoglycemia  -Blood sugars improving, continue sliding scale insulin coverage    Dyslipidemia  -Continue statin    Hypothyroidism  -Continue Synthroid    Code status: Full  DVT prophylaxis: SCDs    Care Plan discussed with: Patient/Family  Anticipated Disposition: Home w/Family  Anticipated Discharge: Greater than 48 hours     Hospital Problems  Date Reviewed: 12/9/2021          Codes Class Noted POA    CHF (congestive heart failure) (HCC) ICD-10-CM: I50.9  ICD-9-CM: 428.0  12/9/2021 Unknown        Renal insufficiency ICD-10-CM: N28.9  ICD-9-CM: 593.9  Unknown Yes        Chronic obstructive pulmonary disease ([facility] [address] ICD-10-CM: J44.9  ICD-9-CM: 146  Unknown Yes        Diabetes ([facility] [address] ICD-10-CM: E11.9  ICD-9-CM: 250.00  Unknown Yes        Essential hypertension ICD-10-CM: I10  ICD-9-CM: 401.9  Unknown Yes        Coronary arteriosclerosis ICD-10-CM: I25.10  ICD-9-CM: 414.00  7/3/2018 Yes                Review of Systems:   A comprehensive review of systems was negative except for that written in the HPI. Vital Signs:    Last 24hrs VS reviewed since prior progress note.  Most recent are:  Visit Vitals  BP (!) 99/46 (BP 1 Location: Left upper arm, BP Patient Position: At rest)   Pulse (!) 55   Temp 97.5 °F (36.4 °C)   Resp 14   Ht 5' 5\" (1.651 m)   Wt 59.6 kg (131 lb 6.3 oz)   SpO2 100%   BMI 21.87 kg/m²         Intake/Output Summary (Last 24 hours) at 12/16/2021 1643  Last data filed at 12/16/2021 1500  Gross per 24 hour   Intake 1110 ml   Output 1600 ml   Net -490 ml        Physical Examination:     I had a face to face encounter with this patient and independently examined them on 12/16/2021 as outlined below:          Constitutional:  No acute distress, cooperative, pleasant, sleeping   ENT:  Oral mucosa moist, oropharynx benign. Resp:  CTA bilaterally. No wheezing/rhonchi/rales. No accessory muscle use   CV:  Regular rhythm, normal rate, no murmurs, gallops, rubs    GI:  Soft, non distended, non tender. normoactive bowel sounds, no hepatosplenomegaly     Musculoskeletal:  No edema, warm, 2+ pulses throughout    Neurologic:  Moves all extremities. asleep            Data Review:    Review and/or order of clinical lab test      Labs:     Recent Labs     12/16/21  0345 12/15/21  0529   WBC 9.9 15.0*   HGB 9.3* 9.2*   HCT 29.2* 29.1*   * 131*     Recent Labs     12/16/21  0345 12/15/21  0529 12/14/21 0451 12/14/21  0050   * 133* 135*  --    K 4.4 4.3 4.8  --    CL 95* 96* 98  --    CO2 30 32 28  --    BUN 83* 85* 81*  --    CREA 3.05* 2.85* 2.83*  --    * 71 107*  --    CA 8.3* 8.7 8.6  --    MG 2.6* 2.4  --  2.3     Recent Labs     12/14/21 0451   ALT 76      TBILI 0.4   TP 6.6   ALB 3.2*   GLOB 3.4     Recent Labs     12/16/21 0345   APTT 30.6      No results for input(s): FE, TIBC, PSAT, FERR in the last 72 hours. No results found for: FOL, RBCF   No results for input(s): PH, PCO2, PO2 in the last 72 hours. No results for input(s): CPK, CKNDX, TROIQ in the last 72 hours.     No lab exists for component: CPKMB  Lab Results   Component Value Date/Time    Cholesterol, total 76 12/10/2021 02:59 PM    HDL Cholesterol 28 12/10/2021 02:59 PM    LDL, calculated 25.4 12/10/2021 02:59 PM    Triglyceride 113 12/10/2021 02:59 PM    CHOL/HDL Ratio 2.7 12/10/2021 02:59 PM     Lab Results   Component Value Date/Time    Glucose (POC) 191 (H) 12/16/2021 04:12 PM    Glucose (POC) 140 (H) 12/16/2021 02:14 PM    Glucose (POC) 275 (H) 12/16/2021 06:08 AM    Glucose (POC) 182 (H) 12/15/2021 09:31 PM    Glucose (POC) 210 (H) 12/15/2021 04:27 PM     Lab Results   Component Value Date/Time    Color YELLOW/STRAW 12/09/2021 05:26 AM    Appearance CLEAR 12/09/2021 05:26 AM    Specific gravity 1.008 12/09/2021 05:26 AM    pH (UA) 6.5 12/09/2021 05:26 AM    Protein 30 (A) 12/09/2021 05:26 AM    Glucose Negative 12/09/2021 05:26 AM    Ketone Negative 12/09/2021 05:26 AM    Bilirubin Negative 12/09/2021 05:26 AM    Urobilinogen 0.2 12/09/2021 05:26 AM    Nitrites Negative 12/09/2021 05:26 AM    Leukocyte Esterase SMALL (A) 12/09/2021 05:26 AM    Epithelial cells FEW 12/09/2021 05:26 AM    Bacteria 4+ (A) 12/09/2021 05:26 AM    WBC 20-50 12/09/2021 05:26 AM    RBC 0-5 12/09/2021 05:26 AM         Medications Reviewed:     Current Facility-Administered Medications   Medication Dose Route Frequency    hydrALAZINE (APRESOLINE) tablet 50 mg  50 mg Oral TID    ondansetron (ZOFRAN) injection 4 mg  4 mg IntraVENous Q6H PRN    linezolid in dextrose 5% (ZYVOX) IVPB premix in D5W 600 mg  600 mg IntraVENous Q12H    isosorbide mononitrate ER (IMDUR) tablet 60 mg  60 mg Oral BID    cefepime (MAXIPIME) 2 g in sterile water (preservative free) 10 mL IV syringe  2 g IntraVENous Q24H    dextrose 20% infusion   IntraVENous CONTINUOUS    carvediloL (COREG) tablet 12.5 mg  12.5 mg Oral Q12H    insulin lispro (HUMALOG) injection   SubCUTAneous AC&HS    glucose chewable tablet 16 g  4 Tablet Oral PRN    dextrose (D50W) injection syrg 12.5-25 g  12.5-25 g IntraVENous PRN    glucagon (GLUCAGEN) injection 1 mg  1 mg IntraMUSCular PRN    mylanta/viscous lidocaine (GI COCKTAIL)  40 mL Oral Q6H PRN    aspirin delayed-release tablet 81 mg  81 mg Oral QHS    guaiFENesin ER (MUCINEX) tablet 600 mg  600 mg Oral Q12H    benzonatate (TESSALON) capsule 100 mg  100 mg Oral TID PRN    [Held by provider] insulin glargine (LANTUS) injection 15 Units  15 Units SubCUTAneous PCD    levothyroxine (SYNTHROID) tablet 100 mcg  100 mcg Oral ACB    arformoterol 15 mcg/budesonide 0.5 mg neb solution   Nebulization BID RT    nitroglycerin (NITROSTAT) tablet 0.4 mg  0.4 mg SubLINGual Q5MIN PRN    clopidogreL (PLAVIX) tablet 75 mg  75 mg Oral DAILY    rosuvastatin (CRESTOR) tablet 20 mg  20 mg Oral QHS    magnesium oxide (MAG-OX) tablet 200 mg  200 mg Oral EVERY OTHER DAY    acetaminophen (TYLENOL) tablet 650 mg  650 mg Oral Q4H PRN    acetaminophen (TYLENOL) tablet 650 mg  650 mg Oral Q6H    sodium chloride (NS) flush 5-10 mL  5-10 mL IntraVENous PRN     ______________________________________________________________________  EXPECTED LENGTH OF STAY: 4d 2h  ACTUAL LENGTH OF STAY:          7                 Vikram Dave MD

## 2021-12-16 NOTE — CARDIO/PULMONARY
Cardiac Rehab: CAD education folder given to [employer]. He has just returned from the Cath lab and is asleep so discussion regarding Cardiac Rehab was with his wife. Alexandru Whalen wife stated \" this is his 10th stent\". Educated using teach back method. Emphasized the value of cardiac rehab. Discussed Cardiac Rehab Program format, benefits, and encouraged enrollment to assist with risk modification and management. He lives closer to [facility] so contact information will be placed for that location and the contact information is now on his AVS. He may need home PT and his wife doubts he will \"enroll\"    Alexandru Whalen wife verbalized understanding with questions answered.  Gracia Case RN

## 2021-12-16 NOTE — PROGRESS NOTES
Physical Therapy  12/16/2021    Chart reviewed. Patient currently off the floor in CCL at this time. F/u later as able/appropriate for PT. Thank you.     Noreen Marroquin, PT, DPT

## 2021-12-16 NOTE — PROGRESS NOTES
Cardiac Cath Lab Procedure Area Arrival Note:    Aislinn Aguilera arrived to Cardiac Cath Lab, Procedure Area. Patient identifiers verified with NAME and DATE OF BIRTH. Procedure verified with patient. Consent forms verified. Allergies verified. Patient informed of procedure and plan of care. Questions answered with review. Patient voiced understanding of procedure and plan of care. Patient on cardiac monitor, non-invasive blood pressure, SPO2 monitor. On O2 @ 3 lpm via NC.  IV of NS on pump at 10 ml/hr. Patient status doing well without problems. Patient is A&Ox 4. Patient reports no CP and SOB with exertion. Patient medicated during procedure with orders obtained and verified by Dr. Tiffany Pena. Refer to patients Cardiac Cath Lab PROCEDURE REPORT for vital signs, assessment, status, and response during procedure, printed at end of case. Printed report on chart or scanned into chart. 1200  Transfer to [facility] from Procedure Area    Verbal report given to JEREMY Morales on Aislinn Aguilera being transferred to Cardiac Cath Lab RR for routine progression of care   Patient is post LHC/PCI of RCA procedure. Patient stable upon transfer to . Report consisted of patients Situation, Background, Assessment and   Recommendations(SBAR). Information from the following report(s) SBAR, Procedure Summary and MAR was reviewed with the receiving nurse. Opportunity for questions and clarification was provided. Patient medicated during procedure with orders obtained and verified by Dr. Tiffany Pena. Refer to patient PROCEDURE REPORT for vital signs, assessment, status, and response during procedure.

## 2021-12-16 NOTE — PROGRESS NOTES
Cardiac Cath Lab Recovery Arrival Note:      Willow Rajan arrived to Cardiac Cath Lab, Recovery Area. Staff introduced to patient. Patient identifiers verified with NAME and DATE OF BIRTH. Procedure verified with patient. Consent forms reviewed and signed by patient or authorized representative and verified. Allergies verified. Patient informed of procedure and plan of care. Questions answered with review. Patient prepped for procedure, per orders from physician, prior to arrival.    Patient on cardiac monitor, non-invasive blood pressure, SPO2 monitor. Patient is A&Ox 4. Patient reports no pain, no chest pain, shortness of breath at rest and with exertion. 3LNC Oxygen    Patient in stretcher, in low position, with side rails up, call bell within reach, patient instructed to call of assistance as needed. Patient prep in: CCL Recovery Area, Bed# 6. Family in: Wife on call.    Prep by: Kaitlyn Barragan RN

## 2021-12-16 NOTE — PROGRESS NOTES
TRANSFER - IN REPORT:    Verbal report received from MIQUEL Johnson RN on Chaz Tovar, Procedure Cath procedure with stent to RCA , from the Cardiac Cath lab, for routine progression of care. Report consisted of patients Situation, Background, Assessment and Recommendations(SBAR). Information from the following report(s) Procedure Summary, MAR and Recent Results was reviewed with the receiving clinician. Opportunity for questions and clarification was provided. Assessment completed upon patients arrival to [address] [place] [address] and care assumed. Cardiac Cath Lab Recovery Arrival Note:     Chaz Tovar arrived to [facility] recovery area. Patient procedure= Cath procedure with intervention. Patient on cardiac monitor, non-invasive blood pressure, Patient status doing well without problems. Patient is Arousable&Ox 4. Patient reports slight chest  Discomfort (Per report MD is aware and patient had been given Morphine 2mg IV). Procedure site without any bleeding and no hematoma.

## 2021-12-17 NOTE — PROGRESS NOTES
Day #1 of Cephalexin  Indication:  CAP  Current regimen:  500 mg q8h  Abx regimen: Monotherapy   Recent Labs     21  0442 21  0345 12/15/21  0529   WBC 8.4 9.9 15.0*   CREA 3.20* 3.05* 2.85*   BUN 88* 83* 85*     Est CrCl: 15-29 ml/min;    Temp (24hrs), Av °F (36.1 °C), Min:93.7 °F (34.3 °C), Max:97.9 °F (36.6 °C)    Cultures: None     Plan: Change to 250 mg q8h    Dickson Beckford, SusanD

## 2021-12-17 NOTE — PROGRESS NOTES
RENAL  PROGRESS NOTE        Subjective: Had 615 S Siemens yesterday  Objective:   VITALS SIGNS:    Visit Vitals  BP (!) 117/42   Pulse 68   Temp 97 °F (36.1 °C)   Resp 20   Ht 5' 5\" (1.651 m)   Wt 60.8 kg (134 lb 0.6 oz)   SpO2 99%   BMI 22.31 kg/m²       O2 Device: Nasal cannula   O2 Flow Rate (L/min): 2.5 l/min (turned down to 2.5)   Temp (24hrs), Av.9 °F (36.1 °C), Min:93.7 °F (34.3 °C), Max:97.9 °F (36.6 °C)         PHYSICAL EXAM:  resting    DATA REVIEW:     INTAKE / OUTPUT:   Last shift:      No intake/output data recorded. Last 3 shifts: 12/15 1901 -  0700  In: 7688 [P.O.:1160; I.V.:450]  Out: 1775 [Urine:1775]    Intake/Output Summary (Last 24 hours) at 2021 0950  Last data filed at 2021 0453  Gross per 24 hour   Intake 600 ml   Output 950 ml   Net -350 ml         LABS:   Recent Labs     21  0442 21  0345 12/15/21  0529   WBC 8.4 9.9 15.0*   HGB 9.3* 9.3* 9.2*   HCT 29.4* 29.2* 29.1*   * 119* 131*     Recent Labs     21  0442 21  0345 12/15/21  0529   * 132* 133*   K 4.8 4.4 4.3   CL 95* 95* 96*   CO2 29 30 32   * 332* 71   BUN 88* 83* 85*   CREA 3.20* 3.05* 2.85*   CA 8.7 8.3* 8.7   MG 2.8* 2.6* 2.4   PHOS 4.1  --   --           Assessment:  CKD stage 4: Serum Cr 2.4->2.7mg/dl. Baseline serum Cr 2.5 to 3mg/dl-> 2 to DM/HTN. Followed by my partner Dr. Mancini Columbus.     Decompensated CHF: EF 45-50%     Chest pain: hx of 9 stents     DM2: Uncontrolled->Hyperglycemia     HTN: fluctuating control.     Proteinuria: 2.8gm proteinuria     COPD suspected: Possible BRAYDEN PNA.  Pulmonary following     Hx of urethral obstruction: s/p suprapubic catheter     Plan/Recommendations:  Creat up s/p LHC on 21   hold  Bumex    monitor renal function   Linda Birmingham MD

## 2021-12-17 NOTE — PROGRESS NOTES
[address] [place] [address]               Division of Cardiology  [phone]                       Progress note              Ge Ortiz M.D., F.A.CSamirC. NAME:  Alix Heredia   :   1950   MRN:   [id number]         ICD-10-CM ICD-9-CM    1. Acute congestive heart failure, unspecified heart failure type (HCC)  I50.9 428.0    2. Chronic combined systolic and diastolic congestive heart failure (HCC)  I50.42 428.42      428.0    3. Coronary arteriosclerosis  I25.10 414.00    4. Essential hypertension  I10 401.9    5. Renal insufficiency  N28.9 593.9    6. Shortness of breath  R06.02 786.05    7. Chronic obstructive pulmonary disease, unspecified COPD type ([facility]  J44.9 496    8. Coronary artery disease due to lipid rich plaque  I25.10 414.00 CARDIAC PROCEDURE    I25.83 414.3 CARDIAC PROCEDURE                 HPI  70years old male with a past medical history remarkable for coronary disease status post 9 stents in the past, diabetes, hypertension, hyperlipidemia, previous myocardial infarction, aortic insufficiency and renal insufficiency who presented to the emergency room after being sent from the advanced heart failure team for increasing shortness of breath with hypoxia. Patient with recurrent chest pain the last few days. This was relieved by nitroglycerin follow-up similar to his previous angina. Based on that it was felt the patient should proceed with cardiac catheterization. Is now status post PCI of the RCA on 2021. He does have residual very distal circumflex stenosis which will be addressed medically. He is in no particular recurrent chest pain. Overall he feels okay. A little worn out. Assessment/Plan: 1. CAD: Chest pain better. Status post PCI of the RCA. Continue aspirin Plavix and statin. Continue Coreg. Previous stents to the circumflex and LAD.     Residual distal OM circumflex stenosis to be treated medically. 2.  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    3.  Murmur: He does have aortic stenosis mild to moderate continue echo surveillance. 4.  CKD: Increase in creatinine after administration of contrast although very small dose. He does have a normal ejection fraction I will give him also some IV fluids today. Bumex is on hold. No additional cardiac interventions for now. We will continue to follow from a distance through the weekend and check back on Monday or sooner of course that if any questions/ issues arise in the meanwhile. CARDIAC STUDIES      12/08/21    ECHO ADULT COMPLETE 12/10/2021 12/10/2021    Interpretation Summary  · LV: Estimated LVEF is 55 - 60%. Normal cavity size, wall thickness and systolic function (ejection fraction normal). Wall motion: normal.  · RV: Mildly dilated right ventricle. · AV: Aortic valve sclerosis with no evidence of reduced excursion. Aortic valve leaflet calcification present. Aortic valve mean gradient is 14 mmHg. Aortic valve area is 1.8 cm2. Mild aortic valve stenosis is present. Moderate aortic valve regurgitation is present. · LA: Moderately dilated left atrium. · MV: Moderate mitral annular calcification. Signed by: Karoline Chawla MD on 12/10/2021  2:09 PM               @[email or web address]      IMAGING      CT Results (most recent):  Results from Hospital Encounter encounter on 12/08/21    CT CHEST WO CONT    Narrative  INDICATION: Shortness of breath    COMPARISON: November 30    CONTRAST: None. TECHNIQUE:  5 mm axial images were obtained through the . Coronal and sagittal  reformats were generated. CT dose reduction was achieved through use of a  standardized protocol tailored for this examination and automatic exposure  control for dose modulation.     The absence of intravenous contrast reduces the sensitivity for evaluation of  the mediastinum, guy, vasculature, and upper abdominal organs. FINDINGS:    CHEST WALL: No mass or axillary lymphadenopathy. THYROID: No nodule. MEDIASTINUM: Prominent nodes unchanged  IMELDA: No mass or lymphadenopathy. THORACIC AORTA: No aneurysm. MAIN PULMONARY ARTERY: Normal in caliber. TRACHEA/BRONCHI: Patent. ESOPHAGUS: No wall thickening or dilatation. HEART: Prominent in size  PLEURA: No effusion or pneumothorax. LUNGS: Diffuse interstitial infiltrates and emphysema unchanged. INCIDENTALLY IMAGED UPPER ABDOMEN: No significant abnormality in the  incidentally imaged upper abdomen. BONES: No destructive bone lesion. Impression  No Change      XR Results (most recent):  Results from Hospital Encounter encounter on 12/08/21    XR CHEST PORT    Narrative  EXAM: XR CHEST PORT    INDICATION: SOB    COMPARISON: December 13, 2021    FINDINGS: A portable AP radiograph of the chest was obtained at 0546 hours. The  patient is on a cardiac monitor. There is bilateral airspace opacification, left  greater than right. There are coronary artery stents. The bones and soft  tissues are grossly within normal limits. Impression  No change in bilateral airspace opacification. MRI Results (most recent):  No results found for this or any previous visit.           Past Medical History:   Diagnosis Date    Aortic regurgitation     CAD (coronary artery disease)     Taxus stent 12/2005, 12/06 [place]her Prox circ, 6/2018 Sudarshan LADx2,  9/19 [place] LAD & LCX, 1/20 Sudarshan     Chronic combined systolic and diastolic congestive heart failure ([address] 11/14/2021    Chronic obstructive pulmonary disease (HCC)     Congestive heart failure ([address]     Diabetes ([address]     Essential hypertension     Hyperlipidemia     MI (myocardial infarction) ([address]     Murmur     Renal insufficiency     Thyroid disease            Past Surgical History:   Procedure Laterality Date    HX CORONARY STENT PLACEMENT      HX CYST REMOVAL      IR ASP BLADDER SUPRA CATH                 Visit Vitals  BP (!) 117/42   Pulse 68   Temp 97 °F (36.1 °C)   Resp 20   Ht 5' 5\" (1.651 m)   Wt 134 lb 0.6 oz (60.8 kg)   SpO2 99%   BMI 22.31 kg/m²         Wt Readings from Last 3 Encounters:   12/17/21 134 lb 0.6 oz (60.8 kg)   12/08/21 137 lb 6.4 oz (62.3 kg)   11/29/21 140 lb (63.5 kg)         Review of Systems:   Pertinent items are noted in the History of Present Illness. No intake/output data recorded. 12/15 1901 - 12/17 0700  In: 0518 [P.O.:1160; I.V.:450]  Out: 1775 [Urine:1775]      Telemetry: normal sinus rhythm    Physical Exam:    Neck: no JVD  Heart: regular rate and rhythm  Lungs: diminished breath sounds R anterior, L anterior  Abdomen: soft, non-tender.  Bowel sounds normal. No masses,  no organomegaly  Extremities: no edema    Current Facility-Administered Medications   Medication Dose Route Frequency    hydrALAZINE (APRESOLINE) tablet 50 mg  50 mg Oral TID    ondansetron (ZOFRAN) injection 4 mg  4 mg IntraVENous Q6H PRN    isosorbide mononitrate ER (IMDUR) tablet 60 mg  60 mg Oral BID    cefepime (MAXIPIME) 2 g in sterile water (preservative free) 10 mL IV syringe  2 g IntraVENous Q24H    dextrose 20% infusion   IntraVENous CONTINUOUS    carvediloL (COREG) tablet 12.5 mg  12.5 mg Oral Q12H    insulin lispro (HUMALOG) injection   SubCUTAneous AC&HS    glucose chewable tablet 16 g  4 Tablet Oral PRN    dextrose (D50W) injection syrg 12.5-25 g  12.5-25 g IntraVENous PRN    glucagon (GLUCAGEN) injection 1 mg  1 mg IntraMUSCular PRN    mylanta/viscous lidocaine (GI COCKTAIL)  40 mL Oral Q6H PRN    aspirin delayed-release tablet 81 mg  81 mg Oral QHS    guaiFENesin ER (MUCINEX) tablet 600 mg  600 mg Oral Q12H    benzonatate (TESSALON) capsule 100 mg  100 mg Oral TID PRN    insulin glargine (LANTUS) injection 15 Units  15 Units SubCUTAneous PCD    levothyroxine (SYNTHROID) tablet 100 mcg  100 mcg Oral ACB    arformoterol 15 mcg/budesonide 0.5 mg neb solution   Nebulization BID RT  nitroglycerin (NITROSTAT) tablet 0.4 mg  0.4 mg SubLINGual Q5MIN PRN    clopidogreL (PLAVIX) tablet 75 mg  75 mg Oral DAILY    rosuvastatin (CRESTOR) tablet 20 mg  20 mg Oral QHS    magnesium oxide (MAG-OX) tablet 200 mg  200 mg Oral EVERY OTHER DAY    acetaminophen (TYLENOL) tablet 650 mg  650 mg Oral Q4H PRN    acetaminophen (TYLENOL) tablet 650 mg  650 mg Oral Q6H    sodium chloride (NS) flush 5-10 mL  5-10 mL IntraVENous PRN         All Cardiac Markers in the last 24 hours:    Lab Results   Component Value Date/Time    BNPNT 5,421 (H) 12/17/2021 04:42 AM        Lab Results   Component Value Date/Time    Creatinine 3.20 (H) 12/17/2021 04:42 AM          No results found for: CPK, RCK1, RCK2, RCK3, RCK4, CKMB, CKNDX, CKND1, TROPT, TROIQ, BNPP, BNP      Lab Results   Component Value Date/Time    WBC 8.4 12/17/2021 04:42 AM    HGB 9.3 (L) 12/17/2021 04:42 AM    HCT 29.4 (L) 12/17/2021 04:42 AM    PLATELET 090 (L) 91/08/6338 04:42 AM    MCV 92.7 12/17/2021 04:42 AM         Lab Results   Component Value Date/Time    Sodium 130 (L) 12/17/2021 04:42 AM    Potassium 4.8 12/17/2021 04:42 AM    Chloride 95 (L) 12/17/2021 04:42 AM    CO2 29 12/17/2021 04:42 AM    Anion gap 6 12/17/2021 04:42 AM    Glucose 334 (H) 12/17/2021 04:42 AM    BUN 88 (H) 12/17/2021 04:42 AM    Creatinine 3.20 (H) 12/17/2021 04:42 AM    BUN/Creatinine ratio 28 (H) 12/17/2021 04:42 AM    GFR est AA 23 (L) 12/17/2021 04:42 AM    GFR est non-AA 19 (L) 12/17/2021 04:42 AM    Calcium 8.7 12/17/2021 04:42 AM         Lab Results   Component Value Date/Time    NT pro-BNP 5,421 (H) 12/17/2021 04:42 AM    NT pro-BNP 3,351 (H) 12/16/2021 03:45 AM    NT pro-BNP 4,772 (H) 12/15/2021 05:29 AM    NT pro-BNP 2,307 (H) 12/14/2021 12:50 AM    NT pro-BNP 1,300 (H) 12/13/2021 04:55 AM         Lab Results   Component Value Date/Time    Cholesterol, total 76 12/10/2021 02:59 PM    HDL Cholesterol 28 12/10/2021 02:59 PM    LDL, calculated 25.4 12/10/2021 02:59 PM VLDL, calculated 22.6 12/10/2021 02:59 PM    Triglyceride 113 12/10/2021 02:59 PM    CHOL/HDL Ratio 2.7 12/10/2021 02:59 PM         Lab Results   Component Value Date/Time    ALT (SGPT) 76 12/14/2021 04:51 AM    Alk.  phosphatase 116 12/14/2021 04:51 AM    Bilirubin, total 0.4 12/14/2021 04:51 AM

## 2021-12-17 NOTE — PROGRESS NOTES
Problem: Self Care Deficits Care Plan (Adult)  Goal: *Acute Goals and Plan of Care (Insert Text)  Description: FUNCTIONAL STATUS PRIOR TO ADMISSION: Patient required up to maximum assistance for basic  ADLs involving LB care and instrumental ADLs. Recently discarged in Nov 2021 from OSH and since d/c has had difficulty performing LB self-care 2/2 SOB and fatigue. Wife now assists. Performs sponge baths due to fatigue and difficulty breathing    HOME SUPPORT: The patient lived alone with wife to provide assistance. Occupational Therapy Goals  Initiated 12/11/2021  1. Patient will perform grooming standing at sink x 5 min with VSS supervision/set-up within 7 day(s). 2.  Patient will perform LB dressing with moderate assistance and AE PRN within 7 day(s). 3.  Patient will perform bathing with moderate assistance  within 7 day(s). 4.  Patient will perform toilet transfers with minimal assistance/contact guard assist within 7 day(s). 5.  Patient will perform all aspects of toileting with supervision/set-up within 7 day(s). 6.  Patient will participate in upper extremity therapeutic exercise/activities with supervision/set-up for 10 minutes within 7 day(s). 7.  Patient will utilize energy conservation techniques during functional activities with verbal and visual cues within 7 day(s). Outcome: Progressing Towards Goal   OCCUPATIONAL THERAPY TREATMENT  Patient: Betzy Sosa (8 y.o. male)  Date: 12/17/2021  Diagnosis: CHF (congestive heart failure) ([facility]) [I50.9] <principal problem not specified>  Procedure(s) (LRB):  LEFT AND RIGHT HEART CATH / CORONARY ANGIOGRAPHY (N/A)  PERCUTANEOUS CORONARY INTERVENTION (N/A)  INTRAVASCULAR ULTRASOUND (N/A) 1 Day Post-Op  Precautions: Fall,Bed Alarm ([place])  Chart, occupational therapy assessment, plan of care, and goals were reviewed. ASSESSMENT  Patient continues with skilled OT services and is progressing towards goals.   Patient with asymptomatic hypotension during session today s/p LHC, RHC, and PCI however patient able to tolerate standing at the table-top to complete grooming tasks. Discussed with wife plan to take patient home and provided energy conservation strategies and safety techniques to be used with patient. Patient's wife inquired about home O2, stating that it would be helpful if patient had the ability to attach the O2 to himself. Discussed with CM. Patient's wife also mentioned wheelchair. Patient continues to present with generalized weakness, decreased endurance requiring supplemental O2, impaired standing balance, and decreased activity tolerance. Continue to recommend rehab vs home with [facility] and support from spouse. Vitals:    12/17/21 1335 12/17/21 1350 12/17/21 1353 12/17/21 1358   BP: (!) 113/40 (!) 81/34 (!) 94/33 (!) 85/51   BP 1 Location: Right upper arm Right upper arm Right upper arm Right upper arm   BP Patient Position: Sitting Standing  Comment: after 5 minutes Sitting Sitting   Pulse:       Temp:       Resp:       Height:       Weight:       SpO2:          Current Level of Function Impacting Discharge (ADLs): up to MAX A LB ADLs    Other factors to consider for discharge: needs home O2; wheelchair? PLAN :  Patient continues to benefit from skilled intervention to address the above impairments. Continue treatment per established plan of care to address goals. Recommend with staff: OOB x 3 to chair    Recommend next OT session: LB ADL training     Recommendation for discharge: (in order for the patient to meet his/her long term goals)  To be determined: rehab vs HHOT    This discharge recommendation:  Has been made in collaboration with the attending provider and/or case management    IF patient discharges home will need the following DME: TBD       SUBJECTIVE:   Patient stated I feel a ton better today.     OBJECTIVE DATA SUMMARY:   Cognitive/Behavioral Status:  Neurologic State: Alert  Orientation Level: Oriented X4  Cognition: Appropriate for age attention/concentration  Perception: Appears intact  Perseveration: No perseveration noted  Safety/Judgement: Decreased insight into deficits    Functional Mobility and Transfers for ADLs:  Transfers:  Sit to Stand: Contact guard assistance          Balance:  Sitting: Intact  Standing: Impaired; With support  Standing - Static: Fair  Standing - Dynamic : Fair    ADL Intervention:       Grooming  Position Performed: Standing;Seated in chair  Washing Face: Set-up; Supervision (seated in chair)  Brushing Teeth: Contact guard assistance (standing at the table-top)    Cognitive Retraining  Safety/Judgement: Decreased insight into deficits    Pain:  No complaints    Activity Tolerance:   Fair, desaturates with exertion and requires oxygen and signs and symptoms of orthostatic hypotension    After treatment patient left in no apparent distress:   Sitting in chair, Call bell within reach, Bed / chair alarm activated and Caregiver / family present    COMMUNICATION/COLLABORATION:   The patients plan of care was discussed with: Physical therapist and Registered nurse.      Yfn Luque  Time Calculation: 39 mins

## 2021-12-17 NOTE — PROGRESS NOTES
Pulmonary, Critical Care, and Sleep Medicine~Consult Note    Name: Carin Duncan MRN: [id number]   : 1950 Hospital: [facility]   Date: 2021 2:58 PM Admission: 2021     Impression Plan   1. Acute on chronic hypoxic resp failure (O2 only recently started at [facility])  2. Abnormal CT scan: Diffuse patchy irregular consolidative opacities and reticular changes and peribronchial thickening Left > right. Notable enlarged mediastinal/hilar adenopathy. Fairly extensive bronchiectasis. left upper lobe with pneumatocele. Offers no signs of new medications, familial CTD, pet birds, farm life [[A. pullulans positive on hypersensitivity pneumonitis panel but this could just be prior exposure, clinical significance unclear]]. A coexisting PNA cannot be ruled out since the PCT is HIGH. D/D burnt out sarcoid vs chronic hypersensitivity pneumonitis. 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 Chronic mycobacterial infection a possibility. 3. Suspected COPD, yet to be define via PFTs. Followed by Dr dEa Carmona  4. CAD, s/p 9 stents; intermittent CP improved by nitro  5. decom HFrEF; EF 45-55%, mild MVR/mild AVR at [facility]  6. BIPIN on CKD   7. DM II    A. pullulans antibodies positive on HP panel, rest negative. IgE 264, CRP 3.23  Negative AVIS, ANCA, RF, CCP 1. Antibiotics - on linezolid, cefepime; hopefully can tolerate narrowing soon   2. Recheck PCT   3. Reluctant to start steroids in view of High PCT. 4. mucinex bid  5. Comparison with chest films from [facility] will be beneficial in determining temporal course (he has relocated from [place] a year ago) -- there may also be an old CT scan in [place], [place] at a freestanding ER we will try and request if possible   6. O2 titration above 90%, currently on 2lpm  7. dvt proph  8. Dicussed with wife at bedside   9. PT/OT  10.  PRN over the weekend      Daily Progression:      Had LHC/RHC yesterday; 80% stenosis with RCA     12/15 unchanged overall status. wife at bedside. Discussed CT Chest with the patient. On 3 lpm by nc all the time. No significant cough. Weight loss for sometime. CT Chest in 2019 at [facility].    12/14 Unchanged breathing per pt. He was on 3L o2 but I weaned to 2L o2 as sats in the upper 90's. Pt reports titration of o2 for comfort. No other complaints. 12/13  Did seem to be doing better each day through the weekend per wife. Able to talk without gasping for breath. Unfortunately didn't sleep well last night and had some hypoglycemia this morning, so quite tired. 12/10  Seen and examined earlier today. His most distressing symptom has been's midsternal chest pain and radiation into the arms. He denies severe neck or back pain. He denies ability to bring up sputum. He has relocated from [place] approximately a year ago and was hospitalized at [facility] in early November, comparison of imaging from November admission may help with temporal course of events. Left heart catheterization has been deferred in view of renal insufficiency. Chest pain is midsternal and nonpleuritic and does not appear to be pulmonary in origin. CT scan of the chest shows patchy infiltrates with nonspecific fibrosis and bronchiectasis-atypical mycobacterial infection is also in the differential diagnosis. He does have a follow-up appointment with Dr. Sheryl Juarez on the 28th for PFTs    12/9  Consult Note requested by hospitalist.    Patient was recently seen by outpatient advance Heart team, Dr Janet Maria. At that visit patient was told to present to the ER because that he was clinically worsening. He is followed by Dr Sheryl Juarez and was last seen on 11/16/21. At that visit he started symbicort and ordered PFTs that have yet to be completed. He was hospitalized at [facility] from 11/4-11/10 following presenting to the stand alone ER in [place] for dyspnea. He was treated there for pulmonary edema. He has a remote hx of aggressive tobacco use stopping in 2000.  No formal dx of COPD    On O2 since last admission. No prior dyspnea since last visit. Chest pains improved with nitro    Last ECHO showed EF 40-45%  followled by Dr Janet Martinez for CKD    Hx of 9 stens placement. Recent change of duiertics from lasix to bumex. 12/8 CT scan fairly comparably to the 11/30 CT scan  LUNGS: Diffuse patchy irregular consolidative opacities and reticulations with  architectural distortion, left slightly more pronounced than right. 4.9 cm left  upper lobe with pneumatocele    I have reviewed the labs and previous days notes. Pertinent items are noted in HPI. Past Medical History:   Diagnosis Date    Aortic regurgitation     CAD (coronary artery disease)     Taxus stent 12/2005, 12/06 [place] Prox circ, 6/2018 [place] LADx2,  9/19 [place] LAD & LCX, 1/20 [place]     Chronic combined systolic and diastolic congestive heart failure ([address] 11/14/2021    Chronic obstructive pulmonary disease (HCC)     Congestive heart failure (HCC)     Diabetes ([address]     Essential hypertension     Hyperlipidemia     MI (myocardial infarction) ([address]     Murmur     Renal insufficiency     Thyroid disease       Past Surgical History:   Procedure Laterality Date    HX CORONARY STENT PLACEMENT      HX CYST REMOVAL      IR ASP BLADDER SUPRA CATH        Prior to Admission medications    Medication Sig Start Date End Date Taking? Authorizing Provider   anastrozole (ARIMIDEX) 1 mg tablet Take 0.5 mg by mouth every Monday, Wednesday, Friday. Yes Provider, Historical   bumetanide (BUMEX) 1 mg tablet TAKE 1 TABLET BY MOUTH TWICE DAILY. REPLACES. LASIX 11/16/21   Nirmala Mustafa NP   magnesium oxide 250 mg magnesium tablet Take 250 mg by mouth. Every other day - OTC 11/4/21   Provider, Historical   prasterone, dhea, 50 mg tab 50 mg = 1 tab each dose, PO, daily, # 30 tab, 0 Refills 11/4/21   Provider, Historical   carvediloL (COREG) 6.25 mg tablet Take 1 Tablet by mouth every twelve (12) hours.  11/15/21 Berta Araujo NP   isosorbide mononitrate ER (IMDUR) 30 mg tablet Take 1 Tablet by mouth daily. 11/15/21   Berta Araujo NP   hydrALAZINE (APRESOLINE) 25 mg tablet Take 1 Tablet by mouth every eight (8) hours. 11/15/21   Berta Araujo NP   amLODIPine (NORVASC) 2.5 mg tablet Take 1 Tablet by mouth daily. 11/15/21   Berta Araujo NP   rosuvastatin (CRESTOR) 20 mg tablet TAKE 1 TABLET BY MOUTH EVERY NIGHT 11/4/21   Berta Araujo NP   clopidogreL (PLAVIX) 75 mg tab TAKE 1 TABLET BY MOUTH EVERY DAY 10/26/21   Berta Araujo NP   Symbicort 80-4.5 mcg/actuation HFAA INHALE 2 PUFFS BY MOUTH TWICE DAILY IN THE MORNING AND IN THE EVENING 8/18/21   Provider, Historical   nitroglycerin (Nitrostat) 0.4 mg SL tablet 1 Tablet by SubLINGual route every five (5) minutes as needed for Chest Pain. Up to 3 doses. 9/1/21   Berta Araujo NP   albuterol (ProAir HFA) 90 mcg/actuation inhaler Take 2 Puffs by inhalation. 4-6 hrs as needed 1/22/21   Provider, Historical   BD Lali 2nd Gen Pen Needle 32 gauge x 5/32\" ndle USE WITH LANTUS 7/13/21   Provider, Historical   aspirin delayed-release 81 mg tablet Take 81 mg by mouth daily. At night    Provider, Historical   glimepiride (AMARYL) 4 mg tablet Take 4 mg by mouth. Every two days    Provider, Historical   insulin glargine (Lantus U-100 Insulin) 100 unit/mL injection 18 U nightly after dinner    Provider, Historical   levothyroxine (SYNTHROID) 100 mcg tablet Take 100 mcg by mouth daily. Morning    Provider, Historical   testosterone (ANDROGEL) 1.62 % (20.25 mg/1.25 gram) glpk 20.25 mg daily. 2 to 3 pumps alternating in the morning.     Provider, Historical     Allergies   Allergen Reactions    Bee Sting [Sting, Bee] Anaphylaxis    Iodinated Contrast Media Rash    Brilinta [Ticagrelor] Other (comments)    Effient [Prasugrel] Other (comments)    Penicillins Rash and Nausea Only    Ranexa [Ranolazine] Other (comments)    Sulfa (Sulfonamide Antibiotics) Rash      Social History     Tobacco Use    Smoking status: Former Smoker     Packs/day: 2.00     Years: 30.00     Pack years: 60.00     Quit date: 1991     Years since quittin.9    Smokeless tobacco: Never Used   Substance Use Topics    Alcohol use: Yes     Comment: rarely      History reviewed. No pertinent family history. OBJECTIVE:     Vital Signs:       Visit Vitals  BP (!) 117/42   Pulse 68   Temp 97 °F (36.1 °C)   Resp 20   Ht 5' 5\" (1.651 m)   Wt 60.8 kg (134 lb 0.6 oz)   SpO2 99%   BMI 22.31 kg/m²      Temp (24hrs), Av.9 °F (36.1 °C), Min:93.7 °F (34.3 °C), Max:97.9 °F (36.6 °C)     Intake/Output:     Last shift: No intake/output data recorded. Last 3 shifts: 12/15 1901 -  0700  In: 7351 [P.O.:1160;  I.V.:450]  Out: 1775 [Urine:1775]          Intake/Output Summary (Last 24 hours) at 2021 0958  Last data filed at 2021 0453  Gross per 24 hour   Intake 600 ml   Output 950 ml   Net -350 ml       Physical Exam:                                        Exam Findings Other   General: No resp distress noted, appears stated age    [de-identified]:  No ulcers, JVD not elevated, no cervical LAD    Chest: No pectus deformity, normal chest rise b/l    HEART:  RRR, no murmurs/rubs/gallops    Lungs:  Bilateral crackles    ABD: Soft/NT, non rigid mildly distended    EXT: No cyanosis/clubbing/edema, normal peripheral pulses, tenderness w/ palpation    Skin: No rashes or ulcers, no mottling    Neuro: A/O x 3        Medications:  Current Facility-Administered Medications   Medication Dose Route Frequency    hydrALAZINE (APRESOLINE) tablet 50 mg  50 mg Oral TID    ondansetron (ZOFRAN) injection 4 mg  4 mg IntraVENous Q6H PRN    linezolid in dextrose 5% (ZYVOX) IVPB premix in D5W 600 mg  600 mg IntraVENous Q12H    isosorbide mononitrate ER (IMDUR) tablet 60 mg  60 mg Oral BID    cefepime (MAXIPIME) 2 g in sterile water (preservative free) 10 mL IV syringe  2 g IntraVENous Q24H    dextrose 20% infusion   IntraVENous CONTINUOUS    carvediloL (COREG) tablet 12.5 mg  12.5 mg Oral Q12H    insulin lispro (HUMALOG) injection   SubCUTAneous AC&HS    glucose chewable tablet 16 g  4 Tablet Oral PRN    dextrose (D50W) injection syrg 12.5-25 g  12.5-25 g IntraVENous PRN    glucagon (GLUCAGEN) injection 1 mg  1 mg IntraMUSCular PRN    mylanta/viscous lidocaine (GI COCKTAIL)  40 mL Oral Q6H PRN    aspirin delayed-release tablet 81 mg  81 mg Oral QHS    guaiFENesin ER (MUCINEX) tablet 600 mg  600 mg Oral Q12H    benzonatate (TESSALON) capsule 100 mg  100 mg Oral TID PRN    insulin glargine (LANTUS) injection 15 Units  15 Units SubCUTAneous PCD    levothyroxine (SYNTHROID) tablet 100 mcg  100 mcg Oral ACB    arformoterol 15 mcg/budesonide 0.5 mg neb solution   Nebulization BID RT    nitroglycerin (NITROSTAT) tablet 0.4 mg  0.4 mg SubLINGual Q5MIN PRN    clopidogreL (PLAVIX) tablet 75 mg  75 mg Oral DAILY    rosuvastatin (CRESTOR) tablet 20 mg  20 mg Oral QHS    magnesium oxide (MAG-OX) tablet 200 mg  200 mg Oral EVERY OTHER DAY    acetaminophen (TYLENOL) tablet 650 mg  650 mg Oral Q4H PRN    acetaminophen (TYLENOL) tablet 650 mg  650 mg Oral Q6H    sodium chloride (NS) flush 5-10 mL  5-10 mL IntraVENous PRN       Labs:  ABG No results for input(s): PHI, PCO2I, PO2I, HCO3I, SO2I, FIO2I in the last 72 hours.      CBC Recent Labs     12/17/21 0442 12/16/21  0345 12/15/21  0529   WBC 8.4 9.9 15.0*   HGB 9.3* 9.3* 9.2*   HCT 29.4* 29.2* 29.1*   * 119* 131*   MCV 92.7 93.0 94.2   MCH 29.3 29.6 83.7        Metabolic  Panel Recent Labs     12/17/21 0442 12/16/21  0345 12/15/21  0529   * 132* 133*   K 4.8 4.4 4.3   CL 95* 95* 96*   CO2 29 30 32   * 332* 71   BUN 88* 83* 85*   CREA 3.20* 3.05* 2.85*   CA 8.7 8.3* 8.7   MG 2.8* 2.6* 2.4   PHOS 4.1  --   --         Pertinent Labs                Ernesto Booker PA-C  12/17/2021

## 2021-12-18 NOTE — PROGRESS NOTES
3155: Upon reassessment of Pt, found to be hot, sweaty, and with delayed responses, lethargy, unable to lift arms, and some dysarthria, Code stroke called, This RN grabbing BG level. 1st B  2nd recheck: 33    25mg D50 given. Recheck B.  Per Ivar Read, NP D5 infusion at 75mL/hr with Q1 glucose checks x3    Q1 Glucose checks:    #1-129  #2-118  #3-111    Pt symptoms resolving, still slightly lethargic, at this time of night no complex carbs available-Kitchen closed- to feed patient, but patient is snacking on his own candy and chips    Pt HR noticed to be Sinus Chante Revel with increased time sustained in 40's, pt asymptomatic. BP is elevated, will give 0900 hydralazine early. 0730: Bedside and Verbal shift change report given to Yocasta Lomeli (oncoming nurse) by Colton Eugene (offgoing nurse). Report included the following information SBAR, Kardex, Intake/Output, MAR, Accordion and Recent Results.

## 2021-12-18 NOTE — PROGRESS NOTES
[facility] Adult  Hospitalist Group                                                                                          Hospitalist Progress Note  Luciana Monzon MD  Answering service: [phone] OR [phone] from in house phone        Date of Service:  2021  NAME:  Willow Current  :  1950  MRN:  [id number]      Admission Summary:     Patient with recent admission at [facility] and discharged with diagnosis of acute diastolic heart failure. Patient went home on 1 L of oxygen. Patient presented to heart failure clinic  and found to be hypoxic with increased work of breathing and subsequently sent to the emergency room for further evaluation. CT of the chest shows bilateral pulmonary infiltrates which was unchanged from his recent chest CT . Patient has seen pulmonology once outpatient for further evaluation and further ongoing investigation for that. Interval history / Subjective:   Hypoglycemic overnight to 30's, symptomatic. Code stroke initially called then canceled. Pt takes 18U lantus at home and A1c is around 9%. Does not have episodes of low BS at home. Likley poor PO intake in combination with kidney injury leading to hypoglycemia. Pt without complaints this am, feels much better. Assessment & Plan:     Acute on chronic hypoxic respiratory failure -  (1lNC baseline) likely CHF vs. CAP or other   -Pulmonology following  -s/p linezolid and cefepime started . Now on augmentin single agent. Will treat for total 7 days.   -Sputum cultures pending, not able to produce much    Acute on chronic diastolic congestive heart failure - NYHA class IV on admit  -Discontinued Norvasc due to leg edema, discontinued lisinopril due to BIPIN  -Previous echo at [facility] shows EF of 45 to 50%, repeat echo this admission shows EF 55 to 60%  -On hydralazine, Coreg.  Holding bumex due to recent LHC, also normal LVEDP on RHC  -Advanced heart failure team following    CKD stage IV  -Baseline serum creatinine 2.5-3 per nephrology  -Mild bump in creatinine due to diuresis  -Nephrology following, holding bumex as of now  -Monitor post LH, and fluids per renal     NSTEMI   -s/p heparin gtt   - [facility] 12/16 with ADARSH to RCA   - Contineu coreg, ASA, imdur, hydralazine, statin     Type 2 diabetes with hyperglycemia - A1c 9.3%  Hypoglycemia this am   -Hold lantus today, likely will DC home on 10U and have them titrate back to home dose based on BS. Will also DC home with glucagon injection   - DC D10 this PM given BS >200, and eating. Monitor closely. - POCT glucose checks, hypoglycemia protocol, and SSI     Hypertension-Continue hydralazine, coreg and nitrates  Hypotension - responded to albumin previously. Monitor   Dyslipidemia-Continue statin  Klebsiella UTI - s/p cefepime   Hypothyroidism-Continue Synthroid    Code status: Full  DVT prophylaxis: SCDs    Care Plan discussed with: Patient/Family  Anticipated Disposition: Home w/Family  Anticipated Discharge: Greater than 48 hours     Hospital Problems  Date Reviewed: 12/9/2021          Codes Class Noted POA    CHF (congestive heart failure) (HCC) ICD-10-CM: I50.9  ICD-9-CM: 428.0  12/9/2021 Unknown        Renal insufficiency ICD-10-CM: N28.9  ICD-9-CM: 593.9  Unknown Yes        Chronic obstructive pulmonary disease ([facility] ICD-10-CM: J44.9  ICD-9-CM: 529  Unknown Yes        Diabetes ([facility] ICD-10-CM: E11.9  ICD-9-CM: 250.00  Unknown Yes        Essential hypertension ICD-10-CM: I10  ICD-9-CM: 401.9  Unknown Yes        Coronary arteriosclerosis ICD-10-CM: I25.10  ICD-9-CM: 414.00  7/3/2018 Yes                Review of Systems:   A comprehensive review of systems was negative except for that written in the HPI. Vital Signs:    Last 24hrs VS reviewed since prior progress note.  Most recent are:  Visit Vitals  BP (!) 137/50   Pulse 76   Temp 98.1 °F (36.7 °C)   Resp 28   Ht 5' 5\" (1.651 m)   Wt 60.7 kg (133 lb 13.1 oz)   SpO2 94%   BMI 22.27 kg/m² Intake/Output Summary (Last 24 hours) at 12/18/2021 1605  Last data filed at 12/18/2021 5675  Gross per 24 hour   Intake 1498.75 ml   Output 750 ml   Net 748.75 ml        Physical Examination:     I had a face to face encounter with this patient and independently examined them on 12/18/2021 as outlined below:          Constitutional:  No acute distress, cooperative, pleasant, awake in chair    ENT:  Oral mucosa moist, oropharynx benign. Resp:  CTA bilaterally. No wheezing/rhonchi/rales. No accessory muscle use   CV:  Regular rhythm, normal rate, no murmurs, gallops, rubs    GI:  Soft, non distended, non tender. normoactive bowel sounds, no hepatosplenomegaly     Musculoskeletal:  No edema, warm, 2+ pulses throughout    Neurologic:  Moves all extremities. Hard of hearing. Answering questions appropriately             Data Review:    Review and/or order of clinical lab test  Review and/or order of tests in the radiology section of CPT  Review and/or order of tests in the medicine section of CPT      Labs:     Recent Labs     12/18/21 0411 12/17/21 0442   WBC 9.6 8.4   HGB 9.3* 9.3*   HCT 29.2* 29.4*   * 137*     Recent Labs     12/18/21 0411 12/17/21 0442 12/16/21  0345   * 130* 132*   K 3.8 4.8 4.4    95* 95*   CO2 27 29 30   BUN 84* 88* 83*   CREA 2.83* 3.20* 3.05*   * 334* 332*   CA 8.3* 8.7 8.3*   MG 2.8* 2.8* 2.6*   PHOS 2.8 4.1  --      No results for input(s): ALT, AP, TBIL, TBILI, TP, ALB, GLOB, GGT, AML, LPSE in the last 72 hours. No lab exists for component: SGOT, GPT, AMYP, HLPSE  Recent Labs     12/18/21 0411 12/17/21 0442 12/16/21  0345   APTT 28.2 27.1 30.6      No results for input(s): FE, TIBC, PSAT, FERR in the last 72 hours. No results found for: FOL, RBCF   No results for input(s): PH, PCO2, PO2 in the last 72 hours. No results for input(s): CPK, CKNDX, TROIQ in the last 72 hours.     No lab exists for component: CPKMB  Lab Results   Component Value Date/Time    Cholesterol, total 76 12/10/2021 02:59 PM    HDL Cholesterol 28 12/10/2021 02:59 PM    LDL, calculated 25.4 12/10/2021 02:59 PM    Triglyceride 113 12/10/2021 02:59 PM    CHOL/HDL Ratio 2.7 12/10/2021 02:59 PM     Lab Results   Component Value Date/Time    Glucose (POC) 205 (H) 12/18/2021 12:40 PM    Glucose (POC) 173 (H) 12/18/2021 08:32 AM    Glucose (POC) 111 12/18/2021 06:21 AM    Glucose (POC) 118 (H) 12/18/2021 06:04 AM    Glucose (POC) 129 (H) 12/18/2021 05:02 AM     Lab Results   Component Value Date/Time    Color YELLOW/STRAW 12/09/2021 05:26 AM    Appearance CLEAR 12/09/2021 05:26 AM    Specific gravity 1.008 12/09/2021 05:26 AM    pH (UA) 6.5 12/09/2021 05:26 AM    Protein 30 (A) 12/09/2021 05:26 AM    Glucose Negative 12/09/2021 05:26 AM    Ketone Negative 12/09/2021 05:26 AM    Bilirubin Negative 12/09/2021 05:26 AM    Urobilinogen 0.2 12/09/2021 05:26 AM    Nitrites Negative 12/09/2021 05:26 AM    Leukocyte Esterase SMALL (A) 12/09/2021 05:26 AM    Epithelial cells FEW 12/09/2021 05:26 AM    Bacteria 4+ (A) 12/09/2021 05:26 AM    WBC 20-50 12/09/2021 05:26 AM    RBC 0-5 12/09/2021 05:26 AM         Medications Reviewed:     Current Facility-Administered Medications   Medication Dose Route Frequency    [Held by provider] insulin glargine (LANTUS) injection 15 Units  15 Units SubCUTAneous PCD    alum-mag hydroxide-simeth (MYLANTA) oral suspension 30 mL  30 mL Oral Q4H PRN    cephALEXin (KEFLEX) capsule 250 mg  250 mg Oral Q8H    hydrALAZINE (APRESOLINE) tablet 50 mg  50 mg Oral TID    ondansetron (ZOFRAN) injection 4 mg  4 mg IntraVENous Q6H PRN    isosorbide mononitrate ER (IMDUR) tablet 60 mg  60 mg Oral BID    carvediloL (COREG) tablet 12.5 mg  12.5 mg Oral Q12H    insulin lispro (HUMALOG) injection   SubCUTAneous AC&HS    glucose chewable tablet 16 g  4 Tablet Oral PRN    dextrose (D50W) injection syrg 12.5-25 g  12.5-25 g IntraVENous PRN    glucagon (GLUCAGEN) injection 1 mg 1 mg IntraMUSCular PRN    mylanta/viscous lidocaine (GI COCKTAIL)  40 mL Oral Q6H PRN    aspirin delayed-release tablet 81 mg  81 mg Oral QHS    guaiFENesin ER (MUCINEX) tablet 600 mg  600 mg Oral Q12H    benzonatate (TESSALON) capsule 100 mg  100 mg Oral TID PRN    levothyroxine (SYNTHROID) tablet 100 mcg  100 mcg Oral ACB    arformoterol 15 mcg/budesonide 0.5 mg neb solution   Nebulization BID RT    nitroglycerin (NITROSTAT) tablet 0.4 mg  0.4 mg SubLINGual Q5MIN PRN    clopidogreL (PLAVIX) tablet 75 mg  75 mg Oral DAILY    rosuvastatin (CRESTOR) tablet 20 mg  20 mg Oral QHS    magnesium oxide (MAG-OX) tablet 200 mg  200 mg Oral EVERY OTHER DAY    acetaminophen (TYLENOL) tablet 650 mg  650 mg Oral Q4H PRN    acetaminophen (TYLENOL) tablet 650 mg  650 mg Oral Q6H    sodium chloride (NS) flush 5-10 mL  5-10 mL IntraVENous PRN     ______________________________________________________________________  EXPECTED LENGTH OF STAY: 5d 4h  ACTUAL LENGTH OF STAY:          9                 Adrián Hawk MD

## 2021-12-18 NOTE — PROGRESS NOTES
RENAL  PROGRESS NOTE        Subjective:   Code stroker called this morning-> notable +Hypoglycemia/ Bradycardia per RN  Placed on IV D5W      Objective:   VITALS SIGNS:    Visit Vitals  BP (!) 118/43   Pulse 65   Temp 97.6 °F (36.4 °C)   Resp 20   Ht 5' 5\" (1.651 m)   Wt 60.8 kg (134 lb 0.6 oz)   SpO2 95%   BMI 22.31 kg/m²       O2 Device: None (Room air)   O2 Flow Rate (L/min): 2 l/min   Temp (24hrs), Av.6 °F (36.4 °C), Min:97.3 °F (36.3 °C), Max:98.3 °F (36.8 °C)         PHYSICAL EXAM:  NAD  NC/AT  Improved edema    DATA REVIEW:     INTAKE / OUTPUT:   Last shift:      No intake/output data recorded. Last 3 shifts:  1901 -  0700  In: 2287.5 [P.O.:1600; I.V.:687.5]  Out: 1700 [Urine:1700]    Intake/Output Summary (Last 24 hours) at 2021 0722  Last data filed at 2021 7537  Gross per 24 hour   Intake 1987.5 ml   Output 1200 ml   Net 787.5 ml         LABS:   Recent Labs     21  0411 21  0442 21  0345   WBC 9.6 8.4 9.9   HGB 9.3* 9.3* 9.3*   HCT 29.2* 29.4* 29.2*   * 137* 119*     Recent Labs     21  0411 21  0442 21  0345   * 130* 132*   K 3.8 4.8 4.4    95* 95*   CO2 27 29 30   * 334* 332*   BUN 84* 88* 83*   CREA 2.83* 3.20* 3.05*   CA 8.3* 8.7 8.3*   MG 2.8* 2.8* 2.6*   PHOS 2.8 4.1  --           Assessment:  CKD stage 4: Mild bump in serum Cr post [facility] but now better today at 2.8mg/dl. Baseline serum Cr 2.5 to 3mg/dl-> 2 to DM/HTN. Followed by my partner Dr. Jordyn Mendoza.     Decompensated CHF: EF 45-50%     Chest pain: hx of 9 stents     DM2: Uncontrolled->Hyperglycemia-> now hypoglycemia     HTN: fluctuating control.     Proteinuria: 2.8gm proteinuria     COPD suspected: Possible BRAYDEN PNA.  Pulmonary following     Hx of urethral obstruction: s/p suprapubic catheter     Plan/Recommendations:  IV D5W->Change to D10 at lower rate of 25cc/hr  Wean as tolerated  Holding Bumex   Coreg likely needs to be decreased-> defer to Cardiology  Strict I/Os  Am labs    Discussed with RN    Mahsa Sahu MD   [address] [facility]

## 2021-12-18 NOTE — PROGRESS NOTES
Physician Progress Note      PATIENT:               Nahomy Bryan  CSN #:                  [id number]  :                       1950  ADMIT DATE:       2021 9:08 PM  [address] [place] DATE:  RESPONDING  PROVIDER #:        Ramonia Severance MD          QUERY TEXT:    Patient admitted with acute respiratory failure. Noted documentation of Type 2 MI d/t CHF in PN 12/15 and NSTEMI in PN  and . If possible, please document in progress notes and discharge summary if you are evaluating and /or treating any of the following: The medical record reflects the following:  Risk Factors: 70 y.o. male presents with a few days' duration of gradual onset, gradually worsening, severe, constant, shortness of breath that is associated with bilateral lower extremity pitting edema. C/O  Chest pain: hx of 9 stents, DM2, HTN, CHF, MI  Clinical Indicators: Chest pain, SOB, increased work of breathing, elevated troponin , NT pro BNP 5,421,  per ECHO EF 45-50%, per  PN 12/15 \"Patient with chest pain and elevated troponin, likely type II MI due to CHF and respiratory failure,\" per PN  and  \"NSTEMI\" L heart cath pending  Treatment: Heparin gtt, IV nitroglycerin, hydralazine, Coreg, ASA, imdur, statin, and on diuresis with Bumex, Cardiology consulted, Left Heart cath, ECHO,  Options provided:  -- NSTEMI confirmed and type 2 MI ruled out  -- type 2 MI confirmed and NSTEMI ruled out  -- NSTEMI and type 2 MI ruled out  -- Other - I will add my own diagnosis  -- Disagree - Not applicable / Not valid  -- Disagree - Clinically unable to determine / Unknown  -- Refer to Clinical Documentation Reviewer    PROVIDER RESPONSE TEXT:    After study, NSTEMI confirmed and type 2 MI ruled out. Query created by:  Candy Nam on 2021 6:47 PM      Electronically signed by:  Ramonia Severance MD 2021 7:36 AM

## 2021-12-19 NOTE — PROGRESS NOTES
1615: Bedside shift change report given to Johnny Roman (oncoming nurse) by Gregor Cadena (offgoing nurse). Report included the following information SBAR, Kardex, ED Summary, Intake/Output, MAR and Recent Results. 1730: Pt off tele and IV removed for discharge. I have reviewed discharge instructions with the patient and spouse. The patient and spouse verbalized understanding. Discharge medications reviewed with patient and spouse and appropriate educational materials and side effects teaching were provided. Current Discharge Medication List      START taking these medications    Details   glucagon (GLUCAGEN) 1 mg injection 1 mL by IntraMUSCular route as needed for Hypoglycemia. Qty: 1 Vial, Refills: 0  Start date: 12/19/2021         CONTINUE these medications which have CHANGED    Details   hydrALAZINE (APRESOLINE) 50 mg tablet Take 1 Tablet by mouth every eight (8) hours for 30 days. Qty: 270 Tablet, Refills: 1  Start date: 12/19/2021, End date: 1/18/2022      isosorbide mononitrate ER (IMDUR) 60 mg CR tablet Take 1 Tablet by mouth daily for 30 days. Qty: 30 Tablet, Refills: 0  Start date: 12/19/2021, End date: 1/18/2022         CONTINUE these medications which have NOT CHANGED    Details   anastrozole (ARIMIDEX) 1 mg tablet Take 0.5 mg by mouth every Monday, Wednesday, Friday. bumetanide (BUMEX) 1 mg tablet TAKE 1 TABLET BY MOUTH TWICE DAILY. REPLACES. LASIX  Qty: 180 Tablet, Refills: 0    Comments: **Patient requests 90 days supply**      magnesium oxide 250 mg magnesium tablet Take 250 mg by mouth. Every other day - OTC      prasterone, dhea, 50 mg tab 50 mg = 1 tab each dose, PO, daily, # 30 tab, 0 Refills      carvediloL (COREG) 6.25 mg tablet Take 1 Tablet by mouth every twelve (12) hours. Qty: 180 Tablet, Refills: 1      rosuvastatin (CRESTOR) 20 mg tablet TAKE 1 TABLET BY MOUTH EVERY NIGHT  Qty: 90 Tablet, Refills: 3    Comments: ZERO refills remain on this prescription.  Your patient is requesting advance approval of refills for this medication to PREVENT ANY MISSED DOSES      clopidogreL (PLAVIX) 75 mg tab TAKE 1 TABLET BY MOUTH EVERY DAY  Qty: 90 Tablet, Refills: 3    Comments: ZERO refills remain on this prescription. Your patient is requesting advance approval of refills for this medication to PREVENT ANY MISSED DOSES      Symbicort 80-4.5 mcg/actuation HFAA INHALE 2 PUFFS BY MOUTH TWICE DAILY IN THE MORNING AND IN THE EVENING      nitroglycerin (Nitrostat) 0.4 mg SL tablet 1 Tablet by SubLINGual route every five (5) minutes as needed for Chest Pain. Up to 3 doses. Qty: 25 Tablet, Refills: 1      albuterol (ProAir HFA) 90 mcg/actuation inhaler Take 2 Puffs by inhalation. 4-6 hrs as needed      BD Lali 2nd Gen Pen Needle 32 gauge x 5/32\" ndle USE WITH LANTUS      aspirin delayed-release 81 mg tablet Take 81 mg by mouth daily. At night      levothyroxine (SYNTHROID) 100 mcg tablet Take 100 mcg by mouth daily. Morning      testosterone (ANDROGEL) 1.62 % (20.25 mg/1.25 gram) glpk 20.25 mg daily. 2 to 3 pumps alternating in the morning.          STOP taking these medications       amLODIPine (NORVASC) 2.5 mg tablet Comments:   Reason for Stopping:         glimepiride (AMARYL) 4 mg tablet Comments:   Reason for Stopping:         insulin glargine (Lantus U-100 Insulin) 100 unit/mL injection Comments:   Reason for Stopping:

## 2021-12-19 NOTE — PROGRESS NOTES
0740: Bedside shift change report given to Yas MANDUJANO RN (oncoming nurse) by Nette Lizarraga RN (offgoing nurse). Report included the following information SBAR, Intake/Output, MAR, Recent Results, Cardiac Rhythm NSR and Alarm Parameters . Pt c/o chest pain, given nitroglycerin tab sublingual. BP extremely elevated    0840: scheduled BP and heart medications administered    0915: BP now low, MD notified.  Pt unsymptomatic    1105: Hospitalist at bedside

## 2021-12-19 NOTE — PROGRESS NOTES
TRANSITION OF CARE:  JOSIE spoke with nurse Sorenson and she noted that patient is tentatively being discharged later today. I called his wife and she will go home and ppick up her 's portable oxygen tank. The provider for home oxygen is [facility]. This was set up upon discharge last month when patient was at [facility]. Patient had been on 1 liter via nasal cannula. Wife has a schedule of her 's medications and spke with the hospitalist this am.  She also was given information on the 2nd IM letter and she gave a verbal ok regarding it. She has declined any home health at this time. She has a rollator at home for spouse along with a scale and wife is following up with Dr Mindy Mancilla office regarding a post hospital visit. Patient is due for his COVID booster shot,  Per wfe the MD told him to wait a few weeks to obtain it in the community.

## 2021-12-19 NOTE — PROGRESS NOTES
RENAL  PROGRESS NOTE        Subjective:   Patient wishes to go home. Bld sugars fluctuating some. No more N/V reported per patient      Objective:   VITALS SIGNS:    Visit Vitals  BP (!) 141/58   Pulse 74   Temp 97.4 °F (36.3 °C)   Resp 20   Ht 5' 5\" (1.651 m)   Wt 61.2 kg (134 lb 14.7 oz)   SpO2 99%   BMI 22.45 kg/m²       O2 Device: Nasal cannula   O2 Flow Rate (L/min): 2 l/min   Temp (24hrs), Av.5 °F (36.4 °C), Min:96.7 °F (35.9 °C), Max:98.5 °F (36.9 °C)         PHYSICAL EXAM:  NAD  NC/AT  Trace LE edema    DATA REVIEW:     INTAKE / OUTPUT:   Last shift:      701 - 1900  In: -   Out: 275 [Urine:725]  Last 3 shifts: 1901 - 700  In: 1302.5 [P.O.:960; I.V.:342.5]  Out: 950 [Urine:950]    Intake/Output Summary (Last 24 hours) at 2021 1337  Last data filed at 2021 1200  Gross per 24 hour   Intake 340 ml   Output 925 ml   Net -585 ml         LABS:   Recent Labs     21  0314 21  0411 21  0442   WBC 9.8 9.6 8.4   HGB 9.3* 9.3* 9.3*   HCT 28.7* 29.2* 29.4*   * 134* 137*     Recent Labs     21  0314 21  0411 21  0442    135* 130*   K 4.4 3.8 4.8    103 95*   CO2 28 27 29   GLU 90 181* 334*   BUN 72* 84* 88*   CREA 2.46* 2.83* 3.20*   CA 8.3* 8.3* 8.7   MG 3.0* 2.8* 2.8*   PHOS 2.5* 2.8 4.1          Assessment:  CKD stage 4: Mild bump in serum Cr post [facility] but now better today at 2.4mg/dl. Baseline serum Cr 2.5 to 3mg/dl-> 2 to DM/HTN. Followed by my partner Dr. Leatha Hensley.     Decompensated CHF: EF 45-50%     Chest pain: hx of 9 stents     DM2: Uncontrolled->Hyperglycemia-> now hypoglycemia     HTN: fluctuating control.     Proteinuria: 2.8gm proteinuria     COPD suspected: Possible BRAYDEN PNA.  Pulmonary following     Hx of urethral obstruction: s/p suprapubic catheter     Plan/Recommendations:  Stable for discharge from renal standpoint  Resume home regimen of Bumex 1mg BID  Follow up with Dr. Leatha Hensley in 2-3wks at our office  Coreg likely needs to be decreased-> defer to Cardiology  Avoid nephrotoxins    Discussed with patient and Dr. Saurabh Tadeo MD   [address] [facility]

## 2021-12-19 NOTE — DISCHARGE SUMMARY
Discharge Summary       PATIENT ID: Jamin Gurrola  MRN: [id number]   YOB: 1950    DATE OF ADMISSION: 12/8/2021  9:08 PM    DATE OF DISCHARGE: 12/19/2021   PRIMARY CARE PROVIDER: Kel Drake NP     DISCHARGING PROVIDER: Maryana Orona MD    To contact this individual call [phone] and ask the  to page. If unavailable ask to be transferred the Adult Hospitalist Department. CONSULTATIONS: IP CONSULT TO CARDIOLOGY  IP CONSULT TO PULMONOLOGY  IP CONSULT TO NEPHROLOGY  IP CONSULT TO UROLOGY    PROCEDURES/SURGERIES: Procedure(s):  LEFT AND RIGHT HEART CATH / CORONARY ANGIOGRAPHY  PERCUTANEOUS CORONARY INTERVENTION  INTRAVASCULAR ULTRASOUND    ADMITTING [address] [place] [address] COURSE:   Acute on chronic hypoxic respiratory failure - combination CHF and PNA  Acute on chronic diastolic CHF NYHA class IV on admit, class III on discharge  CKD stage IV  NSTEMI  Type 2 diabetes with hyper and hypoglycemia  Hypertension  Klebsiella UTI status post treatment    Per H and P   70y/o male with pmh of CKDIV, CHF,IDDM who presented with acute SOB. Patient with recent admission at [facility] and discharged with diagnosis of acute diastolic heart failure. Patient went home on 1 L of oxygen. Patient presented to heart failure clinic 12/8 and found to be hypoxic with increased work of breathing and subsequently sent to the emergency room for further evaluation. CT of the chest shows bilateral pulmonary infiltrates which was unchanged from his recent chest CT 11/30. Patient has seen pulmonology once outpatient for further evaluation and further ongoing investigation for that. Patient was admitted and closely monitored. Required increased oxygen from his baseline at 2 to 4 L. Procalcitonin was elevated, patient had a leukocytosis and was started on cefepime and linezolid. Pulmonology was consulted. Patient additionally had an elevated troponin on admission, cardiology had been following. Patient continued to have shortness of breath which was concerning for an anginal equivalent, and decision was made after multiple discussions with family regarding risk of BIPIN given his CKD to proceed with cardiac catheterization. Subsequently he had a left heart cath on 12/16 that resulted in a ADARSH to the RCA. Patient following that felt much better in terms of his breathing, his O2 was able to be weaned back to his home oxygen. Patient had a few hypoglycemia events when his long acting insulin was reintroduced following hyperglycemia events. His glargine was held, and will be held on discharge. On the night prior to discharge she had blood sugar readings in the 50s which he was symptomatic and required treatment. Patient was offered to stay and be monitored for an additional night to make sure he does not become hypoglycemic, however he refuses and would like to be discharged home. We will therefore discharge him on glucagon injection just in case he becomes hypoglycemic at home, and is unable to take p.o.    DISCHARGE DIAGNOSES / PLAN:      Acute on chronic hypoxic respiratory failure -  ([facility] baseline) likely CHF vs. CAP or other   -Pulmonology following  -s/p linezolid and cefepime started 12/14. Now on augmentin single agent. Will treat for total 7 days.   -Sputum cultures pending, not able to produce much     Acute on chronic diastolic congestive heart failure - NYHA class IV on admit  -Discontinued Norvasc due to leg edema, discontinued lisinopril due to BIPIN  -Previous echo at [facility] shows EF of 45 to 50%, repeat echo this admission shows EF 55 to 60%  -On hydralazine, Coreg.  Resume bumex on DC   -Advanced heart failure team following     CKD stage IV  -Baseline serum creatinine 2.5-3 per nephrology  -Mild bump in creatinine due to diuresis  -Nephrology following, holding bumex as of now  -Monitor post LHC, and fluids per renal      NSTEMI   -s/p heparin gtt   - [facility] 12/16 with ADARSH to RCA   - Luc coreg, ASA, imdur, hydralazine, statin      Type 2 diabetes with hyperglycemia - A1c 9.3%  Hypoglycemia this am   -Hold lantus today, likely will DC home on 10U and have them titrate back to home dose based on BS. Will also DC home with glucagon injection   - DC D10 this PM given BS >200, and eating. Monitor closely. - POCT glucose checks, hypoglycemia protocol, and SSI      Hypertension-Continue hydralazine, coreg and nitrates  Hypotension - responded to albumin previously. Monitor   Dyslipidemia-Continue statin  Klebsiella UTI - s/p cefepime   Hypothyroidism-Continue Synthroid     ADDITIONAL CARE RECOMMENDATIONS:   - Please take all medications as prescribed. Note changes as below. **Increase your imdur, and hydralazine. **Discontinue your amlodipine   **HOLD your insulin due to hypoglycemia, STOP glipizide  - Please make sure to follow up with your primary care physician within 1-2 weeks of discharge for hospital follow up. You should also follow up with nephrology, cardiology. - Please make sure to continue to monitor for: Chest pain, shortness of breath, high fevers, and return to the Emergency Department with any of these symptoms.   - Please get up slowly from a seated or laying position, avoid falls. - Avoid tobacco, alcohol and other illicit drug use.    - Diet restrictions: Diabetic, low salt, heart healthy  - Activity restrictions: None, resume prior   - Wound care: None          PENDING TEST RESULTS:   At the time of discharge the following test results are still pending: None    FOLLOW UP APPOINTMENTS:    Follow-up Information     Follow up With Specialties Details Why Contact Info    [address]  Call after discharge to discuss enrollment in OP program after stent [address] [phone]             DIET: Cardiac Diet and Diabetic Diet    ACTIVITY: Activity as tolerated    WOUND CARE: Monitor     EQUIPMENT needed: None      DISCHARGE MEDICATIONS:  Current Discharge Medication List      START taking these medications    Details   glucagon (GLUCAGEN) 1 mg injection 1 mL by IntraMUSCular route as needed for Hypoglycemia. Qty: 1 Vial, Refills: 0  Start date: 12/19/2021         CONTINUE these medications which have CHANGED    Details   hydrALAZINE (APRESOLINE) 50 mg tablet Take 1 Tablet by mouth every eight (8) hours for 30 days. Qty: 270 Tablet, Refills: 1  Start date: 12/19/2021, End date: 1/18/2022      isosorbide mononitrate ER (IMDUR) 60 mg CR tablet Take 1 Tablet by mouth daily for 30 days. Qty: 30 Tablet, Refills: 0  Start date: 12/19/2021, End date: 1/18/2022         CONTINUE these medications which have NOT CHANGED    Details   anastrozole (ARIMIDEX) 1 mg tablet Take 0.5 mg by mouth every Monday, Wednesday, Friday. bumetanide (BUMEX) 1 mg tablet TAKE 1 TABLET BY MOUTH TWICE DAILY. REPLACES. LASIX  Qty: 180 Tablet, Refills: 0    Comments: **Patient requests 90 days supply**      magnesium oxide 250 mg magnesium tablet Take 250 mg by mouth. Every other day - OTC      prasterone, dhea, 50 mg tab 50 mg = 1 tab each dose, PO, daily, # 30 tab, 0 Refills      carvediloL (COREG) 6.25 mg tablet Take 1 Tablet by mouth every twelve (12) hours. Qty: 180 Tablet, Refills: 1      rosuvastatin (CRESTOR) 20 mg tablet TAKE 1 TABLET BY MOUTH EVERY NIGHT  Qty: 90 Tablet, Refills: 3    Comments: ZERO refills remain on this prescription. Your patient is requesting advance approval of refills for this medication to [address]      clopidogreL (PLAVIX) 75 mg tab TAKE 1 TABLET BY MOUTH EVERY DAY  Qty: 90 Tablet, Refills: 3    Comments: ZERO refills remain on this prescription.  Your patient is requesting advance approval of refills for this medication to PREVENT ANY MISSED DOSES      Symbicort 80-4.5 mcg/actuation HFAA INHALE 2 PUFFS BY MOUTH TWICE DAILY IN THE MORNING AND IN THE EVENING      nitroglycerin (Nitrostat) 0.4 mg SL tablet 1 Tablet by SubLINGual route every five (5) minutes as needed for Chest Pain. Up to 3 doses. Qty: 25 Tablet, Refills: 1      albuterol (ProAir HFA) 90 mcg/actuation inhaler Take 2 Puffs by inhalation. 4-6 hrs as needed      BD Lali 2nd Gen Pen Needle 32 gauge x 5/32\" ndle USE WITH LANTUS      aspirin delayed-release 81 mg tablet Take 81 mg by mouth daily. At night      levothyroxine (SYNTHROID) 100 mcg tablet Take 100 mcg by mouth daily. Morning      testosterone (ANDROGEL) 1.62 % (20.25 mg/1.25 gram) glpk 20.25 mg daily. 2 to 3 pumps alternating in the morning. STOP taking these medications       amLODIPine (NORVASC) 2.5 mg tablet Comments:   Reason for Stopping:         glimepiride (AMARYL) 4 mg tablet Comments:   Reason for Stopping:         insulin glargine (Lantus U-100 Insulin) 100 unit/mL injection Comments:   Reason for Stopping:                 NOTIFY YOUR PHYSICIAN FOR ANY OF THE FOLLOWING:   Fever over 101 degrees for 24 hours. Chest pain, shortness of breath, fever, chills, nausea, vomiting, diarrhea, change in mentation, falling, weakness, bleeding. Severe pain or pain not relieved by medications. Or, any other signs or symptoms that you may have questions about.     DISPOSITION:  X  Home With:   OT  PT  HH  RN       Long term SNF/Inpatient Rehab    Independent/assisted living    Hospice    Other:       PATIENT CONDITION AT DISCHARGE:     Functional status   X Poor     Deconditioned     Independent      Cognition   X  Lucid     Forgetful     Dementia      Catheters/lines (plus indication)    Ruelas     PICC     PEG    X None      Code status   X  Full code     DNR      PHYSICAL EXAMINATION AT DISCHARGE:  Visit Vitals  BP (!) 141/58   Pulse 80   Temp 97.4 °F (36.3 °C)   Resp 20   Ht 5' 5\" (1.651 m)   Wt 61.2 kg (134 lb 14.7 oz)   SpO2 99%   BMI 22.45 kg/m²     Gen: NAD, awake in chair, appears older than stated age   HEENT: NC/AT, sclera anicteric, PERRL, EOMI  CV: RRR no m/r/g, normal S1 and S2, no pedal edema   Resp: CTA b/l no increased work of breathing, no wheezing or rhonchi, speaking in full sentences   Abd: NT/ND, normal bowel sounds, no rebound or guarding  Ext: 2+ pulses, no edema  : suprapubic in place   Skin: No rashes or lesions      CHRONIC MEDICAL DIAGNOSES:  Problem List as of 12/19/2021 Date Reviewed: 12/9/2021          Codes Class Noted - Resolved    CHF (congestive heart failure) ([facility] ICD-10-CM: I50.9  ICD-9-CM: 428.0  12/9/2021 - Present        Chronic combined systolic and diastolic congestive heart failure ([facility] ICD-10-CM: I50.42  ICD-9-CM: 428.42, 428.0  11/14/2021 - Present        Renal insufficiency ICD-10-CM: N28.9  ICD-9-CM: 593.9  Unknown - Present        Chronic obstructive pulmonary disease ([facility] ICD-10-CM: J44.9  ICD-9-CM: 496  Unknown - Present        Diabetes ([facility] ICD-10-CM: E11.9  ICD-9-CM: 250.00  Unknown - Present        Essential hypertension ICD-10-CM: I10  ICD-9-CM: 401.9  Unknown - Present        Aortic regurgitation ICD-10-CM: I35.1  ICD-9-CM: 424.1  Unknown - Present        MI (myocardial infarction) ([facility] ICD-10-CM: I21.9  ICD-9-CM: 410.90  Unknown - Present        Coronary arteriosclerosis ICD-10-CM: I25.10  ICD-9-CM: 414.00  7/3/2018 - Present        RESOLVED: Stage 4 chronic kidney disease ([facility] ICD-10-CM: N18.4  ICD-9-CM: 585.4  1/15/2019 - 1/28/2021              Greater than 30 minutes were spent with the patient on counseling and coordination of care    Signed:   Mariusz Moreland MD  12/19/2021  2:32 PM

## 2021-12-19 NOTE — DISCHARGE INSTRUCTIONS
Dear Fco Arndt,    Thank you for choosing [address] [place] [address] for your healthcare needs. We strive to provide EXCELLENT care to you and your family. In an effort to explain clearly why you were here in the hospital, I've also written a very brief summary below. Other details including formal diagnosis, medication changes, and follow up appointment recommendations can also be found in this packet. You were admitted for shortness of breath due to likely NSTEMI (heart attack) and pulmonary edema  for which you were started on antibiotics, and underwent left heart catheterization and stent placement. You also received care from specialist physicians in the following specialties:  [address] TO PULMONOLOGY  IP CONSULT TO NEPHROLOGY  IP CONSULT TO UROLOGY    Here are the updates to your medication list:  **Increase your imdur, and hydralazine. **Discontinue your amlodipine   **HOLD your insulin due to hypoglycemia, STOP glipizide    Remember that it is important for you to take your medications exactly as they are prescribed. It is helpful to keep a list of your medication with the names, dosages, and times to be taken in your wallet. Additionally,   - Please make sure to follow up with your primary care physician within 1-2 weeks of discharge for hospital follow up. You should also follow up with nephrology, cardiology. - Please make sure to continue to monitor for: Chest pain, shortness of breath, high fevers, and return to the Emergency Department with any of these symptoms.   - Please get up slowly from a seated or laying position, avoid falls. - Avoid tobacco, alcohol and other illicit drug use. - Diet restrictions: Diabetic, low salt, heart healthy  - Activity restrictions: None, resume prior   - Wound care: None    Make sure to also see your primary care doctor for follow-up.   Bring these papers with you and be sure to review your medication list with your doctor. I cannot stress the importance of follow up enough. I've included the information for your follow-up appointments below: Follow-up Information     Follow up With Specialties Details Why Contact Info    [address]  Call after discharge to discuss enrollment in OP program after stent [address] [phone]    Eulalio Carreno NP Nurse Practitioner In 1 week  [address]  [phone]      Dixon Jimenez MD Nephrology In 2 weeks For hospital follow up  [facility]  [address]  [facility]      Taras Silverio MD Cardiology In 2 weeks For hospital follow up.  [facility]  [address] [facility]  [phone]            Should you have any fever over 101 degrees for 24 hours, chest pain, shortness of breath, fever, chills, nausea, vomiting, diarrhea, change in mentation, falling, weakness, bleeding, or worsening pain, please seek medical attention immediately. Finally, as your discharging physician, you may be receiving a survey regarding my care. I would greatly value and appreciate your input in the survey as we strive for excellence. If you have any questions, I can be reached at [phone].   Thank you so much again for allowing me to care for you at [facility].    Respectfully yours,  Jen Zhao MD

## 2021-12-19 NOTE — PROGRESS NOTES
1930: Bedside and Verbal shift change report given to Trang Kebede RN (oncoming nurse) by Tomy Ballesteros RN  (offgoing nurse). Report included the following information SBAR, Intake/Output, MAR, Recent Results, Med Rec Status and Cardiac Rhythm NSR.     2119: patient refused evening medications due to nausea and vomiting at 1900. Stated he would attempt to take pills at a later time. 2320: patient stated that he felt as though his blood sugar was low and that he was not feeling well. BG 53, rechecked to confirm, BG 57. Glucose tabs were given. Patient ate one tablet, BG 62. Patient ate remaining 3 tablets and BG came up to 108.     0055: patient reported chest tightness with pain 5/10, requested nitro tab. /53. Education was offered on medications since evening medications were not taken. Patient did not feel as though he would be able to take pills. 1 SL nitro tab given. Pain relieved to 2/10.

## 2021-12-21 NOTE — TELEPHONE ENCOUNTER
HEART FAILURE NURSE NAVIGATOR POST DISCHARGE FOLLOW UP PHONE CALL     HF NN contacted patient by telephone to perform post hospital discharge follow up call. Spoke with Mr and Mrs Monica Escobar. They stated that Mr. Monica Escobar is doing much better after getting home. Mrs. Mcfarland states that he is able to eat better and sleep better at home. Mrs. Mcfarland reports that they have follow up appointments scheduled with [facility]; pulmonology; and, urology. No dates of appointment were reported to this HF NN.     Mr. Monica Escobar expressed appreciation for the care he received at [facility] and further states that the \"nurses were super terrific\".

## 2022-01-01 ENCOUNTER — APPOINTMENT (OUTPATIENT)
Dept: CT IMAGING | Age: 72
DRG: 286 | End: 2022-01-01
Attending: NURSE PRACTITIONER
Payer: MEDICARE

## 2022-01-01 ENCOUNTER — APPOINTMENT (OUTPATIENT)
Dept: ULTRASOUND IMAGING | Age: 72
DRG: 286 | End: 2022-01-01
Attending: INTERNAL MEDICINE
Payer: MEDICARE

## 2022-01-01 ENCOUNTER — APPOINTMENT (OUTPATIENT)
Dept: GENERAL RADIOLOGY | Age: 72
DRG: 286 | End: 2022-01-01
Attending: STUDENT IN AN ORGANIZED HEALTH CARE EDUCATION/TRAINING PROGRAM
Payer: MEDICARE

## 2022-01-01 ENCOUNTER — APPOINTMENT (OUTPATIENT)
Dept: NON INVASIVE DIAGNOSTICS | Age: 72
DRG: 286 | End: 2022-01-01
Attending: NURSE PRACTITIONER
Payer: MEDICARE

## 2022-01-01 ENCOUNTER — HOSPITAL ENCOUNTER (INPATIENT)
Age: 72
LOS: 8 days | DRG: 286 | End: 2022-03-12
Attending: STUDENT IN AN ORGANIZED HEALTH CARE EDUCATION/TRAINING PROGRAM | Admitting: INTERNAL MEDICINE
Payer: MEDICARE

## 2022-01-01 ENCOUNTER — APPOINTMENT (OUTPATIENT)
Dept: NUCLEAR MEDICINE | Age: 72
DRG: 286 | End: 2022-01-01
Attending: INTERNAL MEDICINE
Payer: MEDICARE

## 2022-01-01 ENCOUNTER — VIRTUAL VISIT (OUTPATIENT)
Dept: CARDIOLOGY CLINIC | Age: 72
End: 2022-01-01
Payer: MEDICARE

## 2022-01-01 ENCOUNTER — TRANSCRIBE ORDER (OUTPATIENT)
Dept: SCHEDULING | Age: 72
End: 2022-01-01

## 2022-01-01 ENCOUNTER — APPOINTMENT (OUTPATIENT)
Dept: CT IMAGING | Age: 72
DRG: 286 | End: 2022-01-01
Attending: STUDENT IN AN ORGANIZED HEALTH CARE EDUCATION/TRAINING PROGRAM
Payer: MEDICARE

## 2022-01-01 VITALS
WEIGHT: 115.5 LBS | HEART RATE: 22 BPM | HEIGHT: 65 IN | DIASTOLIC BLOOD PRESSURE: 57 MMHG | BODY MASS INDEX: 19.24 KG/M2 | OXYGEN SATURATION: 100 % | SYSTOLIC BLOOD PRESSURE: 112 MMHG | TEMPERATURE: 97.5 F

## 2022-01-01 DIAGNOSIS — N28.9 RENAL INSUFFICIENCY: ICD-10-CM

## 2022-01-01 DIAGNOSIS — I10 ESSENTIAL HYPERTENSION: ICD-10-CM

## 2022-01-01 DIAGNOSIS — J84.9 ILD (INTERSTITIAL LUNG DISEASE) (HCC): Primary | ICD-10-CM

## 2022-01-01 DIAGNOSIS — R07.9 CHEST PAIN, UNSPECIFIED TYPE: ICD-10-CM

## 2022-01-01 DIAGNOSIS — I50.42 CHRONIC COMBINED SYSTOLIC AND DIASTOLIC CONGESTIVE HEART FAILURE (HCC): Primary | ICD-10-CM

## 2022-01-01 DIAGNOSIS — J44.9 CHRONIC OBSTRUCTIVE PULMONARY DISEASE, UNSPECIFIED COPD TYPE (HCC): ICD-10-CM

## 2022-01-01 DIAGNOSIS — I50.9 ACUTE ON CHRONIC CONGESTIVE HEART FAILURE, UNSPECIFIED HEART FAILURE TYPE (HCC): ICD-10-CM

## 2022-01-01 DIAGNOSIS — J44.1 COPD EXACERBATION (HCC): ICD-10-CM

## 2022-01-01 DIAGNOSIS — J96.01 ACUTE RESPIRATORY FAILURE WITH HYPOXIA AND HYPERCAPNIA (HCC): Primary | ICD-10-CM

## 2022-01-01 DIAGNOSIS — I21.4 NSTEMI (NON-ST ELEVATED MYOCARDIAL INFARCTION) (HCC): ICD-10-CM

## 2022-01-01 DIAGNOSIS — J96.02 ACUTE RESPIRATORY FAILURE WITH HYPOXIA AND HYPERCAPNIA (HCC): Primary | ICD-10-CM

## 2022-01-01 LAB
1,3 BETA GLUCAN SER-MCNC: 54 PG/ML
ABO + RH BLD: NORMAL
ACT BLD: 303 SECS (ref 79–138)
ALBUMIN SERPL-MCNC: 2.7 G/DL (ref 3.5–5)
ALBUMIN SERPL-MCNC: 3.6 G/DL (ref 3.5–5)
ALBUMIN/GLOB SERPL: 0.6 {RATIO} (ref 1.1–2.2)
ALBUMIN/GLOB SERPL: 1.1 {RATIO} (ref 1.1–2.2)
ALP SERPL-CCNC: 131 U/L (ref 45–117)
ALP SERPL-CCNC: 86 U/L (ref 45–117)
ALT SERPL-CCNC: 27 U/L (ref 12–78)
ALT SERPL-CCNC: 34 U/L (ref 12–78)
ANION GAP SERPL CALC-SCNC: 1 MMOL/L (ref 5–15)
ANION GAP SERPL CALC-SCNC: 2 MMOL/L (ref 5–15)
ANION GAP SERPL CALC-SCNC: 4 MMOL/L (ref 5–15)
ANION GAP SERPL CALC-SCNC: 4 MMOL/L (ref 5–15)
ANION GAP SERPL CALC-SCNC: 5 MMOL/L (ref 5–15)
ANION GAP SERPL CALC-SCNC: 5 MMOL/L (ref 5–15)
ANION GAP SERPL CALC-SCNC: 6 MMOL/L (ref 5–15)
ANION GAP SERPL CALC-SCNC: ABNORMAL MMOL/L (ref 5–15)
APTT PPP: 26.2 SEC (ref 22.1–31)
APTT PPP: 27.8 SEC (ref 22.1–31)
APTT PPP: 28.5 SEC (ref 22.1–31)
APTT PPP: 52.8 SEC (ref 22.1–31)
APTT PPP: 89.6 SEC (ref 22.1–31)
ARTERIAL PATENCY WRIST A: ABNORMAL
ARTERIAL PATENCY WRIST A: ABNORMAL
ARTERIAL PATENCY WRIST A: YES
ARTERIAL PATENCY WRIST A: YES
AST SERPL-CCNC: 25 U/L (ref 15–37)
AST SERPL-CCNC: 63 U/L (ref 15–37)
ATRIAL RATE: 75 BPM
ATRIAL RATE: 81 BPM
ATRIAL RATE: 97 BPM
ATRIAL RATE: 98 BPM
BASE EXCESS BLD CALC-SCNC: 13.1 MMOL/L
BASE EXCESS BLD CALC-SCNC: 8.7 MMOL/L
BASE EXCESS BLDA CALC-SCNC: 3.8 MMOL/L
BASE EXCESS BLDA CALC-SCNC: 4.4 MMOL/L
BASE EXCESS BLDA CALC-SCNC: 7.8 MMOL/L
BASE EXCESS BLDA CALC-SCNC: 9.5 MMOL/L
BASOPHILS # BLD: 0 K/UL (ref 0–0.1)
BASOPHILS # BLD: 0.1 K/UL (ref 0–0.1)
BASOPHILS NFR BLD: 0 % (ref 0–1)
BASOPHILS NFR BLD: 1 % (ref 0–1)
BDY SITE: ABNORMAL
BILIRUB SERPL-MCNC: 0.2 MG/DL (ref 0.2–1)
BILIRUB SERPL-MCNC: 0.3 MG/DL (ref 0.2–1)
BLOOD GROUP ANTIBODIES SERPL: NORMAL
BNP SERPL-MCNC: 2103 PG/ML
BNP SERPL-MCNC: ABNORMAL PG/ML
BREATHS.SPONTANEOUS ON VENT: 24
BREATHS.SPONTANEOUS ON VENT: 26
BUN SERPL-MCNC: 103 MG/DL (ref 6–20)
BUN SERPL-MCNC: 104 MG/DL (ref 6–20)
BUN SERPL-MCNC: 113 MG/DL (ref 6–20)
BUN SERPL-MCNC: 50 MG/DL (ref 6–20)
BUN SERPL-MCNC: 57 MG/DL (ref 6–20)
BUN SERPL-MCNC: 70 MG/DL (ref 6–20)
BUN SERPL-MCNC: 75 MG/DL (ref 6–20)
BUN SERPL-MCNC: 79 MG/DL (ref 6–20)
BUN SERPL-MCNC: 93 MG/DL (ref 6–20)
BUN SERPL-MCNC: 97 MG/DL (ref 6–20)
BUN/CREAT SERPL: 20 (ref 12–20)
BUN/CREAT SERPL: 23 (ref 12–20)
BUN/CREAT SERPL: 24 (ref 12–20)
BUN/CREAT SERPL: 26 (ref 12–20)
BUN/CREAT SERPL: 27 (ref 12–20)
BUN/CREAT SERPL: 35 (ref 12–20)
BUN/CREAT SERPL: 40 (ref 12–20)
BUN/CREAT SERPL: 45 (ref 12–20)
BUN/CREAT SERPL: 46 (ref 12–20)
BUN/CREAT SERPL: 49 (ref 12–20)
CA-I BLD-MCNC: 1.18 MMOL/L (ref 1.12–1.32)
CALCIUM SERPL-MCNC: 8 MG/DL (ref 8.5–10.1)
CALCIUM SERPL-MCNC: 8.1 MG/DL (ref 8.5–10.1)
CALCIUM SERPL-MCNC: 8.2 MG/DL (ref 8.5–10.1)
CALCIUM SERPL-MCNC: 8.2 MG/DL (ref 8.5–10.1)
CALCIUM SERPL-MCNC: 8.4 MG/DL (ref 8.5–10.1)
CALCIUM SERPL-MCNC: 8.4 MG/DL (ref 8.5–10.1)
CALCIUM SERPL-MCNC: 8.5 MG/DL (ref 8.5–10.1)
CALCIUM SERPL-MCNC: 8.6 MG/DL (ref 8.5–10.1)
CALCIUM SERPL-MCNC: 8.6 MG/DL (ref 8.5–10.1)
CALCIUM SERPL-MCNC: 8.7 MG/DL (ref 8.5–10.1)
CALCULATED P AXIS, ECG09: 14 DEGREES
CALCULATED P AXIS, ECG09: 23 DEGREES
CALCULATED P AXIS, ECG09: 34 DEGREES
CALCULATED P AXIS, ECG09: 40 DEGREES
CALCULATED R AXIS, ECG10: -29 DEGREES
CALCULATED R AXIS, ECG10: -31 DEGREES
CALCULATED R AXIS, ECG10: -33 DEGREES
CALCULATED R AXIS, ECG10: -33 DEGREES
CALCULATED T AXIS, ECG11: 110 DEGREES
CALCULATED T AXIS, ECG11: 124 DEGREES
CALCULATED T AXIS, ECG11: 125 DEGREES
CALCULATED T AXIS, ECG11: 135 DEGREES
CHLORIDE BLD-SCNC: 89 MMOL/L (ref 100–108)
CHLORIDE SERPL-SCNC: 100 MMOL/L (ref 97–108)
CHLORIDE SERPL-SCNC: 103 MMOL/L (ref 97–108)
CHLORIDE SERPL-SCNC: 103 MMOL/L (ref 97–108)
CHLORIDE SERPL-SCNC: 104 MMOL/L (ref 97–108)
CHLORIDE SERPL-SCNC: 93 MMOL/L (ref 97–108)
CHLORIDE SERPL-SCNC: 94 MMOL/L (ref 97–108)
CHLORIDE SERPL-SCNC: 95 MMOL/L (ref 97–108)
CHLORIDE SERPL-SCNC: 98 MMOL/L (ref 97–108)
CO2 BLD-SCNC: 44 MMOL/L (ref 19–24)
CO2 SERPL-SCNC: 34 MMOL/L (ref 21–32)
CO2 SERPL-SCNC: 35 MMOL/L (ref 21–32)
CO2 SERPL-SCNC: 36 MMOL/L (ref 21–32)
CO2 SERPL-SCNC: 37 MMOL/L (ref 21–32)
CO2 SERPL-SCNC: 38 MMOL/L (ref 21–32)
CO2 SERPL-SCNC: 38 MMOL/L (ref 21–32)
CO2 SERPL-SCNC: 41 MMOL/L (ref 21–32)
CO2 SERPL-SCNC: 43 MMOL/L (ref 21–32)
CO2 SERPL-SCNC: 44 MMOL/L (ref 21–32)
CO2 SERPL-SCNC: >45 MMOL/L (ref 21–32)
COMMENT, HOLDF: NORMAL
COVID-19 RAPID TEST, COVR: NOT DETECTED
CREAT SERPL-MCNC: 2.25 MG/DL (ref 0.7–1.3)
CREAT SERPL-MCNC: 2.3 MG/DL (ref 0.7–1.3)
CREAT SERPL-MCNC: 2.31 MG/DL (ref 0.7–1.3)
CREAT SERPL-MCNC: 2.41 MG/DL (ref 0.7–1.3)
CREAT SERPL-MCNC: 2.44 MG/DL (ref 0.7–1.3)
CREAT SERPL-MCNC: 2.48 MG/DL (ref 0.7–1.3)
CREAT SERPL-MCNC: 2.65 MG/DL (ref 0.7–1.3)
CREAT SERPL-MCNC: 2.9 MG/DL (ref 0.7–1.3)
CREAT SERPL-MCNC: 2.91 MG/DL (ref 0.7–1.3)
CREAT SERPL-MCNC: 2.92 MG/DL (ref 0.7–1.3)
CREAT UR-MCNC: 2.9 MG/DL (ref 0.6–1.3)
D DIMER PPP FEU-MCNC: 0.69 MG/L FEU (ref 0–0.65)
D DIMER PPP FEU-MCNC: 1.17 MG/L FEU (ref 0–0.65)
DIAGNOSIS, 93000: NORMAL
DIFFERENTIAL METHOD BLD: ABNORMAL
ECHO LA DIAMETER INDEX: 2.5 CM/M2
ECHO LA DIAMETER: 4 CM
ECHO LV EDV A4C: 102 ML
ECHO LV EDV INDEX A4C: 64 ML/M2
ECHO LV EJECTION FRACTION A4C: 51 %
ECHO LV ESV A4C: 50 ML
ECHO LV ESV INDEX A4C: 31 ML/M2
ECHO LV FRACTIONAL SHORTENING: 5 % (ref 28–44)
ECHO LV INTERNAL DIMENSION DIASTOLE INDEX: 2.63 CM/M2
ECHO LV INTERNAL DIMENSION DIASTOLIC: 4.2 CM (ref 4.2–5.9)
ECHO LV INTERNAL DIMENSION SYSTOLIC INDEX: 2.5 CM/M2
ECHO LV INTERNAL DIMENSION SYSTOLIC: 4 CM
ECHO LV IVSD: 1.1 CM (ref 0.6–1)
ECHO LV MASS 2D: 200.6 G (ref 88–224)
ECHO LV MASS INDEX 2D: 125.4 G/M2 (ref 49–115)
ECHO LV POSTERIOR WALL DIASTOLIC: 1.5 CM (ref 0.6–1)
ECHO LV RELATIVE WALL THICKNESS RATIO: 0.71
ECHO LVOT AREA: 3.1 CM2
ECHO LVOT DIAM: 2 CM
ECHO TV REGURGITANT MAX VELOCITY: 3.37 M/S
ECHO TV REGURGITANT PEAK GRADIENT: 45 MMHG
EOSINOPHIL # BLD: 0 K/UL (ref 0–0.4)
EOSINOPHIL # BLD: 0.6 K/UL (ref 0–0.4)
EOSINOPHIL NFR BLD: 0 % (ref 0–7)
EOSINOPHIL NFR BLD: 6 % (ref 0–7)
ERYTHROCYTE [DISTWIDTH] IN BLOOD BY AUTOMATED COUNT: 12.9 % (ref 11.5–14.5)
ERYTHROCYTE [DISTWIDTH] IN BLOOD BY AUTOMATED COUNT: 13 % (ref 11.5–14.5)
ERYTHROCYTE [DISTWIDTH] IN BLOOD BY AUTOMATED COUNT: 13.2 % (ref 11.5–14.5)
ERYTHROCYTE [DISTWIDTH] IN BLOOD BY AUTOMATED COUNT: 13.2 % (ref 11.5–14.5)
ERYTHROCYTE [DISTWIDTH] IN BLOOD BY AUTOMATED COUNT: 13.4 % (ref 11.5–14.5)
ERYTHROCYTE [DISTWIDTH] IN BLOOD BY AUTOMATED COUNT: 13.6 % (ref 11.5–14.5)
ERYTHROCYTE [DISTWIDTH] IN BLOOD BY AUTOMATED COUNT: 13.7 % (ref 11.5–14.5)
ERYTHROCYTE [DISTWIDTH] IN BLOOD BY AUTOMATED COUNT: 13.8 % (ref 11.5–14.5)
ERYTHROCYTE [DISTWIDTH] IN BLOOD BY AUTOMATED COUNT: 14 % (ref 11.5–14.5)
FIO2 ON VENT: 40 %
FIO2 ON VENT: 40 %
FIO2 ON VENT: 50 %
FLUID CULTURE, SPNG2: NORMAL
GAS FLOW.O2 SETTING OXYMISER: 20 L/MIN
GAS FLOW.O2 SETTING OXYMISER: 24 L/MIN
GAS FLOW.O2 SETTING OXYMISER: 24 L/MIN
GAS FLOW.O2 SETTING OXYMISER: 4 L/MIN
GLOBULIN SER CALC-MCNC: 3.3 G/DL (ref 2–4)
GLOBULIN SER CALC-MCNC: 4.4 G/DL (ref 2–4)
GLUCOSE BLD STRIP.AUTO-MCNC: 100 MG/DL (ref 65–117)
GLUCOSE BLD STRIP.AUTO-MCNC: 104 MG/DL (ref 65–117)
GLUCOSE BLD STRIP.AUTO-MCNC: 105 MG/DL (ref 65–117)
GLUCOSE BLD STRIP.AUTO-MCNC: 122 MG/DL (ref 65–117)
GLUCOSE BLD STRIP.AUTO-MCNC: 125 MG/DL (ref 65–117)
GLUCOSE BLD STRIP.AUTO-MCNC: 125 MG/DL (ref 65–117)
GLUCOSE BLD STRIP.AUTO-MCNC: 128 MG/DL (ref 65–117)
GLUCOSE BLD STRIP.AUTO-MCNC: 129 MG/DL (ref 65–117)
GLUCOSE BLD STRIP.AUTO-MCNC: 131 MG/DL (ref 65–117)
GLUCOSE BLD STRIP.AUTO-MCNC: 138 MG/DL (ref 65–117)
GLUCOSE BLD STRIP.AUTO-MCNC: 149 MG/DL (ref 65–117)
GLUCOSE BLD STRIP.AUTO-MCNC: 155 MG/DL (ref 65–117)
GLUCOSE BLD STRIP.AUTO-MCNC: 164 MG/DL (ref 65–117)
GLUCOSE BLD STRIP.AUTO-MCNC: 170 MG/DL (ref 65–117)
GLUCOSE BLD STRIP.AUTO-MCNC: 177 MG/DL (ref 65–117)
GLUCOSE BLD STRIP.AUTO-MCNC: 178 MG/DL (ref 65–117)
GLUCOSE BLD STRIP.AUTO-MCNC: 187 MG/DL (ref 65–117)
GLUCOSE BLD STRIP.AUTO-MCNC: 191 MG/DL (ref 65–117)
GLUCOSE BLD STRIP.AUTO-MCNC: 196 MG/DL (ref 65–117)
GLUCOSE BLD STRIP.AUTO-MCNC: 198 MG/DL (ref 65–117)
GLUCOSE BLD STRIP.AUTO-MCNC: 201 MG/DL (ref 65–117)
GLUCOSE BLD STRIP.AUTO-MCNC: 210 MG/DL (ref 65–117)
GLUCOSE BLD STRIP.AUTO-MCNC: 222 MG/DL (ref 65–117)
GLUCOSE BLD STRIP.AUTO-MCNC: 235 MG/DL (ref 65–117)
GLUCOSE BLD STRIP.AUTO-MCNC: 237 MG/DL (ref 65–117)
GLUCOSE BLD STRIP.AUTO-MCNC: 243 MG/DL (ref 74–106)
GLUCOSE BLD STRIP.AUTO-MCNC: 246 MG/DL (ref 65–117)
GLUCOSE BLD STRIP.AUTO-MCNC: 266 MG/DL (ref 65–117)
GLUCOSE BLD STRIP.AUTO-MCNC: 307 MG/DL (ref 65–117)
GLUCOSE BLD STRIP.AUTO-MCNC: 52 MG/DL (ref 65–117)
GLUCOSE BLD STRIP.AUTO-MCNC: 55 MG/DL (ref 65–117)
GLUCOSE BLD STRIP.AUTO-MCNC: 64 MG/DL (ref 65–117)
GLUCOSE BLD STRIP.AUTO-MCNC: 70 MG/DL (ref 65–117)
GLUCOSE BLD STRIP.AUTO-MCNC: 71 MG/DL (ref 65–117)
GLUCOSE BLD STRIP.AUTO-MCNC: 82 MG/DL (ref 65–117)
GLUCOSE BLD STRIP.AUTO-MCNC: 84 MG/DL (ref 65–117)
GLUCOSE BLD STRIP.AUTO-MCNC: 92 MG/DL (ref 65–117)
GLUCOSE BLD STRIP.AUTO-MCNC: NORMAL MG/DL (ref 65–117)
GLUCOSE BLD-MCNC: 164 MG/DL (ref 65–100)
GLUCOSE SERPL-MCNC: 113 MG/DL (ref 65–100)
GLUCOSE SERPL-MCNC: 126 MG/DL (ref 65–100)
GLUCOSE SERPL-MCNC: 144 MG/DL (ref 65–100)
GLUCOSE SERPL-MCNC: 147 MG/DL (ref 65–100)
GLUCOSE SERPL-MCNC: 155 MG/DL (ref 65–100)
GLUCOSE SERPL-MCNC: 229 MG/DL (ref 65–100)
GLUCOSE SERPL-MCNC: 240 MG/DL (ref 65–100)
GLUCOSE SERPL-MCNC: 43 MG/DL (ref 65–100)
GLUCOSE SERPL-MCNC: 80 MG/DL (ref 65–100)
GLUCOSE SERPL-MCNC: 83 MG/DL (ref 65–100)
HCO3 BLD-SCNC: 40 MMOL/L (ref 22–26)
HCO3 BLDA-SCNC: 34 MMOL/L (ref 22–26)
HCO3 BLDA-SCNC: 35 MMOL/L (ref 22–26)
HCO3 BLDA-SCNC: 36 MMOL/L (ref 22–26)
HCO3 BLDA-SCNC: 38 MMOL/L (ref 22–26)
HCO3 BLDA-SCNC: 43 MMOL/L
HCT VFR BLD AUTO: 25 % (ref 36.6–50.3)
HCT VFR BLD AUTO: 25.7 % (ref 36.6–50.3)
HCT VFR BLD AUTO: 25.9 % (ref 36.6–50.3)
HCT VFR BLD AUTO: 27.1 % (ref 36.6–50.3)
HCT VFR BLD AUTO: 28.1 % (ref 36.6–50.3)
HCT VFR BLD AUTO: 30.1 % (ref 36.6–50.3)
HCT VFR BLD AUTO: 30.5 % (ref 36.6–50.3)
HCT VFR BLD AUTO: 31.1 % (ref 36.6–50.3)
HCT VFR BLD AUTO: 31.8 % (ref 36.6–50.3)
HGB BLD-MCNC: 7.5 G/DL (ref 12.1–17)
HGB BLD-MCNC: 7.5 G/DL (ref 12.1–17)
HGB BLD-MCNC: 7.8 G/DL (ref 12.1–17)
HGB BLD-MCNC: 8.3 G/DL (ref 12.1–17)
HGB BLD-MCNC: 8.4 G/DL (ref 12.1–17)
HGB BLD-MCNC: 9.3 G/DL (ref 12.1–17)
HGB BLD-MCNC: 9.5 G/DL (ref 12.1–17)
HGB BLD-MCNC: 9.6 G/DL (ref 12.1–17)
HGB BLD-MCNC: 9.7 G/DL (ref 12.1–17)
IMM GRANULOCYTES # BLD AUTO: 0.1 K/UL (ref 0–0.04)
IMM GRANULOCYTES # BLD AUTO: 0.2 K/UL (ref 0–0.04)
IMM GRANULOCYTES NFR BLD AUTO: 1 % (ref 0–0.5)
IMM GRANULOCYTES NFR BLD AUTO: 2 % (ref 0–0.5)
INR PPP: 1.1 (ref 0.9–1.1)
IPAP/PIP, IPAPIP: 12
IPAP/PIP, IPAPIP: 14
L PNEUMO1 AG UR QL IA: NEGATIVE
LACTATE BLD-SCNC: 0.86 MMOL/L (ref 0.4–2)
LACTATE SERPL-SCNC: 1.2 MMOL/L (ref 0.4–2)
LYMPHOCYTES # BLD: 0.2 K/UL (ref 0.8–3.5)
LYMPHOCYTES # BLD: 0.2 K/UL (ref 0.8–3.5)
LYMPHOCYTES # BLD: 0.3 K/UL (ref 0.8–3.5)
LYMPHOCYTES # BLD: 0.4 K/UL (ref 0.8–3.5)
LYMPHOCYTES # BLD: 0.5 K/UL (ref 0.8–3.5)
LYMPHOCYTES # BLD: 0.5 K/UL (ref 0.8–3.5)
LYMPHOCYTES # BLD: 0.7 K/UL (ref 0.8–3.5)
LYMPHOCYTES NFR BLD: 3 % (ref 12–49)
LYMPHOCYTES NFR BLD: 4 % (ref 12–49)
LYMPHOCYTES NFR BLD: 5 % (ref 12–49)
LYMPHOCYTES NFR BLD: 7 % (ref 12–49)
LYMPHOCYTES NFR BLD: 9 % (ref 12–49)
MAGNESIUM SERPL-MCNC: 2.9 MG/DL (ref 1.6–2.4)
MAGNESIUM SERPL-MCNC: 2.9 MG/DL (ref 1.6–2.4)
MAGNESIUM SERPL-MCNC: 3.2 MG/DL (ref 1.6–2.4)
MAGNESIUM SERPL-MCNC: 3.2 MG/DL (ref 1.6–2.4)
MCH RBC QN AUTO: 29.9 PG (ref 26–34)
MCH RBC QN AUTO: 30.2 PG (ref 26–34)
MCH RBC QN AUTO: 30.4 PG (ref 26–34)
MCH RBC QN AUTO: 30.5 PG (ref 26–34)
MCH RBC QN AUTO: 30.7 PG (ref 26–34)
MCH RBC QN AUTO: 30.7 PG (ref 26–34)
MCH RBC QN AUTO: 30.9 PG (ref 26–34)
MCHC RBC AUTO-ENTMCNC: 29 G/DL (ref 30–36.5)
MCHC RBC AUTO-ENTMCNC: 29.9 G/DL (ref 30–36.5)
MCHC RBC AUTO-ENTMCNC: 30 G/DL (ref 30–36.5)
MCHC RBC AUTO-ENTMCNC: 30.2 G/DL (ref 30–36.5)
MCHC RBC AUTO-ENTMCNC: 30.4 G/DL (ref 30–36.5)
MCHC RBC AUTO-ENTMCNC: 30.6 G/DL (ref 30–36.5)
MCHC RBC AUTO-ENTMCNC: 30.9 G/DL (ref 30–36.5)
MCHC RBC AUTO-ENTMCNC: 31.1 G/DL (ref 30–36.5)
MCHC RBC AUTO-ENTMCNC: 31.2 G/DL (ref 30–36.5)
MCV RBC AUTO: 100 FL (ref 80–99)
MCV RBC AUTO: 100.6 FL (ref 80–99)
MCV RBC AUTO: 101.2 FL (ref 80–99)
MCV RBC AUTO: 102.6 FL (ref 80–99)
MCV RBC AUTO: 103.2 FL (ref 80–99)
MCV RBC AUTO: 97.8 FL (ref 80–99)
MCV RBC AUTO: 98.7 FL (ref 80–99)
MCV RBC AUTO: 99.3 FL (ref 80–99)
MCV RBC AUTO: 99.6 FL (ref 80–99)
MONOCYTES # BLD: 0.1 K/UL (ref 0–1)
MONOCYTES # BLD: 0.3 K/UL (ref 0–1)
MONOCYTES # BLD: 0.4 K/UL (ref 0–1)
MONOCYTES # BLD: 0.5 K/UL (ref 0–1)
MONOCYTES # BLD: 0.8 K/UL (ref 0–1)
MONOCYTES NFR BLD: 2 % (ref 5–13)
MONOCYTES NFR BLD: 3 % (ref 5–13)
MONOCYTES NFR BLD: 4 % (ref 5–13)
MONOCYTES NFR BLD: 4 % (ref 5–13)
MONOCYTES NFR BLD: 6 % (ref 5–13)
MONOCYTES NFR BLD: 6 % (ref 5–13)
MONOCYTES NFR BLD: 8 % (ref 5–13)
NEUTS SEG # BLD: 10.3 K/UL (ref 1.8–8)
NEUTS SEG # BLD: 4.3 K/UL (ref 1.8–8)
NEUTS SEG # BLD: 6.1 K/UL (ref 1.8–8)
NEUTS SEG # BLD: 7.1 K/UL (ref 1.8–8)
NEUTS SEG # BLD: 7.7 K/UL (ref 1.8–8)
NEUTS SEG # BLD: 9.7 K/UL (ref 1.8–8)
NEUTS SEG # BLD: 9.9 K/UL (ref 1.8–8)
NEUTS SEG NFR BLD: 77 % (ref 32–75)
NEUTS SEG NFR BLD: 88 % (ref 32–75)
NEUTS SEG NFR BLD: 90 % (ref 32–75)
NEUTS SEG NFR BLD: 91 % (ref 32–75)
NEUTS SEG NFR BLD: 91 % (ref 32–75)
NRBC # BLD: 0 K/UL (ref 0–0.01)
NRBC # BLD: 0.02 K/UL (ref 0–0.01)
NRBC BLD-RTO: 0 PER 100 WBC
NRBC BLD-RTO: 0.2 PER 100 WBC
ORGANISM ID, SPNG3: NORMAL
P-R INTERVAL, ECG05: 150 MS
P-R INTERVAL, ECG05: 152 MS
P-R INTERVAL, ECG05: 162 MS
P-R INTERVAL, ECG05: 162 MS
PCO2 BLD: 114.8 MMHG (ref 35–45)
PCO2 BLDA: 66 MMHG (ref 35–45)
PCO2 BLDA: 71 MMHG (ref 35–45)
PCO2 BLDA: 76 MMHG (ref 35–45)
PCO2 BLDA: 88 MMHG (ref 35–45)
PCO2 BLDV: 88.1 MMHG (ref 41–51)
PEEP RESPIRATORY: 5 CM[H2O]
PH BLD: 7.15 [PH] (ref 7.35–7.45)
PH BLDA: 7.22 [PH] (ref 7.35–7.45)
PH BLDA: 7.27 [PH] (ref 7.35–7.45)
PH BLDA: 7.35 [PH] (ref 7.35–7.45)
PH BLDA: 7.36 [PH] (ref 7.35–7.45)
PH BLDV: 7.3 [PH] (ref 7.32–7.42)
PHOSPHATE SERPL-MCNC: 4.1 MG/DL (ref 2.6–4.7)
PHOSPHATE SERPL-MCNC: 4.1 MG/DL (ref 2.6–4.7)
PHOSPHATE SERPL-MCNC: 4.3 MG/DL (ref 2.6–4.7)
PHOSPHATE SERPL-MCNC: 4.5 MG/DL (ref 2.6–4.7)
PLATELET # BLD AUTO: 130 K/UL (ref 150–400)
PLATELET # BLD AUTO: 135 K/UL (ref 150–400)
PLATELET # BLD AUTO: 144 K/UL (ref 150–400)
PLATELET # BLD AUTO: 150 K/UL (ref 150–400)
PLATELET # BLD AUTO: 177 K/UL (ref 150–400)
PLATELET # BLD AUTO: 189 K/UL (ref 150–400)
PLATELET # BLD AUTO: 192 K/UL (ref 150–400)
PLATELET # BLD AUTO: 222 K/UL (ref 150–400)
PLATELET # BLD AUTO: 228 K/UL (ref 150–400)
PLEASE NOTE, SPNG4: NORMAL
PMV BLD AUTO: 10.2 FL (ref 8.9–12.9)
PMV BLD AUTO: 10.4 FL (ref 8.9–12.9)
PMV BLD AUTO: 10.5 FL (ref 8.9–12.9)
PMV BLD AUTO: 10.5 FL (ref 8.9–12.9)
PMV BLD AUTO: 10.6 FL (ref 8.9–12.9)
PMV BLD AUTO: 10.9 FL (ref 8.9–12.9)
PMV BLD AUTO: 11.4 FL (ref 8.9–12.9)
PMV BLD AUTO: 11.6 FL (ref 8.9–12.9)
PMV BLD AUTO: 11.6 FL (ref 8.9–12.9)
PO2 BLD: 216 MMHG (ref 80–100)
PO2 BLDA: 112 MMHG (ref 80–100)
PO2 BLDA: 183 MMHG (ref 80–100)
PO2 BLDA: 37 MMHG (ref 80–100)
PO2 BLDA: 98 MMHG (ref 80–100)
PO2 BLDV: 22 MMHG (ref 25–40)
POTASSIUM BLD-SCNC: 4.7 MMOL/L (ref 3.5–5.5)
POTASSIUM SERPL-SCNC: 4 MMOL/L (ref 3.5–5.1)
POTASSIUM SERPL-SCNC: 4.1 MMOL/L (ref 3.5–5.1)
POTASSIUM SERPL-SCNC: 4.4 MMOL/L (ref 3.5–5.1)
POTASSIUM SERPL-SCNC: 4.4 MMOL/L (ref 3.5–5.1)
POTASSIUM SERPL-SCNC: 4.6 MMOL/L (ref 3.5–5.1)
POTASSIUM SERPL-SCNC: 4.9 MMOL/L (ref 3.5–5.1)
POTASSIUM SERPL-SCNC: 5 MMOL/L (ref 3.5–5.1)
POTASSIUM SERPL-SCNC: 5.2 MMOL/L (ref 3.5–5.1)
POTASSIUM SERPL-SCNC: 5.3 MMOL/L (ref 3.5–5.1)
POTASSIUM SERPL-SCNC: 5.3 MMOL/L (ref 3.5–5.1)
PROCALCITONIN SERPL-MCNC: 0.07 NG/ML
PROCALCITONIN SERPL-MCNC: 0.07 NG/ML
PROCALCITONIN SERPL-MCNC: 0.14 NG/ML
PROT SERPL-MCNC: 6.9 G/DL (ref 6.4–8.2)
PROT SERPL-MCNC: 7.1 G/DL (ref 6.4–8.2)
PROTHROMBIN TIME: 11 SEC (ref 9–11.1)
Q-T INTERVAL, ECG07: 360 MS
Q-T INTERVAL, ECG07: 368 MS
Q-T INTERVAL, ECG07: 396 MS
Q-T INTERVAL, ECG07: 412 MS
QRS DURATION, ECG06: 102 MS
QRS DURATION, ECG06: 108 MS
QRS DURATION, ECG06: 112 MS
QRS DURATION, ECG06: 114 MS
QTC CALCULATION (BEZET), ECG08: 459 MS
QTC CALCULATION (BEZET), ECG08: 460 MS
QTC CALCULATION (BEZET), ECG08: 460 MS
QTC CALCULATION (BEZET), ECG08: 467 MS
RBC # BLD AUTO: 2.47 M/UL (ref 4.1–5.7)
RBC # BLD AUTO: 2.51 M/UL (ref 4.1–5.7)
RBC # BLD AUTO: 2.58 M/UL (ref 4.1–5.7)
RBC # BLD AUTO: 2.73 M/UL (ref 4.1–5.7)
RBC # BLD AUTO: 2.74 M/UL (ref 4.1–5.7)
RBC # BLD AUTO: 3.01 M/UL (ref 4.1–5.7)
RBC # BLD AUTO: 3.09 M/UL (ref 4.1–5.7)
RBC # BLD AUTO: 3.16 M/UL (ref 4.1–5.7)
RBC # BLD AUTO: 3.18 M/UL (ref 4.1–5.7)
RBC MORPH BLD: ABNORMAL
S PNEUM AG SPEC QL LA: NEGATIVE
SAMPLES BEING HELD,HOLD: NORMAL
SAO2 % BLD: 65 % (ref 92–97)
SAO2 % BLD: 96 % (ref 92–97)
SAO2 % BLD: 97 % (ref 92–97)
SAO2 % BLD: 99 % (ref 92–97)
SAO2 % BLD: 99.4 % (ref 92–97)
SAO2% DEVICE SAO2% SENSOR NAME: ABNORMAL
SERVICE CMNT-IMP: ABNORMAL
SERVICE CMNT-IMP: NORMAL
SODIUM BLD-SCNC: 139 MMOL/L (ref 136–145)
SODIUM SERPL-SCNC: 135 MMOL/L (ref 136–145)
SODIUM SERPL-SCNC: 136 MMOL/L (ref 136–145)
SODIUM SERPL-SCNC: 136 MMOL/L (ref 136–145)
SODIUM SERPL-SCNC: 140 MMOL/L (ref 136–145)
SODIUM SERPL-SCNC: 140 MMOL/L (ref 136–145)
SODIUM SERPL-SCNC: 144 MMOL/L (ref 136–145)
SODIUM SERPL-SCNC: 145 MMOL/L (ref 136–145)
SODIUM SERPL-SCNC: 145 MMOL/L (ref 136–145)
SODIUM SERPL-SCNC: 146 MMOL/L (ref 136–145)
SODIUM SERPL-SCNC: 147 MMOL/L (ref 136–145)
SOURCE, COVRS: NORMAL
SPECIMEN EXP DATE BLD: NORMAL
SPECIMEN SITE: ABNORMAL
SPECIMEN SOURCE: NORMAL
SPECIMEN SOURCE: NORMAL
SPECIMEN TYPE: ABNORMAL
SPECIMEN, SPNG1: NORMAL
THERAPEUTIC RANGE,PTTT: ABNORMAL SECS (ref 58–77)
THERAPEUTIC RANGE,PTTT: ABNORMAL SECS (ref 58–77)
THERAPEUTIC RANGE,PTTT: NORMAL SECS (ref 58–77)
TROPONIN-HIGH SENSITIVITY: 120 NG/L (ref 0–76)
TROPONIN-HIGH SENSITIVITY: 13 NG/L (ref 0–76)
TROPONIN-HIGH SENSITIVITY: 16 NG/L (ref 0–76)
TROPONIN-HIGH SENSITIVITY: 17 NG/L (ref 0–76)
TROPONIN-HIGH SENSITIVITY: 7832 NG/L (ref 0–76)
TROPONIN-HIGH SENSITIVITY: 7863 NG/L (ref 0–76)
TROPONIN-HIGH SENSITIVITY: 831 NG/L (ref 0–76)
VENTILATION MODE VENT: 40
VENTILATION MODE VENT: ABNORMAL
VENTRICULAR RATE, ECG03: 75 BPM
VENTRICULAR RATE, ECG03: 81 BPM
VENTRICULAR RATE, ECG03: 97 BPM
VENTRICULAR RATE, ECG03: 98 BPM
VT SETTING VENT: 450 ML
VT SETTING VENT: 450 ML
WBC # BLD AUTO: 10 K/UL (ref 4.1–11.1)
WBC # BLD AUTO: 10.4 K/UL (ref 4.1–11.1)
WBC # BLD AUTO: 10.7 K/UL (ref 4.1–11.1)
WBC # BLD AUTO: 10.9 K/UL (ref 4.1–11.1)
WBC # BLD AUTO: 11.2 K/UL (ref 4.1–11.1)
WBC # BLD AUTO: 5 K/UL (ref 4.1–11.1)
WBC # BLD AUTO: 6 K/UL (ref 4.1–11.1)
WBC # BLD AUTO: 6.8 K/UL (ref 4.1–11.1)
WBC # BLD AUTO: 7.9 K/UL (ref 4.1–11.1)

## 2022-01-01 PROCEDURE — 93458 L HRT ARTERY/VENTRICLE ANGIO: CPT | Performed by: INTERNAL MEDICINE

## 2022-01-01 PROCEDURE — 3017F COLORECTAL CA SCREEN DOC REV: CPT | Performed by: NURSE PRACTITIONER

## 2022-01-01 PROCEDURE — 87449 NOS EACH ORGANISM AG IA: CPT

## 2022-01-01 PROCEDURE — 74011250637 HC RX REV CODE- 250/637: Performed by: INTERNAL MEDICINE

## 2022-01-01 PROCEDURE — 82962 GLUCOSE BLOOD TEST: CPT

## 2022-01-01 PROCEDURE — 74011250636 HC RX REV CODE- 250/636: Performed by: INTERNAL MEDICINE

## 2022-01-01 PROCEDURE — 74011250636 HC RX REV CODE- 250/636

## 2022-01-01 PROCEDURE — 4A023N7 MEASUREMENT OF CARDIAC SAMPLING AND PRESSURE, LEFT HEART, PERCUTANEOUS APPROACH: ICD-10-PCS | Performed by: INTERNAL MEDICINE

## 2022-01-01 PROCEDURE — 74011000250 HC RX REV CODE- 250: Performed by: HOSPITALIST

## 2022-01-01 PROCEDURE — 74011000258 HC RX REV CODE- 258: Performed by: HOSPITALIST

## 2022-01-01 PROCEDURE — 74011000636 HC RX REV CODE- 636: Performed by: INTERNAL MEDICINE

## 2022-01-01 PROCEDURE — 83880 ASSAY OF NATRIURETIC PEPTIDE: CPT

## 2022-01-01 PROCEDURE — 2709999900 HC NON-CHARGEABLE SUPPLY

## 2022-01-01 PROCEDURE — 77030040392 HC DRSG OPTIFOAM MDII -A

## 2022-01-01 PROCEDURE — 65610000006 HC RM INTENSIVE CARE

## 2022-01-01 PROCEDURE — 86900 BLOOD TYPING SEROLOGIC ABO: CPT

## 2022-01-01 PROCEDURE — 74011250636 HC RX REV CODE- 250/636: Performed by: HOSPITALIST

## 2022-01-01 PROCEDURE — 77030019697 HC SYR ANGI INFL MRTM -B: Performed by: INTERNAL MEDICINE

## 2022-01-01 PROCEDURE — 94660 CPAP INITIATION&MGMT: CPT

## 2022-01-01 PROCEDURE — 92920 PRQ TRLUML C ANGIOP 1ART&/BR: CPT | Performed by: INTERNAL MEDICINE

## 2022-01-01 PROCEDURE — 97166 OT EVAL MOD COMPLEX 45 MIN: CPT

## 2022-01-01 PROCEDURE — 36600 WITHDRAWAL OF ARTERIAL BLOOD: CPT

## 2022-01-01 PROCEDURE — 80048 BASIC METABOLIC PNL TOTAL CA: CPT

## 2022-01-01 PROCEDURE — 99223 1ST HOSP IP/OBS HIGH 75: CPT | Performed by: INTERNAL MEDICINE

## 2022-01-01 PROCEDURE — 94640 AIRWAY INHALATION TREATMENT: CPT

## 2022-01-01 PROCEDURE — G8756 NO BP MEASURE DOC: HCPCS | Performed by: NURSE PRACTITIONER

## 2022-01-01 PROCEDURE — 85025 COMPLETE CBC W/AUTO DIFF WBC: CPT

## 2022-01-01 PROCEDURE — 71250 CT THORAX DX C-: CPT

## 2022-01-01 PROCEDURE — 74011250636 HC RX REV CODE- 250/636: Performed by: NURSE PRACTITIONER

## 2022-01-01 PROCEDURE — 77030010221 HC SPLNT WR POS TELE -B: Performed by: INTERNAL MEDICINE

## 2022-01-01 PROCEDURE — 85027 COMPLETE CBC AUTOMATED: CPT

## 2022-01-01 PROCEDURE — 74011000250 HC RX REV CODE- 250: Performed by: INTERNAL MEDICINE

## 2022-01-01 PROCEDURE — 93005 ELECTROCARDIOGRAM TRACING: CPT

## 2022-01-01 PROCEDURE — 77010033711 HC HIGH FLOW OXYGEN

## 2022-01-01 PROCEDURE — 36415 COLL VENOUS BLD VENIPUNCTURE: CPT

## 2022-01-01 PROCEDURE — 74011636637 HC RX REV CODE- 636/637: Performed by: INTERNAL MEDICINE

## 2022-01-01 PROCEDURE — 87899 AGENT NOS ASSAY W/OPTIC: CPT

## 2022-01-01 PROCEDURE — C1769 GUIDE WIRE: HCPCS | Performed by: INTERNAL MEDICINE

## 2022-01-01 PROCEDURE — 97530 THERAPEUTIC ACTIVITIES: CPT

## 2022-01-01 PROCEDURE — 87040 BLOOD CULTURE FOR BACTERIA: CPT

## 2022-01-01 PROCEDURE — 80053 COMPREHEN METABOLIC PANEL: CPT

## 2022-01-01 PROCEDURE — 99233 SBSQ HOSP IP/OBS HIGH 50: CPT | Performed by: INTERNAL MEDICINE

## 2022-01-01 PROCEDURE — 97535 SELF CARE MNGMENT TRAINING: CPT

## 2022-01-01 PROCEDURE — 77030030195 HC CATH ANGI DX PRF4 MRTM -A: Performed by: INTERNAL MEDICINE

## 2022-01-01 PROCEDURE — 83735 ASSAY OF MAGNESIUM: CPT

## 2022-01-01 PROCEDURE — 84100 ASSAY OF PHOSPHORUS: CPT

## 2022-01-01 PROCEDURE — 74011000250 HC RX REV CODE- 250: Performed by: STUDENT IN AN ORGANIZED HEALTH CARE EDUCATION/TRAINING PROGRAM

## 2022-01-01 PROCEDURE — 85730 THROMBOPLASTIN TIME PARTIAL: CPT

## 2022-01-01 PROCEDURE — 77030040831 HC BAG URINE DRNG MDII -A

## 2022-01-01 PROCEDURE — 85379 FIBRIN DEGRADATION QUANT: CPT

## 2022-01-01 PROCEDURE — C1894 INTRO/SHEATH, NON-LASER: HCPCS | Performed by: INTERNAL MEDICINE

## 2022-01-01 PROCEDURE — 77030040922 HC BLNKT HYPOTHRM STRY -A

## 2022-01-01 PROCEDURE — 2709999900 HC NON-CHARGEABLE SUPPLY: Performed by: INTERNAL MEDICINE

## 2022-01-01 PROCEDURE — 84484 ASSAY OF TROPONIN QUANT: CPT

## 2022-01-01 PROCEDURE — 85347 COAGULATION TIME ACTIVATED: CPT

## 2022-01-01 PROCEDURE — 82803 BLOOD GASES ANY COMBINATION: CPT

## 2022-01-01 PROCEDURE — 99214 OFFICE O/P EST MOD 30 MIN: CPT | Performed by: NURSE PRACTITIONER

## 2022-01-01 PROCEDURE — P9047 ALBUMIN (HUMAN), 25%, 50ML: HCPCS | Performed by: NURSE PRACTITIONER

## 2022-01-01 PROCEDURE — 77030013038 HC MSK CPAP FISP -A

## 2022-01-01 PROCEDURE — 96374 THER/PROPH/DIAG INJ IV PUSH: CPT

## 2022-01-01 PROCEDURE — 85610 PROTHROMBIN TIME: CPT

## 2022-01-01 PROCEDURE — 65660000001 HC RM ICU INTERMED STEPDOWN

## 2022-01-01 PROCEDURE — 77030008543 HC TBNG MON PRSS MRTM -A: Performed by: INTERNAL MEDICINE

## 2022-01-01 PROCEDURE — 99152 MOD SED SAME PHYS/QHP 5/>YRS: CPT | Performed by: INTERNAL MEDICINE

## 2022-01-01 PROCEDURE — 99285 EMERGENCY DEPT VISIT HI MDM: CPT

## 2022-01-01 PROCEDURE — 92610 EVALUATE SWALLOWING FUNCTION: CPT

## 2022-01-01 PROCEDURE — 1101F PT FALLS ASSESS-DOCD LE1/YR: CPT | Performed by: NURSE PRACTITIONER

## 2022-01-01 PROCEDURE — 74011250637 HC RX REV CODE- 250/637: Performed by: HOSPITALIST

## 2022-01-01 PROCEDURE — 84145 PROCALCITONIN (PCT): CPT

## 2022-01-01 PROCEDURE — 70450 CT HEAD/BRAIN W/O DYE: CPT

## 2022-01-01 PROCEDURE — 74011250637 HC RX REV CODE- 250/637: Performed by: NURSE PRACTITIONER

## 2022-01-01 PROCEDURE — 77030012468 HC VLV BLEEDBK CNTRL ABBT -B: Performed by: INTERNAL MEDICINE

## 2022-01-01 PROCEDURE — 83605 ASSAY OF LACTIC ACID: CPT

## 2022-01-01 PROCEDURE — 92526 ORAL FUNCTION THERAPY: CPT

## 2022-01-01 PROCEDURE — G8432 DEP SCR NOT DOC, RNG: HCPCS | Performed by: NURSE PRACTITIONER

## 2022-01-01 PROCEDURE — 74011000250 HC RX REV CODE- 250: Performed by: NURSE PRACTITIONER

## 2022-01-01 PROCEDURE — 93308 TTE F-UP OR LMTD: CPT | Performed by: INTERNAL MEDICINE

## 2022-01-01 PROCEDURE — 74011250636 HC RX REV CODE- 250/636: Performed by: STUDENT IN AN ORGANIZED HEALTH CARE EDUCATION/TRAINING PROGRAM

## 2022-01-01 PROCEDURE — 93454 CORONARY ARTERY ANGIO S&I: CPT | Performed by: INTERNAL MEDICINE

## 2022-01-01 PROCEDURE — 93325 DOPPLER ECHO COLOR FLOW MAPG: CPT | Performed by: INTERNAL MEDICINE

## 2022-01-01 PROCEDURE — 97163 PT EVAL HIGH COMPLEX 45 MIN: CPT

## 2022-01-01 PROCEDURE — 1111F DSCHRG MED/CURRENT MED MERGE: CPT | Performed by: NURSE PRACTITIONER

## 2022-01-01 PROCEDURE — 93308 TTE F-UP OR LMTD: CPT

## 2022-01-01 PROCEDURE — P9047 ALBUMIN (HUMAN), 25%, 50ML: HCPCS | Performed by: INTERNAL MEDICINE

## 2022-01-01 PROCEDURE — 82947 ASSAY GLUCOSE BLOOD QUANT: CPT

## 2022-01-01 PROCEDURE — 87635 SARS-COV-2 COVID-19 AMP PRB: CPT

## 2022-01-01 PROCEDURE — 77030029997 HC DEV COM RDL R BND TELE -B: Performed by: INTERNAL MEDICINE

## 2022-01-01 PROCEDURE — 76937 US GUIDE VASCULAR ACCESS: CPT | Performed by: INTERNAL MEDICINE

## 2022-01-01 PROCEDURE — 97110 THERAPEUTIC EXERCISES: CPT

## 2022-01-01 PROCEDURE — 77030042317 HC BND COMPR HEMSTAT -B: Performed by: INTERNAL MEDICINE

## 2022-01-01 PROCEDURE — B211YZZ FLUOROSCOPY OF MULTIPLE CORONARY ARTERIES USING OTHER CONTRAST: ICD-10-PCS | Performed by: INTERNAL MEDICINE

## 2022-01-01 PROCEDURE — 92928 PRQ TCAT PLMT NTRAC ST 1 LES: CPT | Performed by: INTERNAL MEDICINE

## 2022-01-01 PROCEDURE — 77030018729 HC ELECTRD DEFIB PAD CARD -B: Performed by: INTERNAL MEDICINE

## 2022-01-01 PROCEDURE — G8427 DOCREV CUR MEDS BY ELIG CLIN: HCPCS | Performed by: NURSE PRACTITIONER

## 2022-01-01 PROCEDURE — 77010033678 HC OXYGEN DAILY

## 2022-01-01 PROCEDURE — 93321 DOPPLER ECHO F-UP/LMTD STD: CPT | Performed by: INTERNAL MEDICINE

## 2022-01-01 PROCEDURE — 74011000258 HC RX REV CODE- 258: Performed by: NURSE PRACTITIONER

## 2022-01-01 PROCEDURE — 65660000000 HC RM CCU STEPDOWN

## 2022-01-01 PROCEDURE — C1887 CATHETER, GUIDING: HCPCS | Performed by: INTERNAL MEDICINE

## 2022-01-01 PROCEDURE — 97162 PT EVAL MOD COMPLEX 30 MIN: CPT

## 2022-01-01 PROCEDURE — 99153 MOD SED SAME PHYS/QHP EA: CPT | Performed by: INTERNAL MEDICINE

## 2022-01-01 PROCEDURE — A9540 TC99M MAA: HCPCS

## 2022-01-01 PROCEDURE — 77030015766: Performed by: INTERNAL MEDICINE

## 2022-01-01 PROCEDURE — 97165 OT EVAL LOW COMPLEX 30 MIN: CPT

## 2022-01-01 PROCEDURE — 77030029684 HC NEB SM VOL KT MONA -A

## 2022-01-01 PROCEDURE — 77030040934 HC CATH DIAG DXTERITY MEDT -A: Performed by: INTERNAL MEDICINE

## 2022-01-01 PROCEDURE — 74011000250 HC RX REV CODE- 250

## 2022-01-01 PROCEDURE — 93970 EXTREMITY STUDY: CPT

## 2022-01-01 PROCEDURE — 71045 X-RAY EXAM CHEST 1 VIEW: CPT

## 2022-01-01 PROCEDURE — 77030004549 HC CATH ANGI DX PRF MRTM -A: Performed by: INTERNAL MEDICINE

## 2022-01-01 RX ORDER — HEPARIN SODIUM 200 [USP'U]/100ML
INJECTION, SOLUTION INTRAVENOUS
Status: COMPLETED | OUTPATIENT
Start: 2022-01-01 | End: 2022-01-01

## 2022-01-01 RX ORDER — FUROSEMIDE 40 MG/1
40 TABLET ORAL DAILY
Status: DISCONTINUED | OUTPATIENT
Start: 2022-01-01 | End: 2022-01-01 | Stop reason: HOSPADM

## 2022-01-01 RX ORDER — SODIUM CHLORIDE 0.9 % (FLUSH) 0.9 %
5-40 SYRINGE (ML) INJECTION AS NEEDED
Status: DISCONTINUED | OUTPATIENT
Start: 2022-01-01 | End: 2022-01-01 | Stop reason: HOSPADM

## 2022-01-01 RX ORDER — MORPHINE SULFATE 2 MG/ML
2 INJECTION, SOLUTION INTRAMUSCULAR; INTRAVENOUS
Status: DISCONTINUED | OUTPATIENT
Start: 2022-01-01 | End: 2022-01-01

## 2022-01-01 RX ORDER — CARVEDILOL 6.25 MG/1
6.25 TABLET ORAL EVERY 12 HOURS
Status: DISCONTINUED | OUTPATIENT
Start: 2022-01-01 | End: 2022-01-01

## 2022-01-01 RX ORDER — IPRATROPIUM BROMIDE AND ALBUTEROL SULFATE 2.5; .5 MG/3ML; MG/3ML
3 SOLUTION RESPIRATORY (INHALATION)
Status: COMPLETED | OUTPATIENT
Start: 2022-01-01 | End: 2022-01-01

## 2022-01-01 RX ORDER — CARVEDILOL 3.12 MG/1
3.12 TABLET ORAL 2 TIMES DAILY WITH MEALS
Status: DISCONTINUED | OUTPATIENT
Start: 2022-01-01 | End: 2022-01-01

## 2022-01-01 RX ORDER — FUROSEMIDE 10 MG/ML
40 INJECTION INTRAMUSCULAR; INTRAVENOUS EVERY 8 HOURS
Status: DISCONTINUED | OUTPATIENT
Start: 2022-01-01 | End: 2022-01-01

## 2022-01-01 RX ORDER — HEPARIN SODIUM 1000 [USP'U]/ML
60 INJECTION, SOLUTION INTRAVENOUS; SUBCUTANEOUS AS NEEDED
Status: DISCONTINUED | OUTPATIENT
Start: 2022-01-01 | End: 2022-01-01

## 2022-01-01 RX ORDER — CLOPIDOGREL BISULFATE 75 MG/1
75 TABLET ORAL DAILY
COMMUNITY

## 2022-01-01 RX ORDER — ALPRAZOLAM 0.5 MG/1
0.25 TABLET ORAL
Status: DISCONTINUED | OUTPATIENT
Start: 2022-01-01 | End: 2022-01-01

## 2022-01-01 RX ORDER — HYDRALAZINE HYDROCHLORIDE 50 MG/1
50 TABLET, FILM COATED ORAL 3 TIMES DAILY
COMMUNITY

## 2022-01-01 RX ORDER — INSULIN GLARGINE 100 [IU]/ML
7 INJECTION, SOLUTION SUBCUTANEOUS
Status: DISCONTINUED | OUTPATIENT
Start: 2022-01-01 | End: 2022-01-01

## 2022-01-01 RX ORDER — VERAPAMIL HYDROCHLORIDE 2.5 MG/ML
INJECTION, SOLUTION INTRAVENOUS AS NEEDED
Status: DISCONTINUED | OUTPATIENT
Start: 2022-01-01 | End: 2022-01-01 | Stop reason: HOSPADM

## 2022-01-01 RX ORDER — HEPARIN SODIUM 1000 [USP'U]/ML
INJECTION, SOLUTION INTRAVENOUS; SUBCUTANEOUS AS NEEDED
Status: DISCONTINUED | OUTPATIENT
Start: 2022-01-01 | End: 2022-01-01 | Stop reason: HOSPADM

## 2022-01-01 RX ORDER — ENOXAPARIN SODIUM 100 MG/ML
30 INJECTION SUBCUTANEOUS DAILY
Status: DISCONTINUED | OUTPATIENT
Start: 2022-01-01 | End: 2022-01-01

## 2022-01-01 RX ORDER — GUAIFENESIN 100 MG/5ML
LIQUID (ML) ORAL AS NEEDED
Status: DISCONTINUED | OUTPATIENT
Start: 2022-01-01 | End: 2022-01-01 | Stop reason: HOSPADM

## 2022-01-01 RX ORDER — MAGNESIUM SULFATE 100 %
4 CRYSTALS MISCELLANEOUS AS NEEDED
Status: DISCONTINUED | OUTPATIENT
Start: 2022-01-01 | End: 2022-01-01

## 2022-01-01 RX ORDER — LORAZEPAM 2 MG/ML
1 INJECTION INTRAMUSCULAR
Status: DISCONTINUED | OUTPATIENT
Start: 2022-01-01 | End: 2022-01-01

## 2022-01-01 RX ORDER — INSULIN GLARGINE 100 [IU]/ML
10 INJECTION, SOLUTION SUBCUTANEOUS
Status: DISCONTINUED | OUTPATIENT
Start: 2022-01-01 | End: 2022-01-01

## 2022-01-01 RX ORDER — MORPHINE SULFATE 2 MG/ML
1 INJECTION, SOLUTION INTRAMUSCULAR; INTRAVENOUS
Status: DISCONTINUED | OUTPATIENT
Start: 2022-01-01 | End: 2022-01-01

## 2022-01-01 RX ORDER — INSULIN GLARGINE 100 [IU]/ML
16 INJECTION, SOLUTION SUBCUTANEOUS
COMMUNITY

## 2022-01-01 RX ORDER — HALOPERIDOL 5 MG/ML
INJECTION INTRAMUSCULAR
Status: DISPENSED
Start: 2022-01-01 | End: 2022-01-01

## 2022-01-01 RX ORDER — HEPARIN SODIUM 10000 [USP'U]/100ML
12-25 INJECTION, SOLUTION INTRAVENOUS
Status: DISCONTINUED | OUTPATIENT
Start: 2022-01-01 | End: 2022-01-01

## 2022-01-01 RX ORDER — NITROGLYCERIN 20 MG/100ML
0-200 INJECTION INTRAVENOUS
Status: DISCONTINUED | OUTPATIENT
Start: 2022-01-01 | End: 2022-01-01

## 2022-01-01 RX ORDER — ONDANSETRON 2 MG/ML
4 INJECTION INTRAMUSCULAR; INTRAVENOUS
Status: DISCONTINUED | OUTPATIENT
Start: 2022-01-01 | End: 2022-01-01 | Stop reason: HOSPADM

## 2022-01-01 RX ORDER — CLOPIDOGREL BISULFATE 75 MG/1
TABLET ORAL AS NEEDED
Status: DISCONTINUED | OUTPATIENT
Start: 2022-01-01 | End: 2022-01-01 | Stop reason: HOSPADM

## 2022-01-01 RX ORDER — ASPIRIN 81 MG/1
81 TABLET ORAL DAILY
Status: DISCONTINUED | OUTPATIENT
Start: 2022-01-01 | End: 2022-01-01

## 2022-01-01 RX ORDER — ROSUVASTATIN CALCIUM 20 MG/1
20 TABLET, COATED ORAL
Status: DISCONTINUED | OUTPATIENT
Start: 2022-01-01 | End: 2022-01-01

## 2022-01-01 RX ORDER — HYDROCORTISONE SODIUM SUCCINATE 100 MG/2ML
INJECTION, POWDER, FOR SOLUTION INTRAMUSCULAR; INTRAVENOUS AS NEEDED
Status: DISCONTINUED | OUTPATIENT
Start: 2022-01-01 | End: 2022-01-01 | Stop reason: HOSPADM

## 2022-01-01 RX ORDER — NITROGLYCERIN 20 MG/100ML
0-20 INJECTION INTRAVENOUS
Status: DISCONTINUED | OUTPATIENT
Start: 2022-01-01 | End: 2022-01-01

## 2022-01-01 RX ORDER — HEPARIN SODIUM 1000 [USP'U]/ML
30 INJECTION, SOLUTION INTRAVENOUS; SUBCUTANEOUS AS NEEDED
Status: DISCONTINUED | OUTPATIENT
Start: 2022-01-01 | End: 2022-01-01

## 2022-01-01 RX ORDER — DIPHENHYDRAMINE HYDROCHLORIDE 50 MG/ML
50 INJECTION, SOLUTION INTRAMUSCULAR; INTRAVENOUS
Status: COMPLETED | OUTPATIENT
Start: 2022-01-01 | End: 2022-01-01

## 2022-01-01 RX ORDER — HYDRALAZINE HYDROCHLORIDE 20 MG/ML
10 INJECTION INTRAMUSCULAR; INTRAVENOUS
Status: DISCONTINUED | OUTPATIENT
Start: 2022-01-01 | End: 2022-01-01 | Stop reason: HOSPADM

## 2022-01-01 RX ORDER — SODIUM CHLORIDE 9 MG/ML
75 INJECTION, SOLUTION INTRAVENOUS CONTINUOUS
Status: DISCONTINUED | OUTPATIENT
Start: 2022-01-01 | End: 2022-01-01

## 2022-01-01 RX ORDER — LANOLIN ALCOHOL/MO/W.PET/CERES
400 CREAM (GRAM) TOPICAL
Status: DISCONTINUED | OUTPATIENT
Start: 2022-01-01 | End: 2022-01-01

## 2022-01-01 RX ORDER — ACETAMINOPHEN 325 MG/1
650 TABLET ORAL
Status: DISCONTINUED | OUTPATIENT
Start: 2022-01-01 | End: 2022-01-01 | Stop reason: HOSPADM

## 2022-01-01 RX ORDER — BALSAM PERU/CASTOR OIL
OINTMENT (GRAM) TOPICAL 2 TIMES DAILY
Status: DISCONTINUED | OUTPATIENT
Start: 2022-01-01 | End: 2022-01-01 | Stop reason: HOSPADM

## 2022-01-01 RX ORDER — ANASTROZOLE 1 MG/1
0.5 TABLET ORAL
Status: DISCONTINUED | OUTPATIENT
Start: 2022-01-01 | End: 2022-01-01

## 2022-01-01 RX ORDER — FENTANYL CITRATE 50 UG/ML
25 INJECTION, SOLUTION INTRAMUSCULAR; INTRAVENOUS
Status: DISCONTINUED | OUTPATIENT
Start: 2022-01-01 | End: 2022-01-01

## 2022-01-01 RX ORDER — LIDOCAINE 4 G/100G
1 PATCH TOPICAL EVERY 24 HOURS
Status: DISCONTINUED | OUTPATIENT
Start: 2022-01-01 | End: 2022-01-01

## 2022-01-01 RX ORDER — NITROGLYCERIN 0.4 MG/1
0.4 TABLET SUBLINGUAL AS NEEDED
Status: DISCONTINUED | OUTPATIENT
Start: 2022-01-01 | End: 2022-01-01 | Stop reason: HOSPADM

## 2022-01-01 RX ORDER — INSULIN LISPRO 100 [IU]/ML
INJECTION, SOLUTION INTRAVENOUS; SUBCUTANEOUS
Status: DISCONTINUED | OUTPATIENT
Start: 2022-01-01 | End: 2022-01-01

## 2022-01-01 RX ORDER — ROSUVASTATIN CALCIUM 20 MG/1
TABLET, COATED ORAL
Qty: 90 TABLET | Refills: 3 | Status: ON HOLD | OUTPATIENT
Start: 2022-01-01 | End: 2022-01-01

## 2022-01-01 RX ORDER — FUROSEMIDE 10 MG/ML
40 INJECTION INTRAMUSCULAR; INTRAVENOUS ONCE
Status: COMPLETED | OUTPATIENT
Start: 2022-01-01 | End: 2022-01-01

## 2022-01-01 RX ORDER — ISOSORBIDE MONONITRATE 30 MG/1
30 TABLET, EXTENDED RELEASE ORAL DAILY
COMMUNITY

## 2022-01-01 RX ORDER — HEPARIN SODIUM 1000 [USP'U]/ML
INJECTION, SOLUTION INTRAVENOUS; SUBCUTANEOUS ONCE
Status: CANCELLED | OUTPATIENT
Start: 2022-01-01 | End: 2022-01-01

## 2022-01-01 RX ORDER — BUMETANIDE 1 MG/1
1 TABLET ORAL 2 TIMES DAILY
COMMUNITY

## 2022-01-01 RX ORDER — LEVOTHYROXINE SODIUM 100 UG/1
100 TABLET ORAL DAILY
Status: DISCONTINUED | OUTPATIENT
Start: 2022-01-01 | End: 2022-01-01

## 2022-01-01 RX ORDER — MORPHINE SULFATE 2 MG/ML
1 INJECTION, SOLUTION INTRAMUSCULAR; INTRAVENOUS ONCE
Status: COMPLETED | OUTPATIENT
Start: 2022-01-01 | End: 2022-01-01

## 2022-01-01 RX ORDER — DEXTROSE MONOHYDRATE 100 MG/ML
0-250 INJECTION, SOLUTION INTRAVENOUS AS NEEDED
Status: DISCONTINUED | OUTPATIENT
Start: 2022-01-01 | End: 2022-01-01 | Stop reason: HOSPADM

## 2022-01-01 RX ORDER — HALOPERIDOL 5 MG/ML
5 INJECTION INTRAMUSCULAR
Status: ACTIVE | OUTPATIENT
Start: 2022-01-01 | End: 2022-01-01

## 2022-01-01 RX ORDER — HEPARIN SODIUM 5000 [USP'U]/ML
5000 INJECTION, SOLUTION INTRAVENOUS; SUBCUTANEOUS EVERY 8 HOURS
Status: DISCONTINUED | OUTPATIENT
Start: 2022-01-01 | End: 2022-01-01

## 2022-01-01 RX ORDER — MAGNESIUM SULFATE 100 %
4 CRYSTALS MISCELLANEOUS AS NEEDED
Status: DISCONTINUED | OUTPATIENT
Start: 2022-01-01 | End: 2022-01-01 | Stop reason: HOSPADM

## 2022-01-01 RX ORDER — IPRATROPIUM BROMIDE AND ALBUTEROL SULFATE 2.5; .5 MG/3ML; MG/3ML
3 SOLUTION RESPIRATORY (INHALATION)
Status: DISCONTINUED | OUTPATIENT
Start: 2022-01-01 | End: 2022-01-01 | Stop reason: HOSPADM

## 2022-01-01 RX ORDER — CLOPIDOGREL BISULFATE 75 MG/1
75 TABLET ORAL DAILY
Status: DISCONTINUED | OUTPATIENT
Start: 2022-01-01 | End: 2022-01-01

## 2022-01-01 RX ORDER — ALBUMIN HUMAN 250 G/1000ML
25 SOLUTION INTRAVENOUS ONCE
Status: COMPLETED | OUTPATIENT
Start: 2022-01-01 | End: 2022-01-01

## 2022-01-01 RX ORDER — MORPHINE SULFATE 2 MG/ML
INJECTION, SOLUTION INTRAMUSCULAR; INTRAVENOUS
Status: COMPLETED
Start: 2022-01-01 | End: 2022-01-01

## 2022-01-01 RX ORDER — DEXMEDETOMIDINE HYDROCHLORIDE 4 UG/ML
.1-1.5 INJECTION, SOLUTION INTRAVENOUS
Status: DISCONTINUED | OUTPATIENT
Start: 2022-01-01 | End: 2022-01-01

## 2022-01-01 RX ORDER — TRAMADOL HYDROCHLORIDE 50 MG/1
50 TABLET ORAL
Status: DISCONTINUED | OUTPATIENT
Start: 2022-01-01 | End: 2022-01-01 | Stop reason: HOSPADM

## 2022-01-01 RX ORDER — ACETAZOLAMIDE 250 MG/1
250 TABLET ORAL 2 TIMES DAILY
Status: COMPLETED | OUTPATIENT
Start: 2022-01-01 | End: 2022-01-01

## 2022-01-01 RX ORDER — DIPHENHYDRAMINE HYDROCHLORIDE 50 MG/ML
INJECTION, SOLUTION INTRAMUSCULAR; INTRAVENOUS AS NEEDED
Status: DISCONTINUED | OUTPATIENT
Start: 2022-01-01 | End: 2022-01-01 | Stop reason: HOSPADM

## 2022-01-01 RX ORDER — DEXTROSE MONOHYDRATE 100 MG/ML
0-250 INJECTION, SOLUTION INTRAVENOUS AS NEEDED
Status: DISCONTINUED | OUTPATIENT
Start: 2022-01-01 | End: 2022-01-01

## 2022-01-01 RX ORDER — MORPHINE SULFATE 2 MG/ML
2 INJECTION, SOLUTION INTRAMUSCULAR; INTRAVENOUS ONCE
Status: DISCONTINUED | OUTPATIENT
Start: 2022-01-01 | End: 2022-01-01

## 2022-01-01 RX ORDER — ROSUVASTATIN CALCIUM 20 MG/1
20 TABLET, COATED ORAL
COMMUNITY

## 2022-01-01 RX ORDER — IPRATROPIUM BROMIDE AND ALBUTEROL SULFATE 2.5; .5 MG/3ML; MG/3ML
3 SOLUTION RESPIRATORY (INHALATION)
Status: DISCONTINUED | OUTPATIENT
Start: 2022-01-01 | End: 2022-01-01

## 2022-01-01 RX ORDER — HYDRALAZINE HYDROCHLORIDE 50 MG/1
50 TABLET, FILM COATED ORAL 3 TIMES DAILY
Status: DISCONTINUED | OUTPATIENT
Start: 2022-01-01 | End: 2022-01-01 | Stop reason: HOSPADM

## 2022-01-01 RX ORDER — ISOSORBIDE MONONITRATE 60 MG/1
60 TABLET, EXTENDED RELEASE ORAL
Status: ON HOLD | COMMUNITY
End: 2022-01-01

## 2022-01-01 RX ORDER — LIDOCAINE HYDROCHLORIDE 10 MG/ML
INJECTION, SOLUTION EPIDURAL; INFILTRATION; INTRACAUDAL; PERINEURAL AS NEEDED
Status: DISCONTINUED | OUTPATIENT
Start: 2022-01-01 | End: 2022-01-01 | Stop reason: HOSPADM

## 2022-01-01 RX ORDER — SODIUM CHLORIDE 0.9 % (FLUSH) 0.9 %
5-40 SYRINGE (ML) INJECTION EVERY 8 HOURS
Status: DISCONTINUED | OUTPATIENT
Start: 2022-01-01 | End: 2022-01-01

## 2022-01-01 RX ORDER — MORPHINE SULFATE 2 MG/ML
2 INJECTION, SOLUTION INTRAMUSCULAR; INTRAVENOUS
Status: DISCONTINUED | OUTPATIENT
Start: 2022-01-01 | End: 2022-01-01 | Stop reason: HOSPADM

## 2022-01-01 RX ORDER — BUMETANIDE 0.25 MG/ML
2 INJECTION INTRAMUSCULAR; INTRAVENOUS 2 TIMES DAILY
Status: DISCONTINUED | OUTPATIENT
Start: 2022-01-01 | End: 2022-01-01

## 2022-01-01 RX ORDER — SODIUM CHLORIDE 9 MG/ML
100 INJECTION, SOLUTION INTRAVENOUS CONTINUOUS
Status: DISCONTINUED | OUTPATIENT
Start: 2022-01-01 | End: 2022-01-01

## 2022-01-01 RX ORDER — INSULIN GLARGINE 100 [IU]/ML
16 INJECTION, SOLUTION SUBCUTANEOUS
Status: ON HOLD | COMMUNITY
End: 2022-01-01

## 2022-01-01 RX ORDER — LANOLIN ALCOHOL/MO/W.PET/CERES
6 CREAM (GRAM) TOPICAL
Status: DISCONTINUED | OUTPATIENT
Start: 2022-01-01 | End: 2022-01-01

## 2022-01-01 RX ORDER — BUDESONIDE AND FORMOTEROL FUMARATE DIHYDRATE 80; 4.5 UG/1; UG/1
2 AEROSOL RESPIRATORY (INHALATION) 2 TIMES DAILY
COMMUNITY

## 2022-01-01 RX ORDER — NOREPINEPHRINE BITARTRATE/D5W 8 MG/250ML
.5-16 PLASTIC BAG, INJECTION (ML) INTRAVENOUS
Status: DISCONTINUED | OUTPATIENT
Start: 2022-01-01 | End: 2022-01-01

## 2022-01-01 RX ORDER — LORAZEPAM 2 MG/ML
1 INJECTION INTRAMUSCULAR
Status: DISCONTINUED | OUTPATIENT
Start: 2022-01-01 | End: 2022-01-01 | Stop reason: HOSPADM

## 2022-01-01 RX ORDER — POLYETHYLENE GLYCOL 3350 17 G/17G
17 POWDER, FOR SOLUTION ORAL DAILY PRN
Status: DISCONTINUED | OUTPATIENT
Start: 2022-01-01 | End: 2022-01-01 | Stop reason: HOSPADM

## 2022-01-01 RX ORDER — DEXTROSE 50 % IN WATER (D50W) INTRAVENOUS SYRINGE
Status: COMPLETED
Start: 2022-01-01 | End: 2022-01-01

## 2022-01-01 RX ADMIN — Medication 0.4 MCG/KG/HR: at 05:35

## 2022-01-01 RX ADMIN — IPRATROPIUM BROMIDE AND ALBUTEROL SULFATE 3 ML: .5; 3 SOLUTION RESPIRATORY (INHALATION) at 20:48

## 2022-01-01 RX ADMIN — Medication 3 UNITS: at 08:42

## 2022-01-01 RX ADMIN — SODIUM CHLORIDE 50 ML/HR: 9 INJECTION, SOLUTION INTRAVENOUS at 12:00

## 2022-01-01 RX ADMIN — CARVEDILOL 6.25 MG: 6.25 TABLET, FILM COATED ORAL at 09:12

## 2022-01-01 RX ADMIN — Medication 400 MG: at 09:00

## 2022-01-01 RX ADMIN — HEPARIN SODIUM 5000 UNITS: 5000 INJECTION, SOLUTION INTRAVENOUS; SUBCUTANEOUS at 20:54

## 2022-01-01 RX ADMIN — METHYLPREDNISOLONE SODIUM SUCCINATE 40 MG: 40 INJECTION, POWDER, FOR SOLUTION INTRAMUSCULAR; INTRAVENOUS at 12:04

## 2022-01-01 RX ADMIN — CARVEDILOL 3.12 MG: 3.12 TABLET, FILM COATED ORAL at 17:04

## 2022-01-01 RX ADMIN — ANASTROZOLE 0.5 MG: 1 TABLET, COATED ORAL at 09:00

## 2022-01-01 RX ADMIN — IPRATROPIUM BROMIDE AND ALBUTEROL SULFATE 3 ML: .5; 3 SOLUTION RESPIRATORY (INHALATION) at 15:12

## 2022-01-01 RX ADMIN — HEPARIN SODIUM 5000 UNITS: 5000 INJECTION, SOLUTION INTRAVENOUS; SUBCUTANEOUS at 12:38

## 2022-01-01 RX ADMIN — MORPHINE SULFATE 2 MG: 2 INJECTION, SOLUTION INTRAMUSCULAR; INTRAVENOUS at 09:24

## 2022-01-01 RX ADMIN — SODIUM CHLORIDE, PRESERVATIVE FREE 10 ML: 5 INJECTION INTRAVENOUS at 06:43

## 2022-01-01 RX ADMIN — ARFORMOTEROL TARTRATE: 15 SOLUTION RESPIRATORY (INHALATION) at 19:04

## 2022-01-01 RX ADMIN — ARFORMOTEROL TARTRATE: 15 SOLUTION RESPIRATORY (INHALATION) at 08:08

## 2022-01-01 RX ADMIN — LEVOTHYROXINE SODIUM 100 MCG: 100 TABLET ORAL at 05:37

## 2022-01-01 RX ADMIN — Medication 3 UNITS: at 12:30

## 2022-01-01 RX ADMIN — Medication 0.4 MCG/KG/HR: at 11:58

## 2022-01-01 RX ADMIN — MELATONIN 6 MG: at 21:16

## 2022-01-01 RX ADMIN — AZITHROMYCIN MONOHYDRATE 500 MG: 500 INJECTION, POWDER, LYOPHILIZED, FOR SOLUTION INTRAVENOUS at 19:49

## 2022-01-01 RX ADMIN — INSULIN GLARGINE 7 UNITS: 100 INJECTION, SOLUTION SUBCUTANEOUS at 21:15

## 2022-01-01 RX ADMIN — SODIUM CHLORIDE, PRESERVATIVE FREE 10 ML: 5 INJECTION INTRAVENOUS at 13:49

## 2022-01-01 RX ADMIN — CLOPIDOGREL BISULFATE 75 MG: 75 TABLET ORAL at 09:09

## 2022-01-01 RX ADMIN — IPRATROPIUM BROMIDE AND ALBUTEROL SULFATE 3 ML: .5; 3 SOLUTION RESPIRATORY (INHALATION) at 03:53

## 2022-01-01 RX ADMIN — METHYLPREDNISOLONE SODIUM SUCCINATE 40 MG: 40 INJECTION, POWDER, FOR SOLUTION INTRAMUSCULAR; INTRAVENOUS at 14:11

## 2022-01-01 RX ADMIN — INSULIN GLARGINE 10 UNITS: 100 INJECTION, SOLUTION SUBCUTANEOUS at 22:10

## 2022-01-01 RX ADMIN — METHYLPREDNISOLONE SODIUM SUCCINATE 40 MG: 40 INJECTION, POWDER, FOR SOLUTION INTRAMUSCULAR; INTRAVENOUS at 21:08

## 2022-01-01 RX ADMIN — ARFORMOTEROL TARTRATE: 15 SOLUTION RESPIRATORY (INHALATION) at 19:11

## 2022-01-01 RX ADMIN — FUROSEMIDE 40 MG: 10 INJECTION, SOLUTION INTRAMUSCULAR; INTRAVENOUS at 15:32

## 2022-01-01 RX ADMIN — Medication 7 UNITS: at 18:13

## 2022-01-01 RX ADMIN — HYDRALAZINE HYDROCHLORIDE 50 MG: 50 TABLET, FILM COATED ORAL at 09:13

## 2022-01-01 RX ADMIN — FUROSEMIDE 40 MG: 10 INJECTION, SOLUTION INTRAMUSCULAR; INTRAVENOUS at 21:04

## 2022-01-01 RX ADMIN — Medication 3 UNITS: at 08:44

## 2022-01-01 RX ADMIN — IPRATROPIUM BROMIDE AND ALBUTEROL SULFATE 3 ML: .5; 3 SOLUTION RESPIRATORY (INHALATION) at 15:18

## 2022-01-01 RX ADMIN — ASPIRIN 81 MG: 81 TABLET, COATED ORAL at 09:14

## 2022-01-01 RX ADMIN — ARFORMOTEROL TARTRATE: 15 SOLUTION RESPIRATORY (INHALATION) at 07:40

## 2022-01-01 RX ADMIN — METHYLPREDNISOLONE SODIUM SUCCINATE 40 MG: 40 INJECTION, POWDER, FOR SOLUTION INTRAMUSCULAR; INTRAVENOUS at 14:30

## 2022-01-01 RX ADMIN — FUROSEMIDE 40 MG: 10 INJECTION, SOLUTION INTRAMUSCULAR; INTRAVENOUS at 21:01

## 2022-01-01 RX ADMIN — ROSUVASTATIN 20 MG: 20 TABLET, FILM COATED ORAL at 21:07

## 2022-01-01 RX ADMIN — HEPARIN SODIUM 5000 UNITS: 5000 INJECTION, SOLUTION INTRAVENOUS; SUBCUTANEOUS at 20:47

## 2022-01-01 RX ADMIN — METHYLPREDNISOLONE SODIUM SUCCINATE 40 MG: 40 INJECTION, POWDER, FOR SOLUTION INTRAMUSCULAR; INTRAVENOUS at 18:06

## 2022-01-01 RX ADMIN — MORPHINE SULFATE 2 MG: 2 INJECTION, SOLUTION INTRAMUSCULAR; INTRAVENOUS at 08:52

## 2022-01-01 RX ADMIN — HEPARIN SODIUM 5000 UNITS: 5000 INJECTION, SOLUTION INTRAVENOUS; SUBCUTANEOUS at 21:04

## 2022-01-01 RX ADMIN — INSULIN GLARGINE 10 UNITS: 100 INJECTION, SOLUTION SUBCUTANEOUS at 21:14

## 2022-01-01 RX ADMIN — Medication: at 21:06

## 2022-01-01 RX ADMIN — METHYLPREDNISOLONE SODIUM SUCCINATE 40 MG: 40 INJECTION, POWDER, FOR SOLUTION INTRAMUSCULAR; INTRAVENOUS at 15:32

## 2022-01-01 RX ADMIN — Medication: at 21:18

## 2022-01-01 RX ADMIN — Medication 3 UNITS: at 18:00

## 2022-01-01 RX ADMIN — SODIUM CHLORIDE, PRESERVATIVE FREE 5 ML: 5 INJECTION INTRAVENOUS at 14:00

## 2022-01-01 RX ADMIN — Medication: at 09:11

## 2022-01-01 RX ADMIN — CLOPIDOGREL BISULFATE 75 MG: 75 TABLET ORAL at 09:15

## 2022-01-01 RX ADMIN — SODIUM CHLORIDE, PRESERVATIVE FREE 10 ML: 5 INJECTION INTRAVENOUS at 05:37

## 2022-01-01 RX ADMIN — ALBUMIN (HUMAN) 25 G: 0.25 INJECTION, SOLUTION INTRAVENOUS at 10:11

## 2022-01-01 RX ADMIN — ARFORMOTEROL TARTRATE: 15 SOLUTION RESPIRATORY (INHALATION) at 08:49

## 2022-01-01 RX ADMIN — CLOPIDOGREL BISULFATE 75 MG: 75 TABLET ORAL at 10:12

## 2022-01-01 RX ADMIN — HEPARIN SODIUM 5000 UNITS: 5000 INJECTION, SOLUTION INTRAVENOUS; SUBCUTANEOUS at 13:11

## 2022-01-01 RX ADMIN — IPRATROPIUM BROMIDE AND ALBUTEROL SULFATE 3 ML: .5; 3 SOLUTION RESPIRATORY (INHALATION) at 17:21

## 2022-01-01 RX ADMIN — HEPARIN SODIUM 5000 UNITS: 5000 INJECTION, SOLUTION INTRAVENOUS; SUBCUTANEOUS at 05:15

## 2022-01-01 RX ADMIN — ARFORMOTEROL TARTRATE: 15 SOLUTION RESPIRATORY (INHALATION) at 08:13

## 2022-01-01 RX ADMIN — AZITHROMYCIN MONOHYDRATE 500 MG: 500 INJECTION, POWDER, LYOPHILIZED, FOR SOLUTION INTRAVENOUS at 20:53

## 2022-01-01 RX ADMIN — METHYLPREDNISOLONE SODIUM SUCCINATE 40 MG: 40 INJECTION, POWDER, FOR SOLUTION INTRAMUSCULAR; INTRAVENOUS at 06:09

## 2022-01-01 RX ADMIN — ARFORMOTEROL TARTRATE: 15 SOLUTION RESPIRATORY (INHALATION) at 07:56

## 2022-01-01 RX ADMIN — Medication 4 UNITS: at 08:55

## 2022-01-01 RX ADMIN — IPRATROPIUM BROMIDE AND ALBUTEROL SULFATE 3 ML: .5; 3 SOLUTION RESPIRATORY (INHALATION) at 08:13

## 2022-01-01 RX ADMIN — NOREPINEPHRINE BITARTRATE 4 MCG/MIN: 1 INJECTION, SOLUTION, CONCENTRATE INTRAVENOUS at 23:24

## 2022-01-01 RX ADMIN — ASPIRIN 81 MG: 81 TABLET, COATED ORAL at 09:08

## 2022-01-01 RX ADMIN — IPRATROPIUM BROMIDE AND ALBUTEROL SULFATE 3 ML: .5; 3 SOLUTION RESPIRATORY (INHALATION) at 23:06

## 2022-01-01 RX ADMIN — SODIUM CHLORIDE, PRESERVATIVE FREE 10 ML: 5 INJECTION INTRAVENOUS at 23:04

## 2022-01-01 RX ADMIN — HEPARIN SODIUM 5000 UNITS: 5000 INJECTION, SOLUTION INTRAVENOUS; SUBCUTANEOUS at 19:55

## 2022-01-01 RX ADMIN — SODIUM CHLORIDE, PRESERVATIVE FREE 10 ML: 5 INJECTION INTRAVENOUS at 21:13

## 2022-01-01 RX ADMIN — CLOPIDOGREL BISULFATE 75 MG: 75 TABLET ORAL at 09:00

## 2022-01-01 RX ADMIN — ROSUVASTATIN 20 MG: 20 TABLET, FILM COATED ORAL at 21:04

## 2022-01-01 RX ADMIN — ENOXAPARIN SODIUM 30 MG: 100 INJECTION SUBCUTANEOUS at 09:15

## 2022-01-01 RX ADMIN — HYDRALAZINE HYDROCHLORIDE 10 MG: 20 INJECTION INTRAMUSCULAR; INTRAVENOUS at 22:43

## 2022-01-01 RX ADMIN — IPRATROPIUM BROMIDE AND ALBUTEROL SULFATE 3 ML: .5; 3 SOLUTION RESPIRATORY (INHALATION) at 19:22

## 2022-01-01 RX ADMIN — HEPARIN SODIUM 5000 UNITS: 5000 INJECTION, SOLUTION INTRAVENOUS; SUBCUTANEOUS at 21:17

## 2022-01-01 RX ADMIN — SODIUM CHLORIDE, PRESERVATIVE FREE 10 ML: 5 INJECTION INTRAVENOUS at 06:02

## 2022-01-01 RX ADMIN — FUROSEMIDE 40 MG: 10 INJECTION, SOLUTION INTRAMUSCULAR; INTRAVENOUS at 05:15

## 2022-01-01 RX ADMIN — HEPARIN SODIUM AND DEXTROSE 12 UNITS/KG/HR: 10000; 5 INJECTION INTRAVENOUS at 17:43

## 2022-01-01 RX ADMIN — IPRATROPIUM BROMIDE AND ALBUTEROL SULFATE 3 ML: .5; 3 SOLUTION RESPIRATORY (INHALATION) at 08:49

## 2022-01-01 RX ADMIN — NITROGLYCERIN 0.4 MG: 0.4 TABLET SUBLINGUAL at 07:31

## 2022-01-01 RX ADMIN — SODIUM CHLORIDE, PRESERVATIVE FREE 10 ML: 5 INJECTION INTRAVENOUS at 22:10

## 2022-01-01 RX ADMIN — ACETAZOLAMIDE 250 MG: 250 TABLET ORAL at 12:38

## 2022-01-01 RX ADMIN — Medication: at 08:29

## 2022-01-01 RX ADMIN — ARFORMOTEROL TARTRATE: 15 SOLUTION RESPIRATORY (INHALATION) at 20:48

## 2022-01-01 RX ADMIN — BUMETANIDE 2 MG: 0.25 INJECTION, SOLUTION INTRAMUSCULAR; INTRAVENOUS at 08:55

## 2022-01-01 RX ADMIN — HEPARIN SODIUM 5000 UNITS: 5000 INJECTION, SOLUTION INTRAVENOUS; SUBCUTANEOUS at 11:57

## 2022-01-01 RX ADMIN — SODIUM CHLORIDE 75 ML/HR: 9 INJECTION, SOLUTION INTRAVENOUS at 08:59

## 2022-01-01 RX ADMIN — ALPRAZOLAM 0.25 MG: 0.25 TABLET ORAL at 22:48

## 2022-01-01 RX ADMIN — CARVEDILOL 3.12 MG: 3.12 TABLET, FILM COATED ORAL at 08:50

## 2022-01-01 RX ADMIN — IPRATROPIUM BROMIDE AND ALBUTEROL SULFATE 3 ML: .5; 3 SOLUTION RESPIRATORY (INHALATION) at 19:04

## 2022-01-01 RX ADMIN — FUROSEMIDE 40 MG: 10 INJECTION, SOLUTION INTRAMUSCULAR; INTRAVENOUS at 14:08

## 2022-01-01 RX ADMIN — Medication: at 09:49

## 2022-01-01 RX ADMIN — TRAMADOL HYDROCHLORIDE 50 MG: 50 TABLET, COATED ORAL at 23:03

## 2022-01-01 RX ADMIN — IPRATROPIUM BROMIDE AND ALBUTEROL SULFATE 3 ML: .5; 3 SOLUTION RESPIRATORY (INHALATION) at 23:09

## 2022-01-01 RX ADMIN — LEVOTHYROXINE SODIUM 100 MCG: 100 TABLET ORAL at 06:08

## 2022-01-01 RX ADMIN — FUROSEMIDE 40 MG: 10 INJECTION, SOLUTION INTRAMUSCULAR; INTRAVENOUS at 21:13

## 2022-01-01 RX ADMIN — METHYLPREDNISOLONE SODIUM SUCCINATE 40 MG: 40 INJECTION, POWDER, FOR SOLUTION INTRAMUSCULAR; INTRAVENOUS at 21:13

## 2022-01-01 RX ADMIN — NITROGLYCERIN 0.4 MG: 0.4 TABLET SUBLINGUAL at 05:36

## 2022-01-01 RX ADMIN — METHYLPREDNISOLONE SODIUM SUCCINATE 40 MG: 40 INJECTION, POWDER, FOR SOLUTION INTRAMUSCULAR; INTRAVENOUS at 13:13

## 2022-01-01 RX ADMIN — IPRATROPIUM BROMIDE AND ALBUTEROL SULFATE 3 ML: .5; 3 SOLUTION RESPIRATORY (INHALATION) at 11:16

## 2022-01-01 RX ADMIN — AZITHROMYCIN MONOHYDRATE 500 MG: 500 INJECTION, POWDER, LYOPHILIZED, FOR SOLUTION INTRAVENOUS at 22:43

## 2022-01-01 RX ADMIN — IPRATROPIUM BROMIDE AND ALBUTEROL SULFATE 3 ML: .5; 3 SOLUTION RESPIRATORY (INHALATION) at 12:27

## 2022-01-01 RX ADMIN — SODIUM CHLORIDE, PRESERVATIVE FREE 10 ML: 5 INJECTION INTRAVENOUS at 14:30

## 2022-01-01 RX ADMIN — Medication: at 21:21

## 2022-01-01 RX ADMIN — IPRATROPIUM BROMIDE AND ALBUTEROL SULFATE 3 ML: .5; 3 SOLUTION RESPIRATORY (INHALATION) at 05:12

## 2022-01-01 RX ADMIN — Medication 3 UNITS: at 17:03

## 2022-01-01 RX ADMIN — METHYLPREDNISOLONE SODIUM SUCCINATE 40 MG: 40 INJECTION, POWDER, FOR SOLUTION INTRAMUSCULAR; INTRAVENOUS at 22:56

## 2022-01-01 RX ADMIN — IPRATROPIUM BROMIDE AND ALBUTEROL SULFATE 3 ML: .5; 3 SOLUTION RESPIRATORY (INHALATION) at 15:00

## 2022-01-01 RX ADMIN — FUROSEMIDE 40 MG: 10 INJECTION, SOLUTION INTRAMUSCULAR; INTRAVENOUS at 21:08

## 2022-01-01 RX ADMIN — Medication 3 UNITS: at 08:50

## 2022-01-01 RX ADMIN — LEVOTHYROXINE SODIUM 100 MCG: 100 TABLET ORAL at 06:41

## 2022-01-01 RX ADMIN — CARVEDILOL 6.25 MG: 6.25 TABLET, FILM COATED ORAL at 20:43

## 2022-01-01 RX ADMIN — Medication: at 21:13

## 2022-01-01 RX ADMIN — IPRATROPIUM BROMIDE AND ALBUTEROL SULFATE 3 ML: .5; 3 SOLUTION RESPIRATORY (INHALATION) at 19:55

## 2022-01-01 RX ADMIN — MORPHINE SULFATE 1 MG: 2 INJECTION, SOLUTION INTRAMUSCULAR; INTRAVENOUS at 04:13

## 2022-01-01 RX ADMIN — INSULIN GLARGINE 10 UNITS: 100 INJECTION, SOLUTION SUBCUTANEOUS at 22:56

## 2022-01-01 RX ADMIN — CLOPIDOGREL BISULFATE 75 MG: 75 TABLET ORAL at 08:50

## 2022-01-01 RX ADMIN — SODIUM CHLORIDE, PRESERVATIVE FREE 10 ML: 5 INJECTION INTRAVENOUS at 05:42

## 2022-01-01 RX ADMIN — FUROSEMIDE 40 MG: 10 INJECTION, SOLUTION INTRAMUSCULAR; INTRAVENOUS at 14:09

## 2022-01-01 RX ADMIN — ARFORMOTEROL TARTRATE: 15 SOLUTION RESPIRATORY (INHALATION) at 19:22

## 2022-01-01 RX ADMIN — IPRATROPIUM BROMIDE AND ALBUTEROL SULFATE 3 ML: .5; 3 SOLUTION RESPIRATORY (INHALATION) at 08:58

## 2022-01-01 RX ADMIN — METHYLPREDNISOLONE SODIUM SUCCINATE 40 MG: 40 INJECTION, POWDER, FOR SOLUTION INTRAMUSCULAR; INTRAVENOUS at 21:01

## 2022-01-01 RX ADMIN — Medication 0.2 MCG/KG/HR: at 14:00

## 2022-01-01 RX ADMIN — IPRATROPIUM BROMIDE AND ALBUTEROL SULFATE 3 ML: .5; 3 SOLUTION RESPIRATORY (INHALATION) at 03:01

## 2022-01-01 RX ADMIN — IPRATROPIUM BROMIDE AND ALBUTEROL SULFATE 3 ML: .5; 3 SOLUTION RESPIRATORY (INHALATION) at 03:17

## 2022-01-01 RX ADMIN — HEPARIN SODIUM 5000 UNITS: 5000 INJECTION, SOLUTION INTRAVENOUS; SUBCUTANEOUS at 04:45

## 2022-01-01 RX ADMIN — METHYLPREDNISOLONE SODIUM SUCCINATE 125 MG: 125 INJECTION, POWDER, FOR SOLUTION INTRAMUSCULAR; INTRAVENOUS at 17:20

## 2022-01-01 RX ADMIN — LORAZEPAM 1 MG: 2 INJECTION INTRAMUSCULAR; INTRAVENOUS at 17:14

## 2022-01-01 RX ADMIN — DIPHENHYDRAMINE HYDROCHLORIDE 50 MG: 50 INJECTION, SOLUTION INTRAMUSCULAR; INTRAVENOUS at 17:53

## 2022-01-01 RX ADMIN — FUROSEMIDE 40 MG: 10 INJECTION, SOLUTION INTRAMUSCULAR; INTRAVENOUS at 13:11

## 2022-01-01 RX ADMIN — SODIUM CHLORIDE, PRESERVATIVE FREE 10 ML: 5 INJECTION INTRAVENOUS at 21:07

## 2022-01-01 RX ADMIN — IPRATROPIUM BROMIDE AND ALBUTEROL SULFATE 3 ML: .5; 3 SOLUTION RESPIRATORY (INHALATION) at 23:24

## 2022-01-01 RX ADMIN — SODIUM CHLORIDE, PRESERVATIVE FREE 10 ML: 5 INJECTION INTRAVENOUS at 21:17

## 2022-01-01 RX ADMIN — SODIUM CHLORIDE, PRESERVATIVE FREE 10 ML: 5 INJECTION INTRAVENOUS at 05:17

## 2022-01-01 RX ADMIN — Medication: at 08:44

## 2022-01-01 RX ADMIN — Medication: at 09:06

## 2022-01-01 RX ADMIN — IPRATROPIUM BROMIDE AND ALBUTEROL SULFATE 3 ML: .5; 3 SOLUTION RESPIRATORY (INHALATION) at 11:28

## 2022-01-01 RX ADMIN — HEPARIN SODIUM 5000 UNITS: 5000 INJECTION, SOLUTION INTRAVENOUS; SUBCUTANEOUS at 04:29

## 2022-01-01 RX ADMIN — BUMETANIDE 2 MG: 0.25 INJECTION, SOLUTION INTRAMUSCULAR; INTRAVENOUS at 22:41

## 2022-01-01 RX ADMIN — SODIUM CHLORIDE, PRESERVATIVE FREE 10 ML: 5 INJECTION INTRAVENOUS at 14:10

## 2022-01-01 RX ADMIN — IPRATROPIUM BROMIDE AND ALBUTEROL SULFATE 3 ML: .5; 3 SOLUTION RESPIRATORY (INHALATION) at 12:19

## 2022-01-01 RX ADMIN — IPRATROPIUM BROMIDE AND ALBUTEROL SULFATE 3 ML: .5; 3 SOLUTION RESPIRATORY (INHALATION) at 07:16

## 2022-01-01 RX ADMIN — IPRATROPIUM BROMIDE AND ALBUTEROL SULFATE 3 ML: .5; 3 SOLUTION RESPIRATORY (INHALATION) at 15:59

## 2022-01-01 RX ADMIN — FUROSEMIDE 40 MG: 10 INJECTION, SOLUTION INTRAMUSCULAR; INTRAVENOUS at 10:05

## 2022-01-01 RX ADMIN — FUROSEMIDE 40 MG: 10 INJECTION, SOLUTION INTRAMUSCULAR; INTRAVENOUS at 06:08

## 2022-01-01 RX ADMIN — MELATONIN 6 MG: at 21:04

## 2022-01-01 RX ADMIN — IPRATROPIUM BROMIDE AND ALBUTEROL SULFATE 3 ML: .5; 3 SOLUTION RESPIRATORY (INHALATION) at 19:40

## 2022-01-01 RX ADMIN — IPRATROPIUM BROMIDE AND ALBUTEROL SULFATE 3 ML: .5; 3 SOLUTION RESPIRATORY (INHALATION) at 03:30

## 2022-01-01 RX ADMIN — METHYLPREDNISOLONE SODIUM SUCCINATE 40 MG: 40 INJECTION, POWDER, FOR SOLUTION INTRAMUSCULAR; INTRAVENOUS at 05:16

## 2022-01-01 RX ADMIN — Medication 10 UNITS: at 18:17

## 2022-01-01 RX ADMIN — Medication 3 UNITS: at 12:12

## 2022-01-01 RX ADMIN — AZITHROMYCIN MONOHYDRATE 500 MG: 500 INJECTION, POWDER, LYOPHILIZED, FOR SOLUTION INTRAVENOUS at 20:47

## 2022-01-01 RX ADMIN — IPRATROPIUM BROMIDE AND ALBUTEROL SULFATE 3 ML: .5; 3 SOLUTION RESPIRATORY (INHALATION) at 15:29

## 2022-01-01 RX ADMIN — HEPARIN SODIUM 5000 UNITS: 5000 INJECTION, SOLUTION INTRAVENOUS; SUBCUTANEOUS at 19:34

## 2022-01-01 RX ADMIN — IPRATROPIUM BROMIDE AND ALBUTEROL SULFATE 3 ML: .5; 3 SOLUTION RESPIRATORY (INHALATION) at 07:40

## 2022-01-01 RX ADMIN — LIDOCAINE HYDROCHLORIDE 40 ML: 20 SOLUTION OROPHARYNGEAL at 14:47

## 2022-01-01 RX ADMIN — IPRATROPIUM BROMIDE AND ALBUTEROL SULFATE 3 ML: .5; 3 SOLUTION RESPIRATORY (INHALATION) at 23:18

## 2022-01-01 RX ADMIN — Medication: at 20:54

## 2022-01-01 RX ADMIN — HEPARIN SODIUM 5000 UNITS: 5000 INJECTION, SOLUTION INTRAVENOUS; SUBCUTANEOUS at 06:08

## 2022-01-01 RX ADMIN — ALUMINUM HYDROXIDE AND MAGNESIUM HYDROXIDE 40 ML: 200; 200 SUSPENSION ORAL at 10:39

## 2022-01-01 RX ADMIN — Medication 0.8 MCG/KG/HR: at 01:00

## 2022-01-01 RX ADMIN — FUROSEMIDE 40 MG: 10 INJECTION, SOLUTION INTRAMUSCULAR; INTRAVENOUS at 06:01

## 2022-01-01 RX ADMIN — DEXTROSE MONOHYDRATE 25 G: 25 INJECTION, SOLUTION INTRAVENOUS at 05:15

## 2022-01-01 RX ADMIN — SODIUM CHLORIDE, PRESERVATIVE FREE 10 ML: 5 INJECTION INTRAVENOUS at 18:14

## 2022-01-01 RX ADMIN — Medication 2 UNITS: at 21:52

## 2022-01-01 RX ADMIN — METHYLPREDNISOLONE SODIUM SUCCINATE 40 MG: 40 INJECTION, POWDER, FOR SOLUTION INTRAMUSCULAR; INTRAVENOUS at 00:06

## 2022-01-01 RX ADMIN — HEPARIN SODIUM 5000 UNITS: 5000 INJECTION, SOLUTION INTRAVENOUS; SUBCUTANEOUS at 12:31

## 2022-01-01 RX ADMIN — CARVEDILOL 3.12 MG: 3.12 TABLET, FILM COATED ORAL at 18:09

## 2022-01-01 RX ADMIN — ARFORMOTEROL TARTRATE: 15 SOLUTION RESPIRATORY (INHALATION) at 19:47

## 2022-01-01 RX ADMIN — NITROGLYCERIN 10 MCG/MIN: 20 INJECTION INTRAVENOUS at 17:35

## 2022-01-01 RX ADMIN — Medication: at 10:02

## 2022-01-01 RX ADMIN — Medication 5 UNITS: at 16:03

## 2022-01-01 RX ADMIN — METHYLPREDNISOLONE SODIUM SUCCINATE 40 MG: 40 INJECTION, POWDER, FOR SOLUTION INTRAMUSCULAR; INTRAVENOUS at 06:08

## 2022-01-01 RX ADMIN — SODIUM CHLORIDE, PRESERVATIVE FREE 10 ML: 5 INJECTION INTRAVENOUS at 15:33

## 2022-01-01 RX ADMIN — SODIUM CHLORIDE, PRESERVATIVE FREE 10 ML: 5 INJECTION INTRAVENOUS at 06:11

## 2022-01-01 RX ADMIN — ARFORMOTEROL TARTRATE: 15 SOLUTION RESPIRATORY (INHALATION) at 20:11

## 2022-01-01 RX ADMIN — ASPIRIN 81 MG: 81 TABLET, COATED ORAL at 08:50

## 2022-01-01 RX ADMIN — HEPARIN SODIUM 5000 UNITS: 5000 INJECTION, SOLUTION INTRAVENOUS; SUBCUTANEOUS at 03:36

## 2022-01-01 RX ADMIN — METHYLPREDNISOLONE SODIUM SUCCINATE 40 MG: 40 INJECTION, POWDER, FOR SOLUTION INTRAMUSCULAR; INTRAVENOUS at 21:17

## 2022-01-01 RX ADMIN — ASPIRIN 81 MG: 81 TABLET, COATED ORAL at 09:00

## 2022-01-01 RX ADMIN — AZITHROMYCIN MONOHYDRATE 500 MG: 500 INJECTION, POWDER, LYOPHILIZED, FOR SOLUTION INTRAVENOUS at 19:50

## 2022-01-01 RX ADMIN — SODIUM CHLORIDE, PRESERVATIVE FREE 10 ML: 5 INJECTION INTRAVENOUS at 06:09

## 2022-01-01 RX ADMIN — SODIUM CHLORIDE, PRESERVATIVE FREE 10 ML: 5 INJECTION INTRAVENOUS at 05:16

## 2022-01-01 RX ADMIN — ROSUVASTATIN 20 MG: 20 TABLET, FILM COATED ORAL at 21:10

## 2022-01-01 RX ADMIN — Medication 400 MG: at 09:15

## 2022-01-01 RX ADMIN — HEPARIN SODIUM 5000 UNITS: 5000 INJECTION, SOLUTION INTRAVENOUS; SUBCUTANEOUS at 12:12

## 2022-01-01 RX ADMIN — FENTANYL CITRATE 25 MCG: 50 INJECTION, SOLUTION INTRAMUSCULAR; INTRAVENOUS at 00:12

## 2022-01-01 RX ADMIN — IPRATROPIUM BROMIDE AND ALBUTEROL SULFATE 3 ML: .5; 3 SOLUTION RESPIRATORY (INHALATION) at 23:08

## 2022-01-01 RX ADMIN — ALBUMIN (HUMAN) 25 G: 0.25 INJECTION, SOLUTION INTRAVENOUS at 22:55

## 2022-01-01 RX ADMIN — LEVOTHYROXINE SODIUM 100 MCG: 100 TABLET ORAL at 06:01

## 2022-01-01 RX ADMIN — DEXTROSE MONOHYDRATE 125 ML: 100 INJECTION, SOLUTION INTRAVENOUS at 20:59

## 2022-01-01 RX ADMIN — METHYLPREDNISOLONE SODIUM SUCCINATE 40 MG: 40 INJECTION, POWDER, FOR SOLUTION INTRAMUSCULAR; INTRAVENOUS at 06:41

## 2022-01-01 RX ADMIN — ARFORMOTEROL TARTRATE: 15 SOLUTION RESPIRATORY (INHALATION) at 19:55

## 2022-01-01 RX ADMIN — ARFORMOTEROL TARTRATE: 15 SOLUTION RESPIRATORY (INHALATION) at 08:58

## 2022-01-01 RX ADMIN — ROSUVASTATIN 20 MG: 20 TABLET, FILM COATED ORAL at 21:16

## 2022-01-01 RX ADMIN — LEVOTHYROXINE SODIUM 100 MCG: 100 TABLET ORAL at 05:16

## 2022-01-01 RX ADMIN — IPRATROPIUM BROMIDE AND ALBUTEROL SULFATE 3 ML: .5; 3 SOLUTION RESPIRATORY (INHALATION) at 03:55

## 2022-01-01 RX ADMIN — INSULIN GLARGINE 10 UNITS: 100 INJECTION, SOLUTION SUBCUTANEOUS at 22:43

## 2022-01-01 RX ADMIN — NITROGLYCERIN 0.4 MG: 0.4 TABLET SUBLINGUAL at 07:38

## 2022-01-01 RX ADMIN — SODIUM CHLORIDE, PRESERVATIVE FREE 10 ML: 5 INJECTION INTRAVENOUS at 21:01

## 2022-01-01 RX ADMIN — HEPARIN SODIUM 5000 UNITS: 5000 INJECTION, SOLUTION INTRAVENOUS; SUBCUTANEOUS at 06:09

## 2022-01-01 RX ADMIN — FUROSEMIDE 40 MG: 10 INJECTION, SOLUTION INTRAMUSCULAR; INTRAVENOUS at 05:16

## 2022-01-01 RX ADMIN — Medication 3 UNITS: at 12:04

## 2022-01-01 RX ADMIN — Medication: at 09:09

## 2022-01-01 RX ADMIN — ROSUVASTATIN 20 MG: 20 TABLET, FILM COATED ORAL at 22:56

## 2022-01-01 RX ADMIN — Medication 4 UNITS: at 12:38

## 2022-01-01 RX ADMIN — NITROGLYCERIN 0.4 MG: 0.4 TABLET SUBLINGUAL at 20:43

## 2022-01-01 RX ADMIN — METHYLPREDNISOLONE SODIUM SUCCINATE 40 MG: 40 INJECTION, POWDER, FOR SOLUTION INTRAMUSCULAR; INTRAVENOUS at 22:10

## 2022-01-01 RX ADMIN — IPRATROPIUM BROMIDE AND ALBUTEROL SULFATE 3 ML: .5; 3 SOLUTION RESPIRATORY (INHALATION) at 19:05

## 2022-01-01 RX ADMIN — METHYLPREDNISOLONE SODIUM SUCCINATE 40 MG: 40 INJECTION, POWDER, FOR SOLUTION INTRAMUSCULAR; INTRAVENOUS at 06:01

## 2022-01-01 RX ADMIN — SODIUM CHLORIDE, PRESERVATIVE FREE 10 ML: 5 INJECTION INTRAVENOUS at 17:04

## 2022-01-01 RX ADMIN — SODIUM CHLORIDE, PRESERVATIVE FREE 10 ML: 5 INJECTION INTRAVENOUS at 22:45

## 2022-01-01 RX ADMIN — SODIUM CHLORIDE, PRESERVATIVE FREE 10 ML: 5 INJECTION INTRAVENOUS at 21:04

## 2022-01-01 RX ADMIN — ASPIRIN 81 MG: 81 TABLET, COATED ORAL at 10:12

## 2022-01-01 RX ADMIN — ARFORMOTEROL TARTRATE: 15 SOLUTION RESPIRATORY (INHALATION) at 07:16

## 2022-01-01 RX ADMIN — Medication 3 UNITS: at 13:11

## 2022-01-01 RX ADMIN — METHYLPREDNISOLONE SODIUM SUCCINATE 40 MG: 40 INJECTION, POWDER, FOR SOLUTION INTRAMUSCULAR; INTRAVENOUS at 08:52

## 2022-01-01 RX ADMIN — ACETAZOLAMIDE 250 MG: 250 TABLET ORAL at 17:04

## 2022-01-01 RX ADMIN — ARFORMOTEROL TARTRATE: 15 SOLUTION RESPIRATORY (INHALATION) at 23:06

## 2022-01-01 RX ADMIN — MELATONIN 6 MG: at 21:10

## 2022-01-06 NOTE — PROGRESS NOTES
[facility]                                                                                  Meaghan Daniels DNP, ANP-BC    Cardiology Telemedicine Encounter                                                         Pursuant to the emergency declaration under the [facility] waiver authority and the [employer] Act, this Virtual  Visit was conducted, with patient's consent, to reduce the patient's risk of exposure to COVID-19 and provide continuity of care for an established patient. Services were provided through a video synchronous discussion virtually to substitute for in-person clinic visit. Subjective/HPI:   Leelee Loya is a 70 y.o. male with a history of CKD stage IV, insulin-dependent diabetes, diastolic heart failure, COPD who is post admission after presenting to advanced heart failure referred for diastolic heart failure was found to be hypoxic with increased effort of breathing at time of consult, was admitted to [facility] from the office. Who was seen by synchronous (real-time) audio-video technology on 1/7/2022. During admission patient underwent left and right heart cath, required PTCA stenting of the RCA. Started on antibiotic therapy for pneumonia. Patient presents on video exam today. No acute distress. Feels in the last 3 days his strength and energy is slowly improving. He has had virtual follow-up visit with pulmonary pending PFTs in February, has virtual visit with nephrology pending this Monday. He has maintained 1 mg Bumex twice a day, has reported mild lower extremity edema. Remains oxygen dependent 2-2.5 L nasal cannula, SPO2 95% on video exam.  This morning's blood pressure 109/53 heart rate 71. States felt his blood pressure was elevated at 5  mmHg on systolic readings, took his a.m. medications early and normalized.   Denies any numbness tingling or pain from the right radial access site. Maintaining dual antiplatelet therapy without any noticeable bleeding. He has been on dual antiplatelet therapy long-term. Hospital D/C Hospitalist Note  Per H and P   70y/o male with pmh of CKDIV, CHF,IDDM who presented with acute SOB. Patient with recent admission at [address] [place] [address] and discharged with diagnosis of acute diastolic heart failure.  Patient went home on 1 L of oxygen.  Patient presented to heart failure clinic 12/8 and found to be hypoxic with increased work of breathing and subsequently sent to the emergency room for further evaluation.  CT of the chest shows bilateral pulmonary infiltrates which was unchanged from his recent chest CT 11/30. silviarashaad Howard has seen pulmonology once outpatient for further evaluation and further ongoing investigation for that.        Patient was admitted and closely monitored. Required increased oxygen from his baseline at 2 to 4 L. Procalcitonin was elevated, patient had a leukocytosis and was started on cefepime and linezolid. Pulmonology was consulted. Patient additionally had an elevated troponin on admission, cardiology had been following. Patient continued to have shortness of breath which was concerning for an anginal equivalent, and decision was made after multiple discussions with family regarding risk of BIPIN given his CKD to proceed with cardiac catheterization. Subsequently he had a left heart cath on 12/16 that resulted in a ADARSH to the RCA. Patient following that felt much better in terms of his breathing, his O2 was able to be weaned back to his home oxygen. Patient had a few hypoglycemia events when his long acting insulin was reintroduced following hyperglycemia events. His glargine was held, and will be held on discharge. On the night prior to discharge she had blood sugar readings in the 50s which he was symptomatic and required treatment.   Patient was offered to stay and be monitored for an additional night to make sure he does not become hypoglycemic, however he refuses and would like to be discharged home. We will therefore discharge him on glucagon injection just in case he becomes hypoglycemic at home, and is unable to take p.o.     DISCHARGE DIAGNOSES / PLAN:       Acute on chronic hypoxic respiratory failure -  (1lNC baseline) likely CHF vs. CAP or other   -Pulmonology following  -s/p linezolid and cefepime started 12/14. Now on augmentin single agent. Will treat for total 7 days.   -Sputum cultures pending, not able to produce much     Acute on chronic diastolic congestive heart failure - NYHA class IV on admit  -Discontinued Norvasc due to leg edema, discontinued lisinopril due to BIPIN  -Previous echo at [facility] shows EF of 45 to 50%, repeat echo this admission shows EF 55 to 60%  -On hydralazine, Coreg. Resume bumex on DC   -Advanced heart failure team following     CKD stage IV  -Baseline serum creatinine 2.5-3 per nephrology  -Mild bump in creatinine due to diuresis  -Nephrology following, holding bumex as of now  -Monitor post LHC, and fluids per renal      NSTEMI   -s/p heparin gtt   - LHC 12/16 with ADARSH to RCA   - Contineu coreg, ASA, imdur, hydralazine, statin      Type 2 diabetes with hyperglycemia - A1c 9.3%  Hypoglycemia this am   -Hold lantus today, likely will DC home on 10U and have them titrate back to home dose based on BS. Will also DC home with glucagon injection   - DC D10 this PM given BS >200, and eating. Monitor closely.  - POCT glucose checks, hypoglycemia protocol, and SSI      Hypertension-Continue hydralazine, coreg and nitrates  Hypotension - responded to albumin previously.  Monitor   Dyslipidemia-Continue statin  Klebsiella UTI - s/p cefepime   Hypothyroidism-Continue Synthroid         12/19/21 Nephrology Note  Plan/Recommendations:  Stable for discharge from renal standpoint  Resume home regimen of Bumex 1mg BID  Follow up with Dr. Willow Arias in 2-3wks at our office  Coreg likely needs to be decreased-> defer to Cardiology  Avoid nephrotoxins     Discussed with patient and Dr. Yoli Zambrano MD   [facility]  [facility]iac [facility] 12/2021  Conclusion    Findings  1. Normal right and left-sided filling pressures  2. Normal PVR and SVR  3. Normal cardiac output  4. Patent stents in the LAD and circumflex. 5.  Distal to circumflex stent, chronic total occlusion of a relatively small to medium sized obtuse marginals to filling from collaterals from LAD  6. Severe disease in relatively small second diagonal  7.  80% stenosis in proximal RCA treated with placement of 3.25 x 33 mm Xience drug-eluting stent assessed by IVUS and postdilated as needed.     Access right radial no issues  Contrast 40 cc     Recommendations  1. Guideline directed medical therapy for secondary prevention of coronary events  2. Plavix based dual antiplatelet therapy rapid therapy for 6 months    ECHO 12/2021  Echo Findings    Left Ventricle Normal cavity size, wall thickness and systolic function (ejection fraction normal). Wall motion: normal. The estimated EF is 55 - 60%. Left Atrium Moderately dilated left atrium. Interatrial Septum No interatrial shunt visualized on color doppler. Right Ventricle Normal wall thickness and global systolic function. Mildly dilated right ventricle. Right Atrium Normal cavity size. Aortic Valve Severe aortic valve sclerosis with no evidence of reduced excursion. There is leaflet calcification. Aortic valve peak gradient is 27 mmHg. Aortic valve mean gradient is 14 mmHg. Aortic valve area is 1.8 cm2. There is mild aortic stenosis. Moderate aortic valve regurgitation. Mitral Valve Normal valve structure, no stenosis and no regurgitation. Moderate mitral annular calcification. Tricuspid Valve Normal valve structure, no stenosis and no regurgitation. Pulmonic Valve Pulmonic valve not well visualized, but normal doppler findings. No stenosis. Pulmonary Artery Normal pulmonary arteries. Aorta Normal aortic root. IVC/Hepatic Veins Normal structure. Pericardium No evidence of pericardial effusion     CT Chest 11/30/2021  IMPRESSION  Widespread patchy opacities, reticulations, and architectural distortion with  diffuse varicoid bronchiectasis and bronchial wall thickening. Additionally, few  enlarged mediastinal and hilar lymph nodes are noted. Constellation of findings  may reflect end-stage sarcoidosis or other interstitial lung disease, but a  component of superimposed acute infectious or inflammatory process versus  neoplasm cannot be excluded entirely based on imaging alone. Consider PET/CT for  further evaluation. 12/8/2021 CT Chest inpatient  FINDINGS:     CHEST WALL: No mass or axillary lymphadenopathy. THYROID: No nodule. MEDIASTINUM: Prominent nodes unchanged  IMELDA: No mass or lymphadenopathy. THORACIC AORTA: No aneurysm. MAIN PULMONARY ARTERY: Normal in caliber. TRACHEA/BRONCHI: Patent. ESOPHAGUS: No wall thickening or dilatation. HEART: Prominent in size  PLEURA: No effusion or pneumothorax. LUNGS: Diffuse interstitial infiltrates and emphysema unchanged. INCIDENTALLY IMAGED UPPER ABDOMEN: No significant abnormality in the  incidentally imaged upper abdomen.   BONES: No destructive bone lesion.     IMPRESSION  No Change  PCP Provider  David Ortega NP  Past Medical History:   Diagnosis Date    Aortic regurgitation     CAD (coronary artery disease)     Taxus stent 12/2005, 12/06 Cypher Prox circ, 6/2018 Sudarshan LADx2,  9/19 Sudarshan LAD & LCX, 1/20 [place]     Chronic combined systolic and diastolic congestive heart failure ([address] 11/14/2021    Chronic obstructive pulmonary disease ([address]     Congestive heart failure ([address]     Diabetes ([address]     Essential hypertension     Hyperlipidemia     MI (myocardial infarction) ([address]     Murmur     Renal insufficiency     Thyroid disease       Past Surgical History:   Procedure Laterality Date    HX CORONARY STENT PLACEMENT  HX CYST REMOVAL      IR ASP BLADDER SUPRA CATH       Allergies   Allergen Reactions    Bee Sting [Sting, Bee] Anaphylaxis    Iodinated Contrast Media Rash    Brilinta [Ticagrelor] Other (comments)    Effient [Prasugrel] Other (comments)    Ranexa [Ranolazine] Other (comments)    Sulfa (Sulfonamide Antibiotics) Rash    Penicillins Rash and Nausea Only      No family history on file. Current Outpatient Medications   Medication Sig    hydrALAZINE (APRESOLINE) 50 mg tablet Take 1 Tablet by mouth every eight (8) hours for 30 days.  isosorbide mononitrate ER (IMDUR) 60 mg CR tablet Take 1 Tablet by mouth daily for 30 days.  glucagon (GLUCAGEN) 1 mg injection 1 mL by IntraMUSCular route as needed for Hypoglycemia.  anastrozole (ARIMIDEX) 1 mg tablet Take 0.5 mg by mouth every Monday, Wednesday, Friday.  bumetanide (BUMEX) 1 mg tablet TAKE 1 TABLET BY MOUTH TWICE DAILY. REPLACES. LASIX    magnesium oxide 250 mg magnesium tablet Take 250 mg by mouth. Every other day - OTC    prasterone, dhea, 50 mg tab 50 mg = 1 tab each dose, PO, daily, # 30 tab, 0 Refills    carvediloL (COREG) 6.25 mg tablet Take 1 Tablet by mouth every twelve (12) hours.  rosuvastatin (CRESTOR) 20 mg tablet TAKE 1 TABLET BY MOUTH EVERY NIGHT    clopidogreL (PLAVIX) 75 mg tab TAKE 1 TABLET BY MOUTH EVERY DAY    Symbicort 80-4.5 mcg/actuation HFAA INHALE 2 PUFFS BY MOUTH TWICE DAILY IN THE MORNING AND IN THE EVENING    nitroglycerin (Nitrostat) 0.4 mg SL tablet 1 Tablet by SubLINGual route every five (5) minutes as needed for Chest Pain. Up to 3 doses.  albuterol (ProAir HFA) 90 mcg/actuation inhaler Take 2 Puffs by inhalation. 4-6 hrs as needed    BD Lali 2nd Gen Pen Needle 32 gauge x 5/32\" ndle USE WITH LANTUS    aspirin delayed-release 81 mg tablet Take 81 mg by mouth daily. At night    levothyroxine (SYNTHROID) 100 mcg tablet Take 100 mcg by mouth daily.  Morning    testosterone (ANDROGEL) 1.62 % (20.25 mg/1.25 gram) glpk 20.25 mg daily. 2 to 3 pumps alternating in the morning. No current facility-administered medications for this visit. There were no vitals filed for this visit. Social History     Socioeconomic History    Marital status:      Spouse name: Not on file    Number of children: Not on file    Years of education: Not on file    Highest education level: Not on file   Occupational History    Not on file   Tobacco Use    Smoking status: Former Smoker     Packs/day: 2.00     Years: 30.00     Pack years: 60.00     Quit date: 1991     Years since quittin.9    Smokeless tobacco: Never Used   Vaping Use    Vaping Use: Never used   Substance and Sexual Activity    Alcohol use: Yes     Comment: rarely    Drug use: Never    Sexual activity: Yes     Partners: Female   Other Topics Concern    Not on file   Social History Narrative    Not on file     Social Determinants of Health     Financial Resource Strain:     Difficulty of Paying Living Expenses: Not on file   Food Insecurity:     Worried About Running Out of Food in the Last Year: Not on file    Linda of Food in the Last Year: Not on file   Transportation Needs:     Lack of Transportation (Medical): Not on file    Lack of Transportation (Non-Medical):  Not on file   Physical Activity:     Days of Exercise per Week: Not on file    Minutes of Exercise per Session: Not on file   Stress:     Feeling of Stress : Not on file   Social Connections:     Frequency of Communication with Friends and Family: Not on file    Frequency of Social Gatherings with Friends and Family: Not on file    Attends [employer] Services: Not on file    Active Member of Clubs or Organizations: Not on file    Attends Club or Organization Meetings: Not on file    Marital Status: Not on file   Intimate Partner Violence:     Fear of Current or Ex-Partner: Not on file    Emotionally Abused: Not on file    Physically Abused: Not on file   [facility] Sexually Abused: Not on file   Housing Stability:     Unable to Pay for Housing in the Last Year: Not on file    Number of Kyra in the Last Year: Not on file    Unstable Housing in the Last Year: Not on file       Review of Symptoms  11 systems reviewed, negative other than as stated in the HPI    Physical Exam:    Due to this being a TeleHealth evaluation, many elements of the physical examination are unable to be assessed. General: Well developed, in no acute distress, cooperative and alert  HEENT: Pupils equal/round. No marked JVD visible on video. Respiratory: No audible wheezing, no signs of respiratory distress, lips non cyanotic. Nasal cannula in place. Pausing in between long sentences in order to take of breath. Extremities:  No edema  Neuro: A&Ox3, speech clear, answering questions appropriately  Skin: Skin color is normal. No rashes or lesions. Non diaphoretic on visible skin during exam right radial access site appears healing well, no ecchymosis or swelling. Cardiology Labs:  Lab Results   Component Value Date/Time    Cholesterol, total 76 12/10/2021 02:59 PM    HDL Cholesterol 28 12/10/2021 02:59 PM    LDL, calculated 25.4 12/10/2021 02:59 PM    Triglyceride 113 12/10/2021 02:59 PM    CHOL/HDL Ratio 2.7 12/10/2021 02:59 PM       Lab Results   Component Value Date/Time    Sodium 136 12/19/2021 03:14 AM    Potassium 4.4 12/19/2021 03:14 AM    Chloride 104 12/19/2021 03:14 AM    CO2 28 12/19/2021 03:14 AM    Anion gap 4 (L) 12/19/2021 03:14 AM    Glucose 90 12/19/2021 03:14 AM    BUN 72 (H) 12/19/2021 03:14 AM    Creatinine 2.46 (H) 12/19/2021 03:14 AM    BUN/Creatinine ratio 29 (H) 12/19/2021 03:14 AM    GFR est AA 32 (L) 12/19/2021 03:14 AM    GFR est non-AA 26 (L) 12/19/2021 03:14 AM    Calcium 8.3 (L) 12/19/2021 03:14 AM    Bilirubin, total 0.4 12/14/2021 04:51 AM    Alk.  phosphatase 116 12/14/2021 04:51 AM    Protein, total 6.6 12/14/2021 04:51 AM    Albumin 3.2 (L) 12/14/2021 04:51 AM Globulin 3.4 12/14/2021 04:51 AM    A-G Ratio 0.9 (L) 12/14/2021 04:51 AM    ALT (SGPT) 76 12/14/2021 04:51 AM         Assessment:     Diagnoses and all orders for this visit:    1. Chronic combined systolic and diastolic congestive heart failure ([facility]    2. Essential hypertension    3. Chronic obstructive pulmonary disease, unspecified COPD type ([facility]    4. Renal insufficiency        ICD-10-CM ICD-9-CM    1. Chronic combined systolic and diastolic congestive heart failure (HCC)  I50.42 428.42      428.0    2. Essential hypertension  I10 401.9    3. Chronic obstructive pulmonary disease, unspecified COPD type ([facility]  J44.9 496    4. Renal insufficiency  N28.9 593.9      No orders of the defined types were placed in this encounter. Plan:     1. Atherosclerotic heart disease: History of multivessel PTCA stenting, status post PTCA stenting of the RCA continue dual antiplatelet therapy, he has been on long-term dual antiplatelet therapy. Continue beta-blocker/antiplatelet/statin therapy. 2.  Hypertension: Continue carvedilol, hydralazine and Imdur. Amlodipine discontinued due to lower extremity edema, avoiding nephrotoxic medication. Blood pressure this morning 109/53  3. Diastolic heart failure: Continue Bumex 1 mg twice a day has follow-up pending next week with nephrology. May need intermittent higher dose to achieve euvolemic state. 4.  CKD stage IV: Followed by Dr. Macy Edwards, 12/19/2021 creatinine 2.46.  5.  COPD: Followed by Dr. Amber Washington, PFTs pending for February. 6.  Hyperlipidemia: LDL 26 continue statin therapy  7. Aortic stenosis: Mild 12/2021 echo  8. Aortic regurgitation: Mild 12/2021 echo    Follow-up with patient virtual in 3 months. We discussed the expected course, resolution and complications of the diagnosis(es) in detail. Medication risks, benefits, costs, interactions, and alternatives were discussed as indicated.   I advised him to contact the office if his condition worsens, changes or fails to improve as anticipated. He expressed understanding with the diagnosis(es) and plan  Emmie Sarmiento NP    Greater than 20 minutes was spent in direct video patient care, planning and chart review. This visit was conducted using [employer] telemedicine services.

## 2022-03-04 PROBLEM — J96.02 ACUTE RESPIRATORY FAILURE WITH HYPERCAPNIA (HCC): Status: ACTIVE | Noted: 2022-01-01

## 2022-03-04 NOTE — ED PROVIDER NOTES
EMERGENCY DEPARTMENT HISTORY AND PHYSICAL EXAM      Date: 3/4/2022  Patient Name: Radha Sauceda    History of Presenting Illness     Chief Complaint   Patient presents with    Shortness of Breath     Patient in via EMS from home for syncopal episode at home. Per EMS patient was found on toilet passed out lw tih episode lasting for less than 30 seconds. EMS arrived and found pateint having shortness of breath. Patient was placed on oxygen via NC@ 2,  then 6 liters and then CPAP Saturations     Syncope     Saturations of 80%  with oxygen, Was given 40 Lasix and 1 inch Nitro paste by EMS. Patient arrived on CPAPA machine       History Provided By: Patient    HPI: Radha Sauceda, 70 y.o. male with past medical history of hypertension, CHF, CAD s/p multiple PCI, COPD, diabetes, presents to the ED with cc of respiratory distress. Per EMS, patient was found at home after passing out for less than 30 seconds on the toilet. On EMS arrival, they noted that patient was extremely dyspneic, hypoxic with saturations of 80%. He was subsequently placed on 6 L of nasal cannula O2, which was then escalated to CPAP due to ongoing work of breathing. Patient was administered 1 inch Nitropaste as well as 40 mg of IV Lasix prior to arrival.  He was immediately transitioned to BiPAP upon arrival to the ED. Currently states that he is feeling much better since being on the BiPAP. Denies any chest pain. States that he has been feeling progressively more short of breath over the past several days. Endorses orthopnea, and increased bilateral lower extremity edema. He denies any weight gain. Reports some intermittent mild cough. Denies fevers, chills. He has no chest pain. States that he has not been around anybody sick. He reports compliance with his diuretic medication use. PCP: Zully Broussard NP    No current facility-administered medications on file prior to encounter.      Current Outpatient Medications on File Prior to Encounter   Medication Sig Dispense Refill    rosuvastatin (CRESTOR) 20 mg tablet TAKE 1 TABLET BY MOUTH EVERY NIGHT 90 Tablet 3    glucagon (GLUCAGEN) 1 mg injection 1 mL by IntraMUSCular route as needed for Hypoglycemia. (Patient not taking: Reported on 1/7/2022) 1 Vial 0    anastrozole (ARIMIDEX) 1 mg tablet Take 0.5 mg by mouth every Monday, Wednesday, Friday.  bumetanide (BUMEX) 1 mg tablet TAKE 1 TABLET BY MOUTH TWICE DAILY. REPLACES. LASIX 180 Tablet 0    magnesium oxide 250 mg magnesium tablet Take 250 mg by mouth. Every other day - OTC      prasterone, dhea, 50 mg tab 50 mg = 1 tab each dose, PO, daily, # 30 tab, 0 Refills      carvediloL (COREG) 6.25 mg tablet Take 1 Tablet by mouth every twelve (12) hours. 180 Tablet 1    clopidogreL (PLAVIX) 75 mg tab TAKE 1 TABLET BY MOUTH EVERY DAY 90 Tablet 3    Symbicort 80-4.5 mcg/actuation HFAA INHALE 2 PUFFS BY MOUTH TWICE DAILY IN THE MORNING AND IN THE EVENING      nitroglycerin (Nitrostat) 0.4 mg SL tablet 1 Tablet by SubLINGual route every five (5) minutes as needed for Chest Pain. Up to 3 doses. 25 Tablet 1    albuterol (ProAir HFA) 90 mcg/actuation inhaler Take 2 Puffs by inhalation. 4-6 hrs as needed      BD Lali 2nd Gen Pen Needle 32 gauge x 5/32\" ndle USE WITH LANTUS      aspirin delayed-release 81 mg tablet Take 81 mg by mouth daily. At night      levothyroxine (SYNTHROID) 100 mcg tablet Take 100 mcg by mouth daily. Morning      testosterone (ANDROGEL) 1.62 % (20.25 mg/1.25 gram) glpk 20.25 mg daily. 2 to 3 pumps alternating in the morning.          Past History     Past Medical History:  Past Medical History:   Diagnosis Date    Aortic regurgitation     CAD (coronary artery disease)     Taxus stent 12/2005, 12/06 Cypher Prox circ, 6/2018 Sandoval LADx2,  9/19 Sudarshan LAD & LCX, 1/20 Sandoval     Chronic combined systolic and diastolic congestive heart failure ([address] 11/14/2021    Chronic obstructive pulmonary disease (HCC)     Congestive heart failure (HCC)     Diabetes [facility]     Essential hypertension     Hyperlipidemia     MI (myocardial infarction) ([facility] [address]     Murmur     Renal insufficiency     Thyroid disease        Past Surgical History:  Past Surgical History:   Procedure Laterality Date    HX CORONARY STENT PLACEMENT      HX CYST REMOVAL      IR ASP BLADDER SUPRA CATH         Family History:  No family history on file. Social History:  Social History     Tobacco Use    Smoking status: Former Smoker     Packs/day: 2.00     Years: 30.00     Pack years: 60.00     Quit date: 1991     Years since quittin.1    Smokeless tobacco: Never Used   Vaping Use    Vaping Use: Never used   Substance Use Topics    Alcohol use: Yes     Comment: rarely    Drug use: Never       Allergies: Allergies   Allergen Reactions    Bee Sting [Sting, Bee] Anaphylaxis    Iodinated Contrast Media Rash    Brilinta [Ticagrelor] Other (comments)    Effient [Prasugrel] Other (comments)    Ranexa [Ranolazine] Other (comments)    Red Dye Hives    Sulfa (Sulfonamide Antibiotics) Rash    Penicillins Rash and Nausea Only         Review of Systems   Review of Systems   Constitutional: Negative for chills and fever. HENT: Negative for congestion and rhinorrhea. Eyes: Negative for visual disturbance. Respiratory: Positive for cough and shortness of breath. Negative for chest tightness and wheezing. Orthopnea   Cardiovascular: Positive for leg swelling. Negative for chest pain and palpitations. Gastrointestinal: Negative for abdominal pain, diarrhea, nausea and vomiting. Genitourinary: Negative for dysuria, flank pain and hematuria. Musculoskeletal: Negative for back pain and neck pain. Skin: Negative for rash. Allergic/Immunologic: Negative for immunocompromised state. Neurological: Negative for dizziness, speech difficulty, weakness and headaches. Hematological: Negative for adenopathy.    Psychiatric/Behavioral: Negative for dysphoric mood and suicidal ideas. Physical Exam   Physical Exam  Vitals and nursing note reviewed. Constitutional:       General: He is not in acute distress. Appearance: Normal appearance. He is not ill-appearing or toxic-appearing. HENT:      Head: Normocephalic and atraumatic. Nose: Nose normal.      Mouth/Throat:      Mouth: Mucous membranes are moist.   Eyes:      Extraocular Movements: Extraocular movements intact. Pupils: Pupils are equal, round, and reactive to light. Cardiovascular:      Rate and Rhythm: Normal rate and regular rhythm. Pulses: Normal pulses. Pulmonary:      Effort: Pulmonary effort is normal. Tachypnea present. No respiratory distress. Breath sounds: No stridor. Decreased breath sounds and rales present. No wheezing or rhonchi. Abdominal:      General: Abdomen is flat. There is no distension. Palpations: There is no mass. Tenderness: There is no abdominal tenderness. Musculoskeletal:         General: Normal range of motion. Cervical back: Normal range of motion and neck supple. Right lower leg: No tenderness. 2+ Pitting Edema present. Left lower leg: No tenderness. 2+ Pitting Edema present. Skin:     General: Skin is warm and dry. Neurological:      General: No focal deficit present. Mental Status: He is alert. Mental status is at baseline. Sensory: No sensory deficit. Motor: No weakness.          Diagnostic Study Results     Labs -     Recent Results (from the past 24 hour(s))   CBC WITH AUTOMATED DIFF    Collection Time: 03/04/22  4:08 PM   Result Value Ref Range    WBC 10.0 4.1 - 11.1 K/uL    RBC 3.16 (L) 4.10 - 5.70 M/uL    HGB 9.6 (L) 12.1 - 17.0 g/dL    HCT 31.8 (L) 36.6 - 50.3 %    .6 (H) 80.0 - 99.0 FL    MCH 30.4 26.0 - 34.0 PG    MCHC 30.2 30.0 - 36.5 g/dL    RDW 13.0 11.5 - 14.5 %    PLATELET 513 427 - 347 K/uL    MPV 10.4 8.9 - 12.9 FL    NRBC 0.0 0  WBC    ABSOLUTE NRBC 0.00 0.00 - 0.01 K/uL    NEUTROPHILS 77 (H) 32 - 75 %    LYMPHOCYTES 7 (L) 12 - 49 %    MONOCYTES 8 5 - 13 %    EOSINOPHILS 6 0 - 7 %    BASOPHILS 1 0 - 1 %    IMMATURE GRANULOCYTES 1 (H) 0.0 - 0.5 %    ABS. NEUTROPHILS 7.7 1.8 - 8.0 K/UL    ABS. LYMPHOCYTES 0.7 (L) 0.8 - 3.5 K/UL    ABS. MONOCYTES 0.8 0.0 - 1.0 K/UL    ABS. EOSINOPHILS 0.6 (H) 0.0 - 0.4 K/UL    ABS. BASOPHILS 0.1 0.0 - 0.1 K/UL    ABS. IMM. GRANS. 0.1 (H) 0.00 - 0.04 K/UL    DF SMEAR SCANNED      RBC COMMENTS NORMOCYTIC, NORMOCHROMIC     METABOLIC PANEL, COMPREHENSIVE    Collection Time: 03/04/22  4:08 PM   Result Value Ref Range    Sodium 136 136 - 145 mmol/L    Potassium 4.4 3.5 - 5.1 mmol/L    Chloride 93 (L) 97 - 108 mmol/L    CO2 41 (HH) 21 - 32 mmol/L    Anion gap 2 (L) 5 - 15 mmol/L    Glucose 240 (H) 65 - 100 mg/dL    BUN 50 (H) 6 - 20 MG/DL    Creatinine 2.48 (H) 0.70 - 1.30 MG/DL    BUN/Creatinine ratio 20 12 - 20      GFR est AA 31 (L) >60 ml/min/1.73m2    GFR est non-AA 26 (L) >60 ml/min/1.73m2    Calcium 8.7 8.5 - 10.1 MG/DL    Bilirubin, total 0.3 0.2 - 1.0 MG/DL    ALT (SGPT) 34 12 - 78 U/L    AST (SGOT) 25 15 - 37 U/L    Alk. phosphatase 131 (H) 45 - 117 U/L    Protein, total 7.1 6.4 - 8.2 g/dL    Albumin 2.7 (L) 3.5 - 5.0 g/dL    Globulin 4.4 (H) 2.0 - 4.0 g/dL    A-G Ratio 0.6 (L) 1.1 - 2.2     TROPONIN-HIGH SENSITIVITY    Collection Time: 03/04/22  4:08 PM   Result Value Ref Range    Troponin-High Sensitivity 17 0 - 76 ng/L   NT-PRO BNP    Collection Time: 03/04/22  4:08 PM   Result Value Ref Range    NT pro-BNP 2,103 (H) <125 PG/ML   D DIMER    Collection Time: 03/04/22  4:08 PM   Result Value Ref Range    D-dimer 1.17 (H) 0.00 - 0.65 mg/L FEU   SAMPLES BEING HELD    Collection Time: 03/04/22  4:08 PM   Result Value Ref Range    SAMPLES BEING HELD SST, RED     COMMENT        Add-on orders for these samples will be processed based on acceptable specimen integrity and analyte stability, which may vary by analyte.    BLOOD GAS,CHEM8,LACTIC ACID POC    Collection Time: 03/04/22  4:10 PM   Result Value Ref Range    Calcium, ionized (POC) 1.18 1.12 - 1.32 mmol/L    BICARBONATE 43 mmol/L    Base excess (POC) 13.1 mmol/L    Sample source VENOUS BLOOD      CO2, POC 44 (HH) 19 - 24 MMOL/L    Sodium,  136 - 145 MMOL/L    Potassium, POC 4.7 3.5 - 5.5 MMOL/L    Chloride, POC 89 (L) 100 - 108 MMOL/L    Glucose,  (H) 74 - 106 MG/DL    Creatinine, POC 2.9 (H) 0.6 - 1.3 MG/DL    Lactic Acid (POC) 0.86 0.40 - 2.00 mmol/L    Critical value read back GENESIS     pH, venous (POC) 7.30 (L) 7.32 - 7.42      pCO2, venous (POC) 88.1 (H) 41 - 51 MMHG    pO2, venous (POC) 22 (L) 25 - 40 mmHg   COVID-19 RAPID TEST    Collection Time: 03/04/22  4:20 PM   Result Value Ref Range    Specimen source Nasopharyngeal      COVID-19 rapid test Not detected NOTD     EKG, 12 LEAD, INITIAL    Collection Time: 03/04/22  4:23 PM   Result Value Ref Range    Ventricular Rate 75 BPM    Atrial Rate 75 BPM    P-R Interval 152 ms    QRS Duration 112 ms    Q-T Interval 412 ms    QTC Calculation (Bezet) 460 ms    Calculated P Axis 34 degrees    Calculated R Axis -33 degrees    Calculated T Axis 110 degrees    Diagnosis       Normal sinus rhythm  Left axis deviation  Incomplete left bundle branch block  Voltage criteria for left ventricular hypertrophy  T wave abnormality, consider lateral ischemia  When compared with ECG of 16-DEC-2021 13:20,  No significant change was found         Radiologic Studies -   CT CHEST WO CONT   Final Result   1. Small left pleural effusion. 2. Probable mild scattered groundglass lung opacities may represent pulmonary   edema or nonspecific infection/inflammation. There is a stable background of   chronic fibrotic change. 3. Stable cardiomegaly. CT HEAD WO CONT   Final Result   No acute intracranial abnormality.          DUPLEX LOWER EXT VENOUS BILAT   Final Result      NM LUNG SCAN PERF   Final Result   Intermediate probability for pulmonary embolism due to chronic lung   disease. No high probability features. Agree with preliminary report. XR CHEST PORT   Final Result   No change in appearance of low lung volumes and nonspecific severe   bilateral pulmonary opacifications. CT Results  (Last 48 hours)    None        CXR Results  (Last 48 hours)               03/04/22 1616  XR CHEST PORT Final result    Impression:  No change in appearance of low lung volumes and nonspecific severe   bilateral pulmonary opacifications. Narrative:  EXAM: XR CHEST PORT       INDICATION: Shortness of breath       COMPARISON: 12/14/2021       FINDINGS: A portable AP radiograph of the chest was obtained at 1604 hours. Low   lung varus again shown. Severe bilateral pulmonary opacification, asymmetrically   greater on the left, appears unchanged in the interval. No pneumothorax or   substantial pleural effusion is suggested. Cardiac and mediastinal contours   remain obscured. The bones are mildly osteopenic. .                  Celio Singh MD am the first provider for this patient, and I am the attending of record for this patient encounter. I reviewed the vital signs, available nursing notes, past medical history, past surgical history, family history and social history. Vital Signs-Reviewed the patient's vital signs. Patient Vitals for the past 24 hrs:   Temp Pulse Resp BP SpO2   03/04/22 1630     95 %   03/04/22 1621  70 (!) 33 (!) 145/46 100 %   03/04/22 1612     100 %   03/04/22 1611 98.4 °F (36.9 °C) 74 (!) 36 (!) 154/48 95 %       EKG interpretation: (Preliminary)  Normal sinus rhythm, 75 bpm, no significant acute ST changes when compared to previous EKG.   QTc is normal. EKG interpretation by David Gay MD    Records Reviewed: Nursing Notes and Old Medical Records    Provider Notes (Medical Decision Making):   DDx: CHF exacerbation, COPD exacerbation, COVID-19 infection, viral URI, bacterial pneumonia, CO2 narcosis, PE, ACS, etc.    We will check VBG, lactic acid, EKG, troponin, proBNP, D-dimer, basic labs, rapid Covid, chest x-ray. Patient has evidence of respiratory acidosis with pH of 7.3, PCO2 elevated at 88. Likely secondary to component of COPD exacerbation. Patient is administered DuoNeb x3, 125 mg of IV Solu-Medrol. proBNP is elevated at 2103. Troponin 17. D-dimer elevated 1.17. Chest x-ray show nonspecific severe bilateral pulmonary opacifications. Suspect this is secondary to pulmonary edema rather than pneumonia. Patient has already received IV Lasix with EMS. Unfortunately, due to patient's renal function, he cannot undergo CTA chest study to rule out for PE. Will order VQ scan. He will require admission. Case discussed with hospitalist, who has accepted the patient for admission in stable condition. CRITICAL CARE NOTE:    Authorized and Performed by: Americo Adames MD    IMPENDING DETERIORATION -Airway, Respiratory, Cardiovascular, Metabolic and Renal  ASSOCIATED RISK FACTORS - Hypotension, Shock, Hypoxia, Dysrhythmia, Metabolic changes and Vascular Compromise  MANAGEMENT- Bedside Assessment  INTERPRETATION -  Xrays, Blood Gases, ECG, Blood Pressure, Cardiac Output Measures, pulse ox, and all labs  INTERVENTIONS - bipap, duoneb x3, solumedrol  CASE REVIEW - Hospitalist/Intensivist, Nursing and Family  TREATMENT RESPONSE -Improved and Stable  PERFORMED BY - Self    I have spent 100 minutes of critical care time involved in obtaining a history, examining the patient, lab review, arranging urgent treatment with development of a management plan, consultations with other providers, family decision- making, bedside attention and documentation. During this entire length of time I was immediately available to the patient . Critical Care:  The reason for providing this level of medical care for this critically ill patient was due to a critical illness that impaired one or more vital organ systems, such that there was a high probability of imminent or life threatening deterioration in the patient's condition. This care involved high complexity decision making to assess, manipulate, and support vital system functions, to treat this degree of vital organ system failure, and to prevent further life threatening deterioration of the patients condition. Critical care time was exclusive of separately billable procedures. ED Course:   Initial assessment performed. The patient's presenting problems have been discussed, and they are in agreement with the care plan formulated and outlined with them. I have encouraged them to ask questions as they arise throughout their visit. Walt Swan MD      Disposition:  Admit      Diagnosis     Clinical Impression:   1. Acute respiratory failure with hypoxia and hypercapnia (HCC)    2. Acute on chronic congestive heart failure, unspecified heart failure type ([address]    3. COPD exacerbation ([address]        Attestations:    Walt Swan MD    Please note that this dictation was completed with [employer], the computer voice recognition software. Quite often unanticipated grammatical, syntax, homophones, and other interpretive errors are inadvertently transcribed by the computer software. Please disregard these errors. Please excuse any errors that have escaped final proofreading. Thank you.

## 2022-03-04 NOTE — Clinical Note
TRANSFER - OUT REPORT:     Verbal report given to: Alexander Conway, RN. Report consisted of patient's Situation, Background, Assessment and   Recommendations(SBAR). Opportunity for questions and clarification was provided. Patient transported with a Registered Nurse. Patient transported to: CCU, 2548.

## 2022-03-05 PROBLEM — I25.10 CORONARY ARTERIOSCLEROSIS: Chronic | Status: ACTIVE | Noted: 2018-07-03

## 2022-03-05 NOTE — CONSULTS
Subjective:                   [address], [place], [address] [facility]  [phone]    PLEASE NOTE THAT WE CONFIRMED WITH THE REFERRING PHYSICIAN PRIOR TO SEEING THE PATIENT THAT THE PATIENT IS BEING REFERRED FOR INITIAL HOSPITAL EVALUATION AND FOR LONG-TERM ONGOING CARDIAC CARE. Date of  Admission: 3/4/2022  4:00 PM     Admission type:Emergency    Moisés Montgomery is a 70 y.o. male admitted for Acute respiratory failure with hypercapnia ([address] [J96.02]. Pt with known cad. Sp pci at [facility] in dec of rca lesion. Small lesions noted at that time, not amenable to pci and it was decided to treat medically. Per wife, patient has been fine since. He presented with sob/copd exacerbation. Called this am b/c of right sided chest pain that improved with nitro and morphine but bp has dropped into the 80s. Pt is unable to provide hx. Info obtained from review of chart, d/w wife and rn.      Patient Active Problem List    Diagnosis Date Noted    Acute respiratory failure with hypercapnia ([address] 03/04/2022    CHF (congestive heart failure) ([address] 12/09/2021    Chronic combined systolic and diastolic congestive heart failure ([address] 11/14/2021    Renal insufficiency     Chronic obstructive pulmonary disease ([address]     Diabetes ([address]     Essential hypertension     Aortic regurgitation     NSTEMI (non-ST elevated myocardial infarction) ([address]     Coronary arteriosclerosis 07/03/2018      Dimitris Garcia NP  Past Medical History:   Diagnosis Date    Aortic regurgitation     CAD (coronary artery disease)     Taxus stent 12/2005, 12/06 Cypher Prox circ, 6/2018 Sudarshan LADx2,  9/19 [place] LAD & LCX, 1/20 Sudarshan     Chronic combined systolic and diastolic congestive heart failure ([address] 11/14/2021    Chronic obstructive pulmonary disease (HCC)     Congestive heart failure ([address]     Diabetes ([address]     Essential hypertension     Hyperlipidemia     MI (myocardial infarction) ([address]     Murmur     Renal insufficiency  Thyroid disease       Past Surgical History:   Procedure Laterality Date    HX CORONARY STENT PLACEMENT      HX CYST REMOVAL      IR ASP BLADDER SUPRA CATH       Allergies   Allergen Reactions    Bee Sting [Sting, Bee] Anaphylaxis    Iodinated Contrast Media Rash    Brilinta [Ticagrelor] Other (comments)    Effient [Prasugrel] Other (comments)    Ranexa [Ranolazine] Other (comments)    Red Dye Hives    Sulfa (Sulfonamide Antibiotics) Rash    Penicillins Rash and Nausea Only      Social History     Tobacco Use    Smoking status: Former Smoker     Packs/day: 2.00     Years: 30.00     Pack years: 60.00     Quit date: 1991     Years since quittin.1    Smokeless tobacco: Never Used   Vaping Use    Vaping Use: Never used   Substance Use Topics    Alcohol use: Yes     Comment: rarely    Drug use: Never     No family history on file.    Current Facility-Administered Medications   Medication Dose Route Frequency    ALPRAZolam (XANAX) tablet 0.25 mg  0.25 mg Oral TID PRN    morphine injection 1 mg  1 mg IntraVENous Q4H PRN    acetaminophen (TYLENOL) tablet 650 mg  650 mg Oral Q6H PRN    ondansetron (ZOFRAN) injection 4 mg  4 mg IntraVENous Q4H PRN    [START ON 3/7/2022] anastrozole (ARIMIDEX) tablet 0.5 mg  0.5 mg Oral Q MON, WED & FRI    aspirin delayed-release tablet 81 mg  81 mg Oral DAILY    carvediloL (COREG) tablet 6.25 mg  6.25 mg Oral Q12H    clopidogreL (PLAVIX) tablet 75 mg  75 mg Oral DAILY    levothyroxine (SYNTHROID) tablet 100 mcg  100 mcg Oral DAILY    magnesium oxide (MAG-OX) tablet 400 mg  400 mg Oral Q48H    rosuvastatin (CRESTOR) tablet 20 mg  20 mg Oral QHS    arformoterol 15 mcg/budesonide 0.5 mg neb solution   Nebulization BID RT    sodium chloride (NS) flush 5-40 mL  5-40 mL IntraVENous Q8H    sodium chloride (NS) flush 5-40 mL  5-40 mL IntraVENous PRN    polyethylene glycol (MIRALAX) packet 17 g  17 g Oral DAILY PRN    enoxaparin (LOVENOX) injection 30 mg 30 mg SubCUTAneous DAILY    methylPREDNISolone (PF) (SOLU-MEDROL) injection 40 mg  40 mg IntraVENous Q6H    bumetanide (BUMEX) injection 2 mg  2 mg IntraVENous BID    albuterol-ipratropium (DUO-NEB) 2.5 MG-0.5 MG/3 ML  3 mL Nebulization Q4H RT    azithromycin (ZITHROMAX) 500 mg in 0.9% sodium chloride 250 mL (VIAL-MATE)  500 mg IntraVENous Q24H    hydrALAZINE (APRESOLINE) tablet 50 mg  50 mg Oral TID    nitroglycerin (NITROSTAT) tablet 0.4 mg  0.4 mg SubLINGual PRN    insulin glargine (LANTUS) injection 10 Units  10 Units SubCUTAneous QHS    insulin lispro (HUMALOG) injection   SubCUTAneous AC&HS    glucose chewable tablet 16 g  4 Tablet Oral PRN    glucagon (GLUCAGEN) injection 1 mg  1 mg IntraMUSCular PRN    dextrose 10% infusion 0-250 mL  0-250 mL IntraVENous PRN    hydrALAZINE (APRESOLINE) 20 mg/mL injection 10 mg  10 mg IntraVENous Q6H PRN         Review of Symptoms:  uto     Subjective:      Visit Vitals  BP (!) 86/56   Pulse 95   Temp 97.9 °F (36.6 °C)   Resp 25   Ht 5' 5\" (1.651 m)   Wt 122 lb 9.2 oz (55.6 kg)   SpO2 98%   BMI 20.40 kg/m²       Physical Exam:  General:  Alert, cooperative, well noursished, well developed, appears older than stated age   Eyes:  Sclera anicteric. Pupils equally round and reactive to light. Mouth/Throat: Mucous membranes normal, oral pharynx clear   Neck: Supple   Lungs:   Poor effort, decreased bs throughout   CV:  Regular rate and rhythm,no murmur, click, rub or gallop   Abdomen:   Soft, non-tender.  bowel sounds normal. non-distended   Extremities: No cyanosis or edema   Skin: Skin color, texture, turgor normal. no acute rash or lesions   Lymph nodes: Cervical and supraclavicular normal   Musculoskeletal: No swelling or deformity       Psych: Alert and oriented, normal mood affect given the setting       Cardiographics    Telemetry: nsr    ECG: nsr, no acute st/t changes, incomplete lbbb    Echocardiogram: lvef 50-55 in december    Labs:  Recent Labs 03/05/22  0350 03/04/22  1608   WBC 6.0 10.0   HGB 9.7* 9.6*   HCT 31.1* 31.8*    228     Recent Labs     03/05/22  0350 03/04/22  1608   * 136   K 4.9 4.4   CL 95* 93*   CO2 36* 41*   * 240*   BUN 57* 50*   CREA 2.44* 2.48*   CA 8.5 8.7   ALB  --  2.7*   TBILI  --  0.3   ALT  --  34       No results for input(s): TROIQ, CPK, CKMB in the last 72 hours. Intake/Output Summary (Last 24 hours) at 3/5/2022 1026  Last data filed at 3/5/2022 0302  Gross per 24 hour   Intake    Output 550 ml   Net -550 ml         Assessment:     Assessment:       Active Problems:    Coronary arteriosclerosis (7/3/2018)      Renal insufficiency ()      Chronic obstructive pulmonary disease (HCC) ()      Diabetes ([facility] ()      Essential hypertension ()      Acute respiratory failure with hypercapnia ([facility] (3/4/2022)         Plan:     Kizzy Geller is a pleasant gentleman with cad, dm, ckd and copd. He is here for copd exacerbation and had some right sided cp (different than what he had prior to pci in December). I reviewed the ekg - no acute changes noted. I reviewed the echo and cath report from dec. There are small lesions in branches that are not amenable to pci. Still, I do not think that they are the cause of these different symptoms. His trop is wnl and his ekg is unchanged. I would cycle another trop. If that is wnl, I do not see the need for cardiac w/u. I would hold off on treating these symptoms with further nitro or morphine secondary to the resultant hypotension in the 80s. Cont treatment of his copd. D/w wife and rn. Thank you for this consultation.  Can f/u with Dr Chacha Carrington as Sohail Wisdom MD, [facility]    3/5/2022

## 2022-03-05 NOTE — PROGRESS NOTES
03/05/22 0348   Vitals   Temp 97.6 °F (36.4 °C)   Temp Source Axillary   Pulse (Heart Rate) 90   Heart Rate Source Monitor   Resp Rate (!) 38   O2 Sat (%) 99 %   Level of Consciousness Alert (0)   /72   MAP (Calculated) 93   MEWS Score 3   Pt has been tachypneic all shift. NP notified, 1 time dose of morphine given for anxiety/restlessness.

## 2022-03-05 NOTE — PROGRESS NOTES
Speech pathology note  Reviewed chart and note orders for SLP received due to, \"coughs with sips of water, aspirartion risk evaluation. \" Note patient was on BiPAP when order placed. Recommend hold PO while patient requiring BiPAP, as both respiratory status necessitating BiPAP and mechanics of BiPAP both place patient at high risk for aspiration. Note RRT called this morning due to chest pain, and stat cardiology consult placed. SLP will hold for now and follow for swallowing evaluation pending treatment plan and clearance for PO intake. Thank you.     Chuck Samaniego, CCC-SLP

## 2022-03-05 NOTE — PROGRESS NOTES
Comprehensive Nutrition Assessment    Type and Reason for Visit: Initial,Positive nutrition screen    Nutrition Recommendations/Plan:   Easy to chew diet  Ensure high protein (low CHO, high protein supplement), chocolate, TID  Please document % meals and supplements consumed in flowsheet I/O's under intake     Nutrition Assessment:      Auto-referral triggered by MST. Pt noted for acute hypoxic respiratory failure, COPD exacerbation, CHF exacerbation, CKD3-4, DLD, HTN, DM2. Family at bedside report his appetite has been decreased for a while and he has had a significant amount of weight loss, per EMR (down 15lb, 11%, since Dec, severe for the time frame). Pt also presents with significant fat and muscle wasting, meeting ASPEN criteria for malnutrition below. Family interested in any supplements that will help his intake, as long as he gets chocolate. Our best option will be the Ensure high protein for low CHO and chocolate flavor. Visit cut by pt experiencing recurrent chest pain and family requesting RN. Will continue monitoring.      Wt Readings from Last 10 Encounters:   03/04/22 55.6 kg (122 lb 9.2 oz)   12/19/21 61.2 kg (134 lb 14.7 oz)   12/08/21 62.3 kg (137 lb 6.4 oz)   11/29/21 63.5 kg (140 lb)   11/15/21 63.7 kg (140 lb 6.4 oz)   09/01/21 63 kg (139 lb)   07/29/21 64 kg (141 lb)   02/01/21 66.2 kg (146 lb)   01/28/21 66.2 kg (146 lb)   ]    Malnutrition Assessment:  Malnutrition Status:  Severe malnutrition    Context:  Chronic illness     Findings of the 6 clinical characteristics of malnutrition:   Energy Intake:  7 - 75% or less est energy requirements for 1 month or longer  Weight Loss:  7.0 - Greater than 7.5% over 3 months     Body Fat Loss:  7 - Severe body fat loss, Orbital,Fat overlying ribs,Buccal region   Muscle Mass Loss:  7 - Severe muscle mass loss, Clavicles (pectoralis &deltoids),Hand (interosseous),Scapula (trapezius),Temples (temporalis)  Fluid Accumulation:  1 - Mild, Extremities   Strength:  Not performed         Estimated Daily Nutrient Needs:  Energy (kcal): 1609 kcals (BMR x 1. 3AF); Weight Used for Energy Requirements: Current  Protein (g): 56-67g (1.0-1.2g/kg); Weight Used for Protein Requirements: Current  Fluid (ml/day): 1600mL; Method Used for Fluid Requirements: 1 ml/kcal      Nutrition Related Findings:  Labs: -063-346. Meds: zithromax, bumex, lantus, humalog, NPH, synthroid, magox. Edema 3+BLE. BM 3/5. Wounds:    None       Current Nutrition Therapies:  ADULT DIET Easy to Chew; Low Fat/Low Chol/High Fiber/JOANNE  ADULT ORAL NUTRITION SUPPLEMENT Breakfast, Lunch, Dinner;  Low Calorie/High Protein    Anthropometric Measures:  · Height:  5' 5\" (165.1 cm)  · Current Body Wt:  55.6 kg (122 lb 9.2 oz)   · Ideal Body Wt:  136 lbs:  90.1 %   · BMI Category:  Underweight (BMI less than 22) age over 72       Nutrition Diagnosis:   · Severe malnutrition,In context of chronic illness related to inadequate protein-energy intake as evidenced by poor intake prior to admission,intake 0-25%,weight loss,severe muscle loss,severe loss of subcutaneous fat      Nutrition Interventions:   Food and/or Nutrient Delivery: Continue current diet,Start oral nutrition supplement  Nutrition Education and Counseling: No recommendations at this time  Coordination of Nutrition Care: Continue to monitor while inpatient    Goals:  PO intake >50% meals and supplements next 2-4 days       Nutrition Monitoring and Evaluation:   Behavioral-Environmental Outcomes: None identified  Food/Nutrient Intake Outcomes: Food and nutrient intake,Supplement intake  Physical Signs/Symptoms Outcomes: Biochemical data,GI status,Weight,Nutrition focused physical findings    Discharge Planning:    Continue oral nutrition supplement,Continue current diet     Electronically signed by Marlen Gan RD on 3/5/2022 at 6:06 PM    Contact: [phone]

## 2022-03-05 NOTE — PROGRESS NOTES
Got called about his 10/10 chest pain elephant sitting on his chest.     Repeat trop has bumped in the setting of ckd    ekg is unchanged - no acute changes    D/w rn and Dr Grabiel Case. Will start nitro gtt and dc morphine to prevent hypotension. Will Dr Tayo Montes available if cp persists. Thank you for allowing me to participate in this patients care.     Ld Foster MD, Lalitha Sher

## 2022-03-05 NOTE — ED NOTES
Patient is being transferred to [address], Room # [phone]. Report given to Twila Bermudez RN on Luz Marina Ng for routine progression of care. Report consisted of the following information SBAR, Kardex, ED Summary, Intake/Output, MAR, Recent Results, Med Rec Status and Cardiac Rhythm sinus. Patient transferred to receiving unit by: RN (RN or tech name). Outstanding consults needed: No     Next labs due: Yes 0400    The following personal items will be sent with the patient during transfer to the floor:     All valuables:    Cardiac monitoring ordered: Yes    The following CURRENT information was reported to the receiving RN:    Code status: Full Code at time of transfer    Last set of vital signs:  Vital Signs  Level of Consciousness: Alert (0) (03/04/22 1724)  Temp: 98.6 °F (37 °C) (03/04/22 1724)  Temp Source: Axillary (03/04/22 1724)  Pulse (Heart Rate): 71 (03/04/22 1924)  Heart Rate Source: Monitor (03/04/22 1724)  Resp Rate: 27 (03/04/22 1924)  BP: (!) 165/124 (03/04/22 1909)  MAP (Monitor): 134 (03/04/22 1909)  MAP (Calculated): 138 (03/04/22 1909)  BP 1 Location: Right upper arm (03/04/22 1724)  BP 1 Method: Automatic (03/04/22 1724)  BP Patient Position: At rest;Lying (03/04/22 1724)  MEWS Score: 3 (03/04/22 1724)         Oxygen Therapy  O2 Sat (%): 100 % (03/04/22 1924)  Pulse via Oximetry: 70 beats per minute (03/04/22 1924)  O2 Device: BIPAP (03/04/22 1724)  O2 Flow Rate (L/min): 8 l/min (03/04/22 1611)  FIO2 (%): 50 % (03/04/22 1724)      Last pain assessment:  Pain 1  Pain Scale 1: Numeric (0 - 10)  Pain Intensity 1: 0  Patient Stated Pain Goal: 0      Wounds: No     Urinary catheter: suprapubic cath  Is there a wallace order: No     LDAs:       Peripheral IV 03/04/22 Right Antecubital (Active)   Site Assessment Clean, dry, & intact 03/04/22 1615   Phlebitis Assessment 0 03/04/22 1615   Infiltration Assessment 0 03/04/22 1615   Dressing Status Clean, dry, & intact 03/04/22 1615   Hub Color/Line Status Green 03/04/22 1615   Action Taken Blood drawn 03/04/22 1615       Peripheral IV 03/04/22 Left Antecubital (Active)   Site Assessment Clean, dry, & intact 03/04/22 1616   Phlebitis Assessment 0 03/04/22 1616   Infiltration Assessment 0 03/04/22 1616   Dressing Status Clean, dry, & intact 03/04/22 1616   Hub Color/Line Status Green 03/04/22 1616         Opportunity for questions and clarification was provided.     Paul Sal RN

## 2022-03-05 NOTE — PROGRESS NOTES
Received notification from bedside RN about patient with regards to: high risk for aspiration, RN noted patient coughs with sips of water - holding PO meds.  Needs IV meds for BP management  VS: /61, HR 76, RR 32, O2 sat 98% on BIPAP    Intervention given: SLP consult for dysphagia screen, Hydralazine IV PRN ordered

## 2022-03-05 NOTE — PROGRESS NOTES
Problem: Dysphagia (Adult)  Goal: *Acute Goals and Plan of Care (Insert Text)  Description: Speech pathology goals  Initiated 3/5/2022  1. Patient will tolerate easy to chew/thin liquid diet with no adverse effects within 7 days  Outcome: Progressing Towards Goal     SPEECH LANGUAGE PATHOLOGY BEDSIDE SWALLOW EVALUATION  Patient: Miladis Wheeler (44 y.o. male)  Date: 3/5/2022  Primary Diagnosis: Acute respiratory failure with hypercapnia (HCC) [J96.02]        Precautions:        ASSESSMENT :  Based on the objective data described below, the patient presents with limited swallowing evaluation due to drowsiness as patient recently received morphine. Patient with cough x1 with initial sip of thin liquid, and cough with solid and liquid wash after solid. Family at bedside reported that patient's mouth is dry from BiPAP which was observed by SLP as well. Family reported that patient with poor appetite at baseline, and he has poor dentition so he eats softer foods. Family denied dysphagia at baseline. Suspect swallow function will improve when mental status improves. Patient will benefit from skilled intervention to address the above impairments. Patients rehabilitation potential is considered to be Good     PLAN :  Recommendations and Planned Interventions:  -Easy to chew/thin liquid diet (which sounds consistent with patient's baseline)  -SLP to follow for diet tolerance when alertness/mental status improve  Frequency/Duration: Patient will be followed by speech-language pathology 3 times a week to address goals. Discharge Recommendations: To Be Determined     SUBJECTIVE:   Patient drowsy with minimal verbalizations.     OBJECTIVE:     Past Medical History:   Diagnosis Date    Aortic regurgitation     CAD (coronary artery disease)     Taxus stent 12/2005, 12/06 Cypher Prox circ, 6/2018 Sudarshan LADx2,  9/19 [place] LAD & LCX, 1/20 [place]     Chronic combined systolic and diastolic congestive heart failure ([facility] 11/14/2021  Chronic obstructive pulmonary disease (HCC)     Congestive heart failure (HCC)     Diabetes (HCC)     Essential hypertension     Hyperlipidemia     MI (my[facility] infarction) [facility]     Murmur     Renal insufficiency     Thyroid disease      Past Surgical History:   Procedure Laterality Date    HX CORONARY STENT PLACEMENT      HX CYST REMOVAL      IR ASP BLADDER SUPRA CATH       Prior Level of Function/Home Situation:   Home Situation  Home Environment: Private residence  One/Two Story Residence: Two story, live on 1st floor  Living Alone: No  Support Systems: Spouse/Significant Other  Patient Expects to be Discharged to[de-identified] Home  Current DME Used/Available at Home: Veronica Nutley, rollator,Walker, rolling  Diet prior to admission: soft solids/thin liquids, poor appetite  Current Diet:  Regular/thin   Cognitive and Communication Status:  Neurologic State: Drowsy  Orientation Level: Oriented to person  Cognition: Decreased attention/concentration           Oral Assessment:  Oral Assessment  Labial: No impairment  Dentition: Natural  Oral Hygiene: dry oral mucosa  P.O. Trials:  Patient Position: upright in bed  Vocal quality prior to P.O.: Low volume  Consistency Presented: Ice chips; Thin liquid;Puree; Solid  How Presented: SLP-fed/presented;Spoon;Straw     Bolus Acceptance: No impairment  Bolus Formation/Control: Impaired  Type of Impairment: Delayed  Propulsion: Delayed (# of seconds)  Oral Residue: None  Initiation of Swallow: Delayed (# of seconds)  Laryngeal Elevation: Functional  Aspiration Signs/Symptoms: Strong cough                Oral Phase Severity: Mild-moderate  Pharyngeal Phase Severity : Mild    NOMS:   The NOMS functional outcome measure was used to quantify this patient's level of swallowing impairment.   Based on the NOMS, the patient was determined to be at level 5 for swallow function       NOMS Swallowing Levels:  Level 1 (CN): NPO  Level 2 (CM): NPO but takes consistency in therapy  Level 3 (CL): Takes less than 50% of nutrition p.o. and continues with nonoral feedings; and/or safe with mod cues; and/or max diet restriction  Level 4 (CK): Safe swallow but needs mod cues; and/or mod diet restriction; and/or still requires some nonoral feeding/supplements  Level 5 (CJ): Safe swallow with min diet restriction; and/or needs min cues  Level 6 (CI): Independent with p.o.; rare cues; usually self cues; may need to avoid some foods or needs extra time  Level 7 ([address] [place] [address]): Independent for all p.o.  LISA. (2003). National Outcomes Measurement System (NOMS): Adult Speech-Language Pathology User's Guide. Pain:  Pain Scale 1: Numeric (0 - 10)  Pain Intensity 1: 5  Pain Location 1: Chest    After treatment:   Patient left in no apparent distress in bed, Call bell within reach, Nursing notified and Caregiver / family present    COMMUNICATION/EDUCATION:   Patient was educated regarding his deficit(s) of WFL swallow as this relates to his diagnosis. He demonstrated Fair understanding as evidenced by drowsiness. Family verbalized understanding. The patient's plan of care including recommendations, planned interventions, and recommended diet changes were discussed with: Registered nurse. Patient/family have participated as able in goal setting and plan of care. Patient/family agree to work toward stated goals and plan of care.     Thank you for this referral.  MARTHA Tellez  Time Calculation: 15 mins

## 2022-03-05 NOTE — PROGRESS NOTES
Hospitalist Progress Note    NAME: Wade Isidro   :  1950   MRN:  [id number]       Assessment / Plan:  Acute on chronic hypercarbic respiratory failure due to  Acute COPD exacerbation  Acute diastolic congestive heart failure exacerbation  V/q scan abnormal giving chronic lung disease  Check LE duplex US  IV albumin given this AM due to hypotension  Cont' IV bumex, cardiac meds with holding parameters  Cont' IV steroids, empiric IV abx  Pt is weaned off bipap to [place]. He uses 3-4LNC at baseline  Appreciate pulmonary following  Strict I&O, daily wt. PT/OT, cardiology to eval    RRT on 3/5  Trop 16  No new ischemia on EKG . Nitro gtt and heparin gtt initiated on 3/5 due to persistent CP with trop trending up. I spoke to Dr Anisha Thurman, possible cath in the AM  Xanax prn    History of CKD stage III-IV  Creatinine is close to baseline of around 2.5     Dyslipidemia  Hypertension  Hypothyroidism  History of CAD s/p stents  Continue home Crestor, hydralazine, Coreg, levothyroxine  Continue home aspirin, Plavix     Diabetes mellitus type 2  Cont' lantus, titrate prn  Add NPH to override steroid effects  SSI          Code Status: Full code  Surrogate Decision Maker: Wife   DVT Prophylaxis: Lovenox  GI Prophylaxis: not indicated     Baseline: From home, independent of ADLs  Estimated discharge date: 3/8 pending clinical improvement    Code status: Full  Prophylaxis: Lovenox  Recommended Disposition:  PT, OT, RN     Subjective:     Chief Complaint / Reason for Physician Visit  Pt received morphine during RRT. He was falling asleep during my encounter but wakes up to voice and able to answer questions appropriately. His wife was at bedside and was updated on plan of care. Discussed with RN events overnight.      Review of Systems:  Symptom Y/N Comments  Symptom Y/N Comments   Fever/Chills    Chest Pain n    Poor Appetite    Edema     Cough    Abdominal Pain     Sputum    Joint Pain     SOB/WISEMAN n   Pruritis/Rash Nausea/vomit    Tolerating PT/OT     Diarrhea    Tolerating Diet     Constipation    Other       Could NOT obtain due to:      Objective:     VITALS:   Last 24hrs VS reviewed since prior progress note.  Most recent are:  Patient Vitals for the past 24 hrs:   Temp Pulse Resp BP SpO2   03/05/22 1100    (!) 100/58    03/05/22 1043 97.7 °F (36.5 °C) 95 14 (!) 90/55 100 %   03/05/22 1016  95  (!) 86/56    03/05/22 1000  95 25 (!) 89/54 98 %   03/05/22 0900    (!) 149/69    03/05/22 0855  (!) 105  (!) 158/68    03/05/22 0759 97.9 °F (36.6 °C) 98 22 108/61 100 %   03/05/22 0750  100 30 115/60 99 %   03/05/22 0749    115/60 99 %   03/05/22 0747  (!) 102 (!) 33 (!) 108/55 97 %   03/05/22 0740  (!) 101 (!) 50 (!) 153/69 99 %   03/05/22 0738  (!) 101 (!) 37 (!) 159/64 98 %   03/05/22 0737  99  (!) 153/69    03/05/22 0730  98 (!) 45 (!) 159/70 99 %   03/05/22 0717  96 29 (!) 151/66 98 %   03/05/22 0536  98  (!) 144/77    03/05/22 0348 97.6 °F (36.4 °C) 90 (!) 38 136/72 99 %   03/05/22 0302     99 %   03/04/22 2325 97.4 °F (36.3 °C) 87 27 (!) 108/51 98 %   03/04/22 2306     99 %   03/04/22 2127 97.5 °F (36.4 °C) 76 (!) 32 (!) 170/61 98 %   03/04/22 2124     100 %   03/04/22 2043  74  (!) 180/50    03/04/22 2000     98 %   03/04/22 1924  71 27  100 %   03/04/22 1909  71 25 (!) 165/124 100 %   03/04/22 1854  72 27 (!) 169/55 100 %   03/04/22 1724 98.6 °F (37 °C) 73 (!) 37 (!) 138/50 100 %   03/04/22 1721     100 %   03/04/22 1716  69 (!) 37  100 %   03/04/22 1706  74 29  100 %   03/04/22 1656  70 21 (!) 130/51 100 %   03/04/22 1646  69 24 (!) 150/52 100 %   03/04/22 1630     95 %   03/04/22 1621  70 (!) 33 (!) 145/46 100 %   03/04/22 1612     100 %   03/04/22 1611 98.4 °F (36.9 °C) 74 (!) 36 (!) 154/48 95 %       Intake/Output Summary (Last 24 hours) at 3/5/2022 1203  Last data filed at 3/5/2022 0302  Gross per 24 hour   Intake    Output 550 ml   Net -550 ml I had a face to face encounter and independently examined this patient on 3/5/2022, as outlined below:  PHYSICAL EXAM:  General: WD, WN. Alert, cooperative, no acute distress    EENT:  EOMI. Anicteric sclerae. MMM  Resp:  Diminished bs /bl. No accessory muscle use  CV:  Regular  rhythm,  No edema  GI:  Soft, Non distended, Non tender. +Bowel sounds  Neurologic:  Somnolent but responding well to voice, follow commands  Psych:   Not anxious nor agitated  Skin:  No rashes. No jaundice    Reviewed most current lab test results and cultures  YES  Reviewed most current radiology test results   YES  Review and summation of old records today    NO  Reviewed patient's current orders and MAR    YES  PMH/SH reviewed - no change compared to H&P  ________________________________________________________________________  Care Plan discussed with:    Comments   Patient x    Family  x wife   RN x    Care Manager     Consultant                        Multidiciplinary team rounds were held today with , nursing, pharmacist and clinical coordinator. Patient's plan of care was discussed; medications were reviewed and discharge planning was addressed. ________________________________________________________________________  Total NON critical care TIME:  35   Minutes    Total CRITICAL CARE TIME Spent:   Minutes non procedure based      Comments   >50% of visit spent in counseling and coordination of care     ________________________________________________________________________  Shayy Purvis MD     Procedures: see electronic medical records for all procedures/Xrays and details which were not copied into this note but were reviewed prior to creation of Plan. LABS:  I reviewed today's most current labs and imaging studies.   Pertinent labs include:  Recent Labs     03/05/22  0350 03/04/22  1608   WBC 6.0 10.0   HGB 9.7* 9.6*   HCT 31.1* 31.8*    228     Recent Labs     03/05/22  0350 03/04/22  1608   NA 135* 136   K 4.9 4.4   CL 95* 93*   CO2 36* 41*   * 240*   BUN 57* 50*   CREA 2.44* 2.48*   CA 8.5 8.7   ALB  --  2.7*   TBILI  --  0.3   ALT  --  34       Signed: Mirna Jacob MD

## 2022-03-05 NOTE — CONSULTS
Name: Tahir Serna: [address]   : 1950 Admit Date: 3/4/2022   Phone: [phone]  Room: 2284/01   PCP: Jameson Owen NP  MRN: [id number]   Date: 3/5/2022  Code: Full Code        HPI:    11:19 AM       History was obtained from EMR, patient's wife at bedside, nurse. I was asked by Anrulfo Vallecillo MD to see Luz Marina Ng in consultation for a chief complaint of \"hypercapnic RF, pt's wife is requesting pulm consult\". History of Present Illness: 70year old male with a history of HTN, COPD, CAD, DM, CKD m who presented after \"passing out\" per pt's wife on 3/4/22. She reports he was having  worsening shortness of breath and BLE edema x 1 week. She reports an occasional dry cough. Denies any wheezing, fevers, or chills. Elevated d-dimer  Pro-BNP 2103  AB.36, pCO2 66, pO2 183, bicarb 36  Rapid covid-19 test is negative. Images:  21 chest ct w/o contrast, compared to 21 : no lymphadenopathy, prominent mediastinum nodes unchanged, diffuse interstitial infiltrates and emphysema unchanged. 12/10/21 Echo: EF 55-60%, no wma, mildly dilated RV, mild aortic valve stenosis, moderate aortic valve regurgitation is present. Moderately dilated left atrium, moderate mitral annular calcification. 3/4/22 cxr: severe bilateral opacification L>R unchanged compared to 21 cxr,  3/4/22 V/Q scan, reviewed preliminary report: heterogeneous areas of diminished radionuclide avidity optically the left lung with comparison studies showing chronic diffuse pulmonary abnormalities, greater on the left. Indeterminate scan due to these chronic abnormalities.       Past Medical History:   Diagnosis Date    Aortic regurgitation     CAD (coronary artery disease)     Taxus stent 2005,  Cypher Prox circ, 2018 Sudarshan LADx2,   Sudarshan LAD & LCX,  Sudarshan     Chronic combined systolic and diastolic congestive heart failure ([address] 2021    [facility]     Congestive heart failure (HCC)     Diabetes [facility] [facility]     Essential hypertension     Hyperlipidemia     MI (myocardial infarction) [facility] [address]     Murmur     Renal insufficiency     Thyroid disease        Past Surgical History:   Procedure Laterality Date    HX CORONARY STENT PLACEMENT      HX CYST REMOVAL      IR ASP BLADDER SUPRA CATH         No family history on file.     Social History     Tobacco Use    Smoking status: Former Smoker     Packs/day: 2.00     Years: 30.00     Pack years: 60.00     Quit date: 1991     Years since quittin.1    Smokeless tobacco: Never Used   Substance Use Topics    Alcohol use: Yes     Comment: rarely       Allergies   Allergen Reactions    Bee Sting [Sting, Bee] Anaphylaxis    Iodinated Contrast Media Rash    Brilinta [Ticagrelor] Other (comments)    Effient [Prasugrel] Other (comments)    Ranexa [Ranolazine] Other (comments)    Red Dye Hives    Sulfa (Sulfonamide Antibiotics) Rash    Penicillins Rash and Nausea Only       Current Facility-Administered Medications   Medication Dose Route Frequency    ALPRAZolam (XANAX) tablet 0.25 mg  0.25 mg Oral TID PRN    morphine injection 1 mg  1 mg IntraVENous Q4H PRN    acetaminophen (TYLENOL) tablet 650 mg  650 mg Oral Q6H PRN    ondansetron (ZOFRAN) injection 4 mg  4 mg IntraVENous Q4H PRN    [START ON 3/7/2022] anastrozole (ARIMIDEX) tablet 0.5 mg  0.5 mg Oral Q MON, WED & FRI    aspirin delayed-release tablet 81 mg  81 mg Oral DAILY    carvediloL (COREG) tablet 6.25 mg  6.25 mg Oral Q12H    clopidogreL (PLAVIX) tablet 75 mg  75 mg Oral DAILY    levothyroxine (SYNTHROID) tablet 100 mcg  100 mcg Oral DAILY    magnesium oxide (MAG-OX) tablet 400 mg  400 mg Oral Q48H    rosuvastatin (CRESTOR) tablet 20 mg  20 mg Oral QHS    arformoterol 15 mcg/budesonide 0.5 mg neb solution   Nebulization BID RT    sodium chloride (NS) flush 5-40 mL  5-40 mL IntraVENous Q8H    sodium chloride (NS) flush 5-40 mL  5-40 mL IntraVENous PRN    polyethylene glycol (MIRALAX) packet 17 g  17 g Oral DAILY PRN    enoxaparin (LOVENOX) injection 30 mg  30 mg SubCUTAneous DAILY    methylPREDNISolone (PF) (SOLU-MEDROL) injection 40 mg  40 mg IntraVENous Q6H    bumetanide (BUMEX) injection 2 mg  2 mg IntraVENous BID    albuterol-ipratropium (DUO-NEB) 2.5 MG-0.5 MG/3 ML  3 mL Nebulization Q4H RT    azithromycin (ZITHROMAX) 500 mg in 0.9% sodium chloride 250 mL (VIAL-MATE)  500 mg IntraVENous Q24H    hydrALAZINE (APRESOLINE) tablet 50 mg  50 mg Oral TID    nitroglycerin (NITROSTAT) tablet 0.4 mg  0.4 mg SubLINGual PRN    insulin glargine (LANTUS) injection 10 Units  10 Units SubCUTAneous QHS    insulin lispro (HUMALOG) injection   SubCUTAneous AC&HS    glucose chewable tablet 16 g  4 Tablet Oral PRN    glucagon (GLUCAGEN) injection 1 mg  1 mg IntraMUSCular PRN    dextrose 10% infusion 0-250 mL  0-250 mL IntraVENous PRN    hydrALAZINE (APRESOLINE) 20 mg/mL injection 10 mg  10 mg IntraVENous Q6H PRN         REVIEW OF SYSTEMS   Negative except as stated in the HPI. Physical Exam:   Visit Vitals  BP (!) 100/58   Pulse 95   Temp 97.7 °F (36.5 °C)   Resp 14   Ht 5' 5\" (1.651 m)   Wt 55.6 kg (122 lb 9.2 oz)   SpO2 100%   BMI 20.40 kg/m²       General:  Drowsy, easily arousable, no distress, appears stated age. Head:  Normocephalic, without obvious abnormality, atraumatic. Eyes:  Conjunctivae/corneas clear. PERRL, EOMs intact. Nose: Nares normal. Septum midline. Mucosa normal. No drainage or sinus tenderness. Neck: Supple, symmetrical, trachea midline   Back:   Symmetric, no curvature. ROM normal.   Lungs:   diminished  to auscultation bilaterally. Chest wall:  No tenderness or deformity. Heart:  Regular rate and rhythm, S1, S2 normal, no murmur, click, rub or gallop. Abdomen:   Soft, non-tender.  Bowel sounds normal.    Extremities: Extremities normal, atraumatic, no cyanosis, +1-2 BLE edema Skin: Skin color, texture, turgor normal. No rashes or lesions   Neurologic: Grossly nonfocal       Lab Results   Component Value Date/Time    Sodium 135 (L) 03/05/2022 03:50 AM    Potassium 4.9 03/05/2022 03:50 AM    Chloride 95 (L) 03/05/2022 03:50 AM    CO2 36 (H) 03/05/2022 03:50 AM    BUN 57 (H) 03/05/2022 03:50 AM    Creatinine 2.44 (H) 03/05/2022 03:50 AM    Glucose 229 (H) 03/05/2022 03:50 AM    Calcium 8.5 03/05/2022 03:50 AM    Magnesium 3.0 (H) 12/19/2021 03:14 AM    Phosphorus 2.5 (L) 12/19/2021 03:14 AM    Lactic acid 1.0 12/14/2021 06:17 AM       Lab Results   Component Value Date/Time    WBC 6.0 03/05/2022 03:50 AM    HGB 9.7 (L) 03/05/2022 03:50 AM    PLATELET 620 99/50/9339 03:50 AM    MCV 97.8 03/05/2022 03:50 AM       Lab Results   Component Value Date/Time    INR 1.0 12/10/2021 02:59 PM    aPTT 28.2 12/18/2021 04:11 AM    Alk. phosphatase 131 (H) 03/04/2022 04:08 PM    Protein, total 7.1 03/04/2022 04:08 PM    Albumin 2.7 (L) 03/04/2022 04:08 PM    Globulin 4.4 (H) 03/04/2022 04:08 PM       Lab Results   Component Value Date/Time    Iron 45 12/09/2021 10:26 AM    TIBC 272 12/09/2021 10:26 AM    Iron % saturation 17 (L) 12/09/2021 10:26 AM    Ferritin 129 12/09/2021 10:26 AM       Lab Results   Component Value Date/Time    C-Reactive protein 3.23 (H) 12/09/2021 06:02 PM    TSH 0.41 12/09/2021 10:26 AM        Lab Results   Component Value Date/Time    PH 7.36 03/04/2022 08:03 PM    PCO2 66 (H) 03/04/2022 08:03 PM    PO2 183 (H) 03/04/2022 08:03 PM    HCO3 36 (H) 03/04/2022 08:03 PM    FIO2 50 03/04/2022 08:03 PM       No results found for: CPK, RCK1, RCK2, RCK3, RCK4, CKNDX, CKND1, TROPT, TROIQ, BNPP, BNP     Lab Results   Component Value Date/Time    Culture result: MRSA NOT PRESENT 12/14/2021 04:44 PM    Culture result:  12/14/2021 04:44 PM     Screening of patient nares for MRSA is for surveillance purposes and, if positive, to facilitate isolation considerations in high risk settings.  It is not intended for automatic decolonization interventions per se as regimens are not sufficiently effective to warrant routine use. Culture result: NO GROWTH 5 DAYS 12/14/2021 06:17 AM    Culture result: NO GROWTH 5 DAYS 12/14/2021 06:17 AM       Lab Results   Component Value Date/Time    Hepatitis B surface Ag <0.10 12/09/2021 10:26 AM       No results found for: VANCT, CPK    Lab Results   Component Value Date/Time    Color YELLOW/STRAW 12/09/2021 05:26 AM    Appearance CLEAR 12/09/2021 05:26 AM    pH (UA) 6.5 12/09/2021 05:26 AM    Protein 30 (A) 12/09/2021 05:26 AM    Glucose Negative 12/09/2021 05:26 AM    Ketone Negative 12/09/2021 05:26 AM    Bilirubin Negative 12/09/2021 05:26 AM    Blood Negative 12/09/2021 05:26 AM    Urobilinogen 0.2 12/09/2021 05:26 AM    Nitrites Negative 12/09/2021 05:26 AM    Leukocyte Esterase SMALL (A) 12/09/2021 05:26 AM    WBC 20-50 12/09/2021 05:26 AM    RBC 0-5 12/09/2021 05:26 AM    Bacteria 4+ (A) 12/09/2021 05:26 AM       IMPRESSION  · COPD exacerbation  · Acute on chronic hypoxic respiratory failure, back on baseline of 2L o2 at rest (per pt's wife he is on 2-3L at rest, and 4L o2 w/ activity at home). · Chronic hypercapnic respiratory failure  · ?volume overload- BLE edema, elevated pro-bnp  · Abnormal chest imaging- diffuse bilateral infiltrates L>R, unchanged since 12/2021 per cxr    PLAN  · Wean supp o2 for goal spo2 >88%  · Continue NIV qhs and prn-- try AVAPS mode, adjusted settings. He is a candidate for trilogy given his history of copd and chronic hypercapnia. Can consult CM for home trilogy set up at discharge. · Continue scheduled nebs  · Continue solumedrol, wean as able  · continiue azithromycin x 5 days for AECOPD  · Continue diuresis as bp and renal fxn tolerate  · Cardiology is following   · Repeat chest ct w/o contrast to re-evaluate abnormalities noted on prior CT in Dec.  · Speech is following    Thank you for the consult.  Will plan to see again on Monday, call for questions or concerns.     Madelaine Cordero NP

## 2022-03-05 NOTE — PROGRESS NOTES
Problem: Falls - Risk of  Goal: *Absence of Falls  Description: Document Lon Baez Fall Risk and appropriate interventions in the flowsheet.   Outcome: Progressing Towards Goal  Note: Fall Risk Interventions:  Mobility Interventions: Bed/chair exit alarm                   History of Falls Interventions: Bed/chair exit alarm         Problem: Breathing Pattern - Ineffective  Goal: *Absence of hypoxia  Outcome: Progressing Towards Goal  Goal: *Use of effective breathing techniques  Outcome: Progressing Towards Goal     Problem: Breathing Pattern - Ineffective  Goal: *Absence of hypoxia  Outcome: Progressing Towards Goal     Problem: Breathing Pattern - Ineffective  Goal: *Use of effective breathing techniques  Outcome: Progressing Towards Goal

## 2022-03-05 NOTE — PROGRESS NOTES
Rapid response called for chest pain, arrived 2 minutes later to find patient on BIPAP. Wife in room, states patient is having intermittent CP. EKG connected, asked RN to obtain labs. .  Changed to nasal canula due to need for Nitroglycerin. No pain meds yet ordered. Dr. Jayesh Singh arrived, consult for Cardiology obtained, EKG assessed. To give Morphine for pain. See Orders. 2 doses of SL Nitro given with slight relief of CP. Troponin sent to lab. Dr. Elida Adams aware of urgent consult.

## 2022-03-05 NOTE — PROGRESS NOTES
2014:  Report received from [facility] Art. Per Roger Phan, stat ABG done. He will contact Dr. Katy Quick with results. Pt is pending nuclear med study. Still on BIPAP, pt will come up with respiratory. Per Roger Phan he will give outstanding meds prior to pt transfer.

## 2022-03-05 NOTE — PROGRESS NOTES
Received notification from bedside RN about patient with regards to: difficulty tolerating BIPAP, requesting intervention to address increasing restlessness  VS: /51, HR 87, RR 27, O2 sat 98% on BIPAP    Intervention given: Morphine 1 mg IV x 1 dose ordered

## 2022-03-05 NOTE — H&P
Hospitalist Admission Note    NAME: Sera Palmer   :     MRN:  [id number]     Date/Time:  3/4/2022 9:53 PM    Patient PCP: Luis Miguel Ortiz NP  ________________________________________________________________________    My assessment of this patient's clinical condition and my plan of care is as follows. Assessment / Plan:  Acute on chronic hypercarbic respiratory failure due to  Acute COPD exacerbation  Acute diastolic congestive heart failure exacerbation  -Continue BiPAP and wean as tolerated. VQ scan pending and please follow-up on results  -Start Solu-Medrol, DuoNeb and Zithromax. Check procalcitonin level. Continue home inhalers  -Start Bumex 2 mg IV twice daily. Continue home Coreg. Strict I's and O's, daily weights and low-salt diet    History of CKD stage III-IV  -Creatinine is close to baseline of around 2.5    Dyslipidemia  Hypertension  Hypothyroidism  History of CAD s/p stents  -Continue home Crestor, hydralazine, Coreg, levothyroxine  -Continue home aspirin, Plavix    Diabetes mellitus type 2  -Start insulin sliding scale with blood sugar checks  -Patient is not sure about his exact dose of insulin  -Resume his home dose once we have the accurate list  -Consult pharmacy for medication reconciliation        Code Status: Full code  Surrogate Decision Maker: Wife    DVT Prophylaxis: Lovenox  GI Prophylaxis: not indicated    Baseline: From home, independent of ADLs        Subjective:   CHIEF COMPLAINT: Shortness of breath    HISTORY OF PRESENT ILLNESS:     Sera Palmer is a 70 y.o.   male who presents with past medical history of hypertension, COPD, congestive heart failure is coming the hospital chief complaints of shortness of breath. Patient reports shortness of breath is even at rest, with some cough and yellow phlegm. Does not report any chest pain. Reports wheezing as well. Reports increased swelling of the legs as well.   Does not report any abdominal pain, nausea or vomiting. No fever or chills. On arrival to ED, he was hypoxic and had to be placed on BiPAP. Rest of the vitals are normal.  On labs noted to have hemoglobin of 9.6, platelet count 203. BMP shows a CO2 of 41, glucose of 240. Creatinine is 2.48. Troponin is 17.  proBNP is 2100. Chest x-ray shows severe bilateral pulmonary opacifications. We were asked to admit for work up and evaluation of the above problems. Past Medical History:   Diagnosis Date    Aortic regurgitation     CAD (coronary artery disease)     Taxus stent 2005,  Cypher Prox circ, 2018 [place] LADx2,   [place]     Chronic combined systolic and diastolic congestive heart failure ([address] 2021    Chronic obstructive pulmonary disease (HCC)     Congestive heart failure (HCC)     Diabetes (HCC)     Essential hypertension     Hyperlipidemia     MI [facility]my[facility] infarction) [facility] [address]     Murmur     Renal insufficiency     Thyroid disease         Past Surgical History:   Procedure Laterality Date    HX CORONARY STENT PLACEMENT      HX CYST REMOVAL      IR ASP BLADDER SUPRA CATH         Social History     Tobacco Use    Smoking status: Former Smoker     Packs/day: 2.00     Years: 30.00     Pack years: 60.00     Quit date: 1991     Years since quittin.1    Smokeless tobacco: Never Used   Substance Use Topics    Alcohol use: Yes     Comment: rarely        Family history  No CAD in the family    Allergies   Allergen Reactions    Bee Sting [Sting, Bee] Anaphylaxis    Iodinated Contrast Media Rash    Brilinta [Ticagrelor] Other (comments)    Effient [Prasugrel] Other (comments)    Ranexa [Ranolazine] Other (comments)    Red Dye Hives    Sulfa (Sulfonamide Antibiotics) Rash    Penicillins Rash and Nausea Only        Prior to Admission medications    Medication Sig Start Date End Date Taking?  Authorizing Provider   hydrALAZINE (APRESOLINE) 50 mg tablet Take 50 mg by mouth three (3) times daily. Yes Other, MD Raul   rosuvastatin (CRESTOR) 20 mg tablet TAKE 1 TABLET BY MOUTH EVERY NIGHT 2/23/22  Yes Kelley Wong NP   anastrozole (ARIMIDEX) 1 mg tablet Take 0.5 mg by mouth every Monday, Wednesday, Friday. Yes Provider, Historical   bumetanide (BUMEX) 1 mg tablet TAKE 1 TABLET BY MOUTH TWICE DAILY. REPLACES. LASIX 11/16/21  Yes Kelley Wong NP   magnesium oxide 250 mg magnesium tablet Take 250 mg by mouth. Every other day - OTC 11/4/21  Yes Provider, Historical   carvediloL (COREG) 6.25 mg tablet Take 1 Tablet by mouth every twelve (12) hours. 11/15/21  Yes Kelley Wong NP   clopidogreL (PLAVIX) 75 mg tab TAKE 1 TABLET BY MOUTH EVERY DAY 10/26/21  Yes Kelley Wong NP   Symbicort 80-4.5 mcg/actuation HFAA INHALE 2 PUFFS BY MOUTH TWICE DAILY IN THE MORNING AND IN THE EVENING 8/18/21  Yes Provider, Historical   nitroglycerin (Nitrostat) 0.4 mg SL tablet 1 Tablet by SubLINGual route every five (5) minutes as needed for Chest Pain. Up to 3 doses. 9/1/21  Yes Kelley Wong NP   albuterol (ProAir HFA) 90 mcg/actuation inhaler Take 2 Puffs by inhalation. 4-6 hrs as needed 1/22/21  Yes Provider, Historical   BD Lali 2nd Gen Pen Needle 32 gauge x 5/32\" ndle USE WITH LANTUS 7/13/21  Yes Provider, Historical   aspirin delayed-release 81 mg tablet Take 81 mg by mouth daily. At night   Yes Provider, Historical   levothyroxine (SYNTHROID) 100 mcg tablet Take 100 mcg by mouth daily. Morning   Yes Provider, Historical   testosterone (ANDROGEL) 1.62 % (20.25 mg/1.25 gram) glpk 20.25 mg daily. 2 to 3 pumps alternating in the morning. Yes Provider, Historical   glucagon (GLUCAGEN) 1 mg injection 1 mL by IntraMUSCular route as needed for Hypoglycemia.   Patient not taking: Reported on 1/7/2022 12/19/21   Kimberly Guevara MD   prasterone, dhea, 50 mg tab 50 mg = 1 tab each dose, PO, daily, # 30 tab, 0 Refills  Patient not taking: Reported on 3/4/2022 11/4/21   Provider, Historical       REVIEW OF SYSTEMS:     I am not able to complete the review of systems because: The patient is intubated and sedated   y The patient has altered mental status due to his acute medical problems    The patient has baseline aphasia from prior stroke(s)    The patient has baseline dementia and is not reliable historian    The patient is in acute medical distress and unable to provide information           Total of 12 systems reviewed as follows:       POSITIVE= underlined text  Negative = text not underlined  General:  fever, chills, sweats, generalized weakness, weight loss/gain,      loss of appetite   Eyes:    blurred vision, eye pain, loss of vision, double vision  ENT:    rhinorrhea, pharyngitis   Respiratory:   cough, sputum production, SOB, WISEMAN, wheezing, pleuritic pain   Cardiology:   chest pain, palpitations, orthopnea, PND, edema, syncope   Gastrointestinal:  abdominal pain , N/V, diarrhea, dysphagia, constipation, bleeding   Genitourinary:  frequency, urgency, dysuria, hematuria, incontinence   Muskuloskeletal :  arthralgia, myalgia, back pain  Hematology:  easy bruising, nose or gum bleeding, lymphadenopathy   Dermatological: rash, ulceration, pruritis, color change / jaundice  Endocrine:   hot flashes or polydipsia   Neurological:  headache, dizziness, confusion, focal weakness, paresthesia,     Speech difficulties, memory loss, gait difficulty  Psychological: Feelings of anxiety, depression, agitation    Objective:   VITALS:    Visit Vitals  BP (!) 157/61   Pulse 76   Temp 97.5 °F (36.4 °C)   Resp (!) 32   Ht 5' 5\" (1.651 m)   Wt 55.6 kg (122 lb 9.2 oz)   SpO2 98%   BMI 20.40 kg/m²       PHYSICAL EXAM:    General:    no distress, appears stated age.      HEENT: Atraumatic, anicteric sclerae, pink conjunctivae     No oral ulcers, mucosa moist, throat clear, dentition fair  Neck:  Supple, symmetrical,  thyroid: non tender  Lungs:   Bilateral air entry is diminished, crackles present, wheezing present  Chest wall:  No tenderness  No Accessory muscle use. Heart:   Regular  rhythm,  No  murmur   No edema  Abdomen:   Soft, non-tender. Not distended. Bowel sounds normal  Extremities: No cyanosis. No clubbing,      Skin turgor normal, Capillary refill normal, Radial dial pulse 2+  Skin:     Not pale. Not Jaundiced  No rashes   Psych:  Confused  Neurologic: Drowsy but wakes up to commands, not able to follow commands due to confusion    _______________________________________________________________________  Care Plan discussed with:    Comments   Patient y    Family  y    RN     Care Manager                    Consultant:      _______________________________________________________________________  Expected  Disposition:   Home with Family y   HH/PT/OT/RN    SNF/LTC    [facility]    ________________________________________________________________________  TOTAL TIME: 61 Minutes    Critical Care Provided     Minutes non procedure based      Comments    y Reviewed previous records   >50% of visit spent in counseling and coordination of care y Discussion with patient and/or family and questions answered       ________________________________________________________________________  Signed: Stone Sanders MD    Procedures: see electronic medical records for all procedures/Xrays and details which were not copied into this note but were reviewed prior to creation of Plan.     LAB DATA REVIEWED:    Recent Results (from the past 24 hour(s))   CBC WITH AUTOMATED DIFF    Collection Time: 03/04/22  4:08 PM   Result Value Ref Range    WBC 10.0 4.1 - 11.1 K/uL    RBC 3.16 (L) 4.10 - 5.70 M/uL    HGB 9.6 (L) 12.1 - 17.0 g/dL    HCT 31.8 (L) 36.6 - 50.3 %    .6 (H) 80.0 - 99.0 FL    MCH 30.4 26.0 - 34.0 PG    MCHC 30.2 30.0 - 36.5 g/dL    RDW 13.0 11.5 - 14.5 %    PLATELET 051 586 - 557 K/uL    MPV 10.4 8.9 - 12.9 FL    NRBC 0.0 0  WBC    ABSOLUTE NRBC 0.00 0.00 - 0.01 K/uL    NEUTROPHILS 77 (H) 32 - 75 %    LYMPHOCYTES 7 (L) 12 - 49 % MONOCYTES 8 5 - 13 %    EOSINOPHILS 6 0 - 7 %    BASOPHILS 1 0 - 1 %    IMMATURE GRANULOCYTES 1 (H) 0.0 - 0.5 %    ABS. NEUTROPHILS 7.7 1.8 - 8.0 K/UL    ABS. LYMPHOCYTES 0.7 (L) 0.8 - 3.5 K/UL    ABS. MONOCYTES 0.8 0.0 - 1.0 K/UL    ABS. EOSINOPHILS 0.6 (H) 0.0 - 0.4 K/UL    ABS. BASOPHILS 0.1 0.0 - 0.1 K/UL    ABS. IMM. GRANS. 0.1 (H) 0.00 - 0.04 K/UL    DF SMEAR SCANNED      RBC COMMENTS NORMOCYTIC, NORMOCHROMIC     METABOLIC PANEL, COMPREHENSIVE    Collection Time: 03/04/22  4:08 PM   Result Value Ref Range    Sodium 136 136 - 145 mmol/L    Potassium 4.4 3.5 - 5.1 mmol/L    Chloride 93 (L) 97 - 108 mmol/L    CO2 41 (HH) 21 - 32 mmol/L    Anion gap 2 (L) 5 - 15 mmol/L    Glucose 240 (H) 65 - 100 mg/dL    BUN 50 (H) 6 - 20 MG/DL    Creatinine 2.48 (H) 0.70 - 1.30 MG/DL    BUN/Creatinine ratio 20 12 - 20      GFR est AA 31 (L) >60 ml/min/1.73m2    GFR est non-AA 26 (L) >60 ml/min/1.73m2    Calcium 8.7 8.5 - 10.1 MG/DL    Bilirubin, total 0.3 0.2 - 1.0 MG/DL    ALT (SGPT) 34 12 - 78 U/L    AST (SGOT) 25 15 - 37 U/L    Alk. phosphatase 131 (H) 45 - 117 U/L    Protein, total 7.1 6.4 - 8.2 g/dL    Albumin 2.7 (L) 3.5 - 5.0 g/dL    Globulin 4.4 (H) 2.0 - 4.0 g/dL    A-G Ratio 0.6 (L) 1.1 - 2.2     TROPONIN-HIGH SENSITIVITY    Collection Time: 03/04/22  4:08 PM   Result Value Ref Range    Troponin-High Sensitivity 17 0 - 76 ng/L   NT-PRO BNP    Collection Time: 03/04/22  4:08 PM   Result Value Ref Range    NT pro-BNP 2,103 (H) <125 PG/ML   D DIMER    Collection Time: 03/04/22  4:08 PM   Result Value Ref Range    D-dimer 1.17 (H) 0.00 - 0.65 mg/L FEU   SAMPLES BEING HELD    Collection Time: 03/04/22  4:08 PM   Result Value Ref Range    SAMPLES BEING HELD SST, RED     COMMENT        Add-on orders for these samples will be processed based on acceptable specimen integrity and analyte stability, which may vary by analyte.    BLOOD GAS,CHEM8,LACTIC ACID POC    Collection Time: 03/04/22  4:10 PM   Result Value Ref Range Calcium, ionized (POC) 1.18 1.12 - 1.32 mmol/L    BICARBONATE 43 mmol/L    Base excess (POC) 13.1 mmol/L    Sample source VENOUS BLOOD      CO2, POC 44 (HH) 19 - 24 MMOL/L    Sodium,  136 - 145 MMOL/L    Potassium, POC 4.7 3.5 - 5.5 MMOL/L    Chloride, POC 89 (L) 100 - 108 MMOL/L    Glucose,  (H) 74 - 106 MG/DL    Creatinine, POC 2.9 (H) 0.6 - 1.3 MG/DL    Lactic Acid (POC) 0.86 0.40 - 2.00 mmol/L    Critical value read back GENESIS     pH, venous (POC) 7.30 (L) 7.32 - 7.42      pCO2, venous (POC) 88.1 (H) 41 - 51 MMHG    pO2, venous (POC) 22 (L) 25 - 40 mmHg   COVID-19 RAPID TEST    Collection Time: 03/04/22  4:20 PM   Result Value Ref Range    Specimen source Nasopharyngeal      COVID-19 rapid test Not detected NOTD     EKG, 12 LEAD, INITIAL    Collection Time: 03/04/22  4:23 PM   Result Value Ref Range    Ventricular Rate 75 BPM    Atrial Rate 75 BPM    P-R Interval 152 ms    QRS Duration 112 ms    Q-T Interval 412 ms    QTC Calculation (Bezet) 460 ms    Calculated P Axis 34 degrees    Calculated R Axis -33 degrees    Calculated T Axis 110 degrees    Diagnosis       Normal sinus rhythm  Left axis deviation  Incomplete left bundle branch block  Voltage criteria for left ventricular hypertrophy  T wave abnormality, consider lateral ischemia  When compared with ECG of 16-DEC-2021 13:20,  No significant change was found  Confirmed by Dilan Keller ([id number]) on 3/4/2022 6:41:39 PM     BLOOD GAS, ARTERIAL    Collection Time: 03/04/22  8:03 PM   Result Value Ref Range    pH 7.36 7.35 - 7.45      PCO2 66 (H) 35 - 45 mmHg    PO2 183 (H) 80 - 100 mmHg    O2 SAT 99 (H) 92 - 97 %    BICARBONATE 36 (H) 22 - 26 mmol/L    BASE EXCESS 7.8 mmol/L    O2 METHOD BIPAP      FIO2 50 %    MODE BIPAP      SET RATE 4      IPAP/PIP 14      PEEP/CPAP 5.0      Sample source ARTERIAL      SITE RIGHT RADIAL      NAEEM'S TEST YES

## 2022-03-05 NOTE — PROGRESS NOTES
03/04/22 2127   Vitals   Temp 97.5 °F (36.4 °C)   Temp Source Axillary   Pulse (Heart Rate) 76   Heart Rate Source Monitor   Resp Rate (!) 32   O2 Sat (%) 98 %   Level of Consciousness Alert (0)   BP (!) 157/61   MAP (Calculated) 93   MEWS Score 3   PT arrived from ED on bipap and tachypneic. This has been his baseline since arrival.  Charge nurse notified.

## 2022-03-05 NOTE — PROGRESS NOTES
0700: Bedside shift change report given to Jonel An, [facility] (oncoming nurse) by Sofia Gutierrez RN (offgoing nurse). Report included the following information SBAR and Kardex. 0725: Rapid response CP. Rapid nurse Carson Mike at bedside. Pain 7/10 on the right side. 2 SL Nitro given. EKG done. Dr. Verona Robles responded and came to bedside and did assessment, reviewed ekg. Consult to cards ordered. Dopplar of BLE ordered. Morphine PRN ordered. 80: Dr. Betzaida Argueta consulted and will come to bedside to assess patient. 0900: Pt c/o chest pain 9/10 this morning. Given 2mg morphine and pain improved to 5/10. D/w Dr. Tiara Harper. No further cardiac workup indicated. 0920: Bedside swallow eval done and patient able to tolerate sips of water w/o signs of aspiration. Pt able to tolerate PO meds this morning. 1017: D/w Dr. Tiara Harper pt's BP in 70s-80s. Will order Albumin. Pt asymptomatic. Denies any dizziness or lightheadedness. 1019: Dr. Betzaida Argueta came to bedside to assess patient. Told repeat trop in 6 hours. But not further cardiac intervention needed or indicated at this time. 1020: Patient resting quietly with wife at bedside. Maintaining oxygen saturation of 98% on 4L NC.     1440: Patient continues to c/o chest pain on the left and right side. Describes this as pressure. States it feels like an elephant sitting on chest. Pain does not radiate. Notified Dr. Elza Kramer to order GI cocktail and administer. Ordered tramadol every 6 hours as needed for pain as well. Lung w/ crackles bilaterally. Transport here to take patient to CT and per Dr. Tiara Harper hold off until pt stable. VSS aside from tachycardia and tachypnea. 1515: Patient fell asleep shortly after GI cocktail given. Pt states pain is improving and now 5/10. Will notify Dr. Tiara Harper and continue to monitor. 1600: Dr. Tiara Harper notified of troponin of 120.     1616: Notified Dr. Betzaida Argueta of the chest pain events this evening and of repeat troponin result of 120.     1720: Patient c/o 10/10 chest pain. EKG done. Dr. Harpreet Liriano notified. Paged cardiology Dr. Justin Yang who will review EKG. Told to hold all BP meds and start heparin gtt and Nitro gtt. 1730: Spoke with Sommer the pharmacist and told to get baseline ptt and CBC for heparin gtt and to start infusion at 12u/kg/hr and repeat in 6 hours. 1738: Nitro gtt started at 10mcg/min and will increase gtt to meet goal of no chest pain. 1816: Patient states chest pain has resolved. Denies any complaints at this time. Nitro gtt maxed out at 20mcg/min per order. Will continue to monitor.

## 2022-03-06 NOTE — PROGRESS NOTES
To receive patient status post left heart cath. Pending report from cath lab at this time. 26  Dr. Vishnu Anaya notified of decreasing urine output. 1500  Bladder scan obtained with 11cc urine in bladder. Order received to increase fluids and continue to monitor. 1602  TR band off and transparent dressing in place. Stabilization device in place right wrist.    1715  Patient waking up now, confused, restless, attempting to remove the BIPAP mask. Wife states \"he does not like the BIPAP and will try to take off. Redness to nasal bridge. Foam dressing to place by Respiratory. 1800  Tolerating BIPAP after Ativan. Will continue to monitor. IV fluids at 100 mL per hour as ordered by provider. 1900  TR band back on +2 cc of air placed to site for oozing. Oncoming RN notified of site checks every 5 minutes while removing TR band then checks every 15 x 4, every 30 minutes x 2, then every hour x 4.      1915  Slightly more oozing to site +2cc placed; 4 cc total.    End of Shift Note    Bedside shift change report given to (oncoming nurse) by Vishnu Roger RN (offgoing nurse). Report included the following information SBAR, Kardex, ED Summary, Procedure Summary, MAR, Recent Results and Cardiac Rhythm NSr    Shift worked:  0442-4333       Shift summary and any significant changes:    See above note   Diagnostic Left Heart Cath  No intervention. TR band back on at 1900 for oozing. BIPAP placed 1130 due to CO2 114. Suprapubic cath with decreased output. IVF increased to 100 mL per hour. Concerns for physician to address: Possible need for BIPAP at home on discharge; wife states this will be a hard sell for her  who does not like BIPAP. Stage 2 pressure ulcer sacrum       Zone phone for oncoming shift:   [phone]         Activity:  Activity Level: Bed Rest  Number times ambulated in hallways past shift: 0  Number of times OOB to chair past shift: 0    Cardiac:   Cardiac Monitoring:  Yes Cardiac Rhythm: Sinus Rhythm    Access:   Current line(s): PIV     Genitourinary:   Urinary status: suprapubic catheter for neurogenic bladder    Respiratory:   O2 Device: BIPAP  Chronic home O2 use?: YES  Incentive spirometer at bedside: NO     GI:  Last Bowel Movement Date: 03/05/22  Current diet:  ADULT DIET Easy to Chew; Low Fat/Low Chol/High Fiber/JOANNE  ADULT ORAL NUTRITION SUPPLEMENT Breakfast, Lunch, Dinner; Low Calorie/High Protein  Passing flatus: YES  Tolerating current diet: YES       Pain Management:   Patient states pain is manageable on current regimen: NO    Skin:  Natalio Score: 19  Interventions: speciality bed, foam dressing, internal/external urinary devices and nutritional support     Patient Safety:  Fall Score:  Total Score: 2  Interventions: bed/chair alarm       Length of Stay:  Expected LOS: - - -  Actual LOS: 2      Gerber Henriquez RN

## 2022-03-06 NOTE — PROGRESS NOTES
2242:  Pt reporting chest pain 4/10 on nitro drip. EKG completed and NP notified. 2251:  Per NP instructions prn xanax given.

## 2022-03-06 NOTE — PROGRESS NOTES
2242:  Pt reporting chest pain 4/10 on nitro drip. EKG completed and NP notified. 2251:  Per NP instructions prn xanax given. 2303:  Pt now reporting pain 8/10. He requested more nitro. I explained that he is currently getting nitro IV. Advised NP; She instructed to give tramadol and draw a troponin. She reviewed EKG and stated it is unchanged. 2333:  Per NP insstructions Dr. Jalyn Davis was called. LM with answering service. 2338:  Dr Jalyn Davis returned call. Ordered 25mcg fentanyl Q6 PRN pain. 03/06    0020:  Pt resting comfortably with no complaints of pain. 0970:  Troponin resulted - 831. Notified NP who advised to call cardiologist to advise as well.      0106:  Dr. Jalyn Davis returned call. Advised him of result. He stated that he will let Dr. Alesia Barcenas know. 5765:  Per Dr. Jalyn Davis keep pt NPO as of now and Dr. Alesia Barcenas will cath him in the AM.      0133:  PTT  52.8. Per [place]alia increase rate of heparin GTT to 13 and redraw PTT in 6 hours.

## 2022-03-06 NOTE — PROGRESS NOTES
No cad lesions to explain chest pain or elevated trop    Elevated trop likely secondary to pulm issues and ckd    Will dc nitro. No further cardiac recs    WILL SIGN OFF. If chest pain recurs, need to address with hospitalist    Thank you for allowing me to participate in this patients care.     Aldair Grijalva MD, Shahid Foss

## 2022-03-06 NOTE — PROGRESS NOTES
Hospitalist Progress Note    NAME: Lynn Giron   :  1950   MRN:  [id number]       Assessment / Plan:  Acute on chronic hypercarbic respiratory failure due to  Acute COPD exacerbation  Acute diastolic congestive heart failure exacerbation  RRT on 3/5  V/q scan abnormal giving chronic lung disease  LE duplex neg for DVT  IV albumin given on 3/5 due to hypotension  Hold IV bumex due to elevated cr. Pt also going down for cardiac cath today  cont' cardiac meds with holding parameters  Cont' IV steroids, empiric IV abx  Cont' nitro gtt, heparin gtt  NPO for cath today  Pt is weaned off bipap to [place]. He uses 3-4LNC at baseline  Appreciate pulmonary following, CT ordered, hopefully can get done after cath  Strict I&O, daily wt. PT/OT, cardiology to eval  Appreciate cardiology following  Pt's wife was updated via phone this AM 3/6    History of CKD stage III-IV  Baseline cr ~2.5, cr slightly elevated today at 2.9, holding IV bumex. Cardiology started pt on gentle IV hydration. Avoid nephrotoxic agents  Monitor vol status     Dyslipidemia  Hypertension  Hypothyroidism  History of CAD s/p stents  Continue home Crestor, coreg with holding parameters, levothyroxine  Hold hydralazine  Continue home aspirin, Plavix     Diabetes mellitus type 2  Cont' lantus, titrate prn  SSI          Code Status: Full code  Surrogate Decision Maker: Wife   DVT Prophylaxis: Lovenox  GI Prophylaxis: not indicated     Baseline: From home, independent of ADLs  Estimated discharge date: 3/8 pending clinical improvement    Code status: Full  Prophylaxis: Lovenox  Recommended Disposition:  PT, OT, RN     Subjective:     Chief Complaint / Reason for Physician Visit  Pt is awake, NAD. Chest pain overnight. Remains on nitro gtt   Discussed with RN events overnight.      Review of Systems:  Symptom Y/N Comments  Symptom Y/N Comments   Fever/Chills    Chest Pain n    Poor Appetite    Edema     Cough    Abdominal Pain     Sputum    Joint Pain     SOB/WISEMAN n   Pruritis/Rash     Nausea/vomit    Tolerating PT/OT     Diarrhea    Tolerating Diet     Constipation    Other       Could NOT obtain due to:      Objective:     VITALS:   Last 24hrs VS reviewed since prior progress note. Most recent are:  Patient Vitals for the past 24 hrs:   Temp Pulse Resp BP SpO2   03/06/22 0900     100 %   03/06/22 0800 (!) 95.6 °F (35.3 °C) 92 12 (!) 110/56 99 %   03/06/22 0700 (!) 96 °F (35.6 °C) 92 11 (!) 101/53 94 %   03/06/22 0630  93 12 (!) 100/51 98 %   03/06/22 0600  95 18 (!) 107/57 92 %   03/06/22 0559     92 %   03/06/22 0552     (!) 88 %   03/06/22 0512     99 %   03/06/22 0440    (!) 109/57    03/06/22 0341 97.2 °F (36.2 °C) 96 28 121/62 99 %   03/05/22 2325 98 °F (36.7 °C) 99 (!) 39 (!) 137/59 98 %   03/05/22 2240 98 °F (36.7 °C) 94 24 (!) 141/58 99 %   03/05/22 1940 97.3 °F (36.3 °C) 97 15 117/64 99 %   03/05/22 1904     99 %   03/05/22 1856  100  116/61    03/05/22 1830  99 19 122/64 100 %   03/05/22 1800  98 16 131/65 99 %   03/05/22 1756    136/65    03/05/22 1746  100  (!) 141/62    03/05/22 1740  (!) 104  (!) 150/66    03/05/22 1735  (!) 104  (!) 149/64    03/05/22 1700    (!) 149/75 99 %   03/05/22 1430 98.4 °F (36.9 °C) (!) 104 (!) 33 (!) 142/69 100 %   03/05/22 1400  95 14 (!) 111/58 99 %   03/05/22 1100    (!) 100/58    03/05/22 1043 97.7 °F (36.5 °C) 95 14 (!) 90/55 100 %   03/05/22 1016  95  (!) 86/56    03/05/22 1000  95 25 (!) 89/54 98 %       Intake/Output Summary (Last 24 hours) at 3/6/2022 9737  Last data filed at 3/6/2022 0341  Gross per 24 hour   Intake 240 ml   Output 850 ml   Net -610 ml        I had a face to face encounter and independently examined this patient on 3/6/2022, as outlined below:  PHYSICAL EXAM:  General: WD, WN. Alert, cooperative, no acute distress    EENT:  EOMI. Anicteric sclerae. MMM  Resp:  Diminished bs /bl.   No accessory muscle use  CV:  Regular  rhythm,  No edema  GI:  Soft, Non distended, Non tender. +Bowel sounds  Neurologic:  AAOx3, follow commands  Psych:   Mild anxiety  Skin:  No rashes. No jaundice    Reviewed most current lab test results and cultures  YES  Reviewed most current radiology test results   YES  Review and summation of old records today    NO  Reviewed patient's current orders and MAR    YES  PMH/SH reviewed - no change compared to H&P  ________________________________________________________________________  Care Plan discussed with:    Comments   Patient x    Family  x Wife via phone   RN x    Care Manager     Consultant                        Multidiciplinary team rounds were held today with , nursing, pharmacist and clinical coordinator. Patient's plan of care was discussed; medications were reviewed and discharge planning was addressed. ________________________________________________________________________  Total NON critical care TIME:  35   Minutes    Total CRITICAL CARE TIME Spent:   Minutes non procedure based      Comments   >50% of visit spent in counseling and coordination of care     ________________________________________________________________________  Ying Reilly MD     Procedures: see electronic medical records for all procedures/Xrays and details which were not copied into this note but were reviewed prior to creation of Plan. LABS:  I reviewed today's most current labs and imaging studies.   Pertinent labs include:  Recent Labs     03/06/22  0653 03/05/22  1739 03/05/22  0350   WBC 10.7 5.0 6.0   HGB 9.3* 9.5* 9.7*   HCT 30.1* 30.5* 31.1*    222 189     Recent Labs     03/06/22  0653 03/05/22  0350 03/04/22  1608    135* 136   K 5.3* 4.9 4.4   CL 94* 95* 93*   CO2 38* 36* 41*   * 229* 240*   BUN 70* 57* 50*   CREA 2.92* 2.44* 2.48*   CA 8.6 8.5 8.7   ALB  --   --  2.7*   TBILI  --   --  0.3   ALT  --   --  34       Signed: Ying Reilly MD

## 2022-03-06 NOTE — PROGRESS NOTES
03/05/22 2325   Vitals   Temp 98 °F (36.7 °C)   Pulse (Heart Rate) 99   Resp Rate (!) 39   O2 Sat (%) 98 %   Level of Consciousness Alert (0)   BP (!) 137/59   MAP (Monitor) 82   MAP (Calculated) 85   MEWS Score 3   Pain 1   Pain Scale 1 Numeric (0 - 10)   Pain Intensity 1 10   Patient Stated Pain Goal 0   Pain Reassessment 1 Yes   Pain Onset 1 acute   Pain Location 1 Chest   Pain Orientation 1 Mid   Pain Description 1 Constant   Oxygen Therapy   Pulse via Oximetry 99 beats per minute   NP notified

## 2022-03-06 NOTE — PROGRESS NOTES
Cardiology Progress Note              [address] [facility], [address], [address]  [phone]    3/6/2022 9:17 AM    Admit Date: 3/4/2022    Admit Diagnosis: Acute respiratory failure with hypercapnia (HCC) [J96.02]    Subjective:     Mare Felty is receiving breathing treatment currently. On warmer secondary to hypothermia.  Has had intermittent chest pain overnight on nitro gtt and requiring fentanyl    Visit Vitals  BP (!) 110/56   Pulse 92   Temp (!) 95.6 °F (35.3 °C)   Resp 12   Ht 5' 5\" (1.651 m)   Wt 122 lb 9.2 oz (55.6 kg)   SpO2 100%   BMI 20.40 kg/m²     Current Facility-Administered Medications   Medication Dose Route Frequency    0.9% sodium chloride infusion  75 mL/hr IntraVENous CONTINUOUS    ALPRAZolam (XANAX) tablet 0.25 mg  0.25 mg Oral TID PRN    methylPREDNISolone (PF) (SOLU-MEDROL) injection 40 mg  40 mg IntraVENous Q8H    traMADoL (ULTRAM) tablet 50 mg  50 mg Oral Q6H PRN    nitroglycerin (Tridil) 200 mcg/ml infusion  0-200 mcg/min IntraVENous TITRATE    heparin 25,000 units in D5W 250 ml infusion  12-25 Units/kg/hr IntraVENous TITRATE    heparin (porcine) 1,000 unit/mL injection 1,670 Units  30 Units/kg IntraVENous PRN    Or    heparin (porcine) 1,000 unit/mL injection 3,340 Units  60 Units/kg IntraVENous PRN    acetaminophen (TYLENOL) tablet 650 mg  650 mg Oral Q6H PRN    ondansetron (ZOFRAN) injection 4 mg  4 mg IntraVENous Q4H PRN    [START ON 3/7/2022] anastrozole (ARIMIDEX) tablet 0.5 mg  0.5 mg Oral Q MON, WED & FRI    aspirin delayed-release tablet 81 mg  81 mg Oral DAILY    carvediloL (COREG) tablet 6.25 mg  6.25 mg Oral Q12H    clopidogreL (PLAVIX) tablet 75 mg  75 mg Oral DAILY    levothyroxine (SYNTHROID) tablet 100 mcg  100 mcg Oral DAILY    magnesium oxide (MAG-OX) tablet 400 mg  400 mg Oral Q48H    rosuvastatin (CRESTOR) tablet 20 mg  20 mg Oral QHS    arformoterol 15 mcg/budesonide 0.5 mg neb solution   Nebulization BID RT    sodium chloride (NS) flush 5-40 mL  5-40 mL IntraVENous Q8H    sodium chloride (NS) flush 5-40 mL  5-40 mL IntraVENous PRN    polyethylene glycol (MIRALAX) packet 17 g  17 g Oral DAILY PRN    [Held by provider] bumetanide (BUMEX) injection 2 mg  2 mg IntraVENous BID    albuterol-ipratropium (DUO-NEB) 2.5 MG-0.5 MG/3 ML  3 mL Nebulization Q4H RT    azithromycin (ZITHROMAX) 500 mg in 0.9% sodium chloride 250 mL (VIAL-MATE)  500 mg IntraVENous Q24H    [Held by provider] hydrALAZINE (APRESOLINE) tablet 50 mg  50 mg Oral TID    nitroglycerin (NITROSTAT) tablet 0.4 mg  0.4 mg SubLINGual PRN    insulin glargine (LANTUS) injection 10 Units  10 Units SubCUTAneous QHS    insulin lispro (HUMALOG) injection   SubCUTAneous AC&HS    glucose chewable tablet 16 g  4 Tablet Oral PRN    glucagon (GLUCAGEN) injection 1 mg  1 mg IntraMUSCular PRN    dextrose 10% infusion 0-250 mL  0-250 mL IntraVENous PRN    hydrALAZINE (APRESOLINE) 20 mg/mL injection 10 mg  10 mg IntraVENous Q6H PRN         Objective:      Visit Vitals  BP (!) 110/56   Pulse 92   Temp (!) 95.6 °F (35.3 °C)   Resp 12   Ht 5' 5\" (1.651 m)   Wt 122 lb 9.2 oz (55.6 kg)   SpO2 100%   BMI 20.40 kg/m²       Physical Exam:  General:  Alert, cooperative, well noursished, well developed, appears stated age   Eyes:  Sclera anicteric. Pupils equally round and reactive to light. Mouth/Throat: Mucous membranes normal, oral pharynx clear   Neck: Supple   Lungs:   Clear to auscultation bilaterally, good effort   CV:  Regular rate and rhythm,no murmur, click, rub or gallop   Abdomen:   Soft, non-tender.  bowel sounds normal. non-distended   Extremities: No cyanosis or edema   Skin: Skin color, texture, turgor normal. no acute rash or lesions   Lymph nodes: Cervical and supraclavicular normal   Musculoskeletal: No swelling or deformity       Psych: Alert and oriented, normal mood affect given the setting         Data Review:   Labs:    Recent Labs     03/06/22  0653 03/05/22  1739 03/05/22  0350 WBC 10.7 5.0 6.0   HGB 9.3* 9.5* 9.7*   HCT 30.1* 30.5* 31.1*    222 189     Recent Labs     03/06/22  0653 03/05/22  0350 03/04/22  1608    135* 136   K 5.3* 4.9 4.4   CL 94* 95* 93*   CO2 38* 36* 41*   * 229* 240*   BUN 70* 57* 50*   CREA 2.92* 2.44* 2.48*   CA 8.6 8.5 8.7   ALB  --   --  2.7*   TBILI  --   --  0.3   ALT  --   --  34       No results for input(s): TROIQ, CPK, CKMB in the last 72 hours. Intake/Output Summary (Last 24 hours) at 3/6/2022 0917  Last data filed at 3/6/2022 0341  Gross per 24 hour   Intake 240 ml   Output 850 ml   Net -610 ml        Telemetry: nsr    ecg - nsr, incomplete lbbb, no acute changes    Assessment:     Active Problems:    Coronary arteriosclerosis (7/3/2018)      Renal insufficiency ()      Chronic obstructive pulmonary disease (HCC) ()      Diabetes ([facility] [address] ()      Essential hypertension ()      Acute respiratory failure with hypercapnia ([facility] [address] (3/4/2022)        Plan:     Javon Chanel is having intermittent chest pain on nitro gtt. Trop has continued to rise (along with his creatinine). Will start IVF now and proceed with C with Dr Zully Seay. D/w wife at bedside. He understands that there is a risk of kidney failure - contrast associated. I discussed the risks/benefits/alternatives of the procedure with the patient. Risks include (but are not limited to) bleeding, heart block, infection, cva/mi/tamponade/death. The patient understands and agrees to proceed. Thank you for this interesting consultation.       Duane Rutherford MD, [facility]    3/6/2022

## 2022-03-06 NOTE — PROGRESS NOTES
0700: Bedside shift change report given to Fred Wakefield, [facility] (oncoming nurse) by Annette Willard RN (offgoing nurse). Report included the following information SBAR and Kardex. 0800: Patient difficult to arouse this morning. Pt lethargic, hypothermic, but able to tell me name, , place, time and president. Notified Dr. Maggie Meza of the above. Will place pt on jess gger. 0840: aPTT 89. 6. per pharm hold gtt for 1 hour and resume at 10u/kg/hr and then repeat aptt in 6 hours,  0850: D/w Dr. Torrey Willett to have Nursing Supervisor have cath lab called in and start 0.9% NS at 75ml/hr. Nursing supervisor Beka Villarreal notified and will call in cath lab. Dr. Maggie Meza aware. Will notify Dr. Festus Hoang upon cath lab arrival.   3913: Dr. Torrey Willett spoke with wife and patient and obtained consent for cardiac cath today. 0920: Patient c/o 8/10 chest pain. Increased nitro gtt to 25mcg/min. Will continue to monitor. 0945: Cath lab team taking patient to prep for cath and medicated pt with aspirin and plavix. Cath lab will call in Dr. Festus Hoang.

## 2022-03-07 NOTE — PROGRESS NOTES
Assumed care of patient 0700, resting in bed, pulling at CPAP. Patient cpap removed to do oral care and o2 sat dropped to 76. Took a couple minutes to rebound. Patient wife at bedside shortly after 0800. Touching cpap, adjusting straps. Asked and educated multiple times that she cannot touch the cpap and maneuver it because of airway safety as well as patient safety and very high demand for oxygen therapy at this time. Explained how when even briefly when removed patient decompensates very drastically and still continues to adjust and pull at mask. Patient pulling off o2 probe and cpap, and continues to attempt to pull at anything he can. Redirected. And continued to reeducate patient and wife. Interdisciplinary team rounds were held 3/7/2022 added lasix, steroids and precedex for increased agitation. Wife updated on plan of care via Dr. Jaun Toney at bedside. Palliative care, ashley, and case management all on consult and in to see patient and wife. Plan of care discussed. See clinical pathway and/or care plan for interventions and desired outcomes. At 1416 primary RN at bedside and  patient carlos alberto down to rate of 30s and is still responsive and attempting to pull at cpap. Patient did recover on his own. With Provider at bedside and support RN also at bedside to assist.   Patient repeat gas and titrated to high flow nasal cannula. Patient wife very anxious and requesting increase in oxygen therapy and patient stating he wants more oxygen too despite saturating 97% in no respiratory distress. Very extensive conversation with wife about plans and long term goals of care. Decision made to make patient DNR if something were to happen which is also honoring the patients wishes. Patient continues to saturate within defined limits and plan is to continue to use lasix to help take fluid off patient and wear bipap tonight. Patient is still very confused and lethargic. See MAR and flowsheet for more data and information. Will continue to monitor and reassess.

## 2022-03-07 NOTE — PROGRESS NOTES
03/06/22 2100   Neuro   Assessment L Pupil Round;Sluggish   Size L Pupil (mm) 5   Assessment R Pupil Brisk;Round   Size R Pupil (mm) 2   Oxygen Therapy   O2 Sat (%) 97 %   Pulse via Oximetry 97 beats per minute   Notified NP re: neuro exam. Awaiting orders.

## 2022-03-07 NOTE — PROGRESS NOTES
Slight oozing noted from TR band site. Additional 2cc added or total of four. +pulses, + cap refill, +warmth. Will continue to monitor.

## 2022-03-07 NOTE — PROGRESS NOTES
Spiritual Care Assessment/Progress Note  [facility]      NAME: Agusto Castillo      MRN: [id number]  AGE: 70 y.o.  SEX: male  Spiritism Affiliation: Sikh   Language: English     3/7/2022     Total Time (in minutes): 20     Spiritual Assessment begun in [facility] CRITICAL CARE 1 through conversation with:         [x]Patient        [x] Family    [] Friend(s)        Reason for Consult: Palliative Care, Initial/Spiritual Assessment     Spiritual beliefs: (Please include comment if needed)     [x] Identifies with a andi tradition:         [] Supported by a andi community:            [] Claims no spiritual orientation:           [] Seeking spiritual identity:                [] Adheres to an individual form of spirituality:           [] Not able to assess:                           Identified resources for coping:      [] Prayer                               [] Music                  [] Guided Imagery     [x] Family/friends                 [] Pet visits     [] Devotional reading                         [] Unknown     [] Other:                                          Interventions offered during this visit: (See comments for more details)    Patient Interventions: Initial/Spiritual assessment, Critical care,Affirmation of emotions/emotional suffering,Iconic (affirming the presence of God/Higher Power)     Family/Friend(s): Coping skills reviewed/reinforced     Plan of Care:     [] Support spiritual and/or cultural needs    [] Support AMD and/or advance care planning process      [] Support grieving process   [] Coordinate Rites and/or Rituals    [] Coordination with community clergy   [] No spiritual needs identified at this time   [] Detailed Plan of Care below (See Comments)  [] Make referral to Music Therapy  [] Make referral to Pet Therapy     [] Make referral to Addiction services  [] Make referral to [facility]  [] Make referral to Spiritual Care Partner  [] No future visits requested [x] Contact Spiritual Care for further referrals     Comments: Initial spiritual assessment with palliative pt in 2548. Pt appeared uncomfortable with Bi-pap, unable to communicate verbally, accompanied by spouse Raheem.  introduced self and role and proceeded to establish rapport with Raheem. Gunjan Coronado has been caring intensively for pt for a while but especially since November and is supported by her two sisters, one who lives right up the road. [place]jan Coronado feels she has strong support.  advised of availability of chaplains for additional support. Raheem became a little emotional and thanked . Raheem shared goals of care and prospect of having to make some decisions on how to proceed, added that she was going to speak with Palliative and had worked with palliative in the care of her father.  laine close to pt and extended a blessing with empathic touch. Following this sought out RN as pt appeared to need his mask readjusted. Minrashaad thanked [address] [place] [address] for visit. Contact Spiritual Care for any further referrals.   Florian Dunlap M.Div, [facility]   Paging Service [phone])

## 2022-03-07 NOTE — PROGRESS NOTES
Received notification from bedside RN about patient with regards to: unequal pupils  (L>R), LLE weakness (L>R), no prior assessment mentioning this per RN  VS: /61, HR 98, RR 15, O2 sat 98% on BIPAP    Intervention given: CT head WO contrast ordered    2227: Notified of soft BP  VS: BP 85/54, HR 96, RR 15, O2 sat 98% on BIPAP    - 25% Albumin 25 g IV x 1 dose ordered    2242: ABG result: pH 7.22, pCO2 88, pO2 112,     - BIPAP titration per RT, increased I/E 16/6 from 12/5    2300: persistent hypotension, need BP to be more stable before going down to CT  VS: BP 83/52, HR 91, RR 11, O2 sat 97% on BIPAP    - Levophed drip to titrate to keep MAP > 65, Intensivist consultation secondary to vasopressor initiation ordered

## 2022-03-07 NOTE — PROGRESS NOTES
03/07/22 0030   Vitals   Temp (!) 95.8 °F (35.4 °C)   Pulse (Heart Rate) 94   Resp Rate 14   O2 Sat (%) 97 %   /66   MAP (Monitor) 78   MAP (Calculated) 84   Oxygen Therapy   Pulse via Oximetry 94 beats per minute   Joon eastman applied at this time

## 2022-03-07 NOTE — PROGRESS NOTES
Pharmacy Medication Reconciliation     Due to patient being on a breathing tube, the patient was NOT interviewed regarding current PTA medication list, use and drug allergies; patient's wife, Ladonna Hutton was present in room and obtained permission to discuss drug regimen. Allergy Update: Bee sting [sting, bee]; Iodinated contrast media; Brilinta [ticagrelor]; Effient [prasugrel]; Ranexa [ranolazine]; Red dye; Sulfa (sulfonamide antibiotics); and Penicillins    Recommendations/Findings: The following amendments were made to the patient's active medication list on file at [facility]:   1) Additions:  Lantus Solostar 16 units  2) Deletions:   Glucagon 1 mg injection  BD pen needles  Prasterone 50 mg tablet  3) Changes:   Isosorbide ER 60 mg daily -> Isosorbide ER 30 mg daily    Pertinent Findings: None    Clarified PTA med list with patient's wife, Maria De Jesus Mcfarland and [employer]. PTA medication list was corrected to the following:     Prior to Admission Medications   Prescriptions Last Dose Informant Taking? albuterol (ProAir HFA) 90 mcg/actuation inhaler 2/4/2022 at Unknown time Significant Other Yes   Sig: Take 2 Puffs by inhalation every four (4) hours as needed. anastrozole (ARIMIDEX) 1 mg tablet 3/3/2022 at Unknown time Significant Other Yes   Sig: Take 0.5 mg by mouth every Monday, Wednesday, Friday. aspirin delayed-release 81 mg tablet 3/3/2022 at Unknown time Significant Other Yes   Sig: Take 81 mg by mouth daily. At night   budesonide-formoteroL (Symbicort) 80-4.5 mcg/actuation HFAA  Significant Other Yes   Sig: Take 2 Puffs by inhalation two (2) times a day. bumetanide (BUMEX) 1 mg tablet  Significant Other Yes   Sig: Take 1 mg by mouth two (2) times a day. carvediloL (COREG) 6.25 mg tablet 3/4/2022 at Unknown time Significant Other Yes   Sig: Take 1 Tablet by mouth every twelve (12) hours. clopidogreL (PLAVIX) 75 mg tab  Significant Other Yes   Sig: Take 75 mg by mouth daily.    hydrALAZINE (APRESOLINE) 50 mg tablet 3/4/2022 at Unknown time Significant Other Yes   Sig: Take 50 mg by mouth three (3) times daily. insulin glargine (Lantus Solostar U-100 Insulin) 100 unit/mL (3 mL) inpn  Significant Other Yes   Si Units by SubCUTAneous route nightly. isosorbide mononitrate ER (IMDUR) 30 mg tablet  Significant Other Yes   Sig: Take 30 mg by mouth daily. levothyroxine (SYNTHROID) 100 mcg tablet 3/4/2022 at Unknown time Significant Other Yes   Sig: Take 100 mcg by mouth daily. Morning   magnesium oxide 250 mg magnesium tablet 2022 at Unknown time Significant Other Yes   Sig: Take 250 mg by mouth. Every other day - OTC   nitroglycerin (Nitrostat) 0.4 mg SL tablet 3/4/2022 at Unknown time Significant Other Yes   Si Tablet by SubLINGual route every five (5) minutes as needed for Chest Pain. Up to 3 doses. rosuvastatin (CRESTOR) 20 mg tablet  Significant Other Yes   Sig: Take 20 mg by mouth nightly. testosterone (ANDROGEL) 1.62 % (20.25 mg/1.25 gram) glpk 3/3/2022 at Unknown time Significant Other Yes   Si.25 mg daily. 2 to 3 pumps alternating in the morning.       Facility-Administered Medications: None        Thank you,  Jorgito Vicente

## 2022-03-07 NOTE — PROGRESS NOTES
Transition of Care Plan:    RUR: 21%  Disposition: Home with HH vs Comfort Care  Follow up appointments: PCP, Specialist  DME needed: Home O2 ( [address] [facility]), rollator, wheelchair  Transportation at Discharge: Wife or BLS to provide transport  [address] or means to access home:  Wife has access       IM Medicare Letter: 2nd IM Letter to be given   Is patient a BCPI-A Bundle: n/a          If yes, was Bundle Letter given?:    Is patient a [place] and connected with the [place]? n/a                If yes, was [employer] transfer form completed and [facility] notified? Caregiver Contact: Wife-  [facility]- [phone]  Discharge Caregiver contacted prior to discharge? CM to discuss with wife at d/c. Care Conference needed?:      N/a      CM introduced self and role to pt and wife at bedside. , verified pt demographics, insurance info and emergency contact. [facility]  HH or DME in the past. Uses [facility] pharmacy in [place]    Reason for Admission:   Acute on chronic hypercarbic respiratory failure due to  Acute COPD exacerbation  Acute diastolic congestive heart failure exacerbation                 RUR Score:     21%      PCP: First and Last name:  Marleni Cheng NP     Name of Practice:   Are you a current patient: Yes/No: Yes   Approximate date of last visit:  VV- 2/22   Can you do a virtual visit with your PCP:              Resources/supports as identified by patient/family:   Wife is pt support. Top Challenges facing patient (as identified by patient/family and CM): Finances/Medication cost?   Pt has [employer]? Wife provides transportation              Support system or lack thereof? Has supportive wife                     Living arrangements? Lives with wife in 2 story home with [place] on lower level. Self-care/ADLs/Cognition? Pt requries assistance with ADL's,ambulates with rollator or uses wheelchair.  Has home O[facility]). Pt is alert and oriented x4. Current Advanced Directive/Advance Care Plan:  Full Code  Advance Care Planning     General Advance Care Planning (ACP) Conversation      Date of Conversation: 3/7/22  Conducted with: Patient with Decision Making Capacity and Healthcare Decision Maker: Next of Kin by law (only applies in absence of a Healthcare Power of  or Legal Guardian)    Healthcare Decision Maker:     Primary Decision Maker: delaney celeste r - Spouse - [phone]  Click here to complete [address] including selection of the Healthcare Decision Maker Relationship (ie \"Primary\")      Today we documented Decision Maker(s) consistent with Legal Next of Kin hierarchy. Content/Action Overview:   DECLINED ACP conversation - will revisit periodically   Reviewed DNR/DNI and patient elects Full Code (Attempt Resuscitation)    Length of Voluntary ACP Conversation in minutes:  21 minutes    Ann Marie Peterson RN           Healthcare Decision Maker:   Click here to complete [address] including selection of the Healthcare Decision Maker Relationship (ie \"Primary\")      Primary Decision Maker: daisy celesteelio r - Spouse - [phone]    Payor Source Payor: [employer] MEDICARE / Plan: [facility] MEDICARE CHOICE PPO/PFFS / Product Type: Managed Care Medicare /                             Plan for utilizing home health:   Pending clinical progress                  Transition of Care Plan:                         Home with [facility] vs Comfort Care      Care Management Interventions  PCP Verified by CM:  Yes  Mode of Transport at Discharge: BLS (CM to arrange)  Transition of Care Consult (CM Consult): Discharge Planning  Discharge Durable Medical Equipment: No (Has home O2, rollator and wheelchair)  Support Systems: Spouse/Significant Other  Confirm Follow Up Transport: Family  Discharge Location  Patient Expects to be Discharged to[de-identified] [facility] [address] [facility]  Phone: [phone]

## 2022-03-07 NOTE — PROGRESS NOTES
Attended IDR in CCU where pt care was discussed.   Sonam Mosley M.Div, [facility] Paging Service [phone]

## 2022-03-07 NOTE — PROGRESS NOTES
Critical Care Progress Note  Tahmina Pineda MD          Date of Service:  3/7/2022  NAME:  Christiano Estrada  :  1950  MRN:  [id number]      Subjective/Hospital course:      3/7: Patient is starting to wake up more and able to answer some question but remains on BiPAP. sats drop to 60s when BiPAP is taken off briefly. Problem list:     Problem list:  Hospital Problems  Date Reviewed: 2021          Codes Class Noted POA    Acute respiratory failure with hypercapnia [facility]) ICD-10-CM: J96.02  ICD-9-CM: 518.81  3/4/2022 Unknown        Renal insufficiency (Chronic) ICD-10-CM: N28.9  ICD-9-CM: 593.9  Unknown Yes        Chronic obstructive pulmonary disease (HCC) (Chronic) ICD-10-CM: J44.9  ICD-9-CM: 550  Unknown Yes        Diabetes ([address] (Chronic) ICD-10-CM: E11.9  ICD-9-CM: 250.00  Unknown Yes        Essential hypertension (Chronic) ICD-10-CM: I10  ICD-9-CM: 401.9  Unknown Yes        Coronary arteriosclerosis (Chronic) ICD-10-CM: I25.10  ICD-9-CM: 414.00  7/3/2018 Yes              Assessment/Plan:     Acute on chronic hypoxic hypercarbic respiratory failure:   -Patient ultimately presented to the emergency department with shortness of breath. This marks his fourth admission since 2020. Since introduction patient has had abnormal CT chest showing patchy infiltrates with nonspecific fibrosis and bronchiectasis with left upper lobe pneumatocele. There have been ongoing concerns from atypical infections versus lymphogenic carcinomatosis. Pulmonology has also suspected underlying COPD although PFTs have not been completed yet. Patient has undergone multiple rounds of antimicrobial therapy. Covid test have remained negative. He has also been on steroids, brovana, pulmicort, and prn albuterol. Presentation also complicated by heart failure with preserved EF and valvular insufficiency.   Patient was initially diuresed on admission although blood pressure has been rather labile so this has been held. Pulmonology has been following this admission with recommendations for continue noninvasive with AVAPS as needed. Plan to consult case management for home trilogy set up at discharge. Patient has been maintained on Solu-Medrol. He was also started on azithromycin for 5 days. VQ scan abnormal.  Duplex bilateral lower extremities negative. Of note patient is home oxygen dependent on 3 to 4 L nasal cannula  -CT chest again shows likely pulmonary edema with underlying fibrosis. -BP is better this morning, will pursue aggressive diuresis to attain at least 1 lit neg today.   -Send blood and sputum cultures  -Continue azithromycin as previously ordered  -Continue Solu-Medrol  -Continue NIPPV. -Consult to case management  -Consulted to palliative care  -Wean oxygen to keep sats greater than 88  -echo shows some RV dilatation and failure and preserved LVEF.     Acute metabolic encephalopathy:   -Multifactorial now complicated by polypharmacy related to acute agitation. Patient has been receiving benzodiazepines and narcotics to optimize BiPAP compliance. Subsequently on evaluation there was concern over unequal pupils. No other focal deficits appreciated  -CT head negative.  -Avoid sedating meds, will use precedex if needed for agitation.  -Utilize atypical antipsychotics for behavioral modification     Atypical chest pain:   -Patient has a longstanding history of CAD with 9 stents in place. He underwent left cardiac catheterization today with patent coronaries. He was previously on a heparin infusion and nitro infusion although now is hypotensive.  -Follow-up cardiac recommendations  -Consider Imdur  -Supportive care      Chronic kidney disease: Baseline creatinine is approximately 2.5 this is been elevated during this admission.   Most likely prerenal complicated by low output put state.  -Continue to trend BUN and creatinine  -Avoid nephrotoxic agents  -Renally dose medication administration  -Optimize volume status     Hypothyroidism:   -Continue Synthroid     CAD:   -Continue aspirin and Plavix  -Resume Coreg as blood pressure allows     Noninsulin-dependent type 2 diabetes:   -Continue Lantus  -Continue insulin sliding scale coverage     Mild to moderate protein calorie deficit malnutrition:   -Consult to registered dietitian     Deconditioning: In the setting of acute on chronic illness  -PT OT  -Turn and reposition  -Supportive care    Code status: Full code. DVT prophylaxis: [facility]. Patient is critically ill and has very poor prognosis overall, discussed plan in detail with patient. Explained that patient may not come off the ventilator if got intubated, she agrees but has not definitive decision regarding the intubation or not if needed. I personally spent 80 minutes of critical care time. This is time spent at this critically ill patient's bedside actively involved in patient care as well as the coordination of care and discussions with the patient's family. This does not include any procedural time which has been billed separately. Review of Systems:   Review of systems not obtained due to patient factors.        Vital Signs:     Patient Vitals for the past 4 hrs:   BP Pulse Resp SpO2   03/07/22 1515 (!) 143/67 91 20 99 %   03/07/22 1500 (!) 142/65 94 30 99 %   03/07/22 1445 (!) 143/61 92 23 98 %   03/07/22 1430 135/78 93 20 98 %   03/07/22 1415 (!) 137/97 92 18 98 %   03/07/22 1400 (!) 143/69 90 16 99 %   03/07/22 1345 134/60 87 23 96 %   03/07/22 1330 121/69 94 20 96 %   03/07/22 1315 (!) 142/61 97 (!) 32 95 %   03/07/22 1300 (!) 139/99 96 23 96 %   03/07/22 1245 130/89 98 27 96 %   03/07/22 1230 (!) 130/47 (!) 101 29 96 %   03/07/22 1215 (!) 145/67 (!) 103 28 94 %   03/07/22 1200 (!) 142/67 100 22 93 %   03/07/22 1145 (!) 144/64 98 29 96 %        Intake/Output Summary (Last 24 hours) at 3/7/2022 1539  Last data filed at 3/7/2022 1200  Gross per 24 hour   Intake 1813.13 ml   Output 660 ml   Net 1153.13 ml        Physical Examination:    Physical Exam  Constitutional:       Comments: Confused and disoriented, on bipap support. HENT:      Head: Normocephalic and atraumatic. Eyes:      Conjunctiva/sclera: Conjunctivae normal.   Cardiovascular:      Rate and Rhythm: Normal rate and regular rhythm. Heart sounds: No murmur heard. Pulmonary:      Comments: On Bipap support  Abdominal:      General: Abdomen is flat. Palpations: Abdomen is soft. Musculoskeletal:      Cervical back: Normal range of motion and neck supple. Labs:   Labs and imaging reviewed.       Medications:     Current Facility-Administered Medications   Medication Dose Route Frequency    balsam peru-castor oiL (VENELEX) ointment   Topical BID    lidocaine 4 % patch 1 Patch  1 Patch TransDERmal Q24H    dexmedeTOMidine in 0.9 % NaCl (PRECEDEX) 400 mcg/100 mL (4 mcg/mL) infusion soln  0.1-1.5 mcg/kg/hr IntraVENous TITRATE    furosemide (LASIX) injection 40 mg  40 mg IntraVENous Q8H    maalox/viscous lidocaine (COV GI COCKTAIL)  40 mL Oral BID PRN    heparin (porcine) injection 5,000 Units  5,000 Units SubCUTAneous Q8H    NOREPINephrine (LEVOPHED) 8 mg in 5% dextrose 250mL (32 mcg/mL) infusion  0.5-16 mcg/min IntraVENous TITRATE    haloperidol lactate (HALDOL) injection 5 mg  5 mg IntraVENous ONCE PRN    methylPREDNISolone (PF) (SOLU-MEDROL) injection 40 mg  40 mg IntraVENous Q8H    [Held by provider] traMADoL (ULTRAM) tablet 50 mg  50 mg Oral Q6H PRN    acetaminophen (TYLENOL) tablet 650 mg  650 mg Oral Q6H PRN    ondansetron (ZOFRAN) injection 4 mg  4 mg IntraVENous Q4H PRN    anastrozole (ARIMIDEX) tablet 0.5 mg  0.5 mg Oral Q MON, WED & FRI    aspirin delayed-release tablet 81 mg  81 mg Oral DAILY    [Held by provider] carvediloL (COREG) tablet 6.25 mg  6.25 mg Oral Q12H    clopidogreL (PLAVIX) tablet 75 mg  75 mg Oral DAILY    levothyroxine (SYNTHROID) tablet 100 mcg 100 mcg Oral DAILY    magnesium oxide (MAG-OX) tablet 400 mg  400 mg Oral Q48H    rosuvastatin (CRESTOR) tablet 20 mg  20 mg Oral QHS    arformoterol 15 mcg/budesonide 0.5 mg neb solution   Nebulization BID RT    sodium chloride (NS) flush 5-40 mL  5-40 mL IntraVENous Q8H    sodium chloride (NS) flush 5-40 mL  5-40 mL IntraVENous PRN    polyethylene glycol (MIRALAX) packet 17 g  17 g Oral DAILY PRN    [Held by provider] bumetanide (BUMEX) injection 2 mg  2 mg IntraVENous BID    albuterol-ipratropium (DUO-NEB) 2.5 MG-0.5 MG/3 ML  3 mL Nebulization Q4H RT    azithromycin (ZITHROMAX) 500 mg in 0.9% sodium chloride 250 mL (VIAL-MATE)  500 mg IntraVENous Q24H    [Held by provider] hydrALAZINE (APRESOLINE) tablet 50 mg  50 mg Oral TID    nitroglycerin (NITROSTAT) tablet 0.4 mg  0.4 mg SubLINGual PRN    insulin glargine (LANTUS) injection 10 Units  10 Units SubCUTAneous QHS    insulin lispro (HUMALOG) injection   SubCUTAneous AC&HS    glucose chewable tablet 16 g  4 Tablet Oral PRN    glucagon (GLUCAGEN) injection 1 mg  1 mg IntraMUSCular PRN    dextrose 10% infusion 0-250 mL  0-250 mL IntraVENous PRN    hydrALAZINE (APRESOLINE) 20 mg/mL injection 10 mg  10 mg IntraVENous Q6H PRN     ______________________________________________________________________  EXPECTED LENGTH OF STAY: - - -  ACTUAL LENGTH OF STAY:          [address], [place]   Pulmonary/[facility]  [address]  [phone]  3/7/2022     Addendum:  Had detailed discussion with wife and patient. He dose not want to live on life supports. He and his wife changed code status to DNR and do not want to be intubated as well.       Twila Mccall MD

## 2022-03-07 NOTE — CARDIO/PULMONARY
Cardiac Rehab Note: chart review/referral     03/06/2022 Patient status post cath without intervention      Smoking history assessed. Patient is a  Non smoker. Smoking Cessation Program link has been added to the AVS.     EF 60%  on 12/10/2021 per echo. Patient not eligible for cardiac rehab at this time.

## 2022-03-07 NOTE — CONSULTS
[facility]    ICU TEAM Progress Note    Name: Kizzy Geller   :    MRN: [id number]   Date: 3/6/2022      Subjective:   Progress Note: 3/6/2022      HPI:   Lucy Alarcon is a 70 y.o.   male who presents with past medical history of hypertension, COPD, congestive heart failure is coming the hospital chief complaints of shortness of breath. Patient reports shortness of breath is even at rest, with some cough and yellow phlegm. Does not report any chest pain. Reports wheezing as well. Reports increased swelling of the legs as well. Does not report any abdominal pain, nausea or vomiting. No fever or chills.     On arrival to ED, he was hypoxic and had to be placed on BiPAP. Rest of the vitals are normal.  On labs noted to have hemoglobin of 9.6, platelet count 087. BMP shows a CO2 of 41, glucose of 240. Creatinine is 2.48. Troponin is 17. proBNP is 2100. Chest x-ray shows severe bilateral pulmonary opacifications\" (Vishnu Anaya, 3/6). Admitted to hospitalist for further work up and management.      3/6 [facility] - no acute obstruction, ADARSH to RCA patient, remained on bipap for hypercarbia although abg improving, patient with ongoing confusion and agitation now complicated by polypharmacy, intermittent labile blood pressures and hypothermia throughout the day.      POD:Day of Surgery    S/P: Procedure(s):  LEFT HEART CATH / CORONARY ANGIOGRAPHY  PERCUTANEOUS CORONARY INTERVENTION  ANGIOPLASTY CORONARY  ULTRASOUND GUIDED VASCULAR ACCESS    Active Problem List:     Problem List  Date Reviewed: 2021          Codes Class    Acute respiratory failure with hypercapnia (HCC) ICD-10-CM: J96.02  ICD-9-CM: 518.81         CHF (congestive heart failure) (HCC) ICD-10-CM: I50.9  ICD-9-CM: 428.0         Chronic combined systolic and diastolic congestive heart failure (HCC) ICD-10-CM: I50.42  ICD-9-CM: 428.42, 428.0         Renal insufficiency (Chronic) ICD-10-CM: N28.9  ICD-9-CM: 593.9         Chronic obstructive pulmonary disease (HCC) (Chronic) ICD-10-CM: J44.9  ICD-9-CM: 496         Diabetes ([facility] (Chronic) ICD-10-CM: E11.9  ICD-9-CM: 250.00         Essential hypertension (Chronic) ICD-10-CM: I10  ICD-9-CM: 401.9         Aortic regurgitation ICD-10-CM: I35.1  ICD-9-CM: 424.1         NSTEMI (non-ST elevated myocardial infarction) (HCC) ICD-10-CM: I21.4  ICD-9-CM: 410.70         Coronary arteriosclerosis (Chronic) ICD-10-CM: I25.10  ICD-9-CM: 414.00               Past Medical History:      has a past medical history of Aortic regurgitation, CAD (coronary artery disease), Chronic combined systolic and diastolic congestive heart failure ([facility] (11/14/2021), Chronic obstructive pulmonary disease ([facility] Congestive heart failure ([facility] Diabetes ([facility] Essential hypertension, Hyperlipidemia, MI (myocardial infarction) ([facility] Murmur, Renal insufficiency, and Thyroid disease. He has no past medical history of Stroke [facility]). Past Surgical History:      has a past surgical history that includes hx coronary stent placement; ir asp bladder supra cath; and hx cyst removal.    Home Medications:     Prior to Admission medications    Medication Sig Start Date End Date Taking? Authorizing Provider   isosorbide mononitrate ER (IMDUR) 60 mg CR tablet Take 60 mg by mouth every morning. Yes Provider, Historical   hydrALAZINE (APRESOLINE) 50 mg tablet Take 50 mg by mouth three (3) times daily. Yes Other, MD Raul   rosuvastatin (CRESTOR) 20 mg tablet TAKE 1 TABLET BY MOUTH EVERY NIGHT 2/23/22  Yes Benjamin Iverson NP   anastrozole (ARIMIDEX) 1 mg tablet Take 0.5 mg by mouth every Monday, Wednesday, Friday. Yes Provider, Historical   bumetanide (BUMEX) 1 mg tablet TAKE 1 TABLET BY MOUTH TWICE DAILY. REPLACES. LASIX 11/16/21  Yes Benjamin Iverson NP   magnesium oxide 250 mg magnesium tablet Take 250 mg by mouth.  Every other day - OTC 11/4/21  Yes Provider, Historical   carvediloL (COREG) 6.25 mg tablet Take 1 Tablet by mouth every twelve (12) hours. 11/15/21  Yes Rebjavierca Reveal NP   clopidogreL (PLAVIX) 75 mg tab TAKE 1 TABLET BY MOUTH EVERY DAY 10/26/21  Yes Rebjavierca Reveal, NP   Symbicort 80-4.5 mcg/actuation HFAA INHALE 2 PUFFS BY MOUTH TWICE DAILY IN THE MORNING AND IN THE EVENING 21  Yes Provider, Historical   nitroglycerin (Nitrostat) 0.4 mg SL tablet 1 Tablet by SubLINGual route every five (5) minutes as needed for Chest Pain. Up to 3 doses. 21  Yes Rebjavierca Reveal, NP   albuterol (ProAir HFA) 90 mcg/actuation inhaler Take 2 Puffs by inhalation. 4-6 hrs as needed 21  Yes Provider, Historical   BD Lali 2nd Gen Pen Needle 32 gauge x \" ndle USE WITH LANTUS 21  Yes Provider, Historical   aspirin delayed-release 81 mg tablet Take 81 mg by mouth daily. At night   Yes Provider, Historical   levothyroxine (SYNTHROID) 100 mcg tablet Take 100 mcg by mouth daily. Morning   Yes Provider, Historical   testosterone (ANDROGEL) 1.62 % (20.25 mg/1.25 gram) glpk 20.25 mg daily. 2 to 3 pumps alternating in the morning. Yes Provider, Historical   glucagon (GLUCAGEN) 1 mg injection 1 mL by IntraMUSCular route as needed for Hypoglycemia. Patient not taking: Reported on 21   Petr Murphy MD   prasterone, dhea, 50 mg tab 50 mg = 1 tab each dose, PO, daily, # 30 tab, 0 Refills  Patient not taking: Reported on 3/4/2022 11/4/21   Provider, Historical       Allergies/Social/Family History:      Allergies   Allergen Reactions    Bee Sting [Sting, Bee] Anaphylaxis    Iodinated Contrast Media Rash    Brilinta [Ticagrelor] Other (comments)    Effient [Prasugrel] Other (comments)    Ranexa [Ranolazine] Other (comments)    Red Dye Hives    Sulfa (Sulfonamide Antibiotics) Rash    Penicillins Rash and Nausea Only      Social History     Tobacco Use    Smoking status: Former Smoker     Packs/day: 2.00     Years: 30.00     Pack years: 60.00     Quit date: 1991     Years since quittin.1    Smokeless tobacco: Never Used   Substance Use Topics    Alcohol use: Yes     Comment: rarely      No family history on file. Review of Systems:     Confused, [address] patient in bipap    Objective:   Vital Signs:  Visit Vitals  BP (!) 83/52   Pulse 91   Temp (!) 96.5 °F (35.8 °C)   Resp 11   Ht 5' 5\" (1.651 m)   Wt 55.6 kg (122 lb 9.2 oz)   SpO2 98%   BMI 20.40 kg/m²    O2 Flow Rate (L/min): 3 l/min O2 Device: BIPAP Temp (24hrs), Av.1 °F (35.1 °C), Min:92.8 °F (33.8 °C), Max:98 °F (36.7 °C)           Intake/Output:     Intake/Output Summary (Last 24 hours) at 3/6/2022 2318  Last data filed at 3/6/2022 2216  Gross per 24 hour   Intake 200 ml   Output 382 ml   Net -182 ml       Physical Exam:  Physical Exam  Constitutional:       General: She is not in acute distress. Appearance: She is normal weight. HENT:      Head: Normocephalic and atraumatic. Nose: Nose normal.      Mouth/Throat:      Mouth: Mucous membranes are moist.      Pharynx: No oropharyngeal exudate or posterior oropharyngeal erythema. Eyes:      Extraocular Movements: Extraocular movements intact. Pupils: Pupils round and reactive to light, accommodating although L>R  Cardiovascular:      Rate and Rhythm: Normal rate and regular rhythm. Pulses: Normal pulses. Heart sounds: Normal heart sounds. Pulmonary:      Comments: course breath sound throughout   Abdominal:      Palpations: Abdomen is soft. Comments:   Musculoskeletal:      Cervical back: Normal range of motion and neck supple. Skin:     General: Skin is warm and dry. Capillary Refill: Capillary refill takes less than 2 seconds.    Neurological:      Comments:MOREAU, answers questions     Psychiatric:      Comments: CHUCK        LABS AND  DATA: Personally reviewed  Recent Labs     22  0653 22  1739   WBC 10.7 5.0   HGB 9.3* 9.5*   HCT 30.1* 30.5*    222     Recent Labs     22  0653 22  0350    135*   K 5.3* 4.9   CL 94* 95*   CO2 38* 36*   BUN 70* 57*   CREA 2.92* 2.44*   * 229*   CA 8.6 8.5     Recent Labs     03/04/22  1608   *   TP 7.1   ALB 2.7*   GLOB 4.4*     Recent Labs     03/06/22  0653 03/05/22  2317   APTT 89.6* 52.8*      Recent Labs     03/06/22  1133   PHI 7.15*   PCO2I 114.8*   PO2I 216*     No results for input(s): CPK, CKMB, TROIQ, BNPP in the last 72 hours. Hemodynamics:   PAP:   CO:     Wedge:   CI:     CVP:    SVR:       PVR:       Ventilator Settings:  Mode Rate Tidal Volume Pressure FiO2 PEEP            40 %       Peak airway pressure:      Minute ventilation: 8.6 l/min        MEDS: Reviewed    Chest X-Ray: personally reviewed and report checked  FINDINGS: A portable AP radiograph of the chest was obtained at 1604 hours. Low  lung varus again shown. Severe bilateral pulmonary opacification, asymmetrically  greater on the left, appears unchanged in the interval. No pneumothorax or  substantial pleural effusion is suggested. Cardiac and mediastinal contours  remain obscured. The bones are mildly osteopenic. .      IMPRESSION  No change in appearance of low lung volumes and nonspecific severe  bilateral pulmonary opacifications. ECHO:  12/21/22  Result status: Final result  · LV: Estimated LVEF is 55 - 60%. Normal cavity size, wall thickness and systolic function (ejection fraction normal). Wall motion: normal.  · RV: Mildly dilated right ventricle. · AV: Aortic valve sclerosis with no evidence of reduced excursion. Aortic valve leaflet calcification present. Aortic valve mean gradient is 14 mmHg. Aortic valve area is 1.8 cm2. Mild aortic valve stenosis is present. Moderate aortic valve regurgitation is present. · LA: Moderately dilated left atrium. · MV: Moderate mitral annular calcification. Assessment & Plan:   Acute on chronic hypoxic hypercarbic respiratory failure: Patient ultimately presented to the emergency department with shortness of breath. This marks his fourth admission since November 2020. Since introduction patient has had abnormal CT chest showing patchy infiltrates with nonspecific fibrosis and bronchiectasis with left upper lobe pneumatocele. There have been ongoing concerns from atypical infections versus lymphogenic carcinomatosis. Pulmonology has also suspected underlying COPD although PFTs have not been completed yet. Patient has undergone multiple rounds of antimicrobial therapy. Covid test have remained negative. He has also been on steroids, brovana, pulmicort, and prn albuterol. Presentation also complicated by heart failure with preserved EF and valvular insufficiency. Patient was initially diuresed on admission although blood pressure has been rather labile so this has been held. Pulmonology has been following this admission with recommendations for continue noninvasive with AVAPS as needed. Plan to consult case management for home trilogy set up at discharge. Patient has been maintained on Solu-Medrol. He was also started on azithromycin for 5 days. VQ scan abnormal.  Duplex bilateral lower extremities negative. Of note patient is home oxygen dependent on 3 to 4 L nasal cannula  -Repeat CT chest now  -Send blood and sputum cultures  -Send Fungitell and fungal blood culture  -Continue azithromycin as previously ordered  -Continue Solu-Medrol  -Continue noninvasive, will transition to AVAPS should next ABG not demonstrate improved hypercarbia  -Consult to case management  -Consult to palliative care  -Optimize volume status as the blood pressure allows  -Wean oxygen to keep sats greater than 88  -Echocardiogram in a.m. Acute metabolic encephalopathy: Multifactorial now complicated by polypharmacy related to acute agitation. Patient has been receiving benzodiazepines and narcotics to optimize BiPAP compliance. Subsequently on evaluation there was concern over unequal pupils.   No other focal deficits appreciated  -CT head  -Discontinue narcotics and benzodiazepines  -Utilize atypical antipsychotics for behavioral modification  -Low-dose Precedex as blood pressure allows    Atypical chest pain: Patient has a longstanding history of CAD with 9 stents in place. He underwent left cardiac catheterization today with patent coronaries. He was previously on a heparin infusion and nitro infusion although now is hypotensive.  -Follow-up cardiac recommendations  -Consider Imdur  -Supportive care    Labile blood pressure with concern for distributive shock: Most likely in the setting of respiratory acidosis, polypharmacy, and intravascular depletion.  -Continue as needed albumin for gentle resuscitation  -Levophed to keep MAP greater than 65  -Supportive care  -Follow-up culture data    Chronic kidney disease: Baseline creatinine is approximately 2.5 this is been elevated during this admission. Most likely prerenal complicated by low output put state.  -Continue to trend BUN and creatinine  -Avoid nephrotoxic agents  -Renally dose medication administration  -Optimize volume status    Hypothyroidism:   -Continue Synthroid    CAD:   -Continue aspirin and Plavix  -Resume Coreg as blood pressure allows    Noninsulin-dependent type 2 diabetes:   -Continue Lantus  -Continue insulin sliding scale coverage    Mild to moderate protein calorie deficit malnutrition:   -Consult to registered dietitian    Deconditioning: In the setting of acute on chronic illness  -PT OT  -Turn and reposition  -Supportive care    ICU Comprehensive Plan of Care:   Plans for this Shift:   1. CT head and chest  2. Send culture data  3.  Continue bipap     Multidisciplinary Rounds Completed:  Pending    ABCDEF Bundle/Checklist  Pain Medications: None  Target RASS: 0 - Alert & Calm - Spontaneously pays attention to caregiver  Sedation Medications: Haldol and None  CAM-ICU:  Positive  Mobility: Poor  PT/OT: PT consulted and on board and OT consulted and on board   Restraints: Soft wrist restraints  Discussed Plan of Care (goals of care): Pending  Addressed Code Status: Full Code    CARDIOVASCULAR  Cardiac Gtts: Norepinephrine  SBP Goal of: < 140 mmHg  MAP Goal of: > 65 mmHg  Transfusion Trigger (Hgb): <7 g/dL    RESPIRATORY  Vent Goals:   Head of bed > 30 degrees  DVT Prophylaxis (if no, list reason): SCD's or Sequential Compression Device   SPO2 Goal: > 92%  Pulmonary toilet: Duo-Nebs     GI/  Ruelas Catheter Present: Yes  GI Prophylaxis: Not at this time   Nutrition: Pending   IVFs: PIVs  Bowel Movement: Pending  Bowel Regimen: None needed at this time  Insulin: lantus + ssi    ANTIBIOTICS  Antibiotics:  Azithromycin    T/L/D  Tubes: None  Lines: Peripheral IV  Drains: Ruelas Catheter    SPECIAL EQUIPMENT  None    DISPOSITION  Stay in ICU    CRITICAL CARE CONSULTANT NOTE  I had a face to face encounter with the patient, reviewed and interpreted patient data including clinical events, labs, images, vital signs, I/O's, and examined patient. I have discussed the case and the plan and management of the patient's care with the consulting services, the bedside nurses and the respiratory therapist.      NOTE OF PERSONAL INVOLVEMENT IN CARE   This patient has a high probability of imminent, clinically significant deterioration, which requires the highest level of preparedness to intervene urgently. I participated in the decision-making and personally managed or directed the management of the following life and organ supporting interventions that required my frequent assessment to treat or prevent imminent deterioration. I personally spent 60 minutes of critical care time. This is time spent at this critically ill patient's bedside actively involved in patient care as well as the coordination of care and discussions with the patient's family. This does not include any procedural time which has been billed separately.     Quinten Boles NP  [facility] Critical [facility]  3/6/2022

## 2022-03-07 NOTE — WOUND CARE
Wound care consult for the Sacrum that is blanchable but \"red\". Pt. Is on Bi-pap in the CCU currently for acute on chronic hypercarbic respiratory failure due to COPD. Also with CHF, HTN,, Hypothyroid, CAD with stents and DM 2. Assessment and Treatment done with relative in the room. Patient turned to the left side and examined. Foam dressing removed and patient turn to apply the Venelex ointment to the skin. Pillow placed under the upper back and the left leg to keep turned to the right side as placed prior to exam today. Heels are red but completely blanchable. Sacrum is redish / pink but completely blanchable. Currently using Venelex ointment. WOUND POA CONDITIONS    Wound Gluteal fold/cleft Posterior redness to sacrum, coccyx, and gluteal fold with slight open areas (Active)   Wound Etiology Other (Comment) 03/07/22 1006   Dressing Status New dressing applied 03/07/22 1006   Cleansed Soap and water 03/07/22 1006   Dressing/Treatment Pharmaceutical agent (see MAR) 03/07/22 1006   Offloading for Diabetic Foot Ulcers Offloading boot 03/07/22 1006   Dressing Change Due 03/07/22 03/07/22 1006   Wound Assessment [place]/red;Superficial 03/07/22 1006   Drainage Amount None 03/07/22 1006   Wound Odor None 03/07/22 1006   Paulette-Wound/Incision Assessment Blanchable erythema 03/07/22 1006   Wound Thickness Description Partial thickness 03/07/22 1006       Plan: Continue Pressure injury prevention: Turning side to side and off loading the heels as well as Nasal bridge pressure relief.    Sana Magdaleno, RN, BSN, [employer]

## 2022-03-07 NOTE — INTERDISCIPLINARY ROUNDS
Interdisciplinary team rounds were held 3/7/2022 with the following team members:Ethics, Nursing, Nutrition, Pastoral Care, Pharmacy, Physical Therapy, Physician and Clinical Coordinator. Plan of care discussed. See clinical pathway and/or care plan for interventions and desired outcomes. Goals of the Day: Careful fluid I/O. Wound care to see re stage 2.

## 2022-03-07 NOTE — PROGRESS NOTES
03/07/22 0400   Vitals   Temp 97.9 °F (36.6 °C)   Temp Source Rectal   Pulse (Heart Rate) (!) 102   Resp Rate 22   O2 Sat (%) 99 %   /67   MAP (Monitor) 83   MAP (Calculated) 87   Oxygen Therapy   Pulse via Oximetry 101 beats per minute   TR band removed with some oozing noted, NP Martyn Schwab aware, pressure dressing applied. Will follow pv/site checks per protocol. At this time Joon hugger turned off as well.

## 2022-03-07 NOTE — CONSULTS
Palliative Medicine      Met briefly with wife;  introduced Palliative Medicine as a support for pt and families dealing with life limiting disease, to help with symptom management, education and understanding disease process and treatment options, and to discuss goals of care, what pt wants in terms of treatment as well as code status. Wife states she is familiar with Palliative Medicine and feels it's a little too soon but they might need support in next few days. I provided my contact information to her and will follow on the periphery for now.

## 2022-03-08 NOTE — PROGRESS NOTES
Problem: Mobility Impaired (Adult and Pediatric)  Goal: *Acute Goals and Plan of Care (Insert Text)  Description: FUNCTIONAL STATUS PRIOR TO ADMISSION: Limited his history due to questionable pt cognition. At baseline he is on 2-3 L an increased to 4-6L with increased activity. Uses a rollator for very short distance, but mainly in wheelchair. HOME SUPPORT PRIOR TO ADMISSION: The patient lived with wife. Physical Therapy Goals  Initiated 3/8/2022  1. Patient will move from supine to sit and sit to supine  in bed with minimal assistance/contact guard assist within 7 day(s). 2.  Patient will transfer from bed to chair and chair to bed with moderate assistance  using the least restrictive device within 7 day(s). 3.  Patient will perform sit to stand with moderate assistance  within 7 day(s). Outcome: Not Met     PHYSICAL THERAPY EVALUATION  Patient: Aurelio Snell (64 y.o. male)  Date: 3/8/2022  Primary Diagnosis: Acute respiratory failure with hypercapnia (HCC) [J96.02]  Procedure(s) (LRB):  LEFT HEART CATH / CORONARY ANGIOGRAPHY (N/A)  PERCUTANEOUS CORONARY INTERVENTION (N/A)  ANGIOPLASTY CORONARY (N/A)  ULTRASOUND GUIDED VASCULAR ACCESS (N/A) 2 Days Post-Op   Precautions:   Fall      ASSESSMENT  Based on the objective data described below, the patient presents with significant oxygen needs, decreased activity tolerance, and overall poor functional mobility. Pt was received in supine on HHF at 20L and respiratory increased to 40%FIO2. Just talking pt oxygen dropped to 85%. Nursing entered the room and increased to 50%. He was able to maintain oxygen >90% while coming to the edge of the bed and performing a few LAQ and ADLs with OT. He is very limited in his abilities and declined attempted to stand at this date. He was returned to supine, positioned for comfort and pressure relief. Current Level of Function Impacting Discharge (mobility/balance): min A    Functional Outcome Measure:   The patient scored Total: 15/100 on the Barthel Index outcome measure which is indicative of being totally dependent in basic self-care. Other factors to consider for discharge:      Patient will benefit from skilled therapy intervention to address the above noted impairments. PLAN :  Recommendations and Planned Interventions: bed mobility training, transfer training, gait training, therapeutic exercises, patient and family training/education, and therapeutic activities      Frequency/Duration: Patient will be followed by physical therapy:  3 times a week to address goals. Recommendation for discharge: (in order for the patient to meet his/her long term goals)  Pulmonary rehab if able to tolerate pending progress    This discharge recommendation:  Has not yet been discussed the attending provider and/or case management    IF patient discharges home will need the following DME: hospital bed         SUBJECTIVE:   Patient stated i can't wear the socks too long due to the swelling.     OBJECTIVE DATA SUMMARY:   HISTORY:    Past Medical History:   Diagnosis Date    Aortic regurgitation     CAD (coronary artery disease)     Taxus stent 12/2005, 12/06 Cypher Prox circ, 6/2018 Sudarshan LADx2,  9/19 Sudarshan LAD & LCX, 1/20 Sudarshan     Chronic combined systolic and diastolic congestive heart failure ([address] 11/14/2021    Chronic obstructive pulmonary disease (HCC)     Congestive heart failure (HCC)     Diabetes ([address]     Essential hypertension     Hyperlipidemia     MI (myocardial infarction) ([address]     Murmur     Renal insufficiency     Thyroid disease      Past Surgical History:   Procedure Laterality Date    HX CORONARY STENT PLACEMENT      HX CYST REMOVAL      IR ASP BLADDER SUPRA CATH         Personal factors and/or comorbidities impacting plan of care:     Home Situation  Home Environment: Private residence  # Steps to Enter: 6  Rails to Enter: No  One/Two Story Residence: Two story, live on 1st floor  Living Alone: No  Support Systems: Spouse/Significant Other  Patient Expects to be Discharged to[de-identified] Unable to determine at this time  Current DME Used/Available at Home: Alexander Jeff, rollator,Shower chair,Grab bars,Oxygen, portable,Wheelchair (1-6 L at home)  Tub or Shower Type: Shower (smlal threshold to enter)    EXAMINATION/PRESENTATION/DECISION MAKING:   Critical Behavior:  Neurologic State: Alert,Confused,Irritable  Orientation Level: Oriented to person,Disoriented to place,Disoriented to situation,Disoriented to time  Cognition: Decreased attention/concentration,Follows commands  Safety/Judgement: Awareness of environment,Decreased insight into deficits  Hearing: Auditory  Auditory Impairment: Hard of hearing, bilateral  Skin:  intact  Edema: none  Range Of Motion:  AROM: Generally decreased, functional           PROM: Generally decreased, functional           Strength:    Strength: Generally decreased, functional                    Tone & Sensation:   Tone: Normal              Sensation: Intact               Coordination:  Coordination: Generally decreased, functional  Vision:   Acuity: Within Defined Limits  Corrective Lenses: Glasses  Functional Mobility:  Bed Mobility:  Rolling: Supervision  Supine to Sit: Minimum assistance  Sit to Supine: Minimum assistance  Scooting: Minimum assistance        Balance:   Sitting: Impaired;High guard  Sitting - Static: Good (unsupported)  Sitting - Dynamic: Fair (occasional)  Standing:  (NT)        Therapeutic Exercises:   LAQ at edge of the bed    Functional Measure:  Barthel Index:    Bathin  Bladder: 0 (wallace)  Bowels: 5  Groomin  Dressin  Feedin  Mobility: 0  Stairs: 0  Toilet Use: 0  Transfer (Bed to Chair and Back): 5  Total: 15/100       The Barthel ADL Index: Guidelines  1. The index should be used as a record of what a patient does, not as a record of what a patient could do.   2. The main aim is to establish degree of independence from any help, physical or verbal, however minor and for whatever reason. 3. The need for supervision renders the patient not independent. 4. A patient's performance should be established using the best available evidence. Asking the patient, friends/relatives and nurses are the usual sources, but direct observation and common sense are also important. However direct testing is not needed. 5. Usually the patient's performance over the preceding 24-48 hours is important, but occasionally longer periods will be relevant. 6. Middle categories imply that the patient supplies over 50 per cent of the effort. 7. Use of aids to be independent is allowed. Score Interpretation (from [address] [facility])    Independent   60-79 Minimally independent   40-59 Partially dependent   20-39 Very dependent   <20 Totally dependent     -Shun Castaneda., Barthel, DSamirW. (1965). Functional evaluation: the Barthel Index. [address] [facility] ([address] (1997). The Barthel activities of daily living index: self-reporting versus actual performance in the old (> or = 75 years). Journal [address] 45(7), [address] [facility], ..Samir, Didi Zayas., Mishel Brian. (1999). Measuring the change in disability after inpatient rehabilitation; comparison of the responsiveness of the Barthel Index and Functional [place] Measure. Journal of Neurology, Neurosurgery, and Psychiatry, 66(4), 657-701. AMNA Eden, AZUCENA Coffey, & Chris Zuñiga MSamirA. (2004) Assessment of post-stroke quality of life in cost-effectiveness studies: The usefulness of the Barthel Index and the EuroQoL-5D.  Quality of Life Research, 15, 895-13        Physical Therapy Evaluation Charge Determination   History Examination Presentation Decision-Making   HIGH Complexity :3+ comorbidities / personal factors will impact the outcome/ POC  HIGH Complexity : 4+ Standardized tests and measures addressing body structure, function, activity limitation and / or participation in recreation  HIGH Complexity : Unstable and unpredictable characteristics  Other outcome measures barthel  HIGH       Based on the above components, the patient evaluation is determined to be of the following complexity level: HIGH     Pain Rating:  No complaints     Activity Tolerance:   Poor and desaturates with exertion and requires oxygen    After treatment patient left in no apparent distress:   Supine in bed and Call bell within reach    COMMUNICATION/EDUCATION:   The patients plan of care was discussed with: Occupational therapist and Registered nurse. Fall prevention education was provided and the patient/caregiver indicated understanding. and Patient understands intent and goals of therapy, but is neutral about his/her participation.     Thank you for this referral.  Monica Mckeon, PT, DPT   Time Calculation: 26 mins

## 2022-03-08 NOTE — PROGRESS NOTES
0800  Pt coughing with sips of water. Speech consult ordered. Notified pt that he can have mouth swabs only until speech therapy evaluates him. PO meds being held at this time. 0830  Pt noted to have plavix ordered that has been held for last 2 days due to lethargy and possible dysphagia. Last dose given 3/5. Pt had recent PCI of RCA in December and has had recent episodes of significant bradycardia. Called Speech therapy to request re-eval early this AM.  Also notified Dr. Subhash He of situation. Will crush plavix and give in applesauce. [address] with speech therapy. They will be here at 0900  0900  Dr. Subhash He at bedside updating pt and wife.   0449  Speech therapy at bedside for eval

## 2022-03-08 NOTE — PROGRESS NOTES
Problem: Dysphagia (Adult)  Goal: *Acute Goals and Plan of Care (Insert Text)  Description: Speech pathology goals  Initiated 3/5/2022  1. Patient will tolerate easy to chew/thin liquid diet with no adverse effects within 7 days. DISCONTINUED 3/8/2022  Added 3/8/2022: 2. Patient will tolerate Soft&Bite-Sized/Thin Liquids with no adverse effects within 7 days. Outcome: Progressing Towards Goal     SPEECH LANGUAGE PATHOLOGY DYSPHAGIA TREATMENT  Patient: Kristen Medina (13 y.o. male)  Date: 3/8/2022  Diagnosis: Acute respiratory failure with hypercapnia (HCC) [J96.02] <principal problem not specified>  Procedure(s) (LRB):  LEFT HEART CATH / CORONARY ANGIOGRAPHY (N/A)  PERCUTANEOUS CORONARY INTERVENTION (N/A)  ANGIOPLASTY CORONARY (N/A)  ULTRASOUND GUIDED VASCULAR ACCESS (N/A) 2 Days Post-Op  Precautions: Swallow      ASSESSMENT:  Patient is being followed by SLP and initial evaluation on 3/5 revealed a limited swallowing evaluation due to drowsiness as patient recently received morphine, but an overall grossly functional swallow; Easy to Chew/Thin Liquids was recommended. RN reported patient off BiPAP this morning and with increased alertness. Patient observed to be coughing with sips of thin liquid this morning, per RN. Patient on heated high flow, 25 LPM, 40% FiO2 on this date. Cough observed following initial sips of thin liquid, suspect due to NPO status given patient previously on BiPAP. Patient observed to tolerate subsequent trials without overt difficulty. Patient's vitals observed to remain stable with PO intake. Given patient's respiratory status, history of COPD and CHF, patient is at risk for aspiration. Cautiously recommend Soft&Bite-Sized/Thin Liquids with aspiration precautions outlined below. Patient with low threshold to hold PO. If any change is respiratory status, vital signs, etc., suggest hold PO intake. RN, patient, and patient's wife educated re: SLP evaluation.      PLAN:  Recommendations and Planned Interventions:  -- Soft&Bite-Sized/Thin Liquids  -- Medication Whole in Puree  -- Sitting Upright  -- Small Bites/Sips    Patient continues to benefit from skilled intervention to address the above impairments. Continue treatment per established plan of care. Discharge Recommendations: To Be Determined     SUBJECTIVE:   Patient stated I'm trying not to be here, I want to go home. OBJECTIVE:   Cognitive and Communication Status:  Neurologic State: Alert,Irritable  Orientation Level: Oriented to person,Oriented to place,Disoriented to situation,Oriented to time  Cognition: Decreased attention/concentration,Follows commands     Perseveration: No perseveration noted  Safety/Judgement: Awareness of environment,Decreased insight into deficits    Dysphagia Treatment:  Oral Assessment:  Oral Assessment  Labial: No impairment  Dentition: Natural  Oral Hygiene: Dry oral mucosa  Lingual: No impairment  Velum: No impairment  Mandible: No impairment    P.O. Trials:  Patient Position: Upright in bed  Vocal quality prior to P.O.: Low volume  Consistency Presented: Ice chips; Thin liquid;Puree; Solid  How Presented: Self-fed/presented;Straw;Successive swallows;Spoon     Bolus Acceptance: No impairment  Bolus Formation/Control: Impaired  Type of Impairment: Delayed;Mastication  Propulsion: Delayed (# of seconds)  Oral Residue: None  Initiation of Swallow: Delayed (# of seconds)  Laryngeal Elevation: Functional  Aspiration Signs/Symptoms: Strong cough                Oral Phase Severity: Mild-moderate  Pharyngeal Phase Severity : Mild    Pain:  Pain Scale 1: Numeric (0 - 10)  Pain Intensity 1: 0       After treatment:   Patient left in no apparent distress in bed, Call bell within reach, Nursing notified, and Caregiver / family present    COMMUNICATION/EDUCATION:   Patient was educated regarding his deficit(s) of risk for aspiration and safe swallowing strategies.  He demonstrated fair understanding as evidenced by teach back.    The patient's plan of care including recommendations, planned interventions, and recommended diet changes were discussed with: Registered nurse.      [place] , SLP  Time Calculation: 17 mins

## 2022-03-08 NOTE — PROGRESS NOTES
Critical Care Progress Note  Ling Garcia MD          Date of Service:  3/8/2022  NAME:  Elier Fry  :  1950  MRN:  [id number]      Subjective/Hospital course:      3/7: Patient is starting to wake up more and able to answer some question but remains on BiPAP. sats drop to 60s when BiPAP is taken off briefly. Problem list:     Problem list:  Hospital Problems  Date Reviewed: 2021          Codes Class Noted POA    Acute respiratory failure with hypercapnia [facility]) ICD-10-CM: J96.02  ICD-9-CM: 518.81  3/4/2022 Unknown        Renal insufficiency (Chronic) ICD-10-CM: N28.9  ICD-9-CM: 593.9  Unknown Yes        Chronic obstructive pulmonary disease (HCC) (Chronic) ICD-10-CM: J44.9  ICD-9-CM: 982  Unknown Yes        Diabetes ([address] (Chronic) ICD-10-CM: E11.9  ICD-9-CM: 250.00  Unknown Yes        Essential hypertension (Chronic) ICD-10-CM: I10  ICD-9-CM: 401.9  Unknown Yes        Coronary arteriosclerosis (Chronic) ICD-10-CM: I25.10  ICD-9-CM: 414.00  7/3/2018 Yes              Assessment/Plan:     Acute on chronic hypoxic hypercarbic respiratory failure:   -Patient ultimately presented to the emergency department with shortness of breath. This marks his fourth admission since 2020. Since introduction patient has had abnormal CT chest showing patchy infiltrates with nonspecific fibrosis and bronchiectasis with left upper lobe pneumatocele. There have been ongoing concerns from atypical infections versus lymphogenic carcinomatosis. Pulmonology has also suspected underlying COPD although PFTs have not been completed yet. Patient has undergone multiple rounds of antimicrobial therapy. Covid test have remained negative. He has also been on steroids, brovana, pulmicort, and prn albuterol. Presentation also complicated by heart failure with preserved EF and valvular insufficiency.   Patient was initially diuresed on admission although blood pressure has been rather labile so this has been held. Pulmonology has been following this admission with recommendations for continue noninvasive with AVAPS as needed. Plan to consult case management for home trilogy set up at discharge. Patient has been maintained on Solu-Medrol. He was also started on azithromycin for 5 days. VQ scan abnormal.  Duplex bilateral lower extremities negative. Of note patient is home oxygen dependent on 3 to 4 L nasal cannula  -CT chest again shows likely pulmonary edema with underlying fibrosis. -Continue aggressive diuresis for goal of about 1 lit/d  -Send blood and sputum cultures  -Continue azithromycin as previously ordered  -Continue Solu-Medrol  -Continue NIPPV as needed.  -Consult to case management.  -Consulted to palliative care.  -Wean oxygen to keep sats greater than 88.  -echo shows some RV dilatation and failure and preserved LVEF.     Acute metabolic encephalopathy:   -Multifactorial now complicated by polypharmacy related to acute agitation. Patient has been receiving benzodiazepines and narcotics to optimize BiPAP compliance. Subsequently on evaluation there was concern over unequal pupils. No other focal deficits appreciated  -CT head negative.  -Avoid sedating meds, will use precedex if needed for agitation.  -Utilize atypical antipsychotics for behavioral modification     Atypical chest pain:   -Patient has a longstanding history of CAD with 9 stents in place. He underwent left cardiac catheterization today with patent coronaries. He was previously on a heparin infusion and nitro infusion although now is hypotensive.  -Follow-up cardiac recommendations  -Consider Imdur  -Supportive care      Chronic kidney disease: Baseline creatinine is approximately 2.5 this is been elevated during this admission.   Most likely prerenal complicated by low output put state.  -Continue to trend BUN and creatinine  -Avoid nephrotoxic agents  -Renally dose medication administration  -Optimize volume status     Hypothyroidism:   -Continue Synthroid     CAD:   -Continue aspirin and Plavix  -Resume Coreg as blood pressure allows     Noninsulin-dependent type 2 diabetes:   -Continue Lantus  -Continue insulin sliding scale coverage     Mild to moderate protein calorie deficit malnutrition:   -Consult to registered dietitian     Deconditioning: In the setting of acute on chronic illness  -PT OT  -Turn and reposition  -Supportive care    Code status: Full code. DVT prophylaxis: [facility]. Patient is critically ill and has very poor prognosis overall, discussed plan in detail with patient. Explained that patient may not come off the ventilator if got intubated, she agrees but has not definitive decision regarding the intubation or not if needed. I personally spent 60 minutes of critical care time. This is time spent at this critically ill patient's bedside actively involved in patient care as well as the coordination of care and discussions with the patient's family. This does not include any procedural time which has been billed separately. Review of Systems:   Review of systems not obtained due to patient factors. Vital Signs:     Patient Vitals for the past 4 hrs:   BP Temp Pulse Resp SpO2   03/08/22 0900 (!) 140/55  93 23 94 %   03/08/22 0800 (!) 149/65 97.3 °F (36.3 °C) 85 17 94 %   03/08/22 0757  97.3 °F (36.3 °C) 89 20 94 %   03/08/22 0742     94 %   03/08/22 0700 138/62  87 19 94 %   03/08/22 0600 (!) 144/57  85 14 96 %        Intake/Output Summary (Last 24 hours) at 3/8/2022 0943  Last data filed at 3/8/2022 0151  Gross per 24 hour   Intake 494.92 ml   Output 1995 ml   Net -1500.08 ml        Physical Examination:    Physical Exam  Constitutional:       Comments: Confused and disoriented, on bipap support. HENT:      Head: Normocephalic and atraumatic. Eyes:      Conjunctiva/sclera: Conjunctivae normal.   Cardiovascular:      Rate and Rhythm: Normal rate and regular rhythm. Heart sounds: No murmur heard. Pulmonary:      Comments: On Bipap support  Abdominal:      General: Abdomen is flat. Palpations: Abdomen is soft. Musculoskeletal:      Cervical back: Normal range of motion and neck supple. Labs:   Labs and imaging reviewed.       Medications:     Current Facility-Administered Medications   Medication Dose Route Frequency    balsam peru-castor oiL (VENELEX) ointment   Topical BID    lidocaine 4 % patch 1 Patch  1 Patch TransDERmal Q24H    dexmedeTOMidine in 0.9 % NaCl (PRECEDEX) 400 mcg/100 mL (4 mcg/mL) infusion soln  0.1-1.5 mcg/kg/hr IntraVENous TITRATE    furosemide (LASIX) injection 40 mg  40 mg IntraVENous Q8H    maalox/viscous lidocaine (COV GI COCKTAIL)  40 mL Oral BID PRN    heparin (porcine) injection 5,000 Units  5,000 Units SubCUTAneous Q8H    NOREPINephrine (LEVOPHED) 8 mg in 5% dextrose 250mL (32 mcg/mL) infusion  0.5-16 mcg/min IntraVENous TITRATE    methylPREDNISolone (PF) (SOLU-MEDROL) injection 40 mg  40 mg IntraVENous Q8H    [Held by provider] traMADoL (ULTRAM) tablet 50 mg  50 mg Oral Q6H PRN    acetaminophen (TYLENOL) tablet 650 mg  650 mg Oral Q6H PRN    ondansetron (ZOFRAN) injection 4 mg  4 mg IntraVENous Q4H PRN    anastrozole (ARIMIDEX) tablet 0.5 mg  0.5 mg Oral Q MON, WED & FRI    aspirin delayed-release tablet 81 mg  81 mg Oral DAILY    [Held by provider] carvediloL (COREG) tablet 6.25 mg  6.25 mg Oral Q12H    clopidogreL (PLAVIX) tablet 75 mg  75 mg Oral DAILY    levothyroxine (SYNTHROID) tablet 100 mcg  100 mcg Oral DAILY    magnesium oxide (MAG-OX) tablet 400 mg  400 mg Oral Q48H    rosuvastatin (CRESTOR) tablet 20 mg  20 mg Oral QHS    arformoterol 15 mcg/budesonide 0.5 mg neb solution   Nebulization BID RT    sodium chloride (NS) flush 5-40 mL  5-40 mL IntraVENous Q8H    sodium chloride (NS) flush 5-40 mL  5-40 mL IntraVENous PRN    polyethylene glycol (MIRALAX) packet 17 g  17 g Oral DAILY PRN    [Held by provider] bumetanide (BUMEX) injection 2 mg  2 mg IntraVENous BID    albuterol-ipratropium (DUO-NEB) 2.5 MG-0.5 MG/3 ML  3 mL Nebulization Q4H RT    azithromycin (ZITHROMAX) 500 mg in 0.9% sodium chloride 250 mL (VIAL-MATE)  500 mg IntraVENous Q24H    [Held by provider] hydrALAZINE (APRESOLINE) tablet 50 mg  50 mg Oral TID    nitroglycerin (NITROSTAT) tablet 0.4 mg  0.4 mg SubLINGual PRN    insulin glargine (LANTUS) injection 10 Units  10 Units SubCUTAneous QHS    insulin lispro (HUMALOG) injection   SubCUTAneous AC&HS    glucose chewable tablet 16 g  4 Tablet Oral PRN    glucagon (GLUCAGEN) injection 1 mg  1 mg IntraMUSCular PRN    dextrose 10% infusion 0-250 mL  0-250 mL IntraVENous PRN    hydrALAZINE (APRESOLINE) 20 mg/mL injection 10 mg  10 mg IntraVENous Q6H PRN     ______________________________________________________________________  EXPECTED LENGTH OF STAY: - - -  ACTUAL LENGTH OF STAY:          Mauri Arceo MD   Pulmonary/CCM  [address]  [phone]  3/8/2022     Addendum:  Had detailed discussion with wife and patient. He dose not want to live on life supports. He and his wife changed code status to DNR and do not want to be intubated as well.       Russell Wright MD

## 2022-03-08 NOTE — PROGRESS NOTES
Problem: Self Care Deficits Care Plan (Adult)  Goal: *Acute Goals and Plan of Care (Insert Text)  Description: FUNCTIONAL STATUS PRIOR TO ADMISSION: Patient is limited historian with slight confusion during eval. Reports he was requiring assistance with LB dressing and bathing, able to use rollator for short household distances however mainly using wheelchair. He reports he typically sits to complete grooming and sponge bathing with set up. On 1-2L O2 at baseline, however requires up to 6L with activity. Pt has been admitted 4x since November 2020. HOME SUPPORT: The patient lived with spouse and required SPV - max A for ADLs. Occupational Therapy Goals  Initiated 3/8/2022  1. Patient will perform upper body dressing with supervision/set-up within 7 day(s). 2.  Patient will perform lower body dressing with moderate assistance  within 7 day(s). 3.  Patient will perform sponge bathing with supervision/set-up within 7 day(s). 4.  Patient will perform toilet transfers with minimal assistance/contact guard assist within 7 day(s). 5.  Patient will perform all aspects of toileting with minimal assistance/contact guard assist within 7 day(s). 6.  Patient will participate in upper extremity therapeutic exercise/activities with supervision/set-up for 3 minutes within 7 day(s). 7.  Patient will utilize energy conservation techniques during functional activities with verbal cues within 7 day(s).    Outcome: Progressing Towards Goal  OCCUPATIONAL THERAPY EVALUATION  Patient: Kizzy Geller (47 y.o. male)  Date: 3/8/2022  Primary Diagnosis: Acute respiratory failure with hypercapnia (HCC) [J96.02]  Procedure(s) (LRB):  LEFT HEART CATH / CORONARY ANGIOGRAPHY (N/A)  PERCUTANEOUS CORONARY INTERVENTION (N/A)  ANGIOPLASTY CORONARY (N/A)  ULTRASOUND GUIDED VASCULAR ACCESS (N/A) 2 Days Post-Op   Precautions:  Fall    ASSESSMENT  Based on the objective data described below, the patient presents with generalized deconditioning, requires increased supplemental O2 support, decreased cognition and problem solving, and requires encouragement to participate in evaluation on this date. Pt rc'd on 20L O2 HHF, RT present increasing pt to 30L O2 in prep for activity. With talking pt desatting to 85%, RN present and increasing pt to 50L in prep for sitting EOB. Pt able to transition to EOB with min A maintaining satts >90% with prolonged sitting to complete grooming, did endorse brief dizziness however BP stable. Pt declined offer to attempt standing on this date, transition back to bed. Throughout session pt demonstrating delayed processing and poor problem solving however able to answer all orientation questions correctly, may benefit from additional cognitive screen. RN notified of session. Recommend pulmonary rehab at [place]. Current Level of Function Impacting Discharge (ADLs/self-care):   Feeding: Setup;Supervision (modified diet)    Oral Facial Hygiene/Grooming: Setup;Supervision    Bathing: Maximum assistance    Upper Body Dressing: Maximum assistance    Lower Body Dressing: Minimum assistance    Toileting: Maximum assistance       Functional Outcome Measure: The patient scored Total: 15/100 on the Barthel Index outcome measure which is indicative of being totally dependent in basic self-care. Other factors to consider for discharge: hx of multiple hospitalization, requires increased O2     Patient will benefit from skilled therapy intervention to address the above noted impairments. PLAN :  Recommendations and Planned Interventions: self care training, functional mobility training, therapeutic exercise, balance training, therapeutic activities, endurance activities, patient education, home safety training, and family training/education    Frequency/Duration: Patient will be followed by occupational therapy 3 times a week to address goals.     Recommendation for discharge: (in order for the patient to meet his/her long term goals)  Pulmonary rehab     This discharge recommendation:  Has not yet been discussed the attending provider and/or case management    IF patient discharges home will need the following DME: patient owns DME required for discharge       SUBJECTIVE:   Patient stated what do you want me to do?     OBJECTIVE DATA SUMMARY:   HISTORY:   Past Medical History:   Diagnosis Date    Aortic regurgitation     CAD (coronary artery disease)     Taxus stent 12/2005, 12/06 Cypher Prox circ, 6/2018 Sudarshan LADx2,  9/19 Sudarshan LAD & LCX, 1/20 Sudarshan     Chronic combined systolic and diastolic congestive heart failure ([address] 11/14/2021    Chronic obstructive pulmonary disease (HCC)     Congestive heart failure (HCC)     Diabetes [facility]     Essential hypertension     Hyperlipidemia     MI (myocardial infarction) ([address]     Murmur     Renal insufficiency     Thyroid disease      Past Surgical History:   Procedure Laterality Date    HX CORONARY STENT PLACEMENT      HX CYST REMOVAL      IR ASP BLADDER SUPRA CATH         Expanded or extensive additional review of patient history:     Home Situation  Home Environment: Private residence  # Steps to Enter: 6  Rails to Enter: No  One/Two Story Residence: Two story, live on 1st floor  Living Alone: No  Support Systems: Spouse/Significant Other  Patient Expects to be Discharged to[de-identified] Unable to determine at this time  Current DME Used/Available at Home: Walker, rollator,Shower chair,Grab bars,Oxygen, portable,Wheelchair (1-6 L at home)  Tub or Shower Type: Shower (smlal threshold to enter)    Hand dominance: Right    EXAMINATION OF PERFORMANCE DEFICITS:  Cognitive/Behavioral Status:  Neurologic State: Alert;Confused;Irritable  Orientation Level: Oriented to person;Disoriented to place; Disoriented to situation;Disoriented to time  Cognition: Decreased attention/concentration; Follows commands     Perseveration: No perseveration noted  Safety/Judgement: Awareness of environment;Decreased insight into deficits    Skin: intact    Edema: BLE edema     Hearing: Auditory  Auditory Impairment: Hard of hearing, bilateral    Vision/Perceptual:                           Acuity: Within Defined Limits    Corrective Lenses: Glasses    Range of Motion:    AROM: Generally decreased, functional  PROM: Generally decreased, functional                      Strength:    Strength: Generally decreased, functional                Coordination:  Coordination: Generally decreased, functional  Fine Motor Skills-Upper: Left Impaired;Right Impaired    Gross Motor Skills-Upper: Left Intact; Right Intact    Tone & Sensation:    Tone: Normal  Sensation: Intact                      Balance:  Sitting: Impaired;High guard  Sitting - Static: Good (unsupported)  Sitting - Dynamic: Fair (occasional)  Standing:  (NT)    Functional Mobility and Transfers for ADLs:  Bed Mobility:  Rolling: Supervision  Supine to Sit: Minimum assistance  Sit to Supine: Minimum assistance  Scooting: Minimum assistance    Transfers:       ADL Assessment:  Feeding: Setup;Supervision (modified diet)    Oral Facial Hygiene/Grooming: Setup;Supervision    Bathing: Maximum assistance    Upper Body Dressing: Maximum assistance    Lower Body Dressing: Minimum assistance    Toileting: Maximum assistance                ADL Intervention and task modifications:       Grooming  Position Performed: Seated edge of bed  Washing Face: Set-up; Supervision  Cues: Verbal cues provided                   Lower Body Dressing Assistance  Socks: Maximum assistance         Cognitive Retraining  Safety/Judgement: Awareness of environment;Decreased insight into deficits       Functional Measure:    Barthel Index:  Bathin  Bladder: 0 (wallace)  Bowels: 5  Groomin  Dressin  Feedin  Mobility: 0  Stairs: 0  Toilet Use: 0  Transfer (Bed to Chair and Back): 5  Total: 15/100      The Barthel ADL Index: Guidelines  1.  The index should be used as a record of what a patient does, not as a record of what a patient could do. 2. The main aim is to establish degree of independence from any help, physical or verbal, however minor and for whatever reason. 3. The need for supervision renders the patient not independent. 4. A patient's performance should be established using the best available evidence. Asking the patient, friends/relatives and nurses are the usual sources, but direct observation and common sense are also important. However direct testing is not needed. 5. Usually the patient's performance over the preceding 24-48 hours is important, but occasionally longer periods will be relevant. 6. Middle categories imply that the patient supplies over 50 per cent of the effort. 7. Use of aids to be independent is allowed. Score Interpretation (from [facility])    Independent   60-79 Minimally independent   40-59 Partially dependent   20-39 Very dependent   <20 Totally dependent     -Shun Castaneda., Barthel, D.W. (1965). Functional evaluation: the Barthel Index. [facility] ([address] (1997). The Barthel activities of daily living index: self-reporting versus actual performance in the old (> or = 75 years). Journal of [address] [place] [address] 457), [facility], JYASIRF, Alejandro Rodriges., Tony Michelle. (1999). Measuring the change in disability after inpatient rehabilitation; comparison of the responsiveness of the Barthel Index and Functional [place] Measure. Journal of Neurology, Neurosurgery, and Psychiatry, 66(4), 362-780. TANYA Edouard.AISHA, AZUCENA Coffey, & ANA VenturaA. (2004) Assessment of post-stroke quality of life in cost-effectiveness studies: The usefulness of the Barthel Index and the EuroQoL-5D.  Quality of Life Research, 15, 241-74     Occupational Therapy Evaluation Charge Determination   History Examination Decision-Making   MEDIUM Complexity : Expanded review of history including physical, cognitive and psychosocial  history  MEDIUM Complexity : 3-5 performance deficits relating to physical, cognitive , or psychosocial skils that result in activity limitations and / or participation restrictions MEDIUM Complexity : Patient may present with comorbidities that affect occupational performnce. Miniml to moderate modification of tasks or assistance (eg, physical or verbal ) with assesment(s) is necessary to enable patient to complete evaluation       Based on the above components, the patient evaluation is determined to be of the following complexity level: MEDIUM  Pain Rating:  Pt did not endorse pain during session. Activity Tolerance:   Fair, requires rest breaks, observed SOB with activity, and observed SOB with activity    After treatment patient left in no apparent distress:    Supine in bed, Call bell within reach, Bed / chair alarm activated, and Side rails x 3    COMMUNICATION/EDUCATION:   The patients plan of care was discussed with: Physical therapist, Occupational therapist, and Registered nurse. Patient/family have participated as able in goal setting and plan of care. This patients plan of care is appropriate for delegation to [facility].     Thank you for this referral.  Gabo Barron OT  Time Calculation: 25 mins

## 2022-03-09 PROBLEM — E43 SEVERE PROTEIN-CALORIE MALNUTRITION (HCC): Status: ACTIVE | Noted: 2022-01-01

## 2022-03-09 NOTE — PROGRESS NOTES
Critical Care Progress Note  Donte Cote MD          Date of Service:  3/9/2022  NAME:  Bronwyn Castellanos  :  1950  MRN:  [id number]      Subjective/Hospital course:      3/7: Patient is starting to wake up more and able to answer some question but remains on BiPAP. sats drop to 60s when BiPAP is taken off briefly. 3/9: Patient remains on high flow oxygen, now down to 50% Fio2, gets short of breath even while talking. Was taken off of bipap this morning, intermittently gets confused. Problem list:     Problem list:  Hospital Problems  Date Reviewed: 2021          Codes Class Noted POA    Severe protein-calorie malnutrition ([facility] ICD-10-CM: I29  ICD-9-CM: 602  3/9/2022 Unknown        Acute respiratory failure with hypercapnia (HCC) ICD-10-CM: J96.02  ICD-9-CM: 518.81  3/4/2022 Unknown        Renal insufficiency (Chronic) ICD-10-CM: N28.9  ICD-9-CM: 593.9  Unknown Yes        Chronic obstructive pulmonary disease (HCC) (Chronic) ICD-10-CM: J44.9  ICD-9-CM: 496  Unknown Yes        Diabetes ([facility] (Chronic) ICD-10-CM: E11.9  ICD-9-CM: 250.00  Unknown Yes        Essential hypertension (Chronic) ICD-10-CM: I10  ICD-9-CM: 401.9  Unknown Yes        Coronary arteriosclerosis (Chronic) ICD-10-CM: I25.10  ICD-9-CM: 414.00  7/3/2018 Yes              Assessment/Plan:     Acute on chronic hypoxic hypercarbic respiratory failure:   -Patient ultimately presented to the emergency department with shortness of breath. This marks his fourth admission since 2020. Since introduction patient has had abnormal CT chest showing patchy infiltrates with nonspecific fibrosis and bronchiectasis with left upper lobe pneumatocele. There have been ongoing concerns from atypical infections versus lymphogenic carcinomatosis. Pulmonology has also suspected underlying COPD although PFTs have not been completed yet. Patient has undergone multiple rounds of antimicrobial therapy.   Covid test have remained negative. He has also been on steroids, brovana, pulmicort, and prn albuterol. Presentation also complicated by heart failure with preserved EF and valvular insufficiency. Patient was initially diuresed on admission although blood pressure has been rather labile so this has been held. Pulmonology has been following this admission with recommendations for continue noninvasive with AVAPS as needed. Plan to consult case management for home trilogy set up at discharge. Patient has been maintained on Solu-Medrol. He was also started on azithromycin for 5 days. VQ scan abnormal.  Duplex bilateral lower extremities negative. Of note patient is home oxygen dependent on 3 to 4 L nasal cannula  -CT chest again shows likely pulmonary edema with underlying fibrosis. -Continue aggressive diuresis for goal of about 1 lit/d  -Send blood and sputum cultures  -Continue azithromycin as previously ordered  -Continue Solu-Medrol  -Continue NIPPV as needed.  -Consult to case management.  -Consulted to palliative care.  -Wean oxygen to keep sats greater than 88.  -echo shows some RV dilatation and failure and preserved LVEF.     Acute metabolic encephalopathy:   -Multifactorial now complicated by polypharmacy related to acute agitation. Patient has been receiving benzodiazepines and narcotics to optimize BiPAP compliance. Subsequently on evaluation there was concern over unequal pupils. No other focal deficits appreciated  -CT head negative.  -Avoid sedating meds, will use precedex if needed for agitation.  -Utilize atypical antipsychotics for behavioral modification     Atypical chest pain:   -Patient has a longstanding history of CAD with 9 stents in place. He underwent left cardiac catheterization today with patent coronaries.   He was previously on a heparin infusion and nitro infusion although now is hypotensive.  -Follow-up cardiac recommendations  -Supportive care      Chronic kidney disease: Baseline creatinine is approximately 2.5 this is been elevated during this admission. Most likely prerenal complicated by low output put state.  -Continue to trend BUN and creatinine  -Avoid nephrotoxic agents  -Renally dose medication administration  -Optimize volume status     Hypothyroidism:   -Continue Synthroid     CAD:   -Continue aspirin and Plavix  -Resume Coreg as blood pressure allows     Noninsulin-dependent type 2 diabetes:   -Continue Lantus  -Continue insulin sliding scale coverage     Mild to moderate protein calorie deficit malnutrition:   -Consult to registered dietitian     Deconditioning: In the setting of acute on chronic illness  -PT OT  -Turn and reposition  -Supportive care    Code status: Full code. DVT prophylaxis: [facility]. Code status: changed to DNR/DNI. Patient is critically ill and has very poor prognosis overall, discussed plan in detail with patient and his wife. If continues to improve then he may be transferred out of ICU tomorrow. I personally spent 40 minutes of critical care time. This is time spent at this critically ill patient's bedside actively involved in patient care as well as the coordination of care and discussions with the patient's family. This does not include any procedural time which has been billed separately. Review of Systems:   Review of systems not obtained due to patient factors.        Vital Signs:     Patient Vitals for the past 4 hrs:   BP Temp Pulse Resp SpO2   03/09/22 1335   (!) 106 24 96 %   03/09/22 1320   (!) 105 24 97 %   03/09/22 1311 130/67  (!) 105     03/09/22 1305 132/67  (!) 103 21 97 %   03/09/22 1250   100 20 96 %   03/09/22 1235   99 23 100 %   03/09/22 1228     94 %   03/09/22 1220   96 20 97 %   03/09/22 1205 132/62 97.8 °F (36.6 °C) 97 22 95 %   03/09/22 1150   94 18 97 %   03/09/22 1135   97 20 95 %   03/09/22 1120   96 19 95 %   03/09/22 1105 128/77  (!) 101 28 95 %   03/09/22 1050   (!) 104 30 92 % Intake/Output Summary (Last 24 hours) at 3/9/2022 1444  Last data filed at 3/9/2022 0600  Gross per 24 hour   Intake 470 ml   Output 1245 ml   Net -775 ml        Physical Examination:    Physical Exam  Constitutional:       Comments: Confused and disoriented, on bipap support. HENT:      Head: Normocephalic and atraumatic. Eyes:      Conjunctiva/sclera: Conjunctivae normal.   Cardiovascular:      Rate and Rhythm: Normal rate and regular rhythm. Heart sounds: No murmur heard. Pulmonary:      Comments: On Bipap support  Abdominal:      General: Abdomen is flat. Palpations: Abdomen is soft. Musculoskeletal:      Cervical back: Normal range of motion and neck supple. Labs:   Labs and imaging reviewed.       Medications:     Current Facility-Administered Medications   Medication Dose Route Frequency    balsam peru-castor oiL (VENELEX) ointment   Topical BID    lidocaine 4 % patch 1 Patch  1 Patch TransDERmal Q24H    dexmedeTOMidine in 0.9 % NaCl (PRECEDEX) 400 mcg/100 mL (4 mcg/mL) infusion soln  0.1-1.5 mcg/kg/hr IntraVENous TITRATE    furosemide (LASIX) injection 40 mg  40 mg IntraVENous Q8H    maalox/viscous lidocaine (COV GI COCKTAIL)  40 mL Oral BID PRN    heparin (porcine) injection 5,000 Units  5,000 Units SubCUTAneous Q8H    NOREPINephrine (LEVOPHED) 8 mg in 5% dextrose 250mL (32 mcg/mL) infusion  0.5-16 mcg/min IntraVENous TITRATE    methylPREDNISolone (PF) (SOLU-MEDROL) injection 40 mg  40 mg IntraVENous Q8H    [Held by provider] traMADoL (ULTRAM) tablet 50 mg  50 mg Oral Q6H PRN    acetaminophen (TYLENOL) tablet 650 mg  650 mg Oral Q6H PRN    ondansetron (ZOFRAN) injection 4 mg  4 mg IntraVENous Q4H PRN    anastrozole (ARIMIDEX) tablet 0.5 mg  0.5 mg Oral Q MON, WED & FRI    aspirin delayed-release tablet 81 mg  81 mg Oral DAILY    [Held by provider] carvediloL (COREG) tablet 6.25 mg  6.25 mg Oral Q12H    clopidogreL (PLAVIX) tablet 75 mg  75 mg Oral DAILY    levothyroxine (SYNTHROID) tablet 100 mcg  100 mcg Oral DAILY    magnesium oxide (MAG-OX) tablet 400 mg  400 mg Oral Q48H    rosuvastatin (CRESTOR) tablet 20 mg  20 mg Oral QHS    arformoterol 15 mcg/budesonide 0.5 mg neb solution   Nebulization BID RT    sodium chloride (NS) flush 5-40 mL  5-40 mL IntraVENous Q8H    sodium chloride (NS) flush 5-40 mL  5-40 mL IntraVENous PRN    polyethylene glycol (MIRALAX) packet 17 g  17 g Oral DAILY PRN    [Held by provider] bumetanide (BUMEX) injection 2 mg  2 mg IntraVENous BID    albuterol-ipratropium (DUO-NEB) 2.5 MG-0.5 MG/3 ML  3 mL Nebulization Q4H RT    [Held by provider] hydrALAZINE (APRESOLINE) tablet 50 mg  50 mg Oral TID    nitroglycerin (NITROSTAT) tablet 0.4 mg  0.4 mg SubLINGual PRN    [Held by provider] insulin glargine (LANTUS) injection 10 Units  10 Units SubCUTAneous QHS    insulin lispro (HUMALOG) injection   SubCUTAneous AC&HS    glucose chewable tablet 16 g  4 Tablet Oral PRN    glucagon (GLUCAGEN) injection 1 mg  1 mg IntraMUSCular PRN    dextrose 10% infusion 0-250 mL  0-250 mL IntraVENous PRN    hydrALAZINE (APRESOLINE) 20 mg/mL injection 10 mg  10 mg IntraVENous Q6H PRN     ______________________________________________________________________  EXPECTED LENGTH OF STAY: - - -  ACTUAL LENGTH OF STAY:          FRANDY Gómez, MD   Pulmonary/CCM  [phone]  3/9/2022

## 2022-03-09 NOTE — PROGRESS NOTES
1915: Bedside report received from Katerina, [address] [place] [address].: Assessment completed. See flowsheet for details. Patient alert and oriented following commands. Wife at bedside. 2200: Patient refusing to wear BIPAP.    0000: No change to previous assessment. 0400: No change to previous assessment. 0530: BS 53. Amp of D50 given. See MAR for details. 0700: Bedside report given to [facility] MERA.

## 2022-03-09 NOTE — PROGRESS NOTES
SPEECH LANGUAGE PATHOLOGY DYSPHAGIA TREATMENT/DISCHARGE  Patient: Sushant Anderson (57 y.o. male)  Date: 3/9/2022  Diagnosis: Acute respiratory failure with hypercapnia ([facility] [J96.02] <principal problem not specified>  Procedure(s) (LRB):  LEFT HEART CATH / CORONARY ANGIOGRAPHY (N/A)  PERCUTANEOUS CORONARY INTERVENTION (N/A)  ANGIOPLASTY CORONARY (N/A)  ULTRASOUND GUIDED VASCULAR ACCESS (N/A) 3 Days Post-Op  Precautions:  Fall    ASSESSMENT:  The patient is tolerating water well. No overt s/s of aspiration with liquids. We will sign off. PLAN:    Patient will be discharged from acute skilled speech therapy at this time. Rationale for discharge:  Goals achieved    Discharge Recommendations:  None     SUBJECTIVE:   Patient stated he did not want the cracker now but might eat it later. OBJECTIVE:   Cognitive and Communication Status:  Neurologic State: Alert  Orientation Level: Oriented X4  Cognition: Follows commands         Perseveration: No perseveration noted    Safety/Judgement: Awareness of environment,Decreased insight into deficits  Dysphagia Treatment:  Oral Assessment:     P.O. Trials:  Patient Position: upright in bed  Vocal quality prior to P.O.: Low volume  Consistency Presented: Thin liquid  How Presented: Self-fed/presented;Straw;Successive swallows     Bolus Acceptance: No impairment        Propulsion: No impairment  Oral Residue: None  Initiation of Swallow: No impairment  Laryngeal Elevation: Functional  Aspiration Signs/Symptoms: None  Pharyngeal Phase Characteristics: No impairment, issues, or problems            Oral Phase Severity: No impairment  Pharyngeal Phase Severity : No impairment                        NOMS:   The NOMS functional outcome measure was used to quantify this patient's level of swallowing impairment.   Based on the NOMS, the patient was determined to be at level 6 for swallow function     NOMS Swallowing Levels:  Level 1 (CN): NPO  Level 2 (CM): NPO but takes consistency in therapy  Level 3 (CL): Takes less than 50% of nutrition p.o. and continues with nonoral feedings; and/or safe with mod cues; and/or max diet restriction  Level 4 (CK): Safe swallow but needs mod cues; and/or mod diet restriction; and/or still requires some nonoral feeding/supplements  Level 5 (CJ): Safe swallow with min diet restriction; and/or needs min cues  Level 6 (CI): Independent with p.o.; rare cues; usually self cues; may need to avoid some foods or needs extra time  Level 7 ([address] [place] [address]): Independent for all p.o.  LISA. (2003). National Outcomes Measurement System (NOMS): Adult Speech-Language Pathology User's Guide. Pain:  Pain Scale 1: Numeric (0 - 10)  Pain Intensity 1: 0       After treatment:   Patient left in no apparent distress in bed    COMMUNICATION/EDUCATION:   The patient's plan of care including recommendations, planned interventions, and recommended diet changes were discussed with: Registered nurse.      Miguelina Mcbride, SLP  Time Calculation: 15 mins

## 2022-03-09 NOTE — PROGRESS NOTES
Music Therapy Assessment  [address] [facility] [id number]     1950  70 y.o.  male    Patient Telephone Number: [phone] (home)   Buddhist Affiliation: Vitaly Pathak   Language: English   Patient Active Problem List    Diagnosis Date Noted    Severe protein-calorie malnutrition ([facility] 03/09/2022    Acute respiratory failure with hypercapnia ([facility] 03/04/2022    CHF (congestive heart failure) ([facility] 12/09/2021    Chronic combined systolic and diastolic congestive heart failure ([facility] 11/14/2021    Renal insufficiency     Chronic obstructive pulmonary disease (HCC)     Diabetes (HCC)     Essential hypertension     Aortic regurgitation     NSTEMI (non-ST elevated myocardial infarction) ([email or web address]     Coronary arteriosclerosis 07/03/2018        Date: 3/9/2022            Total Time (in minutes): 5          MRM 2 CRITICAL CARE 1    Mental Status:   [  ] Alert [  ] Seferino Bennetts [  ]  Confused  [  ] Minimally responsive  [  ] Sleeping -N/A    Communication Status: [  ] Impaired Speech [  ] Nonverbal -N/A    Physical Status:   [  ] Oxygen in use  [  ] Hard of Hearing [  ] Vision Impaired  [  ] Ambulatory  [  ] Ambulatory with assistance [  ] Non-ambulatory -N/A    Music Preferences, Background: N/A: Please See Session Observations Below    Clinical Problem addressed: N/A: Please See Session Observations Below    Goal(s) met in session: N/A: Please See Session Observations Below  Physical/Pain management (Scale of 1-10):    Pre-session rating ___________    Post-session rating __________  [  ] Increased relaxation   [  ] Affected breathing patterns  [  ] Decreased muscle tension   [  ] Decreased agitation  [  ] Affected heart rate    [  ] Increased alertness     Emotional/Psychological:  [  ] Increased self-expression   [  ] Decreased aggressive behavior   [  ] Decreased feelings of stress  [  ] Discussed healthy coping skills     [  ] Improved mood    [  ] Decreased withdrawn behavior Social:  [  ] Decreased feelings of isolation/loneliness [  ] Positive social interaction   [  ] Provided support and/or comfort for family/friends    Spiritual:  [  ] Spiritual support    [  ] Expressed peace  [  ] Expressed andi    [  ] Discussed beliefs    Techniques Utilized (Check all that apply): N/A: Please See Session Observations Below  [  ] Procedural support MT [  ] Music for relaxation [  ] Patient preferred music  [  ] Jessica analysis  [  ] Tenny Clas choice  [  ] Music for validation  [  ] Entrainment  [  ] Movement to music [  ] Guided visualization  [  ] Ryan Loomis  [  ] Patient instrument playing [  ] Tenny Clas writing  [  ] Danyelle Tran along   [  ] Gerry Laureano  [  ] Sensory stimulation  [  ] Active Listening  [  ] Music for spiritual support [  ] Making of CDs as gifts    Session Observations:  Referred by Fina Mcnally, Palliative NP. This music therapist (MT) approached the room and noticed the lights were out and the curtain was drawn. MT consulted with unit RNs to see if it was an appropriate time to offer music therapy services. RN was unsure, and peered into the room to see if they were open to services. Pt's spouse declined services at this time. MT expressed understanding and exited.     KATHRINE Tyson  (Music Therapist, Board Certified)  [facility]

## 2022-03-09 NOTE — PROGRESS NOTES
Comprehensive Nutrition Assessment    Type and Reason for Visit: Reassess    Nutrition Recommendations/Plan:   Continue diet per SLP  Allow family to bring in food from home (at proper consistency)  RD to add Magic Cup and Ensure Pudding for pt to try  Monitor need for nutrition support if PO intake does not improve (RD to provide TF recommendations prn)     Nutrition Assessment:   Chart reviewed, case discussed during CCU rounds. Pt sitting up in bed awake and alert on hiflow. None of lunch tray consumed, he did sip some Glucerna although he does not like the flavor. He is feeling SOB and this is interfering with adequate intake. He experienced hypoglycemia overnight (down to the 40's). Wife reports he likes pudding and sherbet so willing to try ensure pudding and magic cup. She also reports he likes [employer], offered to get her whole milk to mix it with to improve kcal provision but she reports sometimes he has 'issues' with higher fat dairy so will stick with milk sent on trays. Encouraged her to bring any food from home he is willing to eat, BG can be controlled with medication prn. Briefly discussed option of supplemental TF via dobbhoff, pt reports he does not feel like we are there yet and wants to try to eat. As pt was admitted with severe malnutrition, if family still desires aggressive care would not wait much longer to pursue nutrition support if minimal PO intake continues.    Patient Vitals for the past 168 hrs:   % Diet Eaten   03/08/22 1413 1 - 25%   03/05/22 1854 1 - 25%       Malnutrition Assessment:  Malnutrition Status:  Severe malnutrition    Context:  Chronic illness     Findings of the 6 clinical characteristics of malnutrition:   Energy Intake:  7 - 75% or less est energy requirements for 1 month or longer  Weight Loss:  7.0 - Greater than 7.5% over 3 months     Body Fat Loss:  7 - Severe body fat loss, Orbital,Fat overlying ribs,Buccal region   Muscle Mass Loss:  7 - Severe muscle mass loss, Clavicles (pectoralis &deltoids),Hand (interosseous),Scapula (trapezius),Temples (temporalis)  Fluid Accumulation:  1 - Mild, Extremities   Strength:  Not performed         Estimated Daily Nutrient Needs:  Energy (kcal): 1609 kcals (BMR x 1. 3AF); Weight Used for Energy Requirements: Current  Protein (g): 56-67g (1.0-1.2g/kg); Weight Used for Protein Requirements: Current  Fluid (ml/day): 1600mL; Method Used for Fluid Requirements: 1 ml/kcal      Nutrition Related Findings:  Meds: zithromax, lasix, humalog, magox, solumedrol. Edema: trace-BLE. BM 3/5      Wounds:    Moisture associate skin damage       Current Nutrition Therapies:  ADULT DIET Dysphagia - Soft & Bite Sized  ADULT ORAL NUTRITION SUPPLEMENT Breakfast, Dinner, Lunch; Diabetic Supplement  ADULT ORAL NUTRITION SUPPLEMENT Breakfast, Lunch, Dinner; Frozen Supplement  ADULT ORAL NUTRITION SUPPLEMENT Breakfast, Lunch, Dinner; Fortified Pudding  DIET ONE TIME MESSAGE    Anthropometric Measures:  · Height:  5' 5\" (165.1 cm)  · Current Body Wt:  55.3 kg (121 lb 14.6 oz)    · Ideal Body Wt:  136 lbs:  90.1 %   · Adjusted Body Weight:   ; Weight Adjustment for: No adjustment   · BMI Category:  Underweight (BMI less than 22) age over 72       Nutrition Diagnosis:   · Severe malnutrition,In context of chronic illness related to inadequate protein-energy intake as evidenced by poor intake prior to admission,intake 0-25%,weight loss,severe muscle loss,severe loss of subcutaneous fat  Previous dx continues. Nutrition Interventions:   Food and/or Nutrient Delivery: Continue current diet,Modify oral nutrition supplement  Nutrition Education and Counseling: No recommendations at this time  Coordination of Nutrition Care: Continue to monitor while inpatient,Interdisciplinary rounds    Goals:  Pt will consume >25% of meals/supplements vs start on nutrition support in 1-3 days.        Nutrition Monitoring and Evaluation: Behavioral-Environmental Outcomes: None identified  Food/Nutrient Intake Outcomes: Food and nutrient intake,Supplement intake  Physical Signs/Symptoms Outcomes: Biochemical data,GI status,Weight,Nutrition focused physical findings    Discharge Planning:    Continue oral nutrition supplement,Continue current diet     Electronically signed by Shay Doherty RD, [facility] on 3/9/2022 at 2:27 PM    Contact: ext [phone]

## 2022-03-10 NOTE — PROGRESS NOTES
0700  Bedside and verbal report from Won Vargas RN  0800  Assessment completed. Heels blanchable. Sacral area excoriated, pink and blanchable. Venelex applied. 0900  Wife at bedside. Patient took diet fair. 1000  In bed, denies complaints. 1200  Reassessment completed. Patient's son at the bedside. 1400  Refused lunch tray. Taking a few snacks brought in from home. 1515  TRANSFER - OUT REPORT:    Verbal report given to Yolanda Zamora RN on Lynn Giron  being transferred to PCU room [id number] for routine progression of care       Report consisted of patients Situation, Background, Assessment and   Recommendations(SBAR). Information from the following report(s) Kardex, Procedure Summary, Intake/Output, MAR, Accordion and Cardiac Rhythm SR with BBB was reviewed with the receiving nurse. Lines:   Peripheral IV 03/04/22 Left Antecubital (Active)   Site Assessment Clean, dry, & intact 03/10/22 1200   Phlebitis Assessment 0 03/10/22 1200   Infiltration Assessment 0 03/10/22 1200   Dressing Status Clean, dry, & intact 03/10/22 1200   Dressing Type Transparent;Tape 03/10/22 1200   Hub Color/Line Status Capped;Green 03/10/22 1200   Action Taken Open ports on tubing capped 03/10/22 1200   Alcohol Cap Used Yes 03/10/22 1200       Peripheral IV 03/06/22 Left Forearm (Active)   Site Assessment Clean, dry, & intact 03/10/22 1200   Phlebitis Assessment 0 03/10/22 1200   Infiltration Assessment 0 03/10/22 1200   Dressing Status Clean, dry, & intact 03/10/22 1200   Dressing Type Transparent;Tape 03/10/22 1200   Hub Color/Line Status Blue;Flushed 03/10/22 1200   Action Taken Open ports on tubing capped 03/10/22 1200   Alcohol Cap Used Yes 03/10/22 1200        Opportunity for questions and clarification was provided.       Patient transported with:   Nursing, Respiratory Therapist, PCT, meds, 100% NRB mask, sinus rhythm with BBB

## 2022-03-10 NOTE — PROGRESS NOTES
1915: Bedside report received from [facility], [address] [place] [address]: Assessment completed. Patient alert and oriented, very pleasant, following commands. Wife at bedside. See flowsheet for full assessment. 2020: Patient requesting something to help him sleep this evening. Orders received from NP Fae Libman for Melatonin. See MAR for details. 0000: No change to previous assessment. 0400: No change to previous assessment. 0700: Bedside report given to Gloria lopez RN.

## 2022-03-10 NOTE — PROGRESS NOTES
Critical Care Progress Note  Zaina Ramos MD          Date of Service:  3/10/2022  NAME:  Cj Kamara  :  1950  MRN:  [id number]      Subjective/Hospital course:      3/7: Patient is starting to wake up more and able to answer some question but remains on BiPAP. sats drop to 60s when BiPAP is taken off briefly. 3/9: Patient remains on high flow oxygen, now down to 50% Fio2, gets short of breath even while talking. Was taken off of bipap this morning, intermittently gets confused. 3/10 Refusing bipap, now on HFNC       Problem list:     Problem list:  Hospital Problems  Date Reviewed: 2021          Codes Class Noted POA    Severe protein-calorie malnutrition ([facility] ICD-10-CM: U13  ICD-9-CM: 262  3/9/2022 Unknown        Acute respiratory failure with hypercapnia (HCC) ICD-10-CM: J96.02  ICD-9-CM: 518.81  3/4/2022 Unknown        Renal insufficiency (Chronic) ICD-10-CM: N28.9  ICD-9-CM: 593.9  Unknown Yes        Chronic obstructive pulmonary disease (HCC) (Chronic) ICD-10-CM: J44.9  ICD-9-CM: 496  Unknown Yes        Diabetes ([facility] (Chronic) ICD-10-CM: E11.9  ICD-9-CM: 250.00  Unknown Yes        Essential hypertension (Chronic) ICD-10-CM: I10  ICD-9-CM: 401.9  Unknown Yes        Coronary arteriosclerosis (Chronic) ICD-10-CM: I25.10  ICD-9-CM: 414.00  7/3/2018 Yes              Assessment/Plan:     Acute on chronic hypoxic hypercarbic respiratory failure:   -Patient ultimately presented to the emergency department with shortness of breath. This marks his fourth admission since 2020. Since introduction patient has had abnormal CT chest showing patchy infiltrates with nonspecific fibrosis and bronchiectasis with left upper lobe pneumatocele. There have been ongoing concerns from atypical infections versus lymphogenic carcinomatosis. Pulmonology has also suspected underlying COPD although PFTs have not been completed yet.   Patient has undergone multiple rounds of antimicrobial therapy. Covid test have remained negative. He has also been on steroids, brovana, pulmicort, and prn albuterol. Presentation also complicated by heart failure with preserved EF and valvular insufficiency. Patient was initially diuresed on admission although blood pressure has been rather labile so this has been held. Pulmonology has been following this admission with recommendations for continue noninvasive with AVAPS as needed. Plan to consult case management for home trilogy set up at discharge. Patient has been maintained on Solu-Medrol. He was also started on azithromycin for 5 days. VQ scan abnormal.  Duplex bilateral lower extremities negative. Of note patient is home oxygen dependent on 3 to 4 L nasal cannula    -CT chest again shows likely pulmonary edema with underlying fibrosis. -Continue diuresis today  -Sputum cx not sent   - Blood cx negative x 3 days  -s/p azithro  - Follow up procal  -Continue Solu-Medrol  -Continue NIPPV as tolerated, HFNC during the day as needed. Palliative following peripherally  -Wean oxygen to keep sats greater than 88.       Acute metabolic encephalopathy:   -Multifactorial now complicated by polypharmacy related to acute agitation. Patient has been receiving benzodiazepines and narcotics to optimize BiPAP compliance. Subsequently on evaluation there was concern over unequal pupils. No other focal deficits appreciated  -CT head negative.  -Avoid sedating meds, will use precedex if needed for agitation.  -Will try PRN zyprexa if needed      Atypical chest pain:   -Patient has a longstanding history of CAD with 9 stents in place.   - Negative [facility] 3/06 without causative lesion, cardiology signed off   -Supportive care      Chronic kidney disease:   -BIPIN on CKD resolving.  Cr near baseline  -Continue to trend BUN and creatinine  -Avoid nephrotoxic agents  -Renally dose medication administration  -Optimize volume status     Hypothyroidism:   -Continue Synthroid     CAD:   -Continue aspirin and Plavix  -Resume low dose Coreg, titrate as blood pressure allows     Noninsulin-dependent type 2 diabetes:   -Continue Lantus  -Continue insulin sliding scale coverage     Mild to moderate protein calorie deficit malnutrition:   -Consult to registered dietitian     Deconditioning: In the setting of acute on chronic illness  -PT OT  -Turn and reposition  -Supportive care    Code status: Full code. DVT prophylaxis: [facility] & halfway. Code status: changed to DNR/DNI. Patient is critically ill and has very poor prognosis overall, discussed plan in detail with patient and his wife. If continues to improve then he may be transferred out of ICU tomorrow. I personally spent 45 minutes of critical care time. This is time spent at this critically ill patient's bedside actively involved in patient care as well as the coordination of care and discussions with the patient's family. This does not include any procedural time which has been billed separately. Review of Systems:   Review of systems not obtained due to patient factors. Vital Signs:     Patient Vitals for the past 4 hrs:   BP Temp Pulse Resp SpO2 Weight   03/10/22 0647      54.6 kg (120 lb 5.9 oz)   03/10/22 0600 (!) 144/57  84 14 98 %    03/10/22 0500 134/63  94 20 100 %    03/10/22 0400 (!) 131/56 97.7 °F (36.5 °C) 94 25 98 %         Intake/Output Summary (Last 24 hours) at 3/10/2022 0719  Last data filed at 3/10/2022 0630  Gross per 24 hour   Intake    Output 1725 ml   Net -1725 ml        Physical Examination:    Physical Exam  Constitutional:       Comments: Awake oriented   HENT:      Head: Normocephalic and atraumatic. Eyes:      Conjunctiva/sclera: Conjunctivae normal.   Cardiovascular:      Rate and Rhythm: Normal rate and regular rhythm. Heart sounds: No murmur heard. Pulmonary:      Effort: Pulmonary effort is normal.      Comments: HFNC  Bilateral crackles.    Abdominal: General: Abdomen is flat. Palpations: Abdomen is soft. Musculoskeletal:      Cervical back: Normal range of motion and neck supple. Labs:   Labs and imaging reviewed.       Medications:     Current Facility-Administered Medications   Medication Dose Route Frequency    melatonin tablet 6 mg  6 mg Oral QHS    balsam peru-castor oiL (VENELEX) ointment   Topical BID    lidocaine 4 % patch 1 Patch  1 Patch TransDERmal Q24H    dexmedeTOMidine in 0.9 % NaCl (PRECEDEX) 400 mcg/100 mL (4 mcg/mL) infusion soln  0.1-1.5 mcg/kg/hr IntraVENous TITRATE    furosemide (LASIX) injection 40 mg  40 mg IntraVENous Q8H    maalox/viscous lidocaine (COV GI COCKTAIL)  40 mL Oral BID PRN    heparin (porcine) injection 5,000 Units  5,000 Units SubCUTAneous Q8H    NOREPINephrine (LEVOPHED) 8 mg in 5% dextrose 250mL (32 mcg/mL) infusion  0.5-16 mcg/min IntraVENous TITRATE    methylPREDNISolone (PF) (SOLU-MEDROL) injection 40 mg  40 mg IntraVENous Q8H    [Held by provider] traMADoL (ULTRAM) tablet 50 mg  50 mg Oral Q6H PRN    acetaminophen (TYLENOL) tablet 650 mg  650 mg Oral Q6H PRN    ondansetron (ZOFRAN) injection 4 mg  4 mg IntraVENous Q4H PRN    anastrozole (ARIMIDEX) tablet 0.5 mg  0.5 mg Oral Q MON, WED & FRI    aspirin delayed-release tablet 81 mg  81 mg Oral DAILY    [Held by provider] carvediloL (COREG) tablet 6.25 mg  6.25 mg Oral Q12H    clopidogreL (PLAVIX) tablet 75 mg  75 mg Oral DAILY    levothyroxine (SYNTHROID) tablet 100 mcg  100 mcg Oral DAILY    magnesium oxide (MAG-OX) tablet 400 mg  400 mg Oral Q48H    rosuvastatin (CRESTOR) tablet 20 mg  20 mg Oral QHS    arformoterol 15 mcg/budesonide 0.5 mg neb solution   Nebulization BID RT    sodium chloride (NS) flush 5-40 mL  5-40 mL IntraVENous Q8H    sodium chloride (NS) flush 5-40 mL  5-40 mL IntraVENous PRN    polyethylene glycol (MIRALAX) packet 17 g  17 g Oral DAILY PRN    [Held by provider] bumetanide (BUMEX) injection 2 mg  2 mg IntraVENous BID    albuterol-ipratropium (DUO-NEB) 2.5 MG-0.5 MG/3 ML  3 mL Nebulization Q4H RT    [Held by provider] hydrALAZINE (APRESOLINE) tablet 50 mg  50 mg Oral TID    nitroglycerin (NITROSTAT) tablet 0.4 mg  0.4 mg SubLINGual PRN    [Held by provider] insulin glargine (LANTUS) injection 10 Units  10 Units SubCUTAneous QHS    insulin lispro (HUMALOG) injection   SubCUTAneous AC&HS    glucose chewable tablet 16 g  4 Tablet Oral PRN    glucagon (GLUCAGEN) injection 1 mg  1 mg IntraMUSCular PRN    dextrose 10% infusion 0-250 mL  0-250 mL IntraVENous PRN    hydrALAZINE (APRESOLINE) 20 mg/mL injection 10 mg  10 mg IntraVENous Q6H PRN     ______________________________________________________________________  EXPECTED LENGTH OF STAY: - - -  ACTUAL LENGTH OF STAY:          6                 Marko Bautista MD   Pulmonary/CCM  [address] [phone]  3/10/2022

## 2022-03-10 NOTE — PROGRESS NOTES
Transition of Care Plan:     RUR: 21%  Disposition: Home with HH vs Comfort Care  Follow up appointments: PCP, Specialist  DME needed: Home O2 ( [address] [facility]), rollator, wheelchair  Transportation at Discharge: Wife or BLS to provide transport  [address] or means to access home:  Wife has access       IM Medicare Letter: 2nd IM Letter to be given   Is patient a BCPI-A Bundle: n/a                     If yes, was Bundle Letter given?:    Is patient a  and connected with the [place]? n/a                If yes, was [place] transfer form completed and [facility] notified? Caregiver Contact: Wife-  Jessica Munoz- [phone]  Discharge Caregiver contacted prior to discharge? CM to discuss with wife at d/c. Care Conference needed?:      N/a         Patient remains in the ccu being monitored. He is currently on 25 liters high flow. Therapy is recommending pulmonary rehab when patient is medically stable and oxygen levels are lower. Spoke with wife and she thinks that would be a good idea however wanted to speak with him . Patient has consult for trilogy machine at discharge. If patient is going to a pulmonary rehab unit the trilogy would need to be obtained once he completes rehab. Patient is being transferred to [address] [place] [address]. Handoff given to Joy Hernandez CM to follow for dcp. Lavinia Lozada RN BSN CRM        [phone]

## 2022-03-10 NOTE — PROGRESS NOTES
Patient being transferred out of ICU today  On high flow nasal cannula 25 L      Acute on chronic hypoxic and hypercarbic respiratory failure due to  Acute pulmonary edema  Chronic lung fibrosis on CT  -Continue high flow oxygen by nasal cannula and wean as tolerated  -Continue Solu-Medrol 40 mg every 12. Continue DuoNeb.   S/p Zithromax  -Will need outpatient follow-up with pulmonary    Acute metabolic encephalopathy  History of coronary artery disease s/p multiple stents  Hypothyroidism  CKD  Dyslipidemia  Diabetes mellitus type 2  -Continue present management

## 2022-03-10 NOTE — PROGRESS NOTES
Problem: Bronchodilation Therapy  Goal: Able to breathe comfortably (Bronchodilation Therapy)  Outcome: Progressing Towards Goal

## 2022-03-10 NOTE — PROGRESS NOTES
TRANSFER - IN REPORT:    Verbal report received from [facility] (name) on Tod Cooper  being received from CCU(unit) for routine progression of care      Report consisted of patients Situation, Background, Assessment and   Recommendations(SBAR). Information from the following report(s) SBAR, ED Summary, Procedure Summary, Intake/Output, Recent Results, Med Rec Status, Cardiac Rhythm Sinus Tachy., Alarm Parameters  and Quality Measures was reviewed with the receiving nurse. Opportunity for questions and clarification was provided. Assessment completed upon patients arrival to unit and care assumed. Dual skin assessment has been completed with Milena Cheng. Stage I pressure injury noted on assessment.

## 2022-03-11 NOTE — PROGRESS NOTES
2300: Bedside shift change report given to Stone French (oncoming nurse) by Oriana Das (offgoing nurse). Report included the following information SBAR, Kardex, ED Summary, Intake/Output, MAR, Recent Results and Cardiac Rhythm NSR. End of Shift Note    Bedside shift change report given to Allen Leahy (oncoming nurse) by Gracy Dimas RN (offgoing nurse). Report included the following information SBAR, Kardex, ED Summary, Intake/Output, MAR, Recent Results and Cardiac Rhythm NSR    Shift worked:  3540-3923     Shift summary and any significant changes:     Patient stayed on HHF 25L, refusing BIPAP at night. Concerns for physician to address:  Palliative reconsulted, need clarification on code status? Zone phone for oncoming shift:          Activity:  Activity Level: Bed Rest  Number times ambulated in hallways past shift: 0  Number of times OOB to chair past shift: 0    Cardiac:   Cardiac Monitoring: Yes      Cardiac Rhythm: Sinus Rhythm    Access:   Current line(s): PIV     Genitourinary:   Urinary status: external catheter    Respiratory:   O2 Device: Heated,Hi flow nasal cannula  Chronic home O2 use?: YES  Incentive spirometer at bedside: YES       GI:  Last Bowel Movement Date: 03/09/22  Current diet:  ADULT DIET Dysphagia - Soft & Bite Sized  ADULT ORAL NUTRITION SUPPLEMENT Breakfast, Dinner, Lunch; Diabetic Supplement  ADULT ORAL NUTRITION SUPPLEMENT Breakfast, Lunch, Dinner; Frozen Supplement  ADULT ORAL NUTRITION SUPPLEMENT Breakfast, Lunch, Dinner; Fortified Pudding  DIET ONE TIME MESSAGE  Passing flatus: YES  Tolerating current diet: YES       Pain Management:   Patient states pain is manageable on current regimen: YES    Skin:  Natalio Score: 13  Interventions: turn team, float heels, increase time out of bed, foam dressing, PT/OT consult and internal/external urinary devices    Patient Safety:  Fall Score:  Total Score: 2  Interventions: bed/chair alarm, assistive device (walker, cane, etc), gripper socks, pt to call before getting OOB and stay with me (per policy)  High Fall Risk: Yes    Length of Stay:  Expected LOS: 5d 4h  Actual LOS: 1761 Christina Barroso, RN

## 2022-03-11 NOTE — PROGRESS NOTES
End of Shift Note    Bedside shift change report given to Chelsea Danielle 44 (oncoming nurse) by Shantel Robles RN (offgoing nurse). Report included the following information SBAR, Kardex, OR Summary, Procedure Summary, Recent Results, Med Rec Status, Cardiac Rhythm Sinus Tach., Alarm Parameters  and Quality Measures    Shift worked:  03/10     Shift summary and any significant changes:     Admission to PCU. Stage I pressure injury. Concerns for physician to address:  No new changes. Zone phone for oncoming shift:          Activity:  Activity Level: Bed Rest  Number times ambulated in hallways past shift: 0  Number of times OOB to chair past shift: 0    Cardiac:   Cardiac Monitoring: Yes      Cardiac Rhythm: Sinus Rhythm    Access:   Current line(s): PIV     Genitourinary:   Urinary status: voiding    Respiratory:   O2 Device: Heated,Hi flow nasal cannula  Chronic home O2 use?: NO  Incentive spirometer at bedside: N/A       GI:  Last Bowel Movement Date: 03/09/22  Current diet:  ADULT DIET Dysphagia - Soft & Bite Sized  ADULT ORAL NUTRITION SUPPLEMENT Breakfast, Dinner, Lunch; Diabetic Supplement  ADULT ORAL NUTRITION SUPPLEMENT Breakfast, Lunch, Dinner; Frozen Supplement  ADULT ORAL NUTRITION SUPPLEMENT Breakfast, Lunch, Dinner; Fortified Pudding  DIET ONE TIME MESSAGE  Passing flatus: YES  Tolerating current diet: NO       Pain Management:   Patient states pain is manageable on current regimen: NO    Skin:  Natalio Score: 10  Interventions: turn team, foam dressing and internal/external urinary devices    Patient Safety:  Fall Score:  Total Score: 2  Interventions: bed/chair alarm, gripper socks and pt to call before getting OOB  High Fall Risk: Yes    Length of Stay:  Expected LOS: 5d 4h  Actual LOS: 111 Juliana Dick RN

## 2022-03-11 NOTE — PROGRESS NOTES
Comprehensive Nutrition Assessment    Type and Reason for Visit: Reassess    Nutrition Recommendations/Plan:   · Continue diet as safely tolerated. Currently a soft/bite-sized diet so minimize added therapeutic restrictions to optimize variety. · RD discontinued all oral nutritional supplements as pt declined. Continue to allow pt's family to bring in [employer].  · If po does not improve, consider DHT placement and feed enterally given severe protein calorie malnutrition and increased risk for further development/staging of pressure injuries. Also noted palliative care consult. · Please document % meals and supplements consumed in flowsheet I/O's under intake. Nutrition Assessment:      3/11: Chart reviewed; med noted for acute resp failure. RD visited with pt at bedside, son also present. Currently placed on HHFNC. Pt continues with poor nutritional intake, consumed 0% of lunch meal and refused all oral nutritional supplements ordered on meal trays (ensure pudding, magic cup, glucerna shake) and not interested in trying other options so RD discontinued. Wife is bringing in packets or [employer] which pt consumes ~1pkt daily which only provides ~250 kcals/10 g protein/31 g CHO if mixed with 2% milk. RD retrieved chicken noodle soup from the cafe and brought to bedside along with orange sherbet. BG elevated at times but likely secondary to steroid meds given poor po. Patient Vitals for the past 168 hrs:   % Diet Eaten   03/10/22 1357 1 - 25%   03/10/22 1000 1 - 25%   03/08/22 1413 1 - 25%   03/05/22 1854 1 - 25%     No data found. Last Weight Metric  Weight Loss Metrics 3/10/2022 12/19/2021 12/8/2021 12/8/2021 12/8/2021 11/29/2021 11/15/2021   Today's Wt 118 lb 8 oz 134 lb 14.7 oz - 137 lb 6.4 oz - 140 lb 140 lb 6.4 oz   BMI 19.72 kg/m2 - 22.45 kg/m2 - 22.86 kg/m2 23.3 kg/m2 23.36 kg/m2     3/9: Chart reviewed, case discussed during CCU rounds.   Pt sitting up in bed awake and alert on hiflow. None of lunch tray consumed, he did sip some Glucerna although he does not like the flavor. He is feeling SOB and this is interfering with adequate intake. He experienced hypoglycemia overnight (down to the 40's). Wife reports he likes pudding and sherbet so willing to try ensure pudding and magic cup. She also reports he likes [employer], offered to get her whole milk to mix it with to improve kcal provision but she reports sometimes he has 'issues' with higher fat dairy so will stick with milk sent on trays. Encouraged her to bring any food from home he is willing to eat, BG can be controlled with medication prn. Briefly discussed option of supplemental TF via dobbhoff, pt reports he does not feel like we are there yet and wants to try to eat. As pt was admitted with severe malnutrition, if family still desires aggressive care would not wait much longer to pursue nutrition support if minimal PO intake continues. Malnutrition Assessment:  Malnutrition Status:  Severe malnutrition    Context:  Chronic illness     Findings of the 6 clinical characteristics of malnutrition:   Energy Intake:  7 - 75% or less est energy requirements for 1 month or longer  Weight Loss:  7.0 - Greater than 7.5% over 3 months     Body Fat Loss:  7 - Severe body fat loss, Orbital,Fat overlying ribs,Buccal region   Muscle Mass Loss:  7 - Severe muscle mass loss, Clavicles (pectoralis &deltoids),Hand (interosseous),Scapula (trapezius),Temples (temporalis)  Fluid Accumulation:  1 - Mild, Extremities   Strength:  Not performed     Estimated Daily Nutrient Needs:  Energy (kcal): 1609 kcals (BMR x 1. 3AF); Weight Used for Energy Requirements: Current  Protein (g): 56-67g (1.0-1.2g/kg); Weight Used for Protein Requirements: Current  Fluid (ml/day): 1600mL; Method Used for Fluid Requirements: 1 ml/kcal    Nutrition Related Findings:  BM: 3*9; Labs: Na+ 146, Creat 2.25;  Meds: reviewed Wounds:    Moisture associate skin damage       Current Nutrition Therapies:  ADULT DIET Dysphagia - Soft & Bite Sized  DIET ONE TIME MESSAGE    Anthropometric Measures:  · Height:  5' 5\" (165.1 cm)  · Current Body Wt:  55.3 kg (121 lb 14.6 oz)   · Ideal Body Wt:  136 lbs:  90.1 %   · BMI Category:  Underweight (BMI less than 22) age over 72       Nutrition Diagnosis:   · Severe malnutrition,In context of chronic illness related to inadequate protein-energy intake as evidenced by poor intake prior to admission,intake 0-25%,weight loss,severe muscle loss,severe loss of subcutaneous fat    Nutrition Interventions:   Food and/or Nutrient Delivery: Continue current diet,Discontinue oral nutrition supplement  Nutrition Education and Counseling: No recommendations at this time  Coordination of Nutrition Care: Continue to monitor while inpatient    Goals:  Trend PO intake at least 50% of meals + consume 240 ml [employer] Instant Breakfast daily next 2-4 days       Nutrition Monitoring and Evaluation:   Behavioral-Environmental Outcomes: None identified  Food/Nutrient Intake Outcomes: Diet advancement/tolerance,Food and nutrient intake,Supplement intake  Physical Signs/Symptoms Outcomes: Biochemical data,Chewing or swallowing,Weight,Skin    Discharge Planning:    Continue oral nutrition supplement,Continue current diet     Electronically signed by Hortensia Mckeon RD on 3/11/2022 at 12:54 PM    Contact:

## 2022-03-11 NOTE — PROGRESS NOTES
Problem: Self Care Deficits Care Plan (Adult)  Goal: *Acute Goals and Plan of Care (Insert Text)  Description: FUNCTIONAL STATUS PRIOR TO ADMISSION: Patient is limited historian with slight confusion during eval. Reports he was requiring assistance with LB dressing and bathing, able to use rollator for short household distances however mainly using wheelchair. He reports he typically sits to complete grooming and sponge bathing with set up. On 1-2L O2 at baseline, however requires up to 6L with activity. Pt has been admitted 4x since November 2020. HOME SUPPORT: The patient lived with spouse and required SPV - max A for ADLs. Occupational Therapy Goals  Initiated 3/8/2022  1. Patient will perform upper body dressing with supervision/set-up within 7 day(s). 2.  Patient will perform lower body dressing with moderate assistance  within 7 day(s). 3.  Patient will perform sponge bathing with supervision/set-up within 7 day(s). 4.  Patient will perform toilet transfers with minimal assistance/contact guard assist within 7 day(s). 5.  Patient will perform all aspects of toileting with minimal assistance/contact guard assist within 7 day(s). 6.  Patient will participate in upper extremity therapeutic exercise/activities with supervision/set-up for 3 minutes within 7 day(s). 7.  Patient will utilize energy conservation techniques during functional activities with verbal cues within 7 day(s).    Outcome: Progressing Towards Goal   OCCUPATIONAL THERAPY TREATMENT  Patient: Sera Palmer (79 y.o. male)  Date: 3/11/2022  Diagnosis: Acute respiratory failure with hypercapnia (HCC) [J96.02] <principal problem not specified>  Procedure(s) (LRB):  LEFT HEART CATH / CORONARY ANGIOGRAPHY (N/A)  PERCUTANEOUS CORONARY INTERVENTION (N/A)  ANGIOPLASTY CORONARY (N/A)  ULTRASOUND GUIDED VASCULAR ACCESS (N/A) 5 Days Post-Op  Precautions: Fall  Chart, occupational therapy assessment, plan of care, and goals were reviewed. ASSESSMENT  Patient continues with skilled OT services and is progressing towards goals. Patient received semisupine in bed on 25L HHF O2 with wife present in room and agreeable for therapy. Overall, patient required min to mod assist for bed mobility and set-up/supervision for bed level grooming. Patient required increased assist when coming EOB this session and reported increased dizziness. Patient symptomatic for orthostatic hypotension and unable to tolerate sitting. Patient returned to supine and repositioned for comfort. Patient participate in BUE/BLE therapeutic exercises at bed level with increased dizziness with UE exercises. Patient was able to wash his face in supported sit and tolerated well. Patient's O2 sats remained >90% on 25L HHF O2 throughout session but patient with increased WISEMAN with all activities. Patient was left semisupine in bed with all needs met, family present in room, and bed alarmed. Patient would continue to benefit from skilled OT services during acute hospital stay. Current Level of Function Impacting Discharge (ADLs): set-up/supervision for bed level grooming, min to mod assist for bed mobility     Other factors to consider for discharge: fall risk, increased O2 requirement, orthostatic hypotension/dizziness          PLAN :  Patient continues to benefit from skilled intervention to address the above impairments. Continue treatment per established plan of care to address goals. Recommendation for discharge: (in order for the patient to meet his/her long term goals)  Pul rehab    This discharge recommendation:  Has been made in collaboration with the attending provider and/or case management    IF patient discharges home will need the following DME: TBD in rehab       SUBJECTIVE:   Patient stated It will get better with time.     OBJECTIVE DATA SUMMARY:   Cognitive/Behavioral Status:  Neurologic State: Alert  Orientation Level: Oriented X4  Cognition: Follows commands  Perception: Appears intact  Perseveration: No perseveration noted  Safety/Judgement: Awareness of environment;Decreased insight into deficits    Functional Mobility and Transfers for ADLs:  Bed Mobility:  Rolling: Minimum assistance  Supine to Sit: Minimum assistance  Sit to Supine: Minimum assistance;Assist x2  Scooting: Moderate assistance    Balance:  Sitting: Impaired  Sitting - Static: Fair (occasional)  Sitting - Dynamic: Fair (occasional)  Standing:  (not tested)    ADL Intervention:    Grooming  Position Performed:  (semisupine in bed)  Washing Face: Set-up; Supervision  Cues: Verbal cues provided    Lower Body Dressing Assistance  Socks: Total assistance (dependent)  Leg Crossed Method Used: No  Position Performed: Supine  Cues: Don;Doff;Physical assistance; Tactile cues provided;Verbal cues provided    Cognitive Retraining  Safety/Judgement: Awareness of environment;Decreased insight into deficits    Pain:  Patient c/o dizziness. Activity Tolerance:   Fair, Poor, desaturates with exertion and requires oxygen, observed SOB with activity, and signs and symptoms of orthostatic hypotension    After treatment patient left in no apparent distress:   Supine in bed, Heels elevated for pressure relief, Call bell within reach, Bed / chair alarm activated, Caregiver / family present, and Side rails x 3    COMMUNICATION/COLLABORATION:   The patients plan of care was discussed with: Physical therapist and Registered nurse.      Shawn Arredondo OTR/L  Time Calculation: 31 mins

## 2022-03-11 NOTE — PROGRESS NOTES
Hospitalist Progress Note    NAME: Sera Palmer   :  1950   MRN:  [id number]     Sera Palmer is a 70 y.o.   male who presents with past medical history of hypertension, COPD, congestive heart failure is coming the hospital chief complaints of shortness of breath. Patient reports shortness of breath is even at rest, with some cough and yellow phlegm. Does not report any chest pain. Reports wheezing as well. Reports increased swelling of the legs as well. Does not report any abdominal pain, nausea or vomiting. No fever or chills. ICU hospital course is as follows  3/7: Patient is starting to wake up more and able to answer some question but remains on BiPAP. sats drop to 60s when BiPAP is taken off briefly. 3/9: Patient remains on high flow oxygen, now down to 50% Fio2, gets short of breath even while talking. Was taken off of bipap this morning, intermittently gets confused. 3/10 Refusing bipap, now on HFNC        Assessment / Plan:  Acute on chronic hypoxic and hypercarbic respiratory failure due to  Acute pulmonary edema  Chronic lung fibrosis on CT  -Continue high flow oxygen by nasal cannula and wean as tolerated  -Continue Solu-Medrol 40 mg every 12. Continue DuoNeb. S/p Zithromax  -Consult pulmonary    Acute diastolic congestive heart failure exacerbation  -Echo shows ejection fraction of 50 to 55%. BNP is elevated at 7800  -Change Lasix to 40 mg daily. Continue Coreg.  -Strict I's and O's, daily weights and low-salt diet    Elevated bicarb likely due to diuretics   -Bicarb is elevated at 44. Will change Lasix to p.o. Start Diamox to 50 mg twice daily for 2 doses. Check BMP in a.m. Diabetes mellitus type 2  -Resume home Lantus at 8 units.   Continue sliding scale insulin with blood sugar checks     Acute metabolic encephalopathy  History of coronary artery disease s/p multiple stents  Hypothyroidism  CKD stage 4   Dyslipidemia  -Continue aspirin, Coreg, Crestor  -Continue levothyroxine  -Creatinine is close to baseline of around 2.2        Body mass index is 19.72 kg/m². Code status: DNR  Prophylaxis: Hep SQ  Recommended Disposition: Home w/Family     Subjective:     Chief Complaint / Reason for Physician Visit  Reports feeling about the same. On 25 L high flow nasal cannula. Mild cough. No phlegm. No chest pain. Reports feeling weak in general      Review of Systems:  Symptom Y/N Comments  Symptom Y/N Comments   Fever/Chills    Chest Pain     Poor Appetite    Edema     Cough    Abdominal Pain     Sputum    Joint Pain     SOB/WISEMAN    Pruritis/Rash     Nausea/vomit    Tolerating PT/OT     Diarrhea    Tolerating Diet     Constipation    Other       Could NOT obtain due to:      Objective:     VITALS:   Last 24hrs VS reviewed since prior progress note. Most recent are:  Patient Vitals for the past 24 hrs:   Temp Pulse Resp BP SpO2   03/11/22 1200 97.7 °F (36.5 °C) 85 19 (!) 123/54 99 %   03/11/22 0800 97.3 °F (36.3 °C) 87 18 129/60 100 %   03/11/22 0757     99 %   03/11/22 0400 97.7 °F (36.5 °C) 86 23 132/63 98 %   03/11/22 0330     98 %   03/10/22 2324     95 %   03/10/22 2306 97.8 °F (36.6 °C) 91 20 (!) 136/59 96 %   03/10/22 2048     92 %   03/10/22 1949 98.3 °F (36.8 °C) 87 27 132/66 93 %   03/10/22 1809  97  135/65    03/10/22 1550 97.2 °F (36.2 °C) 96 25 135/73 99 %   03/10/22 1500  84 23 132/62 98 %       Intake/Output Summary (Last 24 hours) at 3/11/2022 1411  Last data filed at 3/11/2022 0816  Gross per 24 hour   Intake 400 ml   Output 1075 ml   Net -675 ml        PHYSICAL EXAM:  General: Thin, frail  EENT:  EOMI. Anicteric sclerae. MMM  Resp:  Bilateral air entry present, crackles present, faint wheezing present  CV:  Regular  rhythm,  No edema  GI:  Soft, Non distended, Non tender.  +Bowel sounds  Neurologic:  Alert and awake, normal speech,   Psych:   Good insight. Not anxious nor agitated  Skin:  No rashes.   No jaundice    Reviewed most current lab test results and cultures  YES  Reviewed most current radiology test results   YES  Review and summation of old records today    NO  Reviewed patient's current orders and MAR    YES  PMH/SH reviewed - no change compared to H&P          Current Facility-Administered Medications:     albuterol-ipratropium (DUO-NEB) 2.5 MG-0.5 MG/3 ML, 3 mL, Nebulization, Q4H PRN, Frederick Barrios MD    [START ON 3/12/2022] furosemide (LASIX) tablet 40 mg, 40 mg, Oral, DAILY, Frederick Alfredo MD    acetaZOLAMIDE (DIAMOX) tablet 250 mg, 250 mg, Oral, BID, Khris Alfredo MD, 250 mg at 03/11/22 1238    [DISCONTINUED] insulin NPH (NOVOLIN N, HUMULIN N) injection 5 Units, 5 Units, SubCUTAneous, Q8H, 5 Units at 03/05/22 1603 **AND** methylPREDNISolone (PF) (SOLU-MEDROL) injection 40 mg, 40 mg, IntraVENous, Q12H, Tj Brooks MD, 40 mg at 03/11/22 0852    OLANZapine (ZyPREXA) 5 mg in sterile water (preservative free) 1 mL injection, 5 mg, IntraVENous, Q12H PRN, Tj Brooks MD    carvediloL (COREG) tablet 3.125 mg, 3.125 mg, Oral, BID WITH MEALS, Tj Brooks MD, 3.125 mg at 03/11/22 0850    melatonin tablet 6 mg, 6 mg, Oral, QHS, Rhina Finnegan NP, 6 mg at 03/10/22 2104    balsam peru-castor oiL (VENELEX) ointment, , Topical, BID, Ale Duran MD, Given at 03/11/22 0911    lidocaine 4 % patch 1 Patch, 1 Patch, TransDERmal, Q24H, Ale Duran MD, 1 Patch at 03/11/22 1239    maalox/viscous lidocaine (COV GI COCKTAIL), 40 mL, Oral, BID PRN, Caroline Guzmán MD, 40 mL at 03/09/22 1039    heparin (porcine) injection 5,000 Units, 5,000 Units, SubCUTAneous, Q8H, Caroline Guzmán MD, 5,000 Units at 03/11/22 1238    [Held by provider] traMADoL (ULTRAM) tablet 50 mg, 50 mg, Oral, Q6H PRN, Caroline Guzmán MD, 50 mg at 03/05/22 2303    acetaminophen (TYLENOL) tablet 650 mg, 650 mg, Oral, Q6H PRN, Frederick Alfredo MD    ondansetron (ZOFRAN) injection 4 mg, 4 mg, IntraVENous, Q4H PRN, Frederick Barrios MD    anastrozole (ARIMIDEX) tablet 0.5 mg, 0.5 mg, Oral, Q MON, WED & FRI, Khris Alfredo MD, 0.5 mg at 03/11/22 0900    aspirin delayed-release tablet 81 mg, 81 mg, Oral, DAILY, Khris Alfredo MD, 81 mg at 03/11/22 0850    clopidogreL (PLAVIX) tablet 75 mg, 75 mg, Oral, DAILY, Khris Alfredo MD, 75 mg at 03/11/22 0850    levothyroxine (SYNTHROID) tablet 100 mcg, 100 mcg, Oral, DAILY, Khris Alfredo MD, 100 mcg at 03/11/22 0516    magnesium oxide (MAG-OX) tablet 400 mg, 400 mg, Oral, Q48H, Khris Alfredo MD, 400 mg at 03/11/22 0900    rosuvastatin (CRESTOR) tablet 20 mg, 20 mg, Oral, QHS, Khris Alfredo MD, 20 mg at 03/10/22 2104    arformoterol 15 mcg/budesonide 0.5 mg neb solution, , Nebulization, BID RT, Juan R Ca MD, Given at 03/11/22 0756    sodium chloride (NS) flush 5-40 mL, 5-40 mL, IntraVENous, Q8H, Khris Alfredo MD, 10 mL at 03/11/22 0516    sodium chloride (NS) flush 5-40 mL, 5-40 mL, IntraVENous, PRN, Tishjenn Meier, Lynann Hodgkin, MD    polyethylene glycol (MIRALAX) packet 17 g, 17 g, Oral, DAILY PRN, Teressa PARRA MD    [Held by provider] hydrALAZINE (APRESOLINE) tablet 50 mg, 50 mg, Oral, TID, Ralf MAN MD, 50 mg at 03/05/22 0913    nitroglycerin (NITROSTAT) tablet 0.4 mg, 0.4 mg, SubLINGual, PRN, Teressa PARRA MD, 0.4 mg at 03/05/22 0738    [Held by provider] insulin glargine (LANTUS) injection 10 Units, 10 Units, SubCUTAneous, QHS, Juan R Ca MD, 10 Units at 03/08/22 2114    insulin lispro (HUMALOG) injection, , SubCUTAneous, AC&HS, Juan R Ca MD, 4 Units at 03/11/22 1238    glucose chewable tablet 16 g, 4 Tablet, Oral, PRN, Tish Meier, Lynann Hodgkin, MD    glucagon (GLUCAGEN) injection 1 mg, 1 mg, IntraMUSCular, PRN, Tish eMier, Lynann Hodgkin, MD    dextrose 10% infusion 0-250 mL, 0-250 mL, IntraVENous, PRN, Juan R Ca MD, Last Rate: 999 mL/hr at 03/07/22 2059, 125 mL at 03/07/22 2059    hydrALAZINE (APRESOLINE) 20 mg/mL injection 10 mg, 10 mg, IntraVENous, Q6H PRN, Nohemy Brunner, NP, 10 mg at 03/04/22 2243  ________________________________________________________________________  Care Plan discussed with:    Comments   Patient y    Family      RN y    Care Manager     Consultant                        Multidiciplinary team rounds were held today with , nursing, pharmacist and clinical coordinator. Patient's plan of care was discussed; medications were reviewed and discharge planning was addressed. ________________________________________________________________________  Total NON critical care TIME:  35   Minutes    Total CRITICAL CARE TIME Spent:   Minutes non procedure based      Comments   >50% of visit spent in counseling and coordination of care     ________________________________________________________________________  Jennifer Grubbs MD     Procedures: see electronic medical records for all procedures/Xrays and details which were not copied into this note but were reviewed prior to creation of Plan. LABS:  I reviewed today's most current labs and imaging studies.   Pertinent labs include:  Recent Labs     03/11/22  0528 03/10/22  0435 03/09/22  0621   WBC 7.9 6.8 10.4   HGB 7.5* 7.8* 8.3*   HCT 25.0* 25.7* 27.1*   * 150 144*     Recent Labs     03/11/22  0528 03/10/22  0435 03/09/22  0327   * 147* 145   K 4.1 5.0 4.0    103 103   CO2 44* 43* 37*   * 83 43*   * 104* 97*   CREA 2.25* 2.31* 2.41*   CA 8.4* 8.0* 8.4*   MG 3.2* 3.2* 2.9*   PHOS 4.1 4.3 4.1       Signed: Jennifer Grubbs MD

## 2022-03-11 NOTE — PROGRESS NOTES
If pt is discharged, CM will need to set up trilogy. If pt goes to SNF, SNF will have to arrange trilogy at discharge. Please refer to CM consult order for Trilogy 3/5/22 at 12:30 pm.    Transition of Care Plan:     RUR: 23% - high risk  Disposition: Home with [address]  Follow up appointments: PCP, Specialist  DME needed: Home Trilogy (see CM consult on 3/5 at 12:30 pm).  Home O2 ( [address] [facility]), rollator, wheelchair  Transportation at [address] [facility] or \[facility] [place] [facility]\"" to provide transport  [place] or means to access home:  Wife has access       IM Medicare Letter: 2nd IM Letter to be given   Is patient a BCPI-A Bundle: n/a                     If yes, was Bundle Letter given?:    Is patient a Reserve and connected with the [facility]? n/a                If yes, was [employer] transfer form completed and [facility] notified? Caregiver Contact: Wife- Kelley Chand- [phone]  Discharge Caregiver contacted prior to discharge?  CM to discuss with wife at d/c. Care Conference needed? :      N/A    Initial Note 11:20 am: In review of chart, pt is not medically stable for discharge. Pt is currently on [place] 25L. CM will re-evaluate therapy recommendations on Monday. Unit CM will continue to follow.      Jenny Layton RN, BSN, [facility] Care Manager  [phone]

## 2022-03-11 NOTE — CONSULTS
Pulmonary, Critical Care, and Sleep Medicine    Patient Consult    Name: Javon Chanel MRN: [id number]   : 1950 Hospital: [facility]   Date: 3/11/2022        IMPRESSION:   · ILD-has not had PFTs. Noted on the CT to have GGO of the lung. Felt to have underlying fibrotic lung changes. · COPD, bronchiectasis. Appears to have severe lung disease but no PFTs to support this. Appears to have cor pulmonale based on Echo with Decreased RVEF. Severe Hypoxia. · Small left pleural effusion. · Acute on Chronic Respiratory Failure: ON baseline 2L NC. Now on 25L flow with fio2 of 42%. He has been able to be weaned much, Seems to have very poor pulmonary reserve. · Appears to be very deconditioned. · Has preserved LVEF: 50-55%. · Urinary Retention when on spiriva. · Ex Smoker  · T2 DM  · CAD prior Stent, ( 9 stents)  · CRI  · Mild GERD. · Anemia: hgb of 7.5  · Hard of Hearing. RECOMMENDATIONS:   · ON solumedrol 40mg IV q 12hrs. · Will get a CXR in am  · Recheck: Pro BNP, PRocal level in am. IF procal is elevated would consider Abx. · ON Diamox. Lasix. · If not improving would agree with Palliative Care, Possible Hospice eval.      PCP: Eda Wells  Pulm: Scott Riojas    Requesting MD: Dr. Raf Salazar . Subjective: This patient has been seen and evaluated at the request of Dr. Stefan Jaramillo for above. Patient is a 70 y.o. male who is followed by Dr. Scott Riojas. Last seen in office on 2022. Has Hx of COPD. Stopped smoking at 48year old. LVEF of 45%. Mild AS, AR. When seen today: PT reports he has been feeling lowsy. Has very poor endurance and strength. Has cough of white sputum. He had more sputum on presentation. That aspect seems to be getting better. He typically is on oxygen at home at 1-3 L per NC at baseline. Has been with intermittent chest pain which is better with the lidocaine patch.  Has been trying to work with PT but has poor strength and moderate dyspnea with any exertion. He feels like his strength is worsening. Gets jittery and dizzy with exertion. He will use the bipap but really does not like to use it. Past Medical History:   Diagnosis Date    Aortic regurgitation     CAD (coronary artery disease)     Taxus stent 12/2005, 12/06 Cypher Prox circ, 6/2018 [place] LADx2,  9/19 [place] LAD & LCX, 1/20 [place]     Chronic combined systolic and diastolic congestive heart failure ([address] 11/14/2021    Chronic obstructive pulmonary disease (HCC)     Congestive heart failure (HCC)     Diabetes ([facility]     Essential hypertension     Hyperlipidemia     MI (myocardial infarction) ([facility]     Murmur     Renal insufficiency     Thyroid disease       Past Surgical History:   Procedure Laterality Date    HX CORONARY STENT PLACEMENT      HX CYST REMOVAL      IR ASP BLADDER SUPRA CATH        Prior to Admission medications    Medication Sig Start Date End Date Taking? Authorizing Provider   bumetanide (BUMEX) 1 mg tablet Take 1 mg by mouth two (2) times a day. Yes Provider, Historical   clopidogreL (PLAVIX) 75 mg tab Take 75 mg by mouth daily. Yes Provider, Historical   rosuvastatin (CRESTOR) 20 mg tablet Take 20 mg by mouth nightly. Yes Provider, Historical   budesonide-formoteroL (Symbicort) 80-4.5 mcg/actuation HFAA Take 2 Puffs by inhalation two (2) times a day. Yes Provider, Historical   isosorbide mononitrate ER (IMDUR) 30 mg tablet Take 30 mg by mouth daily. Yes Provider, Historical   insulin glargine (Lantus Solostar U-100 Insulin) 100 unit/mL (3 mL) inpn 16 Units by SubCUTAneous route nightly. Yes Provider, Historical   hydrALAZINE (APRESOLINE) 50 mg tablet Take 50 mg by mouth three (3) times daily. Yes Other, MD Raul   anastrozole (ARIMIDEX) 1 mg tablet Take 0.5 mg by mouth every Monday, Wednesday, Friday. Yes Provider, Historical   magnesium oxide 250 mg magnesium tablet Take 250 mg by mouth.  Every other day - OTC 11/4/21  Yes Provider, Historical carvediloL (COREG) 6.25 mg tablet Take 1 Tablet by mouth every twelve (12) hours. 11/15/21  Yes Amy Valderrama NP   nitroglycerin (Nitrostat) 0.4 mg SL tablet 1 Tablet by SubLINGual route every five (5) minutes as needed for Chest Pain. Up to 3 doses. 21  Yes Amy Valderrama NP   albuterol (ProAir HFA) 90 mcg/actuation inhaler Take 2 Puffs by inhalation every four (4) hours as needed. 21  Yes Provider, Historical   aspirin delayed-release 81 mg tablet Take 81 mg by mouth daily. At night   Yes Provider, Historical   levothyroxine (SYNTHROID) 100 mcg tablet Take 100 mcg by mouth daily. Morning   Yes Provider, Historical   testosterone (ANDROGEL) 1.62 % (20.25 mg/1.25 gram) glpk 20.25 mg daily. 2 to 3 pumps alternating in the morning. Yes Provider, Historical     Allergies   Allergen Reactions    Bee Sting [Sting, Bee] Anaphylaxis    Iodinated Contrast Media Rash    Brilinta [Ticagrelor] Other (comments)    Effient [Prasugrel] Other (comments)    Ranexa [Ranolazine] Other (comments)    Red Dye Hives    Sulfa (Sulfonamide Antibiotics) Rash    Penicillins Rash and Nausea Only      Social History     Tobacco Use    Smoking status: Former Smoker     Packs/day: 2.00     Years: 30.00     Pack years: 60.00     Quit date: 1991     Years since quittin.1    Smokeless tobacco: Never Used   Substance Use Topics    Alcohol use: Yes     Comment: rarely      No family history on file.      Current Facility-Administered Medications   Medication Dose Route Frequency    [START ON 3/12/2022] furosemide (LASIX) tablet 40 mg  40 mg Oral DAILY    acetaZOLAMIDE (DIAMOX) tablet 250 mg  250 mg Oral BID    methylPREDNISolone (PF) (SOLU-MEDROL) injection 40 mg  40 mg IntraVENous Q12H    carvediloL (COREG) tablet 3.125 mg  3.125 mg Oral BID WITH MEALS    melatonin tablet 6 mg  6 mg Oral QHS    balsam peru-castor oiL (VENELEX) ointment   Topical BID    lidocaine 4 % patch 1 Patch  1 Patch TransDERmal Q24H    heparin (porcine) injection 5,000 Units  5,000 Units SubCUTAneous Q8H    anastrozole (ARIMIDEX) tablet 0.5 mg  0.5 mg Oral Q MON, WED & FRI    aspirin delayed-release tablet 81 mg  81 mg Oral DAILY    clopidogreL (PLAVIX) tablet 75 mg  75 mg Oral DAILY    levothyroxine (SYNTHROID) tablet 100 mcg  100 mcg Oral DAILY    magnesium oxide (MAG-OX) tablet 400 mg  400 mg Oral Q48H    rosuvastatin (CRESTOR) tablet 20 mg  20 mg Oral QHS    arformoterol 15 mcg/budesonide 0.5 mg neb solution   Nebulization BID RT    sodium chloride (NS) flush 5-40 mL  5-40 mL IntraVENous Q8H    [Held by provider] hydrALAZINE (APRESOLINE) tablet 50 mg  50 mg Oral TID    [Held by provider] insulin glargine (LANTUS) injection 10 Units  10 Units SubCUTAneous QHS    insulin lispro (HUMALOG) injection   SubCUTAneous AC&HS       Review of Systems:  Constitutional: positive for fatigue, malaise, anorexia and weight loss  Eyes: negative  Ears, nose, mouth, throat, and face: negative  Respiratory: positive for dyspnea on exertion or chronic bronchitis  Cardiovascular: positive for chest pain, chest pressure/discomfort, dyspnea, fatigue, paroxysmal nocturnal dyspnea, exertional chest pressure/discomfort, tachypnea, dyspnea on exertion, dizziness  Gastrointestinal: positive for dysphagia and reflux symptoms  Genitourinary:negative  Integument/breast: negative  Hematologic/lymphatic: negative  Musculoskeletal:negative  Neurological: negative  Behavioral/Psych: negative  Endocrine: negative  Allergic/Immunologic: positive for hay fever    Objective:   Vital Signs:    Visit Vitals  BP (!) 123/54   Pulse 85   Temp 97.3 °F (36.3 °C)   Resp 19   Ht 5' 5\" (1.651 m)   Wt 53.8 kg (118 lb 8 oz)   SpO2 99%   BMI 19.72 kg/m²       O2 Device: Heated,Hi flow nasal cannula   O2 Flow Rate (L/min): 25 l/min   Temp (24hrs), Av.7 °F (36.5 °C), Min:97.2 °F (36.2 °C), Max:98.3 °F (36.8 °C)       Intake/Output:   Last shift:       0701 -  1900  In: - Out: 550 [Urine:550]  Last 3 shifts: 03/09 1901 - 03/11 0700  In: 1000 [P.O.:1000]  Out: 2010 [Urine:2010]    Intake/Output Summary (Last 24 hours) at 3/11/2022 1219  Last data filed at 3/11/2022 4149  Gross per 24 hour   Intake 600 ml   Output 1150 ml   Net -550 ml      Physical Exam:   General:  Alert, cooperative, no distress, appears stated age. Thin male, hard of hearing, Conversant. Seems dyspneic with talking. Head:  Normocephalic, without obvious abnormality, atraumatic. Eyes:  Conjunctivae/corneas clear. PERRL, EOMs intact. Nose: Nares normal. Septum midline. Mucosa normal. No drainage or sinus tenderness. Throat: Lips, mucosa, and tongue normal. Teeth and gums normal.   Neck: Supple, symmetrical, trachea midline, no adenopathy, thyroid: no enlargment/tenderness/nodules, no carotid bruit and no JVD. Back:   Symmetric, no curvature. ROM normal.   Lungs:   Clear to auscultation bilaterally. Decreased BS in bases. NO overt rhonchi or wheezing. Chest wall:  No tenderness or deformity. Heart:  Regular rate and rhythm, S1, S2 normal, no murmur, click, rub or gallop. Abdomen:   Soft, non-tender. Bowel sounds normal. No masses,  No organomegaly. Extremities: Extremities normal, atraumatic, no cyanosis or edema. Pulses: 2+ and symmetric all extremities. Skin: Skin color, texture, turgor normal. Multiple areas of bruising. Lymph nodes: Cervical, supraclavicular nodes normal.   Neurologic: Grossly nonfocal, seems globally weak. Psych: no overt anxiety or depression.       Data review:     Recent Results (from the past 24 hour(s))   GLUCOSE, POC    Collection Time: 03/10/22  6:16 PM   Result Value Ref Range    Glucose (POC) 307 (H) 65 - 117 mg/dL    Performed by Eulalio Agarwal (TRV RN)    GLUCOSE, POC    Collection Time: 03/10/22  9:47 PM   Result Value Ref Range    Glucose (POC) 237 (H) 65 - 117 mg/dL    Performed by [address] [place] [address]    Collection Time: 03/11/22  5:28 AM Result Value Ref Range    Magnesium 3.2 (H) 1.6 - 2.4 mg/dL   PHOSPHORUS    Collection Time: 03/11/22  5:28 AM   Result Value Ref Range    Phosphorus 4.1 2.6 - 4.7 MG/DL   METABOLIC PANEL, BASIC    Collection Time: 03/11/22  5:28 AM   Result Value Ref Range    Sodium 146 (H) 136 - 145 mmol/L    Potassium 4.1 3.5 - 5.1 mmol/L    Chloride 100 97 - 108 mmol/L    CO2 44 (HH) 21 - 32 mmol/L    Anion gap 2 (L) 5 - 15 mmol/L    Glucose 126 (H) 65 - 100 mg/dL     (H) 6 - 20 MG/DL    Creatinine 2.25 (H) 0.70 - 1.30 MG/DL    BUN/Creatinine ratio 46 (H) 12 - 20      GFR est AA 35 (L) >60 ml/min/1.73m2    GFR est non-AA 29 (L) >60 ml/min/1.73m2    Calcium 8.4 (L) 8.5 - 10.1 MG/DL   CBC WITH AUTOMATED DIFF    Collection Time: 03/11/22  5:28 AM   Result Value Ref Range    WBC 7.9 4.1 - 11.1 K/uL    RBC 2.47 (L) 4.10 - 5.70 M/uL    HGB 7.5 (L) 12.1 - 17.0 g/dL    HCT 25.0 (L) 36.6 - 50.3 %    .2 (H) 80.0 - 99.0 FL    MCH 30.4 26.0 - 34.0 PG    MCHC 30.0 30.0 - 36.5 g/dL    RDW 13.7 11.5 - 14.5 %    PLATELET 909 (L) 377 - 400 K/uL    MPV 11.4 8.9 - 12.9 FL    NRBC 0.0 0  WBC    ABSOLUTE NRBC 0.00 0.00 - 0.01 K/uL    NEUTROPHILS 90 (H) 32 - 75 %    LYMPHOCYTES 3 (L) 12 - 49 %    MONOCYTES 6 5 - 13 %    EOSINOPHILS 0 0 - 7 %    BASOPHILS 0 0 - 1 %    IMMATURE GRANULOCYTES 1 (H) 0.0 - 0.5 %    ABS. NEUTROPHILS 7.1 1.8 - 8.0 K/UL    ABS. LYMPHOCYTES 0.2 (L) 0.8 - 3.5 K/UL    ABS. MONOCYTES 0.5 0.0 - 1.0 K/UL    ABS. EOSINOPHILS 0.0 0.0 - 0.4 K/UL    ABS. BASOPHILS 0.0 0.0 - 0.1 K/UL    ABS. IMM.  GRANS. 0.1 (H) 0.00 - 0.04 K/UL    DF SMEAR SCANNED      RBC COMMENTS NORMOCYTIC, NORMOCHROMIC     PROCALCITONIN    Collection Time: 03/11/22  5:28 AM   Result Value Ref Range    Procalcitonin 0.14 ng/mL   GLUCOSE, POC    Collection Time: 03/11/22  8:30 AM   Result Value Ref Range    Glucose (POC) 198 (H) 65 - 117 mg/dL    Performed by Toñito Mccoy        Imaging:  I have personally reviewed the patients radiographs and have reviewed the reports:    3-4-22: CXR:   IMPRESSION  No change in appearance of low lung volumes and nonspecific severe  bilateral pulmonary opacifications. 3-7-22: CT of chest: IMPRESSION  1. Small left pleural effusion.     2. Probable mild scattered groundglass lung opacities may represent pulmonary  edema or nonspecific infection/inflammation. There is a stable background of  chronic fibrotic change.     3. Stable cardiomegaly.         Emily Willett MD

## 2022-03-11 NOTE — PROGRESS NOTES
End of Shift Note    Bedside shift change report given to NELY Gross (oncoming nurse) by Saba Marlow RN (offgoing nurse). Report included the following information SBAR, Kardex, Intake/Output, MAR, Recent Results, Med Rec Status and Cardiac Rhythm . Shift worked:  7p-11p     Shift summary and any significant changes:    Patient having consistent regular labored breathing. Patient having difficulty \"getting enough air. \"  Respiratory was contacted for a breathing treatment and to place patient on bipap. Patient attempted to put on the mask and immediately pulled it off saying he couldn't tolerate it. Patient placed back on heated high flow and took some time to recover. Patient was repositioned in bed and pulled up with some relief. Respiratory rate staying between 28-34. O2 saturations staying between 93-96%. Concerns for physician to address:  Inability to tolerate bipap.      Zone phone for oncoming shift:   N/A       Activity:  Activity Level: Bed Rest  Number times ambulated in hallways past shift: 0  Number of times OOB to chair past shift: 0    Cardiac:   Cardiac Monitoring: Yes      Cardiac Rhythm: Sinus Rhythm    Access:   Current line(s): PIV     Genitourinary:   Urinary status: wallace    Respiratory:   O2 Device: Heated,Hi flow nasal cannula  Chronic home O2 use?: YES  Incentive spirometer at bedside: NO       GI:  Last Bowel Movement Date: 03/09/22  Current diet:  ADULT DIET Dysphagia - Soft & Bite Sized  ADULT ORAL NUTRITION SUPPLEMENT Breakfast, Dinner, Lunch; Diabetic Supplement  ADULT ORAL NUTRITION SUPPLEMENT Breakfast, Lunch, Dinner; Frozen Supplement  ADULT ORAL NUTRITION SUPPLEMENT Breakfast, Lunch, Dinner; Fortified Pudding  DIET ONE TIME MESSAGE  Passing flatus: YES  Tolerating current diet: YES       Pain Management:   Patient states pain is manageable on current regimen: YES    Skin:  Natalio Score: 15  Interventions: float heels, PT/OT consult and internal/external urinary devices    Patient Safety:  Fall Score:  Total Score: 2  Interventions: gripper socks and stay with me (per policy)  High Fall Risk: Yes    Length of Stay:  Expected LOS: 5d 4h  Actual LOS: 6      Rebeka Barrientos RN

## 2022-03-11 NOTE — PROGRESS NOTES
Problem: Mobility Impaired (Adult and Pediatric)  Goal: *Acute Goals and Plan of Care (Insert Text)  Description: FUNCTIONAL STATUS PRIOR TO ADMISSION: Limited his history due to questionable pt cognition. At baseline he is on 2-3 L an increased to 4-6L with increased activity. Uses a rollator for very short distance, but mainly in wheelchair. HOME SUPPORT PRIOR TO ADMISSION: The patient lived with wife. Physical Therapy Goals  Initiated 3/8/2022  1. Patient will move from supine to sit and sit to supine  in bed with minimal assistance/contact guard assist within 7 day(s). 2.  Patient will transfer from bed to chair and chair to bed with moderate assistance  using the least restrictive device within 7 day(s). 3.  Patient will perform sit to stand with moderate assistance  within 7 day(s). Outcome: Not Met   PHYSICAL THERAPY TREATMENT  Patient: Jesse Early (68 y.o. male)  Date: 3/11/2022  Diagnosis: Acute respiratory failure with hypercapnia (HCC) [J96.02] <principal problem not specified>  Procedure(s) (LRB):  LEFT HEART CATH / CORONARY ANGIOGRAPHY (N/A)  PERCUTANEOUS CORONARY INTERVENTION (N/A)  ANGIOPLASTY CORONARY (N/A)  ULTRASOUND GUIDED VASCULAR ACCESS (N/A) 5 Days Post-Op  Precautions: Fall  Chart, physical therapy assessment, plan of care and goals were reviewed. ASSESSMENT  Patient continues with skilled PT services and is not progressing towards goals. Pt presents with decreased strength and endurance. Pt received on 25L HHF. Pt performed bed mobility at min A with cueing for sequencing. Pt only able to sitting EOB for short time due to increased dizziness. Pt with orthostatic BP while sitting. Pt reported feeling  dizzy even after being back supine. Pt requiring 5-6 mins to completely recover. Pt performed supine exercises with assistance. Pt requiring a rest break after each set. Pts o2 stats stable with activity, but pt becoming dizzy even when exerting with exercises.  Pt unable to tolerate sitting EOB today. Current Level of Function Impacting Discharge (mobility/balance): bed mobility at min A x2     Other factors to consider for discharge: orthostatic BP, decreased strength and endurance. PLAN :  Patient continues to benefit from skilled intervention to address the above impairments. Continue treatment per established plan of care. to address goals. Recommendation for discharge: (in order for the patient to meet his/her long term goals)  Pulmonary  Rehab     This discharge recommendation:  Has been made in collaboration with the attending provider and/or case management    IF patient discharges home will need the following DME: hospital bed       SUBJECTIVE:   Patient stated   It started closing in.  referring to sitting EOB    OBJECTIVE DATA SUMMARY:   Critical Behavior:  Neurologic State: Alert  Orientation Level: Oriented X4  Cognition: Follows commands  Safety/Judgement: Awareness of environment,Decreased insight into deficits  Functional Mobility Training:  Bed Mobility:  Rolling: Minimum assistance  Supine to Sit: Minimum assistance  Sit to Supine: Minimum assistance;Assist x2  Scooting:  Moderate assistance        Balance:  Sitting: Impaired  Sitting - Static: Fair (occasional)  Sitting - Dynamic: Fair (occasional)  Standing:  (not tested)      Therapeutic Exercises:       EXERCISE   Sets   Reps   Active Active Assist   Passive Self ROM   Comments   Ankle Pumps 1 10 [x] [] [] []    Quad Sets/Glut Sets   [] [] [] []    Hamstring Sets   [] [] [] []    Short Arc Quads   [] [] [] []    Heel Slides 1 5 [x] [x] [] []    Straight Leg Raises 1 5 [x] [x] [] []    Hip abd/add 1 5 [x] [x] [] []    Long Arc Quads   [] [] [] []    Marching   [] [] [] []       [] [] [] []      Pain Rating:  Pt with no complaints of pain     Activity Tolerance:   Poor, desaturates with exertion and requires oxygen, requires rest breaks and observed SOB with activity    After treatment patient left in no apparent distress:   Supine in bed, Call bell within reach, Caregiver / family present and Side rails x 3    COMMUNICATION/COLLABORATION:   The patients plan of care was discussed with: Occupational therapist and Registered nurse.      Martinez Monteiro PTA   Time Calculation: 28 mins

## 2022-03-12 NOTE — PROGRESS NOTES
Progress note  Pupils fixed and not reacting to light  No heart sounds  No breath sounds heard  No doll's eye reflex  Time of death 10:16 AM

## 2022-03-12 NOTE — PROGRESS NOTES
RAPID RESPONSE TEAM    Responded to overhead rapid response to 2245. Primary nurse reports pts heart rate carlos alberto down, hypoxic, and hypotensive. Upon my arrival RT at bedside ventilating with BVM, patient with agonal respirations, pale, no palpable femoral or radial pulse. Verified code status with primary nurse and in chart, patient is DNR/DNI. RT placed pt on NRB 15L. Discussed with Dr. Edwardo Hobbs, also confirmed pt is DNR, no ACLS medications to be given. MD contacting [facility]. Dr. Edwardo Hobbs waiting for family at main entrance to escort to bedside. MD confirmed via telephone to keep patient comfortable and code status to remain DNR.  paged by nursing staff. MD and family x2 at bedside. Verbal order to administer morphine 2 mg IV. Patient placed on comfort measures. Nursing sup notified. Patient Vitals for the past 4 hrs:   Temp Pulse Resp BP SpO2   03/12/22 0904  (!) 51 18 (!) 51/35 100 %   03/12/22 0902  (!) 53 17 (!) 43/32 100 %   03/12/22 0900  (!) 51 14 (!) 42/29 100 %   03/12/22 0859  (!) 49 15 (!) 41/28 100 %   03/12/22 0857  (!) 45 15 (!) 38/26 100 %   03/12/22 0850  (!) 36 9 (!) 35/19 (!) 7 %   03/12/22 0848  (!) 0 16  (!) 29 %   03/12/22 0847  (!) 33   (!) 31 %   03/12/22 0846  (!) 33 12  (!) 19 %   03/12/22 0845  (!) 34 11  (!) 41 %   03/12/22 0844  (!) 34 17  (!) 42 %   03/12/22 0843  (!) 37 19  (!) 49 %   03/12/22 0842  (!) 41 27  (!) 61 %   03/12/22 0841  (!) 47 (!) 35  (!) 71 %   03/12/22 0840  (!) 41 27  94 %   03/12/22 0815  78 20  100 %   03/12/22 0808     99 %   03/12/22 0805     99 %   03/12/22 0802 97.5 °F (36.4 °C) 77 26 130/68 97 %   03/12/22 0800     100 %       Hospice suites currently occupied. Patient to remain in 2245. Family able to visit.       Job NELY Reveles  Rapid Response Team  Ext. [phone]

## 2022-03-12 NOTE — PROGRESS NOTES
Pulmonary, Critical Care, and Sleep Medicine    Patient Consult    Name: Kenneth Tran MRN: [id number]   : 1950 Hospital: [facility]   Date: 3/12/2022        IMPRESSION:   · ILD-has not had PFTs. Noted on the CT to have GGO of the lung. Felt to have underlying fibrotic lung changes. · COPD, bronchiectasis. Appears to have severe lung disease but no PFTs to support this. Appears to have cor pulmonale based on Echo with Decreased RVEF. Severe Hypoxia. · Small left pleural effusion. · Acute on Chronic Respiratory Failure: ON baseline 2L NC. Now on 25L flow with fio2 of 42%. He has been able to be weaned much, Seems to have very poor pulmonary reserve. · Appears to be very deconditioned. · Has preserved LVEF: 50-55%. · Urinary Retention when on spiriva. · Ex Smoker  · T2 DM  · CAD prior Stent, ( 9 stents)  · CRI  · Mild GERD. · Anemia: hgb of 7.5  · Hard of Hearing. RECOMMENDATIONS:   ·  Hospice eval.      PCP: Jose Daniel Gar  Pulm: Margarita Carbone    Requesting MD: Dr. Grady Gautam . Subjective:     3/12/22 : rapid response called this am. Agonal breathing. D/W wife at the bedside. This patient has been seen and evaluated at the request of Dr. Jing Spencer for above. Patient is a 70 y.o. male who is followed by Dr. Margarita Carbone. Last seen in office on 2022. Has Hx of COPD. Stopped smoking at 48year old. LVEF of 45%. Mild AS, AR. When seen today: PT reports he has been feeling lowsy. Has very poor endurance and strength. Has cough of white sputum. He had more sputum on presentation. That aspect seems to be getting better. He typically is on oxygen at home at 1-3 L per NC at baseline. Has been with intermittent chest pain which is better with the lidocaine patch. Has been trying to work with PT but has poor strength and moderate dyspnea with any exertion. He feels like his strength is worsening. Gets jittery and dizzy with exertion.  He will use the bipap but really does not like to use it. Past Medical History:   Diagnosis Date    Aortic regurgitation     CAD (coronary artery disease)     Taxus stent 12/2005, 12/06 Cypher Prox circ, 6/2018 Sudarshan LADx2,  9/19 Sudarshan LAD & LCX, 1/20 [place]     Chronic combined systolic and diastolic congestive heart failure ([facility] 11/14/2021    Chronic obstructive pulmonary disease (HCC)     Congestive heart failure (HCC)     Diabetes ([facility]     Essential hypertension     Hyperlipidemia     MI (myocardial infarction) ([facility]     Murmur     Renal insufficiency     Thyroid disease       Past Surgical History:   Procedure Laterality Date    HX CORONARY STENT PLACEMENT      HX CYST REMOVAL      IR ASP BLADDER SUPRA CATH        Prior to Admission medications    Medication Sig Start Date End Date Taking? Authorizing Provider   bumetanide (BUMEX) 1 mg tablet Take 1 mg by mouth two (2) times a day. Yes Provider, Historical   clopidogreL (PLAVIX) 75 mg tab Take 75 mg by mouth daily. Yes Provider, Historical   rosuvastatin (CRESTOR) 20 mg tablet Take 20 mg by mouth nightly. Yes Provider, Historical   budesonide-formoteroL (Symbicort) 80-4.5 mcg/actuation HFAA Take 2 Puffs by inhalation two (2) times a day. Yes Provider, Historical   isosorbide mononitrate ER (IMDUR) 30 mg tablet Take 30 mg by mouth daily. Yes Provider, Historical   insulin glargine (Lantus Solostar U-100 Insulin) 100 unit/mL (3 mL) inpn 16 Units by SubCUTAneous route nightly. Yes Provider, Historical   hydrALAZINE (APRESOLINE) 50 mg tablet Take 50 mg by mouth three (3) times daily. Yes Other, MD Raul   anastrozole (ARIMIDEX) 1 mg tablet Take 0.5 mg by mouth every Monday, Wednesday, Friday. Yes Provider, Historical   magnesium oxide 250 mg magnesium tablet Take 250 mg by mouth. Every other day - OTC 11/4/21  Yes Provider, Historical   carvediloL (COREG) 6.25 mg tablet Take 1 Tablet by mouth every twelve (12) hours.  11/15/21  Yes Glenny Barros NP   nitroglycerin (Nitrostat) 0.4 mg SL tablet 1 Tablet by SubLINGual route every five (5) minutes as needed for Chest Pain. Up to 3 doses. 21  Yes Alcides Lucero NP   albuterol (ProAir HFA) 90 mcg/actuation inhaler Take 2 Puffs by inhalation every four (4) hours as needed. 21  Yes Provider, Historical   aspirin delayed-release 81 mg tablet Take 81 mg by mouth daily. At night   Yes Provider, Historical   levothyroxine (SYNTHROID) 100 mcg tablet Take 100 mcg by mouth daily. Morning   Yes Provider, Historical   testosterone (ANDROGEL) 1.62 % (20.25 mg/1.25 gram) glpk 20.25 mg daily. 2 to 3 pumps alternating in the morning. Yes Provider, Historical     Allergies   Allergen Reactions    Bee Sting [Sting, Bee] Anaphylaxis    Iodinated Contrast Media Rash    Brilinta [Ticagrelor] Other (comments)    Effient [Prasugrel] Other (comments)    Ranexa [Ranolazine] Other (comments)    Red Dye Hives    Sulfa (Sulfonamide Antibiotics) Rash    Penicillins Rash and Nausea Only      Social History     Tobacco Use    Smoking status: Former Smoker     Packs/day: 2.00     Years: 30.00     Pack years: 60.00     Quit date: 1991     Years since quittin.1    Smokeless tobacco: Never Used   Substance Use Topics    Alcohol use: Yes     Comment: rarely      No family history on file.      Current Facility-Administered Medications   Medication Dose Route Frequency    furosemide (LASIX) tablet 40 mg  40 mg Oral DAILY    insulin glargine (LANTUS) injection 7 Units  7 Units SubCUTAneous QHS    methylPREDNISolone (PF) (SOLU-MEDROL) injection 40 mg  40 mg IntraVENous Q12H    carvediloL (COREG) tablet 3.125 mg  3.125 mg Oral BID WITH MEALS    melatonin tablet 6 mg  6 mg Oral QHS    balsam peru-castor oiL (VENELEX) ointment   Topical BID    lidocaine 4 % patch 1 Patch  1 Patch TransDERmal Q24H    heparin (porcine) injection 5,000 Units  5,000 Units SubCUTAneous Q8H    anastrozole (ARIMIDEX) tablet 0.5 mg  0.5 mg Oral Q MON, WED & FRI    aspirin delayed-release tablet 81 mg  81 mg Oral DAILY    clopidogreL (PLAVIX) tablet 75 mg  75 mg Oral DAILY    levothyroxine (SYNTHROID) tablet 100 mcg  100 mcg Oral DAILY    magnesium oxide (MAG-OX) tablet 400 mg  400 mg Oral Q48H    rosuvastatin (CRESTOR) tablet 20 mg  20 mg Oral QHS    arformoterol 15 mcg/budesonide 0.5 mg neb solution   Nebulization BID RT    sodium chloride (NS) flush 5-40 mL  5-40 mL IntraVENous Q8H    [Held by provider] hydrALAZINE (APRESOLINE) tablet 50 mg  50 mg Oral TID    insulin lispro (HUMALOG) injection   SubCUTAneous AC&HS       Review of Systems:  Constitutional: positive for fatigue, malaise, anorexia and weight loss  Eyes: negative  Ears, nose, mouth, throat, and face: negative  Respiratory: positive for dyspnea on exertion or chronic bronchitis  Cardiovascular: positive for chest pain, chest pressure/discomfort, dyspnea, fatigue, paroxysmal nocturnal dyspnea, exertional chest pressure/discomfort, tachypnea, dyspnea on exertion, dizziness  Gastrointestinal: positive for dysphagia and reflux symptoms  Genitourinary:negative  Integument/breast: negative  Hematologic/lymphatic: negative  Musculoskeletal:negative  Neurological: negative  Behavioral/Psych: negative  Endocrine: negative  Allergic/Immunologic: positive for hay fever    Objective:   Vital Signs:    Visit Vitals  /68 (BP 1 Location: Left upper arm)   Pulse 77   Temp 97.5 °F (36.4 °C)   Resp 26   Ht 5' 5\" (1.651 m)   Wt 52.4 kg (115 lb 8 oz)   SpO2 99%   BMI 19.22 kg/m²       O2 Device: Nasal cannula   O2 Flow Rate (L/min): 2 l/min   Temp (24hrs), Av.4 °F (36.3 °C), Min:97.3 °F (36.3 °C), Max:97.7 °F (36.5 °C)       Intake/Output:   Last shift:      No intake/output data recorded.   Last 3 shifts: 03/10 1901 -  0700  In: 480 [P.O.:480]  Out: 1650 [Urine:1650]    Intake/Output Summary (Last 24 hours) at 3/12/2022 0845  Last data filed at 3/12/2022 0617  Gross per 24 hour   Intake 480 ml Output 700 ml   Net -220 ml      Physical Exam:   General:  Alert, cooperative, no distress, appears stated age. Thin male, hard of hearing, Conversant. Seems dyspneic with talking. Head:  Normocephalic, without obvious abnormality, atraumatic. Eyes:  Conjunctivae/corneas clear. PERRL, EOMs intact. Nose: Nares normal. Septum midline. Mucosa normal. No drainage or sinus tenderness. Throat: Lips, mucosa, and tongue normal. Teeth and gums normal.   Neck: Supple, symmetrical, trachea midline, no adenopathy, thyroid: no enlargment/tenderness/nodules, no carotid bruit and no JVD. Back:   Symmetric, no curvature. ROM normal.   Lungs:   Clear to auscultation bilaterally. Decreased BS in bases. NO overt rhonchi or wheezing. Chest wall:  No tenderness or deformity. Heart:  Regular rate and rhythm, S1, S2 normal, no murmur, click, rub or gallop. Abdomen:   Soft, non-tender. Bowel sounds normal. No masses,  No organomegaly. Extremities: Extremities normal, atraumatic, no cyanosis or edema. Pulses: 2+ and symmetric all extremities. Skin: Skin color, texture, turgor normal. Multiple areas of bruising. Lymph nodes: Cervical, supraclavicular nodes normal.   Neurologic: Grossly nonfocal, seems globally weak. Psych: no overt anxiety or depression.       Data review:     Recent Results (from the past 24 hour(s))   GLUCOSE, POC    Collection Time: 03/11/22 12:31 PM   Result Value Ref Range    Glucose (POC) 210 (H) 65 - 117 mg/dL    Performed by Tammy Hooker, POC    Collection Time: 03/11/22  4:21 PM   Result Value Ref Range    Glucose (POC) 196 (H) 65 - 117 mg/dL    Performed by Arelia Holter PCT    GLUCOSE, POC    Collection Time: 03/11/22  9:10 PM   Result Value Ref Range    Glucose (POC) 125 (H) 65 - 117 mg/dL    Performed by Kristel Fox (PEGGY MCKAY)    METABOLIC PANEL, BASIC    Collection Time: 03/12/22  3:37 AM   Result Value Ref Range    Sodium 145 136 - 145 mmol/L    Potassium 4.6 3.5 - 5.1 mmol/L    Chloride 98 97 - 108 mmol/L    CO2 >45 (HH) 21 - 32 mmol/L    Anion gap Cannot be calculated 5 - 15 mmol/L    Glucose 80 65 - 100 mg/dL     (H) 6 - 20 MG/DL    Creatinine 2.30 (H) 0.70 - 1.30 MG/DL    BUN/Creatinine ratio 49 (H) 12 - 20      GFR est AA 34 (L) >60 ml/min/1.73m2    GFR est non-AA 28 (L) >60 ml/min/1.73m2    Calcium 8.6 8.5 - 10.1 MG/DL   CBC WITH AUTOMATED DIFF    Collection Time: 03/12/22  3:37 AM   Result Value Ref Range    WBC 11.2 (H) 4.1 - 11.1 K/uL    RBC 2.51 (L) 4.10 - 5.70 M/uL    HGB 7.5 (L) 12.1 - 17.0 g/dL    HCT 25.9 (L) 36.6 - 50.3 %    .2 (H) 80.0 - 99.0 FL    MCH 29.9 26.0 - 34.0 PG    MCHC 29.0 (L) 30.0 - 36.5 g/dL    RDW 13.6 11.5 - 14.5 %    PLATELET 277 (L) 938 - 400 K/uL    MPV 11.6 8.9 - 12.9 FL    NRBC 0.2 (H) 0  WBC    ABSOLUTE NRBC 0.02 (H) 0.00 - 0.01 K/uL    NEUTROPHILS 91 (H) 32 - 75 %    LYMPHOCYTES 3 (L) 12 - 49 %    MONOCYTES 4 (L) 5 - 13 %    EOSINOPHILS 0 0 - 7 %    BASOPHILS 0 0 - 1 %    IMMATURE GRANULOCYTES 2 (H) 0.0 - 0.5 %    ABS. NEUTROPHILS 10.3 (H) 1.8 - 8.0 K/UL    ABS. LYMPHOCYTES 0.3 (L) 0.8 - 3.5 K/UL    ABS. MONOCYTES 0.4 0.0 - 1.0 K/UL    ABS. EOSINOPHILS 0.0 0.0 - 0.4 K/UL    ABS. BASOPHILS 0.0 0.0 - 0.1 K/UL    ABS. IMM. GRANS. 0.2 (H) 0.00 - 0.04 K/UL    DF SMEAR SCANNED      RBC COMMENTS MACROCYTOSIS  1+       GLUCOSE, POC    Collection Time: 03/12/22  7:30 AM   Result Value Ref Range    Glucose (POC) 71 65 - 117 mg/dL    Performed by Martin Jonas PCT        Imaging:  I have personally reviewed the patients radiographs and have reviewed the reports:    3-4-22: CXR:   IMPRESSION  No change in appearance of low lung volumes and nonspecific severe  bilateral pulmonary opacifications. 3-7-22: CT of chest: IMPRESSION  1. Small left pleural effusion.     2. Probable mild scattered groundglass lung opacities may represent pulmonary  edema or nonspecific infection/inflammation.  There is a stable background of  chronic fibrotic change.     3. Stable cardiomegaly.         Anil Esteves NP

## 2022-03-12 NOTE — PROGRESS NOTES
End of Shift Note    Bedside shift change report given to [facility] (oncoming nurse) by Mirian Pinto RN (offgoing nurse). Report included the following information SBAR, Kardex and MAR    Shift worked:  0700- 1900     Shift summary and any significant changes:          Concerns for physician to address:       Zone phone for oncoming shift:          Activity:  Activity Level: Bed Rest  Number times ambulated in hallways past shift: 0  Number of times OOB to chair past shift: 0    Cardiac:   Cardiac Monitoring: Yes      Cardiac Rhythm: Sinus Rhythm    Access:   Current line(s): PIV     Genitourinary:   Urinary status: wallace    Respiratory:   O2 Device: Heated,Hi flow nasal cannula  Chronic home O2 use?: NO  Incentive spirometer at bedside: NO       GI:  Last Bowel Movement Date: 03/09/22  Current diet:  ADULT DIET Dysphagia - Soft & Bite Sized  DIET ONE TIME MESSAGE  Passing flatus: NO  Tolerating current diet: YES       Pain Management:   Patient states pain is manageable on current regimen: YES    Skin:  Natalio Score: 13  Interventions: turn team, float heels and PT/OT consult    Patient Safety:  Fall Score:  Total Score: 2  Interventions: bed/chair alarm and gripper socks  High Fall Risk: Yes    Length of Stay:  Expected LOS: 5d 4h  Actual LOS: [address], RN

## 2022-03-12 NOTE — PROGRESS NOTES
Had decompensation event this am where he became bradycardic, altered and also hypoxic. Had family meeting with wife and son  They understand his poor prognosis and want to transition to comfort measures only  Will transfer to oncology, start comfort measures only  Discontinue daily labs, non comfort mediations and will focus on comfort measures only.

## 2022-03-12 NOTE — PROGRESS NOTES
Upon rounding to patient this a.m. Patient found to be decreased in reponsivness. Oxygen saturation and HR  went down. Increased O2NC but SpO2 did not improve. Added NRBR but no changes. Called RT and made aware RNs teams. Called Dr. Tomasa Ignacio. Rapid respond at the bedside reported after discussed with Dr. Tomasa Ignacio, to wait for Dr. Tomasa Ignacio and family members to see patient at the bedside before and further medical action. Patient DNR. 1016 Patient found asystole. NO pulse noted. Yohann[facility] [address] [facility] 47 and made aware. Called Nursing supervisor. Called . Melissa Solorzano announced patient's death at .  MsSamir Letty Michelle at the bedside with patient's wife and son. Record of death filled out. [facility] called and made aware of death and time.  home provided by family -Esau Zhang called and made aware via [phone]. All patient belongings sent with family member. Cleaned, new gown, tagged and covered patient with bag before sent to the [facility] at 1500.

## 2022-03-12 NOTE — PROGRESS NOTES
Responded to page to PCU when patient . Patient's wife and son were present. More family is expected to arrive. Provided pastoral presence and supportive listening as Mrs Riat Millan spoke about how quickly patient declined this morning. Although family was aware of poor prognosis, Mrs Rita Millan expressed feeling unprepared for this loss. Normalized her feelings, offering assurance there is rarely a way to be fully prepared for a loved one's death. No immediate needs were voiced at this time. Offered assurance of prayer and words of blessing.      ALFREDO Ramirez, [facility], Staff [address]    [address] Paging Service  [phone])

## 2022-03-13 LAB
BACTERIA SPEC CULT: NORMAL
BACTERIA SPEC CULT: NORMAL
SERVICE CMNT-IMP: NORMAL
SERVICE CMNT-IMP: NORMAL

## 2022-03-25 NOTE — DISCHARGE SUMMARY
Patient  during hospitalization  Acute on chronic hypoxic and hypercarbic respiratory failure  Acute pulmonary edema  Chronic severe lung fibrosis on CT  Acute diastolic congestive heart failure  Elevated bicarb  Diabetes mellitus  Acute metabolic encephalopathy  History of coronary artery s/p multiple stents  Hypothyroidism  CKD  Dyslipidemia    Patient was admitted to hospital was noted to have poor diagnosis and was started on Solu-Medrol along with DuoNeb and also Zithromax. Was also started on Lasix as well. Pulmonary was consulted. He required ICU stay and also BiPAP. Patient was noted to have evidence of severe pulmonary fibrosis even on prior CT scans. In spite of aggressive treatment, patient unfortunately had decompensation on the morning of 3/12 where he became bradycardic, altered and was also hypoxic. Family meeting was held and after careful consideration of all options, family decided to transition him to comfort measures only and patient passed shortly thereafter at 10:16 AM.    Cause of death: Lung fibrosis.

## 2022-03-25 NOTE — ROUTINE PROCESS
[facility] registry     12/10/21 echo     · LV: Estimated LVEF is 55 - 60%. Normal cavity size, wall thickness and systolic function (ejection fraction normal). Wall motion: normal.  · RV: Mildly dilated right ventricle. · AV: Aortic valve sclerosis with no evidence of reduced excursion. Aortic valve leaflet calcification present. Aortic valve mean gradient is 14 mmHg. Aortic valve area is 1.8 cm2. Mild aortic valve stenosis is present. Moderate aortic valve regurgitation is present. · LA: Moderately dilated left atrium. · MV: Moderate mitral annular calcification.

## 2023-02-09 NOTE — PROGRESS NOTES
[facility] @ / [place] , [place]  Subjective/HPI:     Shawn Mann is a 70 y.o. male history of multivessel PTCA stenting x 9  predominantly in the LAD most recent stent 2020, hypertension, hyperlipidemia, CKD stage III, mild aortic stenosis, diastolic dysfunction is here for transitional care hospital follow-up admitted to [facility] last week for CHF exacerbation, EF 45-50%, type II non-STEMI and chest pain. He was diuresed, experienced elevated BUN and creatinine, nephrotoxic medications were discontinued, he was started back on hydralazine which I had previously discontinued as it was triggering anginal symptoms in the absence of nitrates which patient had reported he was intolerant to taking however was placed back on isosorbide mononitrate with this admission. He had deferred left heart cath and stress test during inpatient admission. Patient reports he is tolerating isosorbide well without headache. He is not having chest pain at this time. He has been following his weight daily at home, he is up 5 pounds from his hospital discharge. He is having increasing lower extremity edema since hospital discharge. Denies orthopnea or PND, remains on 2 L nasal cannula oxygen has appointment tomorrow with pulmonary Associates Dr. Ken Mendoza. In discussing patient's chest discomfort at time of admission he reports it is not his typical angina, pointing to the upper apices of the anterior chest wall that were tender with palpation or taking a deep breath or coughing during the admission. He states prior to the admission he was working hard in order to breathe.     [facility] DATA    ECHO 11/2021 9/1/21 Visit  1.  Atherosclerotic heart disease: History of multiple PTCA stents in LAD circumflex most recent in 2020.  At least a total of 7 predominantly in the LAD.    Has maintained dual antiplatelet therapy he has first generation Taxus and Cypher stents recommend long-term Problem: MOBILITY - ADULT  Goal: Maintain or return to baseline ADL function  Description: INTERVENTIONS:  -  Assess patient's ability to carry out ADLs; assess patient's baseline for ADL function and identify physical deficits which impact ability to perform ADLs (bathing, care of mouth/teeth, toileting, grooming, dressing, etc )  - Assess/evaluate cause of self-care deficits   - Assess range of motion  - Assess patient's mobility; develop plan if impaired  - Assess patient's need for assistive devices and provide as appropriate  - Encourage maximum independence but intervene and supervise when necessary  - Involve family in performance of ADLs  - Assess for home care needs following discharge   - Consider OT consult to assist with ADL evaluation and planning for discharge  - Provide patient education as appropriate  2/9/2023 1316 by Master Monique RN  Outcome: Adequate for Discharge  2/9/2023 1146 by Master Monique RN  Outcome: Progressing  Goal: Maintains/Returns to pre admission functional level  Description: INTERVENTIONS:  - Perform BMAT or MOVE assessment daily    - Set and communicate daily mobility goal to care team and patient/family/caregiver     - Collaborate with rehabilitation services on mobility goals if consulted  - Out of bed for toileting  - Record patient progress and toleration of activity level   2/9/2023 1316 by Master Monique RN  Outcome: Adequate for Discharge  2/9/2023 1146 by Master Monique RN  Outcome: Progressing     Problem: INFECTION - ADULT  Goal: Absence of fever/infection during neutropenic period  Description: INTERVENTIONS:  - Monitor WBC    2/9/2023 1316 by Master Monique RN  Outcome: Adequate for Discharge  2/9/2023 1146 by Master Monique RN  Outcome: Progressing  Goal: Absence or prevention of progression during hospitalization  Description: INTERVENTIONS:  - Assess and monitor for signs and symptoms of infection  - Monitor lab/diagnostic results  - Monitor all insertion sites, i e  indwelling lines, tubes, and drains  - Monitor endotracheal if appropriate and nasal secretions for changes in amount and color  - [place] appropriate cooling/warming therapies per order  - Administer medications as ordered  - Instruct and encourage patient and family to use good hand hygiene technique  - Identify and instruct in appropriate isolation precautions for identified infection/condition  2/9/2023 1316 by Shyanne Garvin RN  Outcome: Adequate for Discharge  2/9/2023 1146 by Shyanne Garvin RN  Outcome: Progressing     Problem: Neurological Deficit  Goal: Neurological status is stable or improving  Description: Interventions:  - Monitor and assess patient's level of consciousness, motor function, sensory function, and level of assistance needed for ADLs  - Monitor and report changes from baseline  Collaborate with interdisciplinary team to initiate plan and implement interventions as ordered  - Provide and maintain a safe environment  - Consider seizure precautions  - Consider fall precautions  - Consider aspiration precautions  - Consider bleeding precautions  2/9/2023 1316 by Shyanne Garvin RN  Outcome: Adequate for Discharge  2/9/2023 1146 by Shyanne Garvin RN  Outcome: Progressing     Problem: Activity Intolerance/Impaired Mobility  Goal: Mobility/activity is maintained at optimum level for patient  Description: Interventions:  - Assess and monitor patient  barriers to mobility and need for assistive/adaptive devices  - Assess patient's emotional response to limitations  - Collaborate with interdisciplinary team and initiate plans and interventions as ordered  - Encourage independent activity per ability   - Maintain proper body alignment  - Perform active/passive rom as tolerated/ordered    - Plan activities to conserve energy   - Turn patient as appropriate  2/9/2023 1316 by Shyanne Garvin RN  Outcome: Adequate for Discharge  2/9/2023 Plavix (intolerant to Brilinta and Effient) therapy provided no future contraindications.  Continue atenolol and statin therapy  2.  Daily chest discomfort post medications, specific hydralazine: His chest discomfort has resolved with discontinuation of hydralazine and is tolerating amlodipine without side effects. Suspect coronary artery spasms were triggered by hydralazine in the absence and the ability to use nitrates. 2.  Hypertension:  Normotensive 121/70  3.  CKD stage III: Has established with Dr. Yehuda Lennon: On pravastatin 40 mg,  DL 56 at target  5.  Aortic stenosis / regurgitation: Mild on ECHO 2/2021   6.  Type 2 diabetes: On Lantus intermittently checking Accu-Cheks, labs followed by primary care  7.  Diastolic dysfunction: Requires furosemide 40 mg twice a day to remain euvolemic previous reduction triggered significant lower extremity edema and dyspnea. (Of note BNP can be falsely elevated in the setting of CKD, will manage patient's diastolic dysfunction merrily on symptom and weight). Presents clinically stable and euvolemic on exam today. Advised for trivial amount of pedal edema to use compression stockings more likely venous insufficiency/varicosities is the culprit.     ECHO 2/2021  Interpretation Summary     · LV: Estimated LVEF is 50 - 55%. Visually measured ejection fraction. Mildly dilated left ventricle. Upper normal wall thickness. Globally reduced systolic function. Low normal systolic function. Mild (grade 1) left ventricular diastolic dysfunction. · LA: Mildly dilated left atrium. Left Atrium volume index is 37.77 mL/m2. · AV: Aortic valve leaflet calcification present. Mild aortic valve stenosis is present. Mild aortic valve regurgitation is present. · MV: Mild mitral valve regurgitation is present.      Echo Findings     Left Ventricle Mildly dilated left ventricle. Upper normal wall thickness. The estimated EF is 50 - 55%. Visually measured ejection fraction. 1146 by Tasha Champion RN  Outcome: Progressing     Problem: Communication Impairment  Goal: Ability to express needs and understand communication  Description: Assess patient's communication skills and ability to understand information  Patient will demonstrate use of effective communication techniques, alternative methods of communication and understanding even if not able to speak  - Encourage communication and provide alternate methods of communication as needed  - Collaborate with case management/ for discharge needs  - Include patient/family/caregiver in decisions related to communication  2/9/2023 1316 by Tasha Champion RN  Outcome: Adequate for Discharge  2/9/2023 1146 by Tasha Champion RN  Outcome: Progressing     Problem: Potential for Aspiration  Goal: Non-ventilated patient's risk of aspiration is minimized  Description: Assess and monitor vital signs, respiratory status, and labs (WBC)  Monitor for signs of aspiration (tachypnea, cough, rales, wheezing, cyanosis, fever)  - Assess and monitor patient's ability to swallow  - Place patient up in chair to eat if possible  - HOB up at 90 degrees to eat if unable to get patient up into chair   - Supervise patient during oral intake  - Instruct patient/ family to take small bites  - Instruct patient/ family to take small single sips when taking liquids  - Follow patient-specific strategies generated by speech pathologist   2/9/2023 1316 by Tasha Champion RN  Outcome: Adequate for Discharge  2/9/2023 1146 by Tasha Champion RN  Outcome: Progressing  Goal: Ventilated patient's risk of aspiration is minimized  Description: Assess and monitor vital signs, respiratory status, airway cuff pressure, and labs (WBC)  Monitor for signs of aspiration (tachypnea, cough, rales, wheezing, cyanosis, fever)  - Elevate head of bed 30 degrees if patient has tube feeding   - Monitor tube feeding    2/9/2023 1316 by Jerrica Champagne Globally reduced systolic function. Low normal systolic function. There is mild (grade 1) left ventricular diastolic dysfunction. Left Atrium Mildly dilated left atrium. Left Atrium volume index is 37.77 mL/m2. Right Ventricle Normal cavity size, wall thickness and global systolic function. Right Atrium Normal cavity size. Aortic Valve Trileaflet valve structure. Mild aortic valve sclerosis. There is leaflet calcification. There is mild aortic stenosis. Mild aortic valve regurgitation. Mitral Valve Normal valve structure and no stenosis. Mild regurgitation. Tricuspid Valve Normal valve structure and no stenosis. Trace regurgitation. Pulmonic Valve Normal valve structure, no stenosis and no regurgitation. Aorta Normal aortic root, ascending aortic, and aortic arch. Pulmonary Artery Pulmonary hypertension not suggested by Doppler findings. IVC/Hepatic Veins Normal structure. Normal central venous pressure (3 mmHg); IVC diameter is less than 21 mm and collapses more than 50% with respiration.    Pericardium Normal pericardium and no evidence of pericardial effusion.             PCP Provider  Day Dennison NP  Past Medical History:   Diagnosis Date    Aortic regurgitation     CAD (coronary artery disease)     Taxus stent 12/2005, 12/06 Cypher Prox circ, 6/2018 Sudarshan LADx2,  9/19 Dixon LAD & LCX, 1/20 Dixon     Chronic combined systolic and diastolic congestive heart failure ([address] 11/14/2021    Chronic obstructive pulmonary disease (HCC)     Congestive heart failure ([address]     Diabetes ([address]     Essential hypertension     Hyperlipidemia     MI (myocardial infarction) ([address]     Murmur     Renal insufficiency     Thyroid disease       Past Surgical History:   Procedure Laterality Date    HX CORONARY STENT PLACEMENT      HX CYST REMOVAL      IR ASP BLADDER SUPRA CATH       Allergies   Allergen Reactions    Bee Sting [Sting, Bee] Anaphylaxis    Iodinated Contrast Media Rash    ALYSON Arceo  Outcome: Adequate for Discharge  2/9/2023 1146 by Willy Deleon RN  Outcome: Progressing     Problem: Nutrition  Goal: Nutrition/Hydration status is improving  Description: Monitor and assess patient's nutrition/hydration status for malnutrition (ex- brittle hair, bruises, dry skin, pale skin and conjunctiva, muscle wasting, smooth red tongue, and disorientation)  Collaborate with interdisciplinary team and initiate plan and interventions as ordered  Monitor patient's weight and dietary intake as ordered or per policy  Utilize nutrition screening tool and intervene per policy  Determine patient's food preferences and provide high-protein, high-caloric foods as appropriate  - Assist patient with eating   - Allow adequate time for meals   - Encourage patient to take dietary supplement as ordered  - Collaborate with clinical nutritionist   - Include patient/family/caregiver in decisions related to nutrition    2/9/2023 1316 by Willy Deleon RN  Outcome: Adequate for Discharge  2/9/2023 1146 by Willy Deleon RN  Outcome: Progressing     Problem: PAIN - ADULT  Goal: Verbalizes/displays adequate comfort level or baseline comfort level  Description: Interventions:  - Encourage patient to monitor pain and request assistance  - Assess pain using appropriate pain scale  - Administer analgesics based on type and severity of pain and evaluate response  - Implement non-pharmacological measures as appropriate and evaluate response  - Consider cultural and social influences on pain and pain management  - Notify physician/advanced practitioner if interventions unsuccessful or patient reports new pain  2/9/2023 1316 by Willy Deleon RN  Outcome: Adequate for Discharge  2/9/2023 1146 by Willy Deleon RN  Outcome: Progressing     Problem: SAFETY ADULT  Goal: Maintain or return to baseline ADL function  Description: INTERVENTIONS:  -  Assess patient's ability to carry out ADLs; assess Brilinta [Ticagrelor] Other (comments)    Effient [Prasugrel] Other (comments)    Penicillins Rash and Nausea Only    Ranexa [Ranolazine] Other (comments)    Sulfa (Sulfonamide Antibiotics) Rash      No family history on file. Current Outpatient Medications   Medication Sig    isosorbide mononitrate ER (IMDUR) 30 mg tablet Take 1 Tablet by mouth daily.  magnesium oxide 250 mg magnesium tablet Take 250 mg by mouth. Every other day    prasterone, dhea, 50 mg tab 50 mg = 1 tab each dose, PO, daily, # 30 tab, 0 Refills    carvediloL (COREG) 6.25 mg tablet Take 1 Tablet by mouth every twelve (12) hours.  isosorbide mononitrate ER (IMDUR) 30 mg tablet Take 1 Tablet by mouth daily.  hydrALAZINE (APRESOLINE) 25 mg tablet Take 1 Tablet by mouth every eight (8) hours.  bumetanide (BUMEX) 1 mg tablet Take 1 Tablet by mouth two (2) times a day. Replaces lasix    amLODIPine (NORVASC) 2.5 mg tablet Take 1 Tablet by mouth daily.  rosuvastatin (CRESTOR) 20 mg tablet TAKE 1 TABLET BY MOUTH EVERY NIGHT    clopidogreL (PLAVIX) 75 mg tab TAKE 1 TABLET BY MOUTH EVERY DAY    Symbicort 80-4.5 mcg/actuation HFAA INHALE 2 PUFFS BY MOUTH TWICE DAILY IN THE MORNING AND IN THE EVENING    nitroglycerin (Nitrostat) 0.4 mg SL tablet 1 Tablet by SubLINGual route every five (5) minutes as needed for Chest Pain. Up to 3 doses.  albuterol (ProAir HFA) 90 mcg/actuation inhaler inhale 2 puff by inhalation route  every 4 hours as needed    BD Lali 2nd Gen Pen Needle 32 gauge x 5/32\" ndle USE WITH LANTUS    anastrozole (ARIMIDEX) 1 mg tablet Take 1 mg by mouth three (3) times daily. 0.5 tab TID    aspirin delayed-release 81 mg tablet Aspir-81 mg tablet,delayed release   Take 1 tablet every day by oral route.  glimepiride (AMARYL) 4 mg tablet Take 4 mg by mouth daily.  Every two days    insulin glargine (Lantus U-100 Insulin) 100 unit/mL injection 18U nightly    levothyroxine (SYNTHROID) 100 mcg tablet Take 100 mcg by mouth daily. Morning    testosterone (ANDROGEL) 1.62 % (20.25 mg/1.25 gram) glpk testosterone 1.62 % (20.25 mg/1.25 gram) transdermal gel packet   Apply 1 packet every day by transdermal route. No current facility-administered medications for this visit. Vitals:    11/15/21 1140 11/15/21 1202   BP: (!) 92/51 (!) 101/54   Pulse: 71 72   Resp: 28    SpO2: 97%    Weight: 140 lb 6.4 oz (63.7 kg)    Height: 5' 5\" (1.651 m)      Social History     Socioeconomic History    Marital status:      Spouse name: Not on file    Number of children: Not on file    Years of education: Not on file    Highest education level: Not on file   Occupational History    Not on file   Tobacco Use    Smoking status: Former Smoker     Packs/day: 2.00     Years: 30.00     Pack years: 60.00     Quit date: 1991     Years since quittin.8    Smokeless tobacco: Never Used   Vaping Use    Vaping Use: Never used   Substance and Sexual Activity    Alcohol use: Yes     Comment: rarely    Drug use: Never    Sexual activity: Yes     Partners: Female   Other Topics Concern    Not on file   Social History Narrative    Not on file     Social Determinants of Health     Financial Resource Strain:     Difficulty of Paying Living Expenses: Not on file   Food Insecurity:     Worried About Running Out of Food in the Last Year: Not on file    Linda of Food in the Last Year: Not on file   Transportation Needs:     Lack of Transportation (Medical): Not on file    Lack of Transportation (Non-Medical):  Not on file   Physical Activity:     Days of Exercise per Week: Not on file    Minutes of Exercise per Session: Not on file   Stress:     Feeling of Stress : Not on file   Social Connections:     Frequency of Communication with Friends and Family: Not on file    Frequency of Social Gatherings with Friends and Family: Not on file    Attends Holiness Services: Not on file    Active Member of Clubs or Organizations: Not patient's baseline for ADL function and identify physical deficits which impact ability to perform ADLs (bathing, care of mouth/teeth, toileting, grooming, dressing, etc )  - Assess/evaluate cause of self-care deficits   - Assess range of motion  - Assess patient's mobility; develop plan if impaired  - Assess patient's need for assistive devices and provide as appropriate  - Encourage maximum independence but intervene and supervise when necessary  - Involve family in performance of ADLs  - Assess for home care needs following discharge   - Consider OT consult to assist with ADL evaluation and planning for discharge  - Provide patient education as appropriate  2/9/2023 1316 by Maliha Justin RN  Outcome: Adequate for Discharge  2/9/2023 1146 by Maliha Justin RN  Outcome: Progressing  Goal: Maintains/Returns to pre admission functional level  Description: INTERVENTIONS:  - Perform BMAT or MOVE assessment daily    - Set and communicate daily mobility goal to care team and patient/family/caregiver     - Collaborate with rehabilitation services on mobility goals if consulted  - Out of bed for toileting  - Record patient progress and toleration of activity level   2/9/2023 1316 by Maliha Justin RN  Outcome: Adequate for Discharge  2/9/2023 1146 by Maliha Justin RN  Outcome: Progressing  Goal: Patient will remain free of falls  Description: INTERVENTIONS:  - Educate patient/family on patient safety including physical limitations  - Instruct patient to call for assistance with activity   - Consult OT/PT to assist with strengthening/mobility   - Keep Call bell within reach  - Keep bed low and locked with side rails adjusted as appropriate  - Keep care items and personal belongings within reach  - Initiate and maintain comfort rounds  - Make Fall Risk Sign visible to staff  - Apply yellow socks and bracelet for high fall risk patients  - Consider moving patient to room near nurses station  2/9/2023 1316 by Krys Benavides RN  Outcome: Adequate for Discharge  2/9/2023 1146 by Krys Benavides RN  Outcome: Progressing     Problem: DISCHARGE PLANNING  Goal: Discharge to home or other facility with appropriate resources  Description: INTERVENTIONS:  - Identify barriers to discharge w/patient and caregiver  - Arrange for needed discharge resources and transportation as appropriate  - Identify discharge learning needs (meds, wound care, etc )  - Arrange for interpretive services to assist at discharge as needed  - Refer to Case Management Department for coordinating discharge planning if the patient needs post-hospital services based on physician/advanced practitioner order or complex needs related to functional status, cognitive ability, or social support system  2/9/2023 1316 by Krys Benavides RN  Outcome: Adequate for Discharge  2/9/2023 1146 by Krys Benavides RN  Outcome: Progressing     Problem: Knowledge Deficit  Goal: Patient/family/caregiver demonstrates understanding of disease process, treatment plan, medications, and discharge instructions  Description: Complete learning assessment and assess knowledge base    Interventions:  - Provide teaching at level of understanding  - Provide teaching via preferred learning methods  2/9/2023 1316 by Krys Benavides RN  Outcome: Adequate for Discharge  2/9/2023 1146 by Krys Benavides RN  Outcome: Progressing on file    Attends Club or Organization Meetings: Not on file    Marital Status: Not on file   Intimate Partner Violence:     Fear of Current or Ex-Partner: Not on file    Emotionally Abused: Not on file    Physically Abused: Not on file    Sexually Abused: Not on file   Housing Stability:     Unable to Pay for Housing in the Last Year: Not on file    Number of Kyra in the Last Year: Not on file    Unstable Housing in the Last Year: Not on file       I have reviewed the nurses notes, vitals, problem list, allergy list, medical history, family, social history and medications. Review of Symptoms  11 systems reviewed, negative other than as stated in the HPI      Physical Exam:      General: Well developed, in no acute distress, cooperative and alert  HEENT: No carotid bruits, no JVD, trach is midline. Neck Supple, PERRL, EOM intact. Heart:  Normal S1/S2 negative S3 or S4. Regular, 2/6 systolic, gallop or rub. Respiratory: Scant crackles in the left lower lobe, diminished in the right lobe, expiratory wheezing in the right field  Abdomen:   Soft, non-tender, no masses, bowel sounds are active. Extremities: 2+ pitting ankle and pedal edema bilaterally normal cap refill, no cyanosis, atraumatic. Neuro: A&Ox3, speech clear, gait stable. Skin: Skin color is normal. No rashes or lesions. Non diaphoretic  Vascular: 2+ pulses symmetric in all extremities  Psychiatric: Normal affect, maintaining eye contact, normal speech pattern  Cardiographics    ECG: Sinus rhythm        Cardiology Labs:  No results found for: CHOL, CHOLX, CHLST, CHOLV, 597979, HDL, HDLP, LDL, LDLC, DLDLP, TGLX, TRIGL, TRIGP, CHHD, CHHDX    No results found for: NA, K, CL, CO2, AGAP, GLU, BUN, CREA, BUCR, GFRAA, GFRNA, CA, TBIL, TBILI, AP, TP, ALB, GLOB, AGRAT, ALT        Assessment:     Assessment:     Diagnoses and all orders for this visit:    1.  Aortic valve insufficiency, etiology of cardiac valve disease unspecified  - AMB POC EKG ROUTINE W/ 12 LEADS, INTER & REP  -     REFERRAL TO CARDIOLOGY    2. Coronary arteriosclerosis  -     REFERRAL TO CARDIOLOGY    3. Chronic obstructive pulmonary disease, unspecified COPD type ([facility]  -     REFERRAL TO CARDIOLOGY    4. Renal insufficiency  -     REFERRAL TO CARDIOLOGY    5. Chronic combined systolic and diastolic congestive heart failure (HCC)  -     REFERRAL TO CARDIOLOGY    6. Essential hypertension  -     REFERRAL TO CARDIOLOGY    7. Type 2 myocardial infarction (HCC)  -     REFERRAL TO CARDIOLOGY    8. Hospital discharge follow-up  -     [place] DISCHARGE MEDS RECONCILED W/ CURRENT OUTPATIENT MED LIST  -     REFERRAL TO CARDIOLOGY    Other orders  -     carvediloL (COREG) 6.25 mg tablet; Take 1 Tablet by mouth every twelve (12) hours. -     isosorbide mononitrate ER (IMDUR) 30 mg tablet; Take 1 Tablet by mouth daily. -     hydrALAZINE (APRESOLINE) 25 mg tablet; Take 1 Tablet by mouth every eight (8) hours. -     bumetanide (BUMEX) 1 mg tablet; Take 1 Tablet by mouth two (2) times a day. Replaces lasix  -     amLODIPine (NORVASC) 2.5 mg tablet; Take 1 Tablet by mouth daily. ICD-10-CM ICD-9-CM    1. Aortic valve insufficiency, etiology of cardiac valve disease unspecified  I35.1 424.1 AMB POC EKG ROUTINE W/ 12 LEADS, INTER & REP      REFERRAL TO CARDIOLOGY   2. Coronary arteriosclerosis  I25.10 414.00 REFERRAL TO CARDIOLOGY   3. Chronic obstructive pulmonary disease, unspecified COPD type ([facility]  J44.9 496 REFERRAL TO CARDIOLOGY   4. Renal insufficiency  N28.9 593.9 REFERRAL TO CARDIOLOGY   5. Chronic combined systolic and diastolic congestive heart failure (HCC)  I50.42 428.42 REFERRAL TO CARDIOLOGY     428.0    6. Essential hypertension  I10 401.9 REFERRAL TO CARDIOLOGY   7. Type 2 myocardial infarction ([facility]  I21. A1 410.90 REFERRAL TO CARDIOLOGY   8.  Hospital discharge follow-up  Z09 V67.59 [place] DISCHARGE MEDS RECONCILED W/ CURRENT OUTPATIENT MED LIST      REFERRAL TO CARDIOLOGY Orders Placed This Encounter   [facility] Cardiology [facility] EMPL     Referral Priority:   Routine     Referral Type:   Consultation     Referral Reason:   Specialty Services Required     Referred to Provider:   Rachelle Roger MD     Number of Visits Requested:   1    [place] DISCHARGE MEDS RECONCILED W/ CURRENT OUTPATIENT MED LIST    AMB POC EKG ROUTINE W/ 12 LEADS, INTER & REP     Order Specific Question:   Reason for Exam:     Answer:   routine    DISCONTD: carvediloL (COREG) 6.25 mg tablet     Sig: Take 1 Tablet by mouth every twelve (12) hours.  DISCONTD: isosorbide mononitrate ER (IMDUR) 30 mg tablet     Sig: Take 30 mg by mouth daily.  DISCONTD: hydrALAZINE (APRESOLINE) 25 mg tablet     Sig: Take 1 Tablet by mouth every eight (8) hours.  isosorbide mononitrate ER (IMDUR) 30 mg tablet     Sig: Take 1 Tablet by mouth daily.  magnesium oxide 250 mg magnesium tablet     Sig: Take 250 mg by mouth. Every other day    prasterone, dhea, 50 mg tab     Si mg = 1 tab each dose, PO, daily, # 30 tab, 0 Refills    carvediloL (COREG) 6.25 mg tablet     Sig: Take 1 Tablet by mouth every twelve (12) hours. Dispense:  180 Tablet     Refill:  1    isosorbide mononitrate ER (IMDUR) 30 mg tablet     Sig: Take 1 Tablet by mouth daily. Dispense:  90 Tablet     Refill:  1    hydrALAZINE (APRESOLINE) 25 mg tablet     Sig: Take 1 Tablet by mouth every eight (8) hours. Dispense:  270 Tablet     Refill:  1    bumetanide (BUMEX) 1 mg tablet     Sig: Take 1 Tablet by mouth two (2) times a day. Replaces lasix     Dispense:  60 Tablet     Refill:  1    amLODIPine (NORVASC) 2.5 mg tablet     Sig: Take 1 Tablet by mouth daily. Dispense:  90 Tablet     Refill:  1        Plan:     1.  Atherosclerotic heart disease: History of multiple PTCA stents in LAD circumflex most recent in . Type II non-STEMI MCV 2021 in setting of decompensated combined systolic and diastolic heart failure EF 45-50%.   On previous visits patient reporting angina after taking hydralazine in the absence of nitrates which triggered significant headaches. At [facility] restarted on hydralazine with Imdur and patient reports he is tolerating without headaches and denies chest pain at this time. Presently on triple antianginal therapy beta-blocker/nitrates/calcium channel blocker. 2.  Heart failure moderately reduced ejection fraction: EF 45% on echo [facility] 11/2021 (55% 22021): Switched to carvedilol during admission, resumed on diuretics as initially had BIPIN with aggressive diuresis during inpatient, remainder of nephrotoxic medications held. Avoid ACE/ARB/Arni at this time. He is up 5 pounds and developing lower extremity edema and has crackles in the left lower lobe, will change from Lasix 40 mg twice a day to Bumex 1 mg twice a day. Repeat CMP in 1 week. 2.  Hypertension:   Reducing amlodipine back to 2.5 mg ([facility] had increased dose to 5 mg), he is off clonidine. Blood pressure is soft 101/54  3.  CKD stage III/: Has established with Dr. Marleni Hearn, holding nephrotoxic medications for heart failure with the exception of diuretics, has upcoming appointment in January. 4.  Hyperlipidemia: Previously on pravastatin, was switched to Crestor at time of admission to [facility] will continue  5.  Aortic stenosis / regurgitation: Mild/Moderate 11/2021 ECHO [facility]   6.  Type 2 diabetes: On Lantus intermittently checking Accu-Cheks, labs followed by primary care  7.  Diastolic dysfunction: Recent admission 11/2021 for decompensated CHF, 5 pound weight gain with increasing edema as reported above changing Lasix to Bumex. 8.  COPD: Followed by Dr Víctor Kline. 43-year-old male with history of atherosclerotic heart disease, hypertension, CKD, aortic stenosis, diabetes, diastolic dysfunction recent admitted to [facility] for new onset heart failure moderately reduced ejection fraction 10%, diastolic dysfunction, fluid overload.   Multiple med changes as reported above presently chest pain-free, changing diuretics. Has appointment with pulmonary tomorrow. Check CMP, BNP, CBC and magnesium in 1 week. Referring patient to advanced heart failure. Will need right heart cath, will consider left heart cath dependent on renal function and if any recurrence of chest pain. Follow-up in 2 weeks. Trula Sandifer, NP      Please note that this dictation was completed with [employer] ([employer]), the computer voice recognition software. Quite often unanticipated grammatical, syntax, homophones, and other interpretive errors are inadvertently transcribed by the computer software. Please disregard these errors. Please excuse any errors that have escaped final proofreading. Thank you.

## 2024-06-03 NOTE — PROGRESS NOTES
Overnight Hospitalist Progress Note    Name: Reggie Choudhary  YOB: 1950  MRN: [id number]  Admission Date: 12/8/2021    Date of service: 12/18/21, 4:03 AM          ____________________________________________________________________________                               2229R: \" Code stroke\" called for lethargy and dysarthria. Initial blood glucose 33 mg/dL. Improved to 294 mg/dL after 25 g of D50    Patient is a 49-year-old male admitted 12/8/2021 for acute on chronic hypoxic respiratory failure, CHF vs CAP. PMH significant for acute on chronic diastolic congestive heart failure, CAD, COPD, DM, HTN, CRI and MI. Received 25 units of Lantus yesterday evening for the first time along with ACHS fingersticks and a sliding scale coverage for repeated hypoglycemic readings. · After dextrose, patient was alert oriented x3  · Face symmetrical, tongue midline. Speech clear and appropriate.   Moving all extremities, good sensation throughout  · Chest clear, no rales, rhonchi or wheezing  · Abdomen soft, nontender not distended  · 1+ bilateral lower extremity edema  · NIHSS 0                                        Patient Vitals for the past 12 hrs:   Temp Pulse Resp BP SpO2   12/18/21 0600  65      12/18/21 0400  70      12/18/21 0200  72      12/18/21 0011 97.6 °F (36.4 °C) 72 20 (!) 118/43 95 %   12/17/21 2200  70      12/17/21 2000  76      12/17/21 1905 98.3 °F (36.8 °C) 82 22 (!) 111/41 94 %     Recent Results (from the past 8 hour(s))   GLUCOSE, POC    Collection Time: 12/18/21  3:47 AM   Result Value Ref Range    Glucose (POC) 35 (LL) 65 - 117 mg/dL    Performed by David CHACKO (CON)    GLUCOSE, POC    Collection Time: 12/18/21  3:49 AM   Result Value Ref Range    Glucose (POC) 33 (LL) 65 - 117 mg/dL    Performed by Shahnaz MC)    GLUCOSE, POC    Collection Time: 12/18/21 3:55 AM   Result Value Ref Range    Glucose (POC) 294 (H) 65 - 117 mg/dL    Performed by René CHACKO (CON)    PTT    Collection Time: 12/18/21  4:11 AM   Result Value Ref Range    aPTT 28.2 22.1 - 31.0 sec    aPTT, therapeutic range     58.0 - 77.0 SECS   NT-PRO BNP    Collection Time: 12/18/21  4:11 AM   Result Value Ref Range    NT pro-BNP 3,660 (H) <125 PG/ML   MAGNESIUM    Collection Time: 12/18/21  4:11 AM   Result Value Ref Range    Magnesium 2.8 (H) 1.6 - 2.4 mg/dL   METABOLIC PANEL, BASIC    Collection Time: 12/18/21  4:11 AM   Result Value Ref Range    Sodium 135 (L) 136 - 145 mmol/L    Potassium 3.8 3.5 - 5.1 mmol/L    Chloride 103 97 - 108 mmol/L    CO2 27 21 - 32 mmol/L    Anion gap 5 5 - 15 mmol/L    Glucose 181 (H) 65 - 100 mg/dL    BUN 84 (H) 6 - 20 MG/DL    Creatinine 2.83 (H) 0.70 - 1.30 MG/DL    BUN/Creatinine ratio 30 (H) 12 - 20      GFR est AA 27 (L) >60 ml/min/1.73m2    GFR est non-AA 22 (L) >60 ml/min/1.73m2    Calcium 8.3 (L) 8.5 - 10.1 MG/DL   PHOSPHORUS    Collection Time: 12/18/21  4:11 AM   Result Value Ref Range    Phosphorus 2.8 2.6 - 4.7 MG/DL   CBC WITH AUTOMATED DIFF    Collection Time: 12/18/21  4:11 AM   Result Value Ref Range    WBC 9.6 4.1 - 11.1 K/uL    RBC 3.16 (L) 4.10 - 5.70 M/uL    HGB 9.3 (L) 12.1 - 17.0 g/dL    HCT 29.2 (L) 36.6 - 50.3 %    MCV 92.4 80.0 - 99.0 FL    MCH 29.4 26.0 - 34.0 PG    MCHC 31.8 30.0 - 36.5 g/dL    RDW 14.7 (H) 11.5 - 14.5 %    PLATELET 389 (L) 367 - 400 K/uL    MPV 11.4 8.9 - 12.9 FL    NRBC 0.0 0  WBC    ABSOLUTE NRBC 0.00 0.00 - 0.01 K/uL    NEUTROPHILS 74 32 - 75 %    LYMPHOCYTES 10 (L) 12 - 49 %    MONOCYTES 9 5 - 13 %    EOSINOPHILS 5 0 - 7 %    BASOPHILS 0 0 - 1 %    IMMATURE GRANULOCYTES 2 (H) 0.0 - 0.5 %    ABS. NEUTROPHILS 7.1 1.8 - 8.0 K/UL    ABS. LYMPHOCYTES 1.0 0.8 - 3.5 K/UL    ABS. MONOCYTES 0.9 0.0 - 1.0 K/UL    ABS. EOSINOPHILS 0.4 0.0 - 0.4 K/UL    ABS. BASOPHILS 0.0 0.0 - 0.1 K/UL    ABS. IMM.  GRANS. 0.2 (H) 0.00 - 0.04 K/UL    DF AUTOMATED     GLUCOSE, POC    Collection Time: 12/18/21  5:02 AM   Result Value Ref Range    Glucose (POC) 129 (H) 65 - 117 mg/dL    Performed by Jung Karolina H (CON)    GLUCOSE, POC    Collection Time: 12/18/21  6:04 AM   Result Value Ref Range    Glucose (POC) 118 (H) 65 - 117 mg/dL    Performed by Cubaia Karolina H (CON)    GLUCOSE, POC    Collection Time: 12/18/21  6:21 AM   Result Value Ref Range    Glucose (POC) 111 65 - 117 mg/dL    Performed by Jung Karolina H (CON)                   Altered mental status due to hypoglycemia  · Initial blood glucose 33 mg/dL, improved to 294 mg/dL after amp of D50  · Will change IV fluid to D5 at 75 cc an hour  · Fingersticks every hour x3  · Requested RN give patient a snack with complex carbohydrates  · Decrease evening Lantus dose to 15 units  · Continue fingersticks with sliding scale coverage  · Consider consult to diabetes CNS if necessary    Code stroke canceled. Appreciate neuro NP Homero Lynch for evaluation and assessment    d/w primary RN    This patient was stable at the time of my exam.  Please do not hesitate to call me again if his status does not improve or worsens.     ____________________________________________________________________________    KARIME No, RN, NP-C  [phone] or via Robert n/a

## (undated) DEVICE — RUNTHROUGH NS EXTRA FLOPPY PTCA GUIDEWIRE: Brand: RUNTHROUGH

## (undated) DEVICE — TR BAND RADIAL ARTERY COMPRESSION DEVICE: Brand: TR BAND

## (undated) DEVICE — SWAN-GANZ TRUE SIZE THERMODULTION CATHETER, 5F: Brand: SWAN-GANZ TRUE SIZE

## (undated) DEVICE — HI-TORQUE VERSACORE FLOPPY GUIDE WIRE SYSTEM 145 CM: Brand: HI-TORQUE VERSACORE

## (undated) DEVICE — GLIDESHEATH SLENDER ACCESS KIT: Brand: GLIDESHEATH SLENDER

## (undated) DEVICE — CUSTOM KT PTCA INFL DEV K05 00052M

## (undated) DEVICE — PROVE COVER: Brand: UNBRANDED

## (undated) DEVICE — GLIDESHEATH SLENDER STAINLESS STEEL KIT: Brand: GLIDESHEATH SLENDER

## (undated) DEVICE — RADIFOCUS OPTITORQUE ANGIOGRAPHIC CATHETER: Brand: OPTITORQUE

## (undated) DEVICE — CATH 5F 100CM JR40 -- DXTERITY

## (undated) DEVICE — DEVICE COMPR REG 24 CM VASC BND

## (undated) DEVICE — ANGIOGRAPHIC CATHETER: Brand: IMPULSE™

## (undated) DEVICE — CO-SET DELIVERY SYSTEM FOR 123 ROOM TEMPATURE INJECTATE: Brand: CO-SET+

## (undated) DEVICE — ADULT SPO2 SENSOR: Brand: NELLCOR

## (undated) DEVICE — BAND COMPR L21CM SHT CLR PLAS HEMSTAT EXT HK AND LOOP RETEN

## (undated) DEVICE — CATHETER ANGIO JR4 AD 5 FRX100 CM 25 CM PERFORMA

## (undated) DEVICE — SPLINT WR POS F/ARTERIAL ACC -- BX/10

## (undated) DEVICE — CATH GUID COR EB35 5FR 100CM -- LAUNCHER

## (undated) DEVICE — ANGIOGRAPHY KIT

## (undated) DEVICE — CATH 5F 100CM JL35 -- DXTERITY

## (undated) DEVICE — Device: Brand: ASAHI SION BLUE

## (undated) DEVICE — KIT MFLD ISOLATN NACL CNTRST PRT TBNG SPIK W/ PRSS TRNSDUC

## (undated) DEVICE — GUIDEWIRE VASC L260CM 0.035IN J TIP L3MM PTFE FIX COR NAMIC

## (undated) DEVICE — CATH BLLN ANGIO 2.75X20MM SC EUPHORA RX

## (undated) DEVICE — GOWN,SIRUS,FABRNF,XL,20/CS: Brand: MEDLINE

## (undated) DEVICE — CATH ANGI BLLN DIL 3.5X20MM -- NC EUPHORA

## (undated) DEVICE — KIT HND CTRL 3 W STPCOCK ROT END 54IN PREM HI PRSS TBNG AT

## (undated) DEVICE — CATHETER DIAG 5FR L100CM AL AL 1.0 CRV SZ DBL BRAID WIRE

## (undated) DEVICE — 3M™ TEGADERM™ TRANSPARENT FILM DRESSING FRAME STYLE, 1626W, 4 IN X 4-3/4 IN (10 CM X 12 CM), 50/CT 4CT/CASE: Brand: 3M™ TEGADERM™

## (undated) DEVICE — SPECIAL PROCEDURE DRAPE 32" X 34": Brand: SPECIAL PROCEDURE DRAPE

## (undated) DEVICE — CATHETER GUID 6FR L100CM DIA0.071IN NYL SHFT RCB W/O SIDE H

## (undated) DEVICE — COPILOT BLEEDBACK CONTROL VALVE: Brand: COPILOT

## (undated) DEVICE — CATH GUID COR HSI 6FR 100CM -- LAUNCHER

## (undated) DEVICE — CUSTOM KT PTCA INFL DEV K05 00053H

## (undated) DEVICE — KIT MED IMAG CNTRST AGNT W/ IOPAMIDOL REUSE

## (undated) DEVICE — Device: Brand: PROWATER

## (undated) DEVICE — GUIDEWIRE VASC L260CM DIA0.035IN TIP L3MM STD EXCHG PTFE J

## (undated) DEVICE — TUBING PRSS MON L6IN PVC M FEM CONN

## (undated) DEVICE — MEDI-TRACE CADENCE ADULT, DEFIBRILLATION ELECTRODE -RTS  (10 PR/PK) - PHYSIO-CONTROL: Brand: MEDI-TRACE CADENCE

## (undated) DEVICE — Device: Brand: EAGLE EYE PLATINUM RX DIGITAL IVUS CATHETER

## (undated) DEVICE — Z DUPLICATE USE 2103554 VALVE HEMOSTATIC BLEEDBK CTRL COPILOT

## (undated) DEVICE — PACK PROCEDURE SURG HRT CATH

## (undated) DEVICE — CATHETER ETER ANGIO L110CM OD5FR ID046IN L75CM 038IN 145DEG CARD

## (undated) DEVICE — PRESSURE MONITORING SET: Brand: TRUWAVE